# Patient Record
Sex: MALE | Race: WHITE | NOT HISPANIC OR LATINO | Employment: FULL TIME | URBAN - METROPOLITAN AREA
[De-identification: names, ages, dates, MRNs, and addresses within clinical notes are randomized per-mention and may not be internally consistent; named-entity substitution may affect disease eponyms.]

---

## 2018-06-14 ENCOUNTER — OFFICE VISIT (OUTPATIENT)
Dept: URGENT CARE | Facility: PHYSICIAN GROUP | Age: 53
End: 2018-06-14
Payer: OTHER GOVERNMENT

## 2018-06-14 ENCOUNTER — HOSPITAL ENCOUNTER (OUTPATIENT)
Dept: RADIOLOGY | Facility: MEDICAL CENTER | Age: 53
End: 2018-06-14
Attending: FAMILY MEDICINE
Payer: OTHER GOVERNMENT

## 2018-06-14 ENCOUNTER — HOSPITAL ENCOUNTER (OUTPATIENT)
Dept: LAB | Facility: MEDICAL CENTER | Age: 53
End: 2018-06-14
Attending: FAMILY MEDICINE
Payer: OTHER GOVERNMENT

## 2018-06-14 VITALS
OXYGEN SATURATION: 92 % | HEIGHT: 76 IN | RESPIRATION RATE: 12 BRPM | BODY MASS INDEX: 22.65 KG/M2 | SYSTOLIC BLOOD PRESSURE: 132 MMHG | TEMPERATURE: 97.7 F | WEIGHT: 186 LBS | HEART RATE: 71 BPM | DIASTOLIC BLOOD PRESSURE: 74 MMHG

## 2018-06-14 DIAGNOSIS — R05.9 COUGH: ICD-10-CM

## 2018-06-14 DIAGNOSIS — R60.0 LOWER EXTREMITY EDEMA: ICD-10-CM

## 2018-06-14 DIAGNOSIS — M79.89 CALF SWELLING: ICD-10-CM

## 2018-06-14 LAB
ANION GAP SERPL CALC-SCNC: 10 MMOL/L (ref 0–11.9)
BUN SERPL-MCNC: 13 MG/DL (ref 8–22)
CALCIUM SERPL-MCNC: 9.4 MG/DL (ref 8.5–10.5)
CHLORIDE SERPL-SCNC: 102 MMOL/L (ref 96–112)
CO2 SERPL-SCNC: 27 MMOL/L (ref 20–33)
CREAT SERPL-MCNC: 1.07 MG/DL (ref 0.5–1.4)
GLUCOSE SERPL-MCNC: 79 MG/DL (ref 65–99)
POTASSIUM SERPL-SCNC: 3.8 MMOL/L (ref 3.6–5.5)
SODIUM SERPL-SCNC: 139 MMOL/L (ref 135–145)

## 2018-06-14 PROCEDURE — 71046 X-RAY EXAM CHEST 2 VIEWS: CPT

## 2018-06-14 PROCEDURE — 36415 COLL VENOUS BLD VENIPUNCTURE: CPT

## 2018-06-14 PROCEDURE — 80048 BASIC METABOLIC PNL TOTAL CA: CPT

## 2018-06-14 PROCEDURE — 93971 EXTREMITY STUDY: CPT | Mod: LT

## 2018-06-14 PROCEDURE — 99214 OFFICE O/P EST MOD 30 MIN: CPT | Performed by: FAMILY MEDICINE

## 2018-06-14 RX ORDER — FUROSEMIDE 40 MG/1
40 TABLET ORAL DAILY
Qty: 3 TAB | Refills: 1 | Status: SHIPPED | OUTPATIENT
Start: 2018-06-14 | End: 2018-06-17

## 2018-06-14 ASSESSMENT — ENCOUNTER SYMPTOMS
EYE REDNESS: 0
WEIGHT LOSS: 0
SENSORY CHANGE: 0
FEVER: 0
EYE DISCHARGE: 0
FOCAL WEAKNESS: 0

## 2018-06-14 ASSESSMENT — PAIN SCALES - GENERAL: PAINLEVEL: NO PAIN

## 2018-06-14 NOTE — PROGRESS NOTES
"Subjective:      Tommy Mckeon is a 52 y.o. male who presents with Foot Swelling (Swelling in feet, ankles, and legs, dry cracked feet w/bleeding [id number])            1 year bilateral leg swelling. Waxes and wanes. Improves with compression. Swelling to mid leg. Left is worse than right. No orthopnea but coughs at night with apparent apnea episodes. No chest pain. No known CKD. No other aggravating or alleviating factors.            Review of Systems   Constitutional: Negative for fever and weight loss.   Eyes: Negative for discharge and redness.   Respiratory: Negative for hemoptysis.    Musculoskeletal: Negative for joint pain and myalgias.   Skin: Positive for itching (bilateral leg) and rash.   Neurological: Negative for sensory change and focal weakness.     .  Medications, Allergies, and current problem list reviewed today in Epic       Objective:     /74   Pulse 71   Temp 36.5 °C (97.7 °F)   Resp 12   Ht 1.93 m (6' 3.98\")   Wt 84.4 kg (186 lb)   SpO2 92%   BMI 22.65 kg/m²      Physical Exam   Constitutional: He appears well-developed and well-nourished. No distress.   HENT:   Head: Normocephalic and atraumatic.   Eyes: Conjunctivae are normal.   Neck: Neck supple.   Cardiovascular: Normal rate, regular rhythm and normal heart sounds.    Pulmonary/Chest: Effort normal and breath sounds normal.   Musculoskeletal: He exhibits edema (bilateral L>R. calf diameter 3cm L>R with mild tenderness, no cords. ).   Lymphadenopathy:     He has no cervical adenopathy.   Skin: Skin is warm and dry. No rash noted.               Assessment/Plan:   US no DVT  cxr per radiology:  1.  Left lower lobe airspace process which could be atelectasis or pneumonia    2.  No evidence for pulmonary edema    3.  Hiatal hernia    1. Lower extremity edema  US-EXTREMITY VENOUS UNILATERAL-LOWER LEFT    BASIC METABOLIC PANEL    furosemide (LASIX) 40 MG Tab   2. Calf swelling  US-EXTREMITY VENOUS UNILATERAL-LOWER LEFT   3. Cough  " DX-CHEST-2 VIEWS     Renal function ok for short duration diuresis and f/u with pcp  Suspect component of sleep apnea  Abnormal cxr results discussed with patient, no clinical evidence of pneumonia.

## 2018-06-19 ENCOUNTER — OFFICE VISIT (OUTPATIENT)
Dept: MEDICAL GROUP | Facility: PHYSICIAN GROUP | Age: 53
End: 2018-06-19
Payer: OTHER GOVERNMENT

## 2018-06-19 VITALS
OXYGEN SATURATION: 90 % | BODY MASS INDEX: 34.34 KG/M2 | HEART RATE: 97 BPM | TEMPERATURE: 98.1 F | DIASTOLIC BLOOD PRESSURE: 84 MMHG | WEIGHT: 282 LBS | RESPIRATION RATE: 18 BRPM | HEIGHT: 76 IN | SYSTOLIC BLOOD PRESSURE: 154 MMHG

## 2018-06-19 DIAGNOSIS — E66.9 OBESITY (BMI 30-39.9): ICD-10-CM

## 2018-06-19 DIAGNOSIS — Z23 NEED FOR TDAP VACCINATION: ICD-10-CM

## 2018-06-19 DIAGNOSIS — I10 ESSENTIAL HYPERTENSION: ICD-10-CM

## 2018-06-19 DIAGNOSIS — Z12.11 COLON CANCER SCREENING: ICD-10-CM

## 2018-06-19 DIAGNOSIS — M79.89 LEG SWELLING: ICD-10-CM

## 2018-06-19 DIAGNOSIS — R03.0 ELEVATED BLOOD PRESSURE READING: ICD-10-CM

## 2018-06-19 PROCEDURE — 90715 TDAP VACCINE 7 YRS/> IM: CPT | Performed by: INTERNAL MEDICINE

## 2018-06-19 PROCEDURE — 90471 IMMUNIZATION ADMIN: CPT | Performed by: INTERNAL MEDICINE

## 2018-06-19 PROCEDURE — 99214 OFFICE O/P EST MOD 30 MIN: CPT | Mod: 25 | Performed by: INTERNAL MEDICINE

## 2018-06-19 RX ORDER — FUROSEMIDE 40 MG/1
40 TABLET ORAL DAILY
COMMUNITY
End: 2018-06-19

## 2018-06-19 RX ORDER — LISINOPRIL AND HYDROCHLOROTHIAZIDE 12.5; 1 MG/1; MG/1
1 TABLET ORAL DAILY
Qty: 30 TAB | Refills: 3 | Status: SHIPPED | OUTPATIENT
Start: 2018-06-19 | End: 2018-09-18 | Stop reason: SDUPTHER

## 2018-06-19 ASSESSMENT — PATIENT HEALTH QUESTIONNAIRE - PHQ9: CLINICAL INTERPRETATION OF PHQ2 SCORE: 0

## 2018-06-19 NOTE — ASSESSMENT & PLAN NOTE
He has had bilateral leg swelling for several years but particularly worse in the past year. He was recently seen in urgent care with ultrasound recently that was negative for LLE DVT. He was also prescribed lasix and compression shoes and sleeping with his legs elevated. Denies chest pain, shortness of breath, orthopnea, palpitations. Does have shortness of breath though when he's exercising.

## 2018-06-19 NOTE — ASSESSMENT & PLAN NOTE
He says on his home scale he is fluctuating between 265-270 lbs. He does think that his pants are fitting better. He is trying to make dietary modifications and work out.

## 2018-06-19 NOTE — PROGRESS NOTES
PRIMARY CARE CLINIC NEW PATIENT H&P  Chief Complaint   Patient presents with   • Edema     bilateral legs and feet     History of Present Illness     Leg swelling  He has had bilateral leg swelling for several years but particularly worse in the past year. He was recently seen in urgent care with ultrasound recently that was negative for LLE DVT. He was also prescribed lasix and compression shoes and sleeping with his legs elevated. Denies chest pain, shortness of breath, orthopnea, palpitations. Does have shortness of breath though when he's exercising.     Obesity (BMI 30-39.9)  He says on his home scale he is fluctuating between 265-270 lbs. He does think that his pants are fitting better. He is trying to make dietary modifications and work out.     Elevated blood pressure reading  Blood pressure reading of 154/84 today. He doesn't have a blood pressure machine at home.     Current Outpatient Prescriptions   Medication Sig Dispense Refill   • lisinopril-hydrochlorothiazide (PRINZIDE, ZESTORETIC) 10-12.5 MG per tablet Take 1 Tab by mouth every day. 30 Tab 3     No current facility-administered medications for this visit.      Past Medical History:   Diagnosis Date   • Hypertension    • Obesity (BMI 30-39.9) 2016     Past Surgical History:   Procedure Laterality Date   • EYE SURGERY       Social History   Substance Use Topics   • Smoking status: Never Smoker   • Smokeless tobacco: Never Used   • Alcohol use 0.6 oz/week     1 Cans of beer per week      Comment: occassionally      Social History     Social History Narrative    Works in security      Family History   Problem Relation Age of Onset   • Diabetes Mother    • Stroke Mother      Family Status   Relation Status   • Mother    • Father     HX unknown   • Brother Alive     Allergies: Patient has no known allergies.    ROS  Constitutional: Negative for fatigue/generalized weakness.   HEENT: Negative for  vision changes, hearing changes   "  Respiratory: Negative for shortness of breath  Cardiovascular: Negative for chest pain, palpitations  Gastrointestinal: Negative for blood in stool, constipation, diarrhea  Genitourinary: Negative for dysuria, polyuria  Musculoskeletal: Negative for myalgias, back pain, and joint pain.   Skin: Negative for rash  Neurological: Negative for numbness, tingling  Psychiatric/Behavioral: Negative for depression, anxiety       Objective   Blood pressure 154/84, pulse 97, temperature 36.7 °C (98.1 °F), resp. rate 18, height 1.93 m (6' 3.98\"), weight (!) 127.9 kg (282 lb), SpO2 90 %. Body mass index is 34.34 kg/m².    General: Alert, oriented. In no acute distress   HEET: EOMI, PERRL, conjunctiva non-injected, sclera non-icteric.  Nares patent with no significant congestion or drainage.  Cecily pinnae, external auditory canals, TM pearly gray with normal light reflex bilaterally.Oral mucous membranes pink and moist with no lesions.  Neck: supple with no cervical, subclavicular lymphadenopathy, JVD, palpable thyroid nodules   Lungs: clear to auscultation bilaterally with good excursion.  CV: regular rate and rhythm.  Abdomen soft, non-distended, non-tender with normal bowel sounds. No hepatosplenomegaly, no masses palpated  Extremities: non-pitting bilateral lower extremity edema up to mid calves. Bilateral venous stasis changes   Psychiatric: appropriate mood and affect       Assessment and Plan   The following treatment plan was discussed     1. Leg swelling  Suspect that his lower extremity edema is from weight and uncontrolled hypertension. Will also check BNP (recent BMP with normal kidney function). Advised to continue compression stockings, work on weight loss, and will manage blood pressure as below. Asked him to stop the lasix.   - CBC WITH DIFFERENTIAL; Future  - LIPID PROFILE; Future  - B TYPE NATRIURETIC    2. BMI 34.0-34.9,adult  - Patient identified as having weight management issue.  Appropriate orders and " counseling given.    3. Colon cancer screening  Due for screening colonoscopy.   - REFERRAL TO GI FOR COLONOSCOPY    4. Essential hypertension  His blood pressure today is elevated and suspect he may have uncontrolled hypertension. Will start low dose Prinzide and advised obtaining a blood pressure cuff and keeping home blood pressure log (demonstrated proper technique) and will follow up within 4 weeks.   - lisinopril-hydrochlorothiazide (PRINZIDE, ZESTORETIC) 10-12.5 MG per tablet; Take 1 Tab by mouth every day.  Dispense: 30 Tab; Refill: 3    5. Need for Tdap vaccination  Given today.   - TDAP VACCINE =>6YO IM      Return in about 4 weeks (around 7/17/2018) for blood pressure management .    Health Maintenance      Health Maintenance Due   Topic Date Due   • IMM DTaP/Tdap/Td Vaccine (1 - Tdap) 10/05/1982   • COLONOSCOPY  10/05/2013       Trent Dsouza MD  Internal Medicine  [facility]

## 2018-06-21 ENCOUNTER — HOSPITAL ENCOUNTER (OUTPATIENT)
Dept: LAB | Facility: MEDICAL CENTER | Age: 53
End: 2018-06-21
Attending: INTERNAL MEDICINE
Payer: OTHER GOVERNMENT

## 2018-06-21 DIAGNOSIS — M79.89 LEG SWELLING: ICD-10-CM

## 2018-06-21 LAB
BASOPHILS # BLD AUTO: 1.4 % (ref 0–1.8)
BASOPHILS # BLD: 0.11 K/UL (ref 0–0.12)
CHOLEST SERPL-MCNC: 185 MG/DL (ref 100–199)
EOSINOPHIL # BLD AUTO: 0.22 K/UL (ref 0–0.51)
EOSINOPHIL NFR BLD: 2.8 % (ref 0–6.9)
ERYTHROCYTE [DISTWIDTH] IN BLOOD BY AUTOMATED COUNT: 46 FL (ref 35.9–50)
HCT VFR BLD AUTO: 52.1 % (ref 42–52)
HDLC SERPL-MCNC: 51 MG/DL
HGB BLD-MCNC: 17.4 G/DL (ref 14–18)
IMM GRANULOCYTES # BLD AUTO: 0.02 K/UL (ref 0–0.11)
IMM GRANULOCYTES NFR BLD AUTO: 0.3 % (ref 0–0.9)
LDLC SERPL CALC-MCNC: 114 MG/DL
LYMPHOCYTES # BLD AUTO: 3.06 K/UL (ref 1–4.8)
LYMPHOCYTES NFR BLD: 38.9 % (ref 22–41)
MCH RBC QN AUTO: 32 PG (ref 27–33)
MCHC RBC AUTO-ENTMCNC: 33.4 G/DL (ref 33.7–35.3)
MCV RBC AUTO: 95.9 FL (ref 81.4–97.8)
MONOCYTES # BLD AUTO: 0.8 K/UL (ref 0–0.85)
MONOCYTES NFR BLD AUTO: 10.2 % (ref 0–13.4)
NEUTROPHILS # BLD AUTO: 3.66 K/UL (ref 1.82–7.42)
NEUTROPHILS NFR BLD: 46.4 % (ref 44–72)
NRBC # BLD AUTO: 0 K/UL
NRBC BLD-RTO: 0 /100 WBC
PLATELET # BLD AUTO: 263 K/UL (ref 164–446)
PMV BLD AUTO: 11.1 FL (ref 9–12.9)
RBC # BLD AUTO: 5.43 M/UL (ref 4.7–6.1)
TRIGL SERPL-MCNC: 102 MG/DL (ref 0–149)
WBC # BLD AUTO: 7.9 K/UL (ref 4.8–10.8)

## 2018-06-21 PROCEDURE — 85025 COMPLETE CBC W/AUTO DIFF WBC: CPT

## 2018-06-21 PROCEDURE — 80061 LIPID PANEL: CPT

## 2018-06-21 PROCEDURE — 36415 COLL VENOUS BLD VENIPUNCTURE: CPT

## 2018-06-22 ASSESSMENT — ENCOUNTER SYMPTOMS
MYALGIAS: 0
HEMOPTYSIS: 0

## 2018-07-24 ENCOUNTER — OFFICE VISIT (OUTPATIENT)
Dept: MEDICAL GROUP | Facility: PHYSICIAN GROUP | Age: 53
End: 2018-07-24
Payer: OTHER GOVERNMENT

## 2018-07-24 VITALS
SYSTOLIC BLOOD PRESSURE: 138 MMHG | WEIGHT: 280 LBS | HEART RATE: 70 BPM | RESPIRATION RATE: 16 BRPM | TEMPERATURE: 97.5 F | DIASTOLIC BLOOD PRESSURE: 88 MMHG | BODY MASS INDEX: 34.1 KG/M2 | HEIGHT: 76 IN | OXYGEN SATURATION: 94 %

## 2018-07-24 DIAGNOSIS — D18.01 CHERRY ANGIOMA: ICD-10-CM

## 2018-07-24 DIAGNOSIS — M79.89 LEG SWELLING: ICD-10-CM

## 2018-07-24 PROBLEM — I10 HYPERTENSION: Status: ACTIVE | Noted: 2018-07-24

## 2018-07-24 PROCEDURE — 99213 OFFICE O/P EST LOW 20 MIN: CPT | Performed by: INTERNAL MEDICINE

## 2018-07-24 NOTE — PROGRESS NOTES
"PRIMARY CARE CLINIC FOLLOW UP VISIT  Chief Complaint   Patient presents with   • Results     labs   • Hypertension   • Edema     Lt Foot      History of Present Illness     Hypertension  He brings in his blood pressure log since he started Prinzide 10-12.5 on 2018. He is also going to the gym about 3 times a week and is doing well on the Prinzide.     Leg swelling  His right lower extremity is a lot better and left lower extremity swelling has also improved. He has been using compression socks when he goes to work and compression boots at home.     Cherry angioma  Wife points out several cherry angiomas on Tommy' back.     Current Outpatient Prescriptions   Medication Sig Dispense Refill   • lisinopril-hydrochlorothiazide (PRINZIDE, ZESTORETIC) 10-12.5 MG per tablet Take 1 Tab by mouth every day. 30 Tab 3     No current facility-administered medications for this visit.      Past Medical History:   Diagnosis Date   • Hypertension    • Obesity (BMI 30-39.9) 2016     Past Surgical History:   Procedure Laterality Date   • EYE SURGERY       Social History   Substance Use Topics   • Smoking status: Never Smoker   • Smokeless tobacco: Never Used   • Alcohol use 0.6 oz/week     1 Cans of beer per week      Comment: occassionally      Social History     Social History Narrative    Works in security      Family History   Problem Relation Age of Onset   • Diabetes Mother    • Stroke Mother      Family Status   Relation Status   • Mother    • Father     HX unknown   • Brother Alive     Allergies: Patient has no known allergies.    ROS  As per HPI above. All other systems reviewed and negative.        Objective   Blood pressure 138/88, pulse 70, temperature 36.4 °C (97.5 °F), resp. rate 16, height 1.93 m (6' 3.98\"), weight (!) 127 kg (280 lb), SpO2 94 %. Body mass index is 34.1 kg/m².    General: alert and oriented, pleasant, cooperative  HEENT: Normocephalic, atraumatic  Cardiovascular: regular " rate and rhythm, normal S1/S2  Pulmonary: lungs clear to auscultation bilaterally  Lymphatics: no cervical or supraclavicular lymphadenopathy   Skin: scattered cherry angiomas of back  Extremities: non-pitting bilateral lower extremity edema   Psychiatric: appropriate mood and affect. Good insight and appropriate judgment     Assessment and Plan   The following treatment plan was discussed     1. Leg swelling  Improving with blood pressure control and compression stockings. LLE DVT US in urgent care 6/2018 was negative. Advised him to continue current dose of Prinzide (blood pressure log scanned into media tab and SBPs ranging from 120-130 and look excellent). Advised continue weight reduction with diet and exercise. Follow up in 6 months.     2. Cherry angioma  Reassured these are benign.       Healthcare maintenance     Health Maintenance Due   Topic Date Due   • COLONOSCOPY  10/05/2013       Return in about 6 months (around 1/24/2019).    Trent Dsouza MD  Internal Medicine  [facility]

## 2018-07-24 NOTE — ASSESSMENT & PLAN NOTE
He brings in his blood pressure log since he started Prinzide 10-12.5 on 6/19/2018. He is also going to the gym about 3 times a week and is doing well on the Prinzide.

## 2018-07-24 NOTE — ASSESSMENT & PLAN NOTE
His right lower extremity is a lot better and left lower extremity swelling has also improved. He has been using compression socks when he goes to work and compression boots at home.

## 2018-08-29 ENCOUNTER — OFFICE VISIT (OUTPATIENT)
Dept: URGENT CARE | Facility: PHYSICIAN GROUP | Age: 53
End: 2018-08-29
Payer: OTHER GOVERNMENT

## 2018-08-29 VITALS
SYSTOLIC BLOOD PRESSURE: 104 MMHG | BODY MASS INDEX: 33.61 KG/M2 | HEIGHT: 76 IN | DIASTOLIC BLOOD PRESSURE: 78 MMHG | OXYGEN SATURATION: 98 % | HEART RATE: 79 BPM | TEMPERATURE: 98.4 F | WEIGHT: 276 LBS | RESPIRATION RATE: 18 BRPM

## 2018-08-29 DIAGNOSIS — B35.9 DERMATOPHYTOSIS: ICD-10-CM

## 2018-08-29 DIAGNOSIS — I87.2 VENOUS STASIS DERMATITIS OF BOTH LOWER EXTREMITIES: ICD-10-CM

## 2018-08-29 PROCEDURE — 99202 OFFICE O/P NEW SF 15 MIN: CPT | Performed by: EMERGENCY MEDICINE

## 2018-08-29 RX ORDER — CLOTRIMAZOLE AND BETAMETHASONE DIPROPIONATE 10; .64 MG/G; MG/G
1 CREAM TOPICAL 2 TIMES DAILY
Qty: 1 TUBE | Refills: 0 | Status: SHIPPED
Start: 2018-08-29 | End: 2020-01-28

## 2018-08-29 ASSESSMENT — ENCOUNTER SYMPTOMS
FEVER: 0
SENSORY CHANGE: 0

## 2018-08-30 NOTE — PROGRESS NOTES
"Subjective:      Tommy Mckeon is a 52 y.o. male who presents with Rash (on the back of the left ankle x 1 week and has increased in size)            Rash   This is a new problem. The current episode started in the past 7 days. The problem has been gradually worsening since onset. The affected locations include the left ankle. The rash is characterized by itchiness, peeling and scaling. He was exposed to nothing. Pertinent negatives include no fever. Past treatments include topical steroids. The treatment provided mild relief. There is no history of eczema.   Patient notes chronic lower extremity edema, uses compression stockings, OTC hydrocortisone. No trauma, notes weeping from lesion last 2 days.    Review of Systems   Constitutional: Negative for fever.   Skin: Positive for itching and rash.   Neurological: Negative for sensory change.     PMH:  has a past medical history of Hypertension and Obesity (BMI 30-39.9) (4/19/2016).  MEDS:   Current Outpatient Prescriptions:   •  clotrimazole-betamethasone (LOTRISONE) 1-0.05 % Cream, Apply 1 Application to affected area(s) 2 times a day., Disp: 1 Tube, Rfl: 0  •  lisinopril-hydrochlorothiazide (PRINZIDE, ZESTORETIC) 10-12.5 MG per tablet, Take 1 Tab by mouth every day., Disp: 30 Tab, Rfl: 3  ALLERGIES: No Known Allergies  SURGHX:   Past Surgical History:   Procedure Laterality Date   • EYE SURGERY  2015     SOCHX:  reports that he has never smoked. He has never used smokeless tobacco. He reports that he drinks about 0.6 oz of alcohol per week . He reports that he does not use drugs.  FH: family history includes Diabetes in his mother; Stroke in his mother.       Objective:     /78   Pulse 79   Temp 36.9 °C (98.4 °F)   Resp 18   Ht 1.93 m (6' 4\")   Wt (!) 125.2 kg (276 lb)   SpO2 98%   BMI 33.60 kg/m²      Physical Exam   Constitutional: Vital signs are normal. He appears well-developed and well-nourished. He is cooperative. He does not appear ill. No " distress.   Cardiovascular: Normal rate, regular rhythm and normal heart sounds.    No calf tenderness, Homans sign negative.   Pulmonary/Chest: Effort normal and breath sounds normal.   Lymphadenopathy:   No lymphangitis   Neurological: He is alert.   Distal sensation to light touch and pressure intact.   Skin: Skin is dry. Rash noted.        Bilateral lower extremity edema with mild hyperpigmentation, scaliness.   Psychiatric: He has a normal mood and affect.               Assessment/Plan:     1. Dermatophytosis  PCP follow up if not resolving  - clotrimazole-betamethasone (LOTRISONE) 1-0.05 % Cream; Apply 1 Application to affected area(s) 2 times a day.  Dispense: 1 Tube; Refill: 0    2. Venous stasis dermatitis of both lower extremities  Compression stockings, CeraVe topical

## 2018-09-18 ENCOUNTER — OFFICE VISIT (OUTPATIENT)
Dept: MEDICAL GROUP | Facility: PHYSICIAN GROUP | Age: 53
End: 2018-09-18
Payer: OTHER GOVERNMENT

## 2018-09-18 VITALS
SYSTOLIC BLOOD PRESSURE: 124 MMHG | BODY MASS INDEX: 33.36 KG/M2 | WEIGHT: 274 LBS | DIASTOLIC BLOOD PRESSURE: 78 MMHG | HEART RATE: 82 BPM | OXYGEN SATURATION: 96 % | TEMPERATURE: 97.3 F | RESPIRATION RATE: 16 BRPM | HEIGHT: 76 IN

## 2018-09-18 DIAGNOSIS — R05.9 COUGH: ICD-10-CM

## 2018-09-18 DIAGNOSIS — R21 RASH: ICD-10-CM

## 2018-09-18 DIAGNOSIS — I10 ESSENTIAL HYPERTENSION: ICD-10-CM

## 2018-09-18 PROCEDURE — 99213 OFFICE O/P EST LOW 20 MIN: CPT | Performed by: INTERNAL MEDICINE

## 2018-09-18 RX ORDER — OMEPRAZOLE 20 MG/1
20 CAPSULE, DELAYED RELEASE ORAL DAILY
Qty: 60 CAP | Refills: 0 | Status: SHIPPED | OUTPATIENT
Start: 2018-09-18 | End: 2018-12-03 | Stop reason: SDUPTHER

## 2018-09-18 RX ORDER — LISINOPRIL AND HYDROCHLOROTHIAZIDE 12.5; 1 MG/1; MG/1
1 TABLET ORAL DAILY
Qty: 90 TAB | Refills: 1 | Status: SHIPPED | OUTPATIENT
Start: 2018-09-18 | End: 2019-04-01 | Stop reason: SDUPTHER

## 2018-09-18 NOTE — ASSESSMENT & PLAN NOTE
Having a raspy cough at night so he has to sit up to sleep. Sometimes has acid reflux. Used to be able to eat things like chili but cannot tolerate these foods any longer. Nocturnal coughing can be so violent he has to move to the cough downstairs to prop himself up to sleep. Eats dinner around 5 pm and goes to bed around 10 pm. His wife has bought him a foam angle to help prop himself up but he doesn't always use it.

## 2018-09-18 NOTE — ASSESSMENT & PLAN NOTE
Was seen 8/29/2018 in urgent care for a rash behind her left ankle and prescribed Lotrisone. His weeping rash has healed well with the treatment that was prescribed.

## 2018-09-18 NOTE — PROGRESS NOTES
PRIMARY CARE CLINIC FOLLOW UP VISIT  Chief Complaint   Patient presents with   • Cough     nightly    • Rash     Lt foot-[facility] Follow up    • Hypertension     lisinopril-hydrochlorothiazide      History of Present Illness     Rash  Was seen 2018 in urgent care for a rash behind her left ankle and prescribed Lotrisone. His weeping rash has healed well with the treatment that was prescribed.     Cough  Having a raspy cough at night so he has to sit up to sleep. Sometimes has acid reflux. Used to be able to eat things like chili but cannot tolerate these foods any longer. Nocturnal coughing can be so violent he has to move to the cough downstairs to prop himself up to sleep. Eats dinner around 5 pm and goes to bed around 10 pm. His wife has bought him a foam angle to help prop himself up but he doesn't always use it.     Current Outpatient Prescriptions   Medication Sig Dispense Refill   • lisinopril-hydrochlorothiazide (PRINZIDE, ZESTORETIC) 10-12.5 MG per tablet Take 1 Tab by mouth every day. 90 Tab 1   • omeprazole (PRILOSEC) 20 MG delayed-release capsule Take 1 Cap by mouth every day. Take 30 minutes before breakfast 60 Cap 0   • clotrimazole-betamethasone (LOTRISONE) 1-0.05 % Cream Apply 1 Application to affected area(s) 2 times a day. 1 Tube 0     No current facility-administered medications for this visit.      Past Medical History:   Diagnosis Date   • Hypertension    • Obesity (BMI 30-39.9) 2016     Past Surgical History:   Procedure Laterality Date   • EYE SURGERY       Social History   Substance Use Topics   • Smoking status: Never Smoker   • Smokeless tobacco: Never Used   • Alcohol use 0.6 oz/week     1 Cans of beer per week      Comment: occassionally      Social History     Social History Narrative    Works in security      Family History   Problem Relation Age of Onset   • Diabetes Mother    • Stroke Mother      Family Status   Relation Status   • Mo    • Fa         HX unknown  "  • Bro Alive     Allergies: Patient has no known allergies.    ROS  As per HPI above. All other systems reviewed and negative.        Objective   Blood pressure 124/78, pulse 82, temperature 36.3 °C (97.3 °F), resp. rate 16, height 1.93 m (6' 3.98\"), weight 124.3 kg (274 lb), SpO2 96 %. Body mass index is 33.37 kg/m².    General: alert and oriented, pleasant, cooperative  HEENT: Normocephalic, atraumatic.   Skin: dry, crackled skin of left ankle/heel   Psychiatric: appropriate mood and affect. Good insight and appropriate judgment     Assessment and Plan   The following treatment plan was discussed     1. Rash  His rash is healing well with the lotrisone prescribed by urgent care. Advised to continue care with moisturizing.     2. Essential hypertension  Well controlled on Prinzide, continue present dose.   - lisinopril-hydrochlorothiazide (PRINZIDE, ZESTORETIC) 10-12.5 MG per tablet; Take 1 Tab by mouth every day.  Dispense: 90 Tab; Refill: 1    3. Cough  His nocturnal cough symptoms could very well be GERD related. Advised to identify and avoid particular food triggers and trial 4-6 weeks of PPI.   - omeprazole (PRILOSEC) 20 MG delayed-release capsule; Take 1 Cap by mouth every day. Take 30 minutes before breakfast  Dispense: 60 Cap; Refill: 0    Healthcare maintenance     There are no preventive care reminders to display for this patient.    Return if symptoms worsen or fail to improve.    Trent Dsouza MD  Internal Medicine  [facility]                   "

## 2018-12-03 DIAGNOSIS — R05.9 COUGH: ICD-10-CM

## 2018-12-04 RX ORDER — OMEPRAZOLE 20 MG/1
CAPSULE, DELAYED RELEASE ORAL
Qty: 90 CAP | Refills: 1 | Status: SHIPPED | OUTPATIENT
Start: 2018-12-04 | End: 2019-07-12 | Stop reason: SDUPTHER

## 2018-12-04 NOTE — TELEPHONE ENCOUNTER
Was the patient seen in the last year in this department? Yes    Does patient have an active prescription for medications requested? No     Received Request Via: Pharmacy      Pt met protocol?: Yes. Last 9/18/18  Last labs 6/18

## 2019-04-01 DIAGNOSIS — I10 ESSENTIAL HYPERTENSION: ICD-10-CM

## 2019-04-02 NOTE — TELEPHONE ENCOUNTER
Was the patient seen in the last year in this department? Yes    Does patient have an active prescription for medications requested? No     Received Request Via: Pharmacy      Pt met protocol?: No, OV 9/18   BP Readings from Last 1 Encounters:   09/18/18 124/78

## 2019-04-03 RX ORDER — LISINOPRIL AND HYDROCHLOROTHIAZIDE 12.5; 1 MG/1; MG/1
TABLET ORAL
Qty: 90 TAB | Refills: 1 | Status: SHIPPED | OUTPATIENT
Start: 2019-04-03 | End: 2020-01-13

## 2019-04-03 NOTE — TELEPHONE ENCOUNTER
Refill X 6 months, sent to pharmacy.Pt. Seen in the last 6 months per protocol.   Lab Results   Component Value Date/Time    SODIUM 139 06/14/2018 12:30 PM    POTASSIUM 3.8 06/14/2018 12:30 PM    CHLORIDE 102 06/14/2018 12:30 PM    CO2 27 06/14/2018 12:30 PM    GLUCOSE 79 06/14/2018 12:30 PM    BUN 13 06/14/2018 12:30 PM    CREATININE 1.07 06/14/2018 12:30 PM

## 2019-07-12 DIAGNOSIS — R05.9 COUGH: ICD-10-CM

## 2019-07-15 RX ORDER — OMEPRAZOLE 20 MG/1
CAPSULE, DELAYED RELEASE ORAL
Qty: 90 CAP | Refills: 0 | Status: SHIPPED | OUTPATIENT
Start: 2019-07-15 | End: 2019-10-20 | Stop reason: SDUPTHER

## 2019-10-20 DIAGNOSIS — R05.9 COUGH: ICD-10-CM

## 2019-10-22 RX ORDER — OMEPRAZOLE 20 MG/1
CAPSULE, DELAYED RELEASE ORAL
Qty: 90 CAP | Refills: 0 | Status: SHIPPED | OUTPATIENT
Start: 2019-10-22 | End: 2020-01-28 | Stop reason: SDUPTHER

## 2020-01-28 ENCOUNTER — HOSPITAL ENCOUNTER (OUTPATIENT)
Dept: LAB | Facility: MEDICAL CENTER | Age: 55
End: 2020-01-28
Attending: NURSE PRACTITIONER
Payer: OTHER GOVERNMENT

## 2020-01-28 ENCOUNTER — OFFICE VISIT (OUTPATIENT)
Dept: MEDICAL GROUP | Facility: PHYSICIAN GROUP | Age: 55
End: 2020-01-28
Payer: OTHER GOVERNMENT

## 2020-01-28 VITALS
TEMPERATURE: 97.3 F | HEART RATE: 80 BPM | OXYGEN SATURATION: 94 % | RESPIRATION RATE: 16 BRPM | HEIGHT: 76 IN | WEIGHT: 272 LBS | DIASTOLIC BLOOD PRESSURE: 78 MMHG | SYSTOLIC BLOOD PRESSURE: 120 MMHG | BODY MASS INDEX: 33.12 KG/M2

## 2020-01-28 DIAGNOSIS — E78.5 DYSLIPIDEMIA: ICD-10-CM

## 2020-01-28 DIAGNOSIS — I10 ESSENTIAL HYPERTENSION: ICD-10-CM

## 2020-01-28 DIAGNOSIS — Z11.59 NEED FOR HEPATITIS C SCREENING TEST: ICD-10-CM

## 2020-01-28 DIAGNOSIS — E55.9 AVITAMINOSIS D: ICD-10-CM

## 2020-01-28 DIAGNOSIS — Z12.5 SCREENING PSA (PROSTATE SPECIFIC ANTIGEN): ICD-10-CM

## 2020-01-28 DIAGNOSIS — R05.9 COUGH: ICD-10-CM

## 2020-01-28 LAB
25(OH)D3 SERPL-MCNC: 40 NG/ML (ref 30–100)
ALBUMIN SERPL BCP-MCNC: 4.2 G/DL (ref 3.2–4.9)
ALBUMIN/GLOB SERPL: 1.3 G/DL
ALP SERPL-CCNC: 29 U/L (ref 30–99)
ALT SERPL-CCNC: 31 U/L (ref 2–50)
ANION GAP SERPL CALC-SCNC: 10 MMOL/L (ref 0–11.9)
AST SERPL-CCNC: 31 U/L (ref 12–45)
BASOPHILS # BLD AUTO: 1.2 % (ref 0–1.8)
BASOPHILS # BLD: 0.08 K/UL (ref 0–0.12)
BILIRUB SERPL-MCNC: 0.6 MG/DL (ref 0.1–1.5)
BUN SERPL-MCNC: 18 MG/DL (ref 8–22)
CALCIUM SERPL-MCNC: 9.8 MG/DL (ref 8.5–10.5)
CHLORIDE SERPL-SCNC: 103 MMOL/L (ref 96–112)
CHOLEST SERPL-MCNC: 187 MG/DL (ref 100–199)
CO2 SERPL-SCNC: 27 MMOL/L (ref 20–33)
COMMENT 1642: NORMAL
CREAT SERPL-MCNC: 1.07 MG/DL (ref 0.5–1.4)
EOSINOPHIL # BLD AUTO: 0.41 K/UL (ref 0–0.51)
EOSINOPHIL NFR BLD: 6.3 % (ref 0–6.9)
ERYTHROCYTE [DISTWIDTH] IN BLOOD BY AUTOMATED COUNT: 46.5 FL (ref 35.9–50)
FASTING STATUS PATIENT QL REPORTED: NORMAL
GLOBULIN SER CALC-MCNC: 3.3 G/DL (ref 1.9–3.5)
GLUCOSE SERPL-MCNC: 83 MG/DL (ref 65–99)
HCT VFR BLD AUTO: 50.2 % (ref 42–52)
HCV AB SER QL: NEGATIVE
HDLC SERPL-MCNC: 51 MG/DL
HGB BLD-MCNC: 16.9 G/DL (ref 14–18)
IMM GRANULOCYTES # BLD AUTO: 0.02 K/UL (ref 0–0.11)
IMM GRANULOCYTES NFR BLD AUTO: 0.3 % (ref 0–0.9)
LDLC SERPL CALC-MCNC: 112 MG/DL
LYMPHOCYTES # BLD AUTO: 2.07 K/UL (ref 1–4.8)
LYMPHOCYTES NFR BLD: 31.9 % (ref 22–41)
MCH RBC QN AUTO: 32.9 PG (ref 27–33)
MCHC RBC AUTO-ENTMCNC: 33.7 G/DL (ref 33.7–35.3)
MCV RBC AUTO: 97.7 FL (ref 81.4–97.8)
MONOCYTES # BLD AUTO: 0.56 K/UL (ref 0–0.85)
MONOCYTES NFR BLD AUTO: 8.6 % (ref 0–13.4)
MORPHOLOGY BLD-IMP: NORMAL
NEUTROPHILS # BLD AUTO: 3.34 K/UL (ref 1.82–7.42)
NEUTROPHILS NFR BLD: 51.7 % (ref 44–72)
NRBC # BLD AUTO: 0 K/UL
NRBC BLD-RTO: 0 /100 WBC
PLATELET # BLD AUTO: 205 K/UL (ref 164–446)
PMV BLD AUTO: 12.1 FL (ref 9–12.9)
POTASSIUM SERPL-SCNC: 4 MMOL/L (ref 3.6–5.5)
PROT SERPL-MCNC: 7.5 G/DL (ref 6–8.2)
PSA SERPL-MCNC: 1.01 NG/ML (ref 0–4)
RBC # BLD AUTO: 5.14 M/UL (ref 4.7–6.1)
SODIUM SERPL-SCNC: 140 MMOL/L (ref 135–145)
TRIGL SERPL-MCNC: 119 MG/DL (ref 0–149)
WBC # BLD AUTO: 6.5 K/UL (ref 4.8–10.8)

## 2020-01-28 PROCEDURE — 80053 COMPREHEN METABOLIC PANEL: CPT

## 2020-01-28 PROCEDURE — 80061 LIPID PANEL: CPT

## 2020-01-28 PROCEDURE — 85025 COMPLETE CBC W/AUTO DIFF WBC: CPT

## 2020-01-28 PROCEDURE — 82306 VITAMIN D 25 HYDROXY: CPT

## 2020-01-28 PROCEDURE — 86803 HEPATITIS C AB TEST: CPT

## 2020-01-28 PROCEDURE — 99214 OFFICE O/P EST MOD 30 MIN: CPT | Performed by: NURSE PRACTITIONER

## 2020-01-28 PROCEDURE — 84153 ASSAY OF PSA TOTAL: CPT

## 2020-01-28 PROCEDURE — 36415 COLL VENOUS BLD VENIPUNCTURE: CPT

## 2020-01-28 RX ORDER — OMEPRAZOLE 20 MG/1
CAPSULE, DELAYED RELEASE ORAL
Qty: 90 CAP | Refills: 3 | Status: SHIPPED | OUTPATIENT
Start: 2020-01-28 | End: 2020-10-26 | Stop reason: SDUPTHER

## 2020-01-28 RX ORDER — HYDROCODONE BITARTRATE AND ACETAMINOPHEN 5; 325 MG/1; MG/1
TABLET ORAL
COMMUNITY
Start: 2019-10-31 | End: 2020-01-28

## 2020-01-28 RX ORDER — LISINOPRIL AND HYDROCHLOROTHIAZIDE 12.5; 1 MG/1; MG/1
1 TABLET ORAL DAILY
Qty: 90 TAB | Refills: 3 | Status: SHIPPED | OUTPATIENT
Start: 2020-01-28 | End: 2020-10-26 | Stop reason: SDUPTHER

## 2020-01-28 ASSESSMENT — PATIENT HEALTH QUESTIONNAIRE - PHQ9: CLINICAL INTERPRETATION OF PHQ2 SCORE: 0

## 2020-01-28 NOTE — PROGRESS NOTES
Chief Complaint   Patient presents with   • Medication Management     Rx Check        HISTORY OF PRESENT ILLNESS: Patient is a 54 y.o. male established patient who presents today to discuss the following issues:    Hypertension  Chronic in nature.  Stable on meds.  Due for labs and refills.    Cough  Would like refills of prilosec.    BMI 33.0-33.9,adult  Patient is aware of BMI elevation.  Brief discussion of diet, exercise, and lifestyle modification.      Dyslipidemia  Due for labs.      Patient Active Problem List    Diagnosis Date Noted   • Dyslipidemia 2020   • Rash 2018   • Cough 2018   • Hypertension 2018   • BMI 33.0-33.9,adult 2016       Allergies:Patient has no known allergies.    Current Outpatient Medications   Medication Sig Dispense Refill   • lisinopril-hydrochlorothiazide (PRINZIDE) 10-12.5 MG per tablet Take 1 Tab by mouth every day. 90 Tab 3   • omeprazole (PRILOSEC) 20 MG delayed-release capsule TAKE 1 CAPSULE BY MOUTH ONCE DAILY 90 Cap 3     No current facility-administered medications for this visit.        Social History     Tobacco Use   • Smoking status: Never Smoker   • Smokeless tobacco: Never Used   Substance Use Topics   • Alcohol use: Yes     Alcohol/week: 0.6 oz     Types: 1 Cans of beer per week     Comment: occassionally    • Drug use: No       Family Status   Relation Name Status   • Mo     • Fa          HX unknown   • Bro  Alive     Family History   Problem Relation Age of Onset   • Diabetes Mother    • Stroke Mother        Review of Systems:   Constitutional: Negative for fever, chills, weight loss and malaise/fatigue.   HENT: Negative for ear pain, nosebleeds, congestion, sore throat and neck pain.    Eyes: Negative for blurred vision.   Respiratory: Negative for cough, sputum production, shortness of breath and wheezing.    Cardiovascular: Negative for chest pain, palpitations, orthopnea and leg swelling.   Gastrointestinal: Negative  "for nausea, vomiting and abdominal pain. Positive for stable heartburn  Genitourinary: Negative for dysuria, urgency and frequency.   Musculoskeletal: Negative for myalgias, joint pain, and back pain.  Skin: Negative for rash and itching.   Neurological: Negative for dizziness, tingling, tremors, sensory change, focal weakness and headaches.   Endo/Heme/Allergies: Does not bruise/bleed easily.   Psychiatric/Behavioral: Negative for depression, suicidal ideas and memory loss.  The patient is not nervous/anxious and does not have insomnia.    All other systems reviewed and are negative except as in HPI.    Exam:  Blood Pressure 120/78   Pulse 80   Temperature 36.3 °C (97.3 °F)   Respiration 16   Height 1.93 m (6' 4\")   Weight 123.4 kg (272 lb)   Oxygen Saturation 94%   General:  Well nourished, well developed male in NAD  Head: Grossly normal.  Neck: Supple without JVD or bruit. Thyroid is not enlarged.  Pulmonary: Clear to ausculation. Normal effort. No rales, ronchi, or wheezing.  Cardiovascular: Regular rate and rhythm without murmur.   Abdomen:  Soft, nontender, nondistended.  Extremities: No clubbing, cyanosis, or edema.  Skin: Intact with no obvious rashes or lesions.  Neuro: Grossly intact.  Psych: Alert and oriented x 3.  Mood and affect appropriate.    Medical decision-making and discussion: Tommy is here today to discuss a few things.  Lab work was ordered, his medications were refilled, and he will follow-up here as needed.          Assessment/Plan:  1. Essential hypertension  lisinopril-hydrochlorothiazide (PRINZIDE) 10-12.5 MG per tablet    CBC WITH DIFFERENTIAL    Comp Metabolic Panel   2. Cough  omeprazole (PRILOSEC) 20 MG delayed-release capsule   3. BMI 33.0-33.9,adult  Patient identified as having weight management issue.  Appropriate orders and counseling given.   4. Screening PSA (prostate specific antigen)  PROSTATE SPECIFIC AG SCREENING   5. Avitaminosis D  VITAMIN D,25 HYDROXY   6. " Dyslipidemia  Lipid Profile   7. Need for hepatitis C screening test  HEP C VIRUS ANTIBODY       Return if symptoms worsen or fail to improve.    Please note that this dictation was created using voice recognition software. I have made every reasonable attempt to correct obvious errors, but I expect that there are errors of grammar and possibly content that I did not discover before finalizing the note.

## 2020-02-02 PROBLEM — E78.5 DYSLIPIDEMIA: Status: ACTIVE | Noted: 2020-02-02

## 2020-03-10 ENCOUNTER — TELEPHONE (OUTPATIENT)
Dept: MEDICAL GROUP | Facility: PHYSICIAN GROUP | Age: 55
End: 2020-03-10

## 2020-03-10 NOTE — TELEPHONE ENCOUNTER
1. Caller Name: Tommy                          Call Back Number: [phone] (home)           2.  Does patient have any active symptoms of respiratory illness (fever OR cough OR shortness of breath)?     Unable to contact, sahram for pt to call the office back at [phone]

## 2020-03-12 ENCOUNTER — OFFICE VISIT (OUTPATIENT)
Dept: MEDICAL GROUP | Facility: PHYSICIAN GROUP | Age: 55
End: 2020-03-12
Payer: OTHER GOVERNMENT

## 2020-03-12 DIAGNOSIS — J06.9 UPPER RESPIRATORY TRACT INFECTION, UNSPECIFIED TYPE: ICD-10-CM

## 2020-03-12 NOTE — PROGRESS NOTES
Patient has an appointment with me today.   However the patient reported having respiratory symptoms and therefore the patient was sent to Respiratory Urgent care at  [address] [place] [address] for further evaluation.  Pt was not seen by this provider today.

## 2020-03-12 NOTE — PROGRESS NOTES
Patient has an appointment with me today.   However the patient reported having respiratory symptoms and therefore the patient was sent to [facility] at  [address] [place] [address] for further evaluation.  Pt was not seen by this provider today.

## 2020-03-24 ENCOUNTER — TELEPHONE (OUTPATIENT)
Dept: MEDICAL GROUP | Facility: PHYSICIAN GROUP | Age: 55
End: 2020-03-24

## 2020-03-24 NOTE — TELEPHONE ENCOUNTER
1. Caller Name: Tommy                          Call Back Number: [phone] (home)           2.  Does patient have any active symptoms of respiratory illness (fever OR cough OR shortness of breath)?     Left detailed message to call if pt is having respiratory symptoms and if not to proceed with appt.

## 2020-03-26 ENCOUNTER — OFFICE VISIT (OUTPATIENT)
Dept: MEDICAL GROUP | Facility: PHYSICIAN GROUP | Age: 55
End: 2020-03-26
Payer: OTHER GOVERNMENT

## 2020-03-26 VITALS
SYSTOLIC BLOOD PRESSURE: 106 MMHG | WEIGHT: 266.8 LBS | DIASTOLIC BLOOD PRESSURE: 76 MMHG | HEIGHT: 76 IN | OXYGEN SATURATION: 94 % | HEART RATE: 96 BPM | TEMPERATURE: 98.1 F | BODY MASS INDEX: 32.49 KG/M2 | RESPIRATION RATE: 16 BRPM

## 2020-03-26 DIAGNOSIS — I10 HYPERTENSION, ESSENTIAL: ICD-10-CM

## 2020-03-26 DIAGNOSIS — I15.9 SECONDARY HYPERTENSION: ICD-10-CM

## 2020-03-26 DIAGNOSIS — K63.5 POLYP OF COLON, UNSPECIFIED PART OF COLON, UNSPECIFIED TYPE: ICD-10-CM

## 2020-03-26 DIAGNOSIS — E78.5 DYSLIPIDEMIA: ICD-10-CM

## 2020-03-26 DIAGNOSIS — K21.9 GASTROESOPHAGEAL REFLUX DISEASE, ESOPHAGITIS PRESENCE NOT SPECIFIED: ICD-10-CM

## 2020-03-26 PROBLEM — R21 RASH: Status: RESOLVED | Noted: 2018-09-18 | Resolved: 2020-03-26

## 2020-03-26 PROBLEM — R05.9 COUGH: Status: RESOLVED | Noted: 2018-09-18 | Resolved: 2020-03-26

## 2020-03-26 PROCEDURE — 99214 OFFICE O/P EST MOD 30 MIN: CPT | Performed by: INTERNAL MEDICINE

## 2020-03-26 ASSESSMENT — FIBROSIS 4 INDEX: FIB4 SCORE: 1.52

## 2020-03-26 NOTE — ASSESSMENT & PLAN NOTE
Patient had colonoscopy done August 2018 which showed 2 polyps.  GI recommend to repeat the study August 2023.  He denies any  GI symptoms.

## 2020-03-26 NOTE — PROGRESS NOTES
CC: BP check  Meds reviewed         HPI: 54 y.o. Patient presents to discuss the following:     BMI 33.0-33.9,adult  This is a chronic and stable condition.  His weight has remained essentially unchanged.  Recommend the patient to start/continue a healthy diet.  Also encouraged regular exercise activities.    Hypertension  This is a chronic and stable condition.  The patient is currently taking lisinopril-hydrochlorothiazide.  Blood pressure is well controlled at home per patient report.  No side effect reported.  Patient had blood test done recently including CBC and chemistry panel which was unremarkable.    Dyslipidemia  This is a chronic and stable condition.  The LDL cholesterol is slightly elevated as noted recent recent blood test which is stable compared to previous result    GERD (gastroesophageal reflux disease)  This is a chronic and stable condition.  The patient takes omeprazole as needed.  He denies nausea vomiting abdominal pain unexplained weight loss dysphagia or odynophagia    Colonic polyp  Patient had colonoscopy done August 2018 which showed 2 polyps.  GI recommend to repeat the study August 2023.  He denies any  GI symptoms.              REVIEW OF SYSTEMS:  Constitutional:  no fever / chills   Neurologic: no headaches  Eyes: no changes in vision  ENT: no sore throat, no hearing loss  CV:  no chest pain, no palpitations  Pulmonary: no SOB, no cough      Allergies: Patient has no known allergies.    Current Outpatient Medications Ordered in Epic   Medication Sig Dispense Refill   • lisinopril-hydrochlorothiazide (PRINZIDE) 10-12.5 MG per tablet Take 1 Tab by mouth every day. 90 Tab 3   • omeprazole (PRILOSEC) 20 MG delayed-release capsule TAKE 1 CAPSULE BY MOUTH ONCE DAILY 90 Cap 3     No current Epic-ordered facility-administered medications on file.        Past Medical History:   Diagnosis Date   • Hypertension    • Obesity (BMI 30-39.9) 4/19/2016        Past Surgical History:   Procedure  Laterality Date   • EYE SURGERY  2015        Family History   Problem Relation Age of Onset   • Diabetes Mother    • Stroke Mother         Social History     Tobacco Use   Smoking Status Never Smoker   Smokeless Tobacco Never Used          Social History     Substance and Sexual Activity   Alcohol Use Yes   • Alcohol/week: 0.6 oz   • Types: 1 Cans of beer per week    Comment: occassionally         ---------------------------------------------------------------------     PHYSICAL EXAM:  Vitals:    03/26/20 0723   BP: 106/76   Pulse: 96   Resp: 16   Temp: 36.7 °C (98.1 °F)   SpO2: 94%      Body mass index is 32.48 kg/m².        Psych: A&O x 3, mood and affect appropiate  Constitution: no acute distress  Eyes: EOMI, PERRL  Neck: supple, no LN or thyromegaly  Respiratory: normal effort, no wheezing  rales or rhonchi  CV: heart RRR  GI: abdomen is soft, nontender, no obvious mass  Skin no rash no  Neuro cranial nerves II to XII grossly intact    ---------------------------------------------------------------------     ASSESSMENT and PLAN:     1. Gastroesophageal reflux disease, esophagitis presence not specified  Chronic stable condition.  Continue with omeprazole.    2. Polyp of colon, unspecified part of colon, unspecified type  Recommend repeat colonoscopy August 2023.  Continue to monitor      3. Hypertension, essential  Chronic stable condition.  Continue with current med  Advised the patient to continue to monitor blood pressure at home.  Recommend sodium restriction.  - Basic Metabolic Panel; Future  - Lipid Profile; Future  - ALANINE AMINO-TRANS; Future  - PROSTATE SPECIFIC AG SCREENING; Future  - MICROALBUMIN CREAT RATIO URINE; Future    5. BMI 33.0-33.9,adult  Advised the patient healthy diet and regular exercise.  The patient will try to lose some weight    6. Dyslipidemia  LDL cholesterol slightly elevated.  Continue to monitor              No follow-ups on file.       PATIENT EDUCATION:  -If any problems  should arise, patient was advised to contact our office or go to ER to be evaluated.  -The pertinent physical findings, assessments, and plans of care were discussed with the patient. Patient verbalized understanding.  -Advised pt to follow a healthy diet and regular aerobic exercise regimen. Advised pt to avoid alcohol and tobacco use.    Please note that this dictation was created using voice recognition software. I have made every reasonable attempt to correct obvious errors, but it is possible there are errors of grammar and possibly content that I did not discover before finalizing the note.

## 2020-03-26 NOTE — ASSESSMENT & PLAN NOTE
This is a chronic and stable condition.  His weight has remained essentially unchanged.  Recommend the patient to start/continue a healthy diet.  Also encouraged regular exercise activities.

## 2020-03-26 NOTE — ASSESSMENT & PLAN NOTE
This is a chronic and stable condition.  The patient is currently taking lisinopril-hydrochlorothiazide.  Blood pressure is well controlled at home per patient report.  No side effect reported.  Patient had blood test done recently including CBC and chemistry panel which was unremarkable.

## 2020-03-26 NOTE — ASSESSMENT & PLAN NOTE
This is a chronic and stable condition.  The LDL cholesterol is slightly elevated as noted recent recent blood test which is stable compared to previous result

## 2020-03-26 NOTE — ASSESSMENT & PLAN NOTE
This is a chronic and stable condition.  The patient takes omeprazole as needed.  He denies nausea vomiting abdominal pain unexplained weight loss dysphagia or odynophagia

## 2020-10-12 ENCOUNTER — HOSPITAL ENCOUNTER (OUTPATIENT)
Dept: LAB | Facility: MEDICAL CENTER | Age: 55
End: 2020-10-12
Attending: INTERNAL MEDICINE
Payer: OTHER GOVERNMENT

## 2020-10-12 DIAGNOSIS — I10 HYPERTENSION, ESSENTIAL: ICD-10-CM

## 2020-10-12 LAB
ALT SERPL-CCNC: 17 U/L (ref 2–50)
ANION GAP SERPL CALC-SCNC: 10 MMOL/L (ref 7–16)
BUN SERPL-MCNC: 15 MG/DL (ref 8–22)
CALCIUM SERPL-MCNC: 9.7 MG/DL (ref 8.5–10.5)
CHLORIDE SERPL-SCNC: 102 MMOL/L (ref 96–112)
CHOLEST SERPL-MCNC: 204 MG/DL (ref 100–199)
CO2 SERPL-SCNC: 26 MMOL/L (ref 20–33)
CREAT SERPL-MCNC: 1.16 MG/DL (ref 0.5–1.4)
CREAT UR-MCNC: 233.22 MG/DL
FASTING STATUS PATIENT QL REPORTED: NORMAL
GLUCOSE SERPL-MCNC: 82 MG/DL (ref 65–99)
HDLC SERPL-MCNC: 58 MG/DL
LDLC SERPL CALC-MCNC: 129 MG/DL
MICROALBUMIN UR-MCNC: <1.2 MG/DL
MICROALBUMIN/CREAT UR: NORMAL MG/G (ref 0–30)
POTASSIUM SERPL-SCNC: 3.8 MMOL/L (ref 3.6–5.5)
PSA SERPL-MCNC: 0.83 NG/ML (ref 0–4)
SODIUM SERPL-SCNC: 138 MMOL/L (ref 135–145)
TRIGL SERPL-MCNC: 86 MG/DL (ref 0–149)

## 2020-10-12 PROCEDURE — 84153 ASSAY OF PSA TOTAL: CPT

## 2020-10-12 PROCEDURE — 82043 UR ALBUMIN QUANTITATIVE: CPT

## 2020-10-12 PROCEDURE — 80048 BASIC METABOLIC PNL TOTAL CA: CPT

## 2020-10-12 PROCEDURE — 36415 COLL VENOUS BLD VENIPUNCTURE: CPT

## 2020-10-12 PROCEDURE — 80061 LIPID PANEL: CPT

## 2020-10-12 PROCEDURE — 82570 ASSAY OF URINE CREATININE: CPT

## 2020-10-12 PROCEDURE — 84460 ALANINE AMINO (ALT) (SGPT): CPT

## 2020-10-13 ENCOUNTER — TELEPHONE (OUTPATIENT)
Dept: MEDICAL GROUP | Facility: PHYSICIAN GROUP | Age: 55
End: 2020-10-13

## 2020-10-13 NOTE — TELEPHONE ENCOUNTER
----- Message from Reynaldo Robins M.D. sent at 10/13/2020  3:19 PM PDT -----    Please call patient labs reviewed    - Liver enzyme,  chemistry panel,  kidney function test,  Urine microalbumin, psa [prostate test] :  are within acceptable levels.    -Cholesterol :   204                   [Desirable range = below 200]   LDL [bad cholesterol]:  129      [Desirable range = below 100]  Please start/continue with low fat low cholesterol diet, continue to exercise regularly and maintain an ideal weight.

## 2020-10-26 ENCOUNTER — OFFICE VISIT (OUTPATIENT)
Dept: MEDICAL GROUP | Facility: PHYSICIAN GROUP | Age: 55
End: 2020-10-26
Payer: OTHER GOVERNMENT

## 2020-10-26 VITALS
OXYGEN SATURATION: 97 % | HEIGHT: 76 IN | HEART RATE: 74 BPM | BODY MASS INDEX: 32.63 KG/M2 | RESPIRATION RATE: 14 BRPM | TEMPERATURE: 98.5 F | WEIGHT: 268 LBS | DIASTOLIC BLOOD PRESSURE: 70 MMHG | SYSTOLIC BLOOD PRESSURE: 110 MMHG

## 2020-10-26 DIAGNOSIS — I10 ESSENTIAL HYPERTENSION: ICD-10-CM

## 2020-10-26 DIAGNOSIS — R05.9 COUGH: ICD-10-CM

## 2020-10-26 DIAGNOSIS — J30.2 SEASONAL ALLERGIES: ICD-10-CM

## 2020-10-26 DIAGNOSIS — E78.5 DYSLIPIDEMIA: Chronic | ICD-10-CM

## 2020-10-26 PROBLEM — K21.9 GERD (GASTROESOPHAGEAL REFLUX DISEASE): Chronic | Status: ACTIVE | Noted: 2020-03-26

## 2020-10-26 PROCEDURE — 99214 OFFICE O/P EST MOD 30 MIN: CPT | Performed by: INTERNAL MEDICINE

## 2020-10-26 RX ORDER — OMEPRAZOLE 20 MG/1
CAPSULE, DELAYED RELEASE ORAL
Qty: 90 CAP | Refills: 3 | Status: SHIPPED | OUTPATIENT
Start: 2020-10-26 | End: 2022-01-26 | Stop reason: SDUPTHER

## 2020-10-26 RX ORDER — HYDROCODONE BITARTRATE AND ACETAMINOPHEN 5; 325 MG/1; MG/1
TABLET ORAL
COMMUNITY
Start: 2020-10-19 | End: 2020-10-20

## 2020-10-26 RX ORDER — LISINOPRIL AND HYDROCHLOROTHIAZIDE 12.5; 1 MG/1; MG/1
1 TABLET ORAL DAILY
Qty: 90 TAB | Refills: 3 | Status: SHIPPED | OUTPATIENT
Start: 2020-10-26 | End: 2021-10-18

## 2020-10-26 ASSESSMENT — FIBROSIS 4 INDEX: FIB4 SCORE: 2.09

## 2020-10-26 NOTE — PROGRESS NOTES
CC: Follow-up hypertension and to discuss recent lab test results      HPI: This is a 55 y.o. pt.  Pt's medical history is notable for:     Hypertension  This is a chronic condition.  The patient is presently taking lisinopril/hydrochlorothiazide.  His blood pressure has been well controlled at home per patient report.  Patient is requesting a refill    Dyslipidemia  This was noted with recent lab test.  Patient was advised regarding healthy diet and exercise.    BMI 33.0-33.9,adult  This is a chronic and stable condition but the patient takes omeprazole daily.  Patient denies nausea vomiting abdominal pain or unexplained weight loss.    Seasonal allergies  This is a chronic condition.  The patient has been complaining of sneezing runny nose congestion.  He has not taken any antihistamine over-the-counter.  The patient is interested in getting a Kenalog injection.    Discussed w pt re different treatment options for allergies which may include: oral antihistamine such as zyrtec/claritin +/- steroid nasal spray such as fluticasone +/- referral to allergy specialist.    Also dw pt the potential risks of kenalog injection which may be contributing to osteoporosis , glaucoma, diabetes and hypertension. Other risks may  include  but not limited to : irregular heart rhythm, sob, swelling, rapid wt gain, severe high bp, headaches, tinnitus, anxiety,confusion, cp, problems w vision, eye swelling, redness discomfort , or drainage, severe depression, changes in mood or behavior, seizure , muscle pain/tenderness/weakness, Nausea, bloating, appetite changes, stomach pain, sleep problems, ance, scaling, other skin changes, slow to heal wound, thinning hair, bruising, swelling, redness /pain or skin changes at the injection site, stomach upset, dizziness, wt gain, uncontrolled infection which could be life threatening...    Pt voiced understandingt of the above and declined to get the Kenalog injection.  Patient stated that he  will try over-the-counter Zyrtec and Claritin first.          REVIEW OF SYSTEMS:     Constitutional:  no fever / chills   Neurologic: no headaches, no numbness/tingling  Eyes: no changes in vision  ENT: no sore throat, no hearing loss  CV:  no chest pain, no palpitations  Pulmonary: no SOB, no cough    GI: no nausea / vomiting, no diarrhea, no constipation  :  no dysuria, no hematuria       Allergies: Patient has no known allergies.    Current Outpatient Medications Ordered in Epic   Medication Sig Dispense Refill   • omeprazole (PRILOSEC) 20 MG delayed-release capsule TAKE 1 CAPSULE BY MOUTH ONCE DAILY 90 Cap 3   • lisinopril-hydrochlorothiazide (PRINZIDE) 10-12.5 MG per tablet Take 1 Tab by mouth every day. 90 Tab 3     No current Epic-ordered facility-administered medications on file.        Past Medical History:   Diagnosis Date   • Hypertension    • Obesity (BMI 30-39.9) 4/19/2016        Past Surgical History:   Procedure Laterality Date   • EYE SURGERY  2015        Family History   Problem Relation Age of Onset   • Diabetes Mother    • Stroke Mother         Social History     Tobacco Use   Smoking Status Never Smoker   Smokeless Tobacco Never Used          Social History     Substance and Sexual Activity   Alcohol Use Yes   • Alcohol/week: 0.6 oz   • Types: 1 Cans of beer per week    Comment: occassionally          ------------------------------------------------------------------------------     PHYSICAL EXAM:   Vitals:    10/26/20 0702   BP: 110/70   Pulse: 74   Resp: 14   Temp: 36.9 °C (98.5 °F)   SpO2: 97%      Body mass index is 32.62 kg/m².         Constitutional: no acute distress  Neck: supple, no JVD  CV: heart RRR  Resp: normal effort, no wheezing or rales.  GI: abdomen soft, no obvious mass, no tenderness  Neuro: CN 2-12 grossly intact        -----------------------------------------------------------------------------    ASSESSMENT:   1. Seasonal allergies     2. Cough  omeprazole (PRILOSEC) 20  MG delayed-release capsule   3. Essential hypertension  lisinopril-hydrochlorothiazide (PRINZIDE) 10-12.5 MG per tablet    HEMOGLOBIN A1C    ALANINE AMINO-TRANS    Basic Metabolic Panel    Lipid Profile    TSH    MICROALBUMIN CREAT RATIO URINE    CBC WITH DIFFERENTIAL   4. Dyslipidemia           MEDICAL DECISION MAKING: TODAY'S ASSESSMENT / STATUS / PLAN:    Recent lab test result discussed with the patient in detail.  Advised the patient regarding low-fat low-cholesterol low-carb diet.  Patient will try to lose some weight.  Regular exercise activities advised.  Continue with current medication.  Commend follow-up in approximately 6 months with lab test to be done prior.     Return in about 6 months (around 4/26/2021).       PATIENT EDUCATION:  -If any problems should arise, patient was advised to contact our office or go to ER to be evaluated.  -Advised pt to follow a healthy diet and regular aerobic exercise regimen. Advised pt to avoid alcohol and tobacco use.    Please note that this dictation was created using voice recognition software. I have made every reasonable attempt to correct obvious errors, but it is possible there are errors of grammar and possibly content that I did not discover before finalizing the note.

## 2020-10-26 NOTE — ASSESSMENT & PLAN NOTE
This is a chronic condition.  The patient has been complaining of sneezing runny nose congestion.  He has not taken any antihistamine over-the-counter.  The patient is interested in getting a Kenalog injection.    Discussed w pt re different treatment options for allergies which may include: oral antihistamine such as zyrtec/claritin +/- steroid nasal spray such as fluticasone +/- referral to allergy specialist.    Also dw pt the potential risks of kenalog injection which may be contributing to osteoporosis , glaucoma, diabetes and hypertension. Other risks may  include  but not limited to : irregular heart rhythm, sob, swelling, rapid wt gain, severe high bp, headaches, tinnitus, anxiety,confusion, cp, problems w vision, eye swelling, redness discomfort , or drainage, severe depression, changes in mood or behavior, seizure , muscle pain/tenderness/weakness, Nausea, bloating, appetite changes, stomach pain, sleep problems, ance, scaling, other skin changes, slow to heal wound, thinning hair, bruising, swelling, redness /pain or skin changes at the injection site, stomach upset, dizziness, wt gain, uncontrolled infection which could be life threatening...    Pt voiced understandingt of the above and declined to get the Kenalog injection.  Patient stated that he will try over-the-counter Zyrtec and Claritin first.

## 2020-10-26 NOTE — ASSESSMENT & PLAN NOTE
This is a chronic and stable condition but the patient takes omeprazole daily.  Patient denies nausea vomiting abdominal pain or unexplained weight loss.

## 2020-10-26 NOTE — ASSESSMENT & PLAN NOTE
This is a chronic condition.  The patient is presently taking lisinopril/hydrochlorothiazide.  His blood pressure has been well controlled at home per patient report.  Patient is requesting a refill

## 2021-04-19 ENCOUNTER — HOSPITAL ENCOUNTER (OUTPATIENT)
Dept: LAB | Facility: MEDICAL CENTER | Age: 56
End: 2021-04-19
Attending: INTERNAL MEDICINE
Payer: OTHER GOVERNMENT

## 2021-04-19 DIAGNOSIS — I10 ESSENTIAL HYPERTENSION: ICD-10-CM

## 2021-04-19 LAB
ALT SERPL-CCNC: 22 U/L (ref 2–50)
ANION GAP SERPL CALC-SCNC: 8 MMOL/L (ref 7–16)
BASOPHILS # BLD AUTO: 1.1 % (ref 0–1.8)
BASOPHILS # BLD: 0.07 K/UL (ref 0–0.12)
BUN SERPL-MCNC: 18 MG/DL (ref 8–22)
CALCIUM SERPL-MCNC: 9.4 MG/DL (ref 8.5–10.5)
CHLORIDE SERPL-SCNC: 104 MMOL/L (ref 96–112)
CHOLEST SERPL-MCNC: 183 MG/DL (ref 100–199)
CO2 SERPL-SCNC: 28 MMOL/L (ref 20–33)
CREAT SERPL-MCNC: 1.06 MG/DL (ref 0.5–1.4)
CREAT UR-MCNC: 206.03 MG/DL
EOSINOPHIL # BLD AUTO: 0.19 K/UL (ref 0–0.51)
EOSINOPHIL NFR BLD: 2.9 % (ref 0–6.9)
ERYTHROCYTE [DISTWIDTH] IN BLOOD BY AUTOMATED COUNT: 47.4 FL (ref 35.9–50)
EST. AVERAGE GLUCOSE BLD GHB EST-MCNC: 120 MG/DL
FASTING STATUS PATIENT QL REPORTED: NORMAL
GLUCOSE SERPL-MCNC: 83 MG/DL (ref 65–99)
HBA1C MFR BLD: 5.8 % (ref 4–5.6)
HCT VFR BLD AUTO: 46.1 % (ref 42–52)
HDLC SERPL-MCNC: 49 MG/DL
HGB BLD-MCNC: 15.4 G/DL (ref 14–18)
IMM GRANULOCYTES # BLD AUTO: 0.04 K/UL (ref 0–0.11)
IMM GRANULOCYTES NFR BLD AUTO: 0.6 % (ref 0–0.9)
LDLC SERPL CALC-MCNC: 113 MG/DL
LYMPHOCYTES # BLD AUTO: 1.79 K/UL (ref 1–4.8)
LYMPHOCYTES NFR BLD: 27.5 % (ref 22–41)
MCH RBC QN AUTO: 32.2 PG (ref 27–33)
MCHC RBC AUTO-ENTMCNC: 33.4 G/DL (ref 33.7–35.3)
MCV RBC AUTO: 96.2 FL (ref 81.4–97.8)
MICROALBUMIN UR-MCNC: <1.2 MG/DL
MICROALBUMIN/CREAT UR: NORMAL MG/G (ref 0–30)
MONOCYTES # BLD AUTO: 0.72 K/UL (ref 0–0.85)
MONOCYTES NFR BLD AUTO: 11.1 % (ref 0–13.4)
NEUTROPHILS # BLD AUTO: 3.7 K/UL (ref 1.82–7.42)
NEUTROPHILS NFR BLD: 56.8 % (ref 44–72)
NRBC # BLD AUTO: 0 K/UL
NRBC BLD-RTO: 0 /100 WBC
PLATELET # BLD AUTO: 254 K/UL (ref 164–446)
PMV BLD AUTO: 11 FL (ref 9–12.9)
POTASSIUM SERPL-SCNC: 3.8 MMOL/L (ref 3.6–5.5)
RBC # BLD AUTO: 4.79 M/UL (ref 4.7–6.1)
SODIUM SERPL-SCNC: 140 MMOL/L (ref 135–145)
TRIGL SERPL-MCNC: 104 MG/DL (ref 0–149)
TSH SERPL DL<=0.005 MIU/L-ACNC: 3.04 UIU/ML (ref 0.38–5.33)
WBC # BLD AUTO: 6.5 K/UL (ref 4.8–10.8)

## 2021-04-19 PROCEDURE — 84460 ALANINE AMINO (ALT) (SGPT): CPT

## 2021-04-19 PROCEDURE — 83036 HEMOGLOBIN GLYCOSYLATED A1C: CPT

## 2021-04-19 PROCEDURE — 80048 BASIC METABOLIC PNL TOTAL CA: CPT

## 2021-04-19 PROCEDURE — 82043 UR ALBUMIN QUANTITATIVE: CPT

## 2021-04-19 PROCEDURE — 84443 ASSAY THYROID STIM HORMONE: CPT

## 2021-04-19 PROCEDURE — 80061 LIPID PANEL: CPT

## 2021-04-19 PROCEDURE — 36415 COLL VENOUS BLD VENIPUNCTURE: CPT

## 2021-04-19 PROCEDURE — 82570 ASSAY OF URINE CREATININE: CPT

## 2021-04-19 PROCEDURE — 85025 COMPLETE CBC W/AUTO DIFF WBC: CPT

## 2021-04-20 ENCOUNTER — TELEPHONE (OUTPATIENT)
Dept: MEDICAL GROUP | Facility: PHYSICIAN GROUP | Age: 56
End: 2021-04-20

## 2021-04-20 NOTE — TELEPHONE ENCOUNTER
Pt called back and left vm stating that he would follow up with Dr. Robins at his next appt.  I returned his call to verify he knew we were calling in regards to his labs, that they were available in [facility] and if he had any further questions before his appt to call us back.

## 2021-04-20 NOTE — TELEPHONE ENCOUNTER
----- Message from Reynaldo Robins M.D. sent at 4/19/2021  4:59 PM PDT -----  Please call patient : labs back    A1c high = prediabetes  Please start/continue with low sweet  low carb diet ,  continue with regular exercises / walking and maintain an ideal weight.    Cholesterol :     183 Improved when compared to previous result.                 [Desirable range = below 200]  LDL [bad cholesterol]:  113. Improved.     [Desirable range = below 100]  Please continue with low fat low cholesterol diet

## 2021-04-26 ENCOUNTER — OFFICE VISIT (OUTPATIENT)
Dept: MEDICAL GROUP | Facility: PHYSICIAN GROUP | Age: 56
End: 2021-04-26
Payer: OTHER GOVERNMENT

## 2021-04-26 VITALS
BODY MASS INDEX: 32.62 KG/M2 | HEART RATE: 89 BPM | TEMPERATURE: 98.6 F | RESPIRATION RATE: 16 BRPM | SYSTOLIC BLOOD PRESSURE: 122 MMHG | OXYGEN SATURATION: 99 % | DIASTOLIC BLOOD PRESSURE: 64 MMHG | HEIGHT: 76 IN

## 2021-04-26 DIAGNOSIS — K21.9 GASTROESOPHAGEAL REFLUX DISEASE, UNSPECIFIED WHETHER ESOPHAGITIS PRESENT: Chronic | ICD-10-CM

## 2021-04-26 DIAGNOSIS — E78.5 DYSLIPIDEMIA: ICD-10-CM

## 2021-04-26 DIAGNOSIS — Z00.00 WELL ADULT EXAM: ICD-10-CM

## 2021-04-26 DIAGNOSIS — I10 ESSENTIAL HYPERTENSION: Chronic | ICD-10-CM

## 2021-04-26 DIAGNOSIS — R73.03 PREDIABETES: ICD-10-CM

## 2021-04-26 PROCEDURE — 99214 OFFICE O/P EST MOD 30 MIN: CPT | Performed by: INTERNAL MEDICINE

## 2021-04-26 ASSESSMENT — PATIENT HEALTH QUESTIONNAIRE - PHQ9: CLINICAL INTERPRETATION OF PHQ2 SCORE: 0

## 2021-04-26 NOTE — ASSESSMENT & PLAN NOTE
Recent blood test show A1c 5.8%.  Patient was advised the result.   Discussed with the patient regarding the option to take medication to delay the progression of diabetes.  Patient not interested in taking medication at this time.  Diet and exercise advised.

## 2021-04-26 NOTE — ASSESSMENT & PLAN NOTE
Patient currently taking lisinopril/hydrochlorothiazide.  Blood pressure has been well controlled.

## 2021-04-26 NOTE — PROGRESS NOTES
CC: Follow-up hypertension  Discuss lab test result      HPI: This is a 55 y.o. pt.  Pt's medical history is notable for:     Prediabetes  Recent blood test show A1c 5.8%.  Patient was advised the result.   Discussed with the patient regarding the option to take medication to delay the progression of diabetes.  Patient not interested in taking medication at this time.  Diet and exercise advised.    Dyslipidemia  Lipid panel has improved from when compared to previous result.  Continue with diet and exercise.    GERD (gastroesophageal reflux disease)  Chronic condition.  Patient is taking omeprazole.  Patient denies nausea vomiting or dysphagia.    Hypertension  Patient currently taking lisinopril/hydrochlorothiazide.  Blood pressure has been well controlled.          REVIEW OF SYSTEMS:     Constitutional:  no fever / chills   Neurologic: no headaches  Eyes: no changes in vision  ENT: no sore throat, no hearing loss  CV:  no chest pain, no palpitations  Pulmonary: no SOB, no cough          Allergies: Patient has no known allergies.    Current Outpatient Medications Ordered in Epic   Medication Sig Dispense Refill   • omeprazole (PRILOSEC) 20 MG delayed-release capsule TAKE 1 CAPSULE BY MOUTH ONCE DAILY 90 Cap 3   • lisinopril-hydrochlorothiazide (PRINZIDE) 10-12.5 MG per tablet Take 1 Tab by mouth every day. 90 Tab 3     No current Epic-ordered facility-administered medications on file.       Past Medical, Social, and Family history reviewed and updated in EPIC     ------------------------------------------------------------------------------     PHYSICAL EXAM:   Vitals:    04/26/21 0701   BP: 122/64   Pulse: 89   Resp: 16   Temp: 37 °C (98.6 °F)   SpO2: 99%      Body mass index is 32.62 kg/m².         Constitutional: no acute distress  Neck: supple, no JVD  CV: heart RRR  Resp: normal effort, no wheezing or rales.  GI: abdomen soft, no obvious mass, no tenderness  Neuro: CN 2-12 grossly  intact        -----------------------------------------------------------------------------    ASSESSMENT:   1. Dyslipidemia  ALANINE AMINO-TRANS    Lipid Profile   2. Prediabetes  HEMOGLOBIN A1C    Basic Metabolic Panel   3. Well adult exam  PROSTATE SPECIFIC AG SCREENING    TSH    VITAMIN D,25 HYDROXY    MICROALBUMIN CREAT RATIO URINE   4. Gastroesophageal reflux disease, unspecified whether esophagitis present     5. Essential hypertension             MEDICAL DECISION MAKING: DISCUSSION / STATUS / PLAN:    Recent lab test result discussed with the patient.  Diet and exercise advised.  We will continue with current medications  Follow-up again in 6 months with lab to be done prior       Return in about 6 months (around 10/26/2021).       PATIENT EDUCATION:  -If any problems should arise, patient was advised to contact our office or go to ER to be evaluated.      Please note that this dictation was created using voice recognition software. I have made every reasonable attempt to correct obvious errors, but it is possible there are errors of grammar and possibly content that I did not discover before finalizing the note.         denies

## 2021-10-15 DIAGNOSIS — I10 ESSENTIAL HYPERTENSION: ICD-10-CM

## 2021-10-18 RX ORDER — LISINOPRIL AND HYDROCHLOROTHIAZIDE 12.5; 1 MG/1; MG/1
TABLET ORAL
Qty: 90 TABLET | Refills: 0 | Status: SHIPPED | OUTPATIENT
Start: 2021-10-18 | End: 2022-01-26 | Stop reason: SDUPTHER

## 2021-10-19 ENCOUNTER — APPOINTMENT (OUTPATIENT)
Dept: LAB | Facility: MEDICAL CENTER | Age: 56
End: 2021-10-19
Payer: OTHER GOVERNMENT

## 2022-01-26 DIAGNOSIS — R05.9 COUGH: ICD-10-CM

## 2022-01-26 DIAGNOSIS — I10 ESSENTIAL HYPERTENSION: ICD-10-CM

## 2022-01-27 RX ORDER — LISINOPRIL AND HYDROCHLOROTHIAZIDE 12.5; 1 MG/1; MG/1
1 TABLET ORAL DAILY
Qty: 90 TABLET | Refills: 0 | Status: SHIPPED | OUTPATIENT
Start: 2022-01-27 | End: 2023-01-01

## 2022-01-27 RX ORDER — OMEPRAZOLE 20 MG/1
CAPSULE, DELAYED RELEASE ORAL
Qty: 90 CAPSULE | Refills: 3 | Status: SHIPPED | OUTPATIENT
Start: 2022-01-27 | End: 2023-01-01

## 2023-01-01 ENCOUNTER — HOSPITAL ENCOUNTER (OUTPATIENT)
Dept: RADIATION ONCOLOGY | Facility: MEDICAL CENTER | Age: 58
End: 2023-10-31
Attending: RADIOLOGY
Payer: OTHER GOVERNMENT

## 2023-01-01 ENCOUNTER — PATIENT OUTREACH (OUTPATIENT)
Dept: ONCOLOGY | Facility: MEDICAL CENTER | Age: 58
End: 2023-01-01
Payer: OTHER GOVERNMENT

## 2023-01-01 ENCOUNTER — HOSPITAL ENCOUNTER (OUTPATIENT)
Dept: RADIATION ONCOLOGY | Facility: MEDICAL CENTER | Age: 58
End: 2023-10-06
Payer: OTHER GOVERNMENT

## 2023-01-01 ENCOUNTER — HOSPITAL ENCOUNTER (OUTPATIENT)
Dept: RADIATION ONCOLOGY | Facility: MEDICAL CENTER | Age: 58
End: 2023-09-21
Payer: OTHER GOVERNMENT

## 2023-01-01 ENCOUNTER — OUTPATIENT INFUSION SERVICES (OUTPATIENT)
Dept: ONCOLOGY | Facility: MEDICAL CENTER | Age: 58
End: 2023-01-01
Attending: INTERNAL MEDICINE
Payer: OTHER GOVERNMENT

## 2023-01-01 ENCOUNTER — HOSPITAL ENCOUNTER (OUTPATIENT)
Dept: RADIATION ONCOLOGY | Facility: MEDICAL CENTER | Age: 58
End: 2023-09-20
Payer: OTHER GOVERNMENT

## 2023-01-01 ENCOUNTER — HOSPITAL ENCOUNTER (OUTPATIENT)
Dept: RADIOLOGY | Facility: MEDICAL CENTER | Age: 58
End: 2023-08-30
Attending: SPECIALIST
Payer: OTHER GOVERNMENT

## 2023-01-01 ENCOUNTER — HOSPITAL ENCOUNTER (OUTPATIENT)
Dept: RADIATION ONCOLOGY | Facility: MEDICAL CENTER | Age: 58
End: 2023-09-27
Payer: OTHER GOVERNMENT

## 2023-01-01 ENCOUNTER — HOSPITAL ENCOUNTER (OUTPATIENT)
Dept: RADIATION ONCOLOGY | Facility: MEDICAL CENTER | Age: 58
End: 2023-09-30
Attending: RADIOLOGY
Payer: OTHER GOVERNMENT

## 2023-01-01 ENCOUNTER — HOSPITAL ENCOUNTER (OUTPATIENT)
Dept: RADIATION ONCOLOGY | Facility: MEDICAL CENTER | Age: 58
End: 2023-10-11
Payer: OTHER GOVERNMENT

## 2023-01-01 ENCOUNTER — HOSPITAL ENCOUNTER (OUTPATIENT)
Dept: LAB | Facility: MEDICAL CENTER | Age: 58
End: 2023-09-22
Attending: INTERNAL MEDICINE
Payer: OTHER GOVERNMENT

## 2023-01-01 ENCOUNTER — HOSPITAL ENCOUNTER (OUTPATIENT)
Dept: LAB | Facility: MEDICAL CENTER | Age: 58
End: 2023-08-16
Attending: PHYSICIAN ASSISTANT
Payer: OTHER GOVERNMENT

## 2023-01-01 ENCOUNTER — HOSPITAL ENCOUNTER (OUTPATIENT)
Dept: RADIATION ONCOLOGY | Facility: MEDICAL CENTER | Age: 58
End: 2023-09-18

## 2023-01-01 ENCOUNTER — HOSPITAL ENCOUNTER (OUTPATIENT)
Facility: MEDICAL CENTER | Age: 58
End: 2023-09-01
Attending: SURGERY | Admitting: SURGERY
Payer: OTHER GOVERNMENT

## 2023-01-01 ENCOUNTER — TELEPHONE (OUTPATIENT)
Dept: RADIATION ONCOLOGY | Facility: MEDICAL CENTER | Age: 58
End: 2023-01-01
Payer: OTHER GOVERNMENT

## 2023-01-01 ENCOUNTER — HOSPITAL ENCOUNTER (OUTPATIENT)
Dept: RADIATION ONCOLOGY | Facility: MEDICAL CENTER | Age: 58
End: 2023-10-20
Payer: OTHER GOVERNMENT

## 2023-01-01 ENCOUNTER — ANESTHESIA (OUTPATIENT)
Dept: SURGERY | Facility: MEDICAL CENTER | Age: 58
End: 2023-01-01
Payer: OTHER GOVERNMENT

## 2023-01-01 ENCOUNTER — HOSPITAL ENCOUNTER (OUTPATIENT)
Dept: LAB | Facility: MEDICAL CENTER | Age: 58
End: 2023-09-29
Attending: INTERNAL MEDICINE
Payer: OTHER GOVERNMENT

## 2023-01-01 ENCOUNTER — HOSPITAL ENCOUNTER (OUTPATIENT)
Dept: RADIATION ONCOLOGY | Facility: MEDICAL CENTER | Age: 58
End: 2023-12-31
Attending: RADIOLOGY
Payer: OTHER GOVERNMENT

## 2023-01-01 ENCOUNTER — HOSPITAL ENCOUNTER (OUTPATIENT)
Dept: RADIATION ONCOLOGY | Facility: MEDICAL CENTER | Age: 58
End: 2023-09-19
Payer: OTHER GOVERNMENT

## 2023-01-01 ENCOUNTER — ANESTHESIA EVENT (OUTPATIENT)
Dept: SURGERY | Facility: MEDICAL CENTER | Age: 58
End: 2023-01-01
Payer: OTHER GOVERNMENT

## 2023-01-01 ENCOUNTER — APPOINTMENT (OUTPATIENT)
Dept: RADIOLOGY | Facility: MEDICAL CENTER | Age: 58
DRG: 375 | End: 2023-01-01
Attending: STUDENT IN AN ORGANIZED HEALTH CARE EDUCATION/TRAINING PROGRAM
Payer: OTHER GOVERNMENT

## 2023-01-01 ENCOUNTER — HOSPITAL ENCOUNTER (OUTPATIENT)
Dept: RADIOLOGY | Facility: MEDICAL CENTER | Age: 58
End: 2023-08-16
Attending: PHYSICIAN ASSISTANT
Payer: OTHER GOVERNMENT

## 2023-01-01 ENCOUNTER — OFFICE VISIT (OUTPATIENT)
Dept: URGENT CARE | Facility: PHYSICIAN GROUP | Age: 58
End: 2023-01-01
Payer: OTHER GOVERNMENT

## 2023-01-01 ENCOUNTER — HOSPITAL ENCOUNTER (OUTPATIENT)
Dept: RADIATION ONCOLOGY | Facility: MEDICAL CENTER | Age: 58
End: 2023-08-31
Attending: RADIOLOGY
Payer: OTHER GOVERNMENT

## 2023-01-01 ENCOUNTER — HOSPITAL ENCOUNTER (OUTPATIENT)
Dept: RADIATION ONCOLOGY | Facility: MEDICAL CENTER | Age: 58
End: 2023-10-23

## 2023-01-01 ENCOUNTER — HOSPITAL ENCOUNTER (OUTPATIENT)
Dept: RADIATION ONCOLOGY | Facility: MEDICAL CENTER | Age: 58
End: 2023-10-13
Payer: OTHER GOVERNMENT

## 2023-01-01 ENCOUNTER — TELEPHONE (OUTPATIENT)
Dept: SURGICAL ONCOLOGY | Facility: MEDICAL CENTER | Age: 58
End: 2023-01-01
Payer: OTHER GOVERNMENT

## 2023-01-01 ENCOUNTER — HOSPITAL ENCOUNTER (OUTPATIENT)
Dept: RADIATION ONCOLOGY | Facility: MEDICAL CENTER | Age: 58
End: 2023-09-28
Payer: OTHER GOVERNMENT

## 2023-01-01 ENCOUNTER — HOSPITAL ENCOUNTER (INPATIENT)
Facility: MEDICAL CENTER | Age: 58
LOS: 3 days | DRG: 375 | End: 2023-08-20
Attending: STUDENT IN AN ORGANIZED HEALTH CARE EDUCATION/TRAINING PROGRAM | Admitting: STUDENT IN AN ORGANIZED HEALTH CARE EDUCATION/TRAINING PROGRAM
Payer: OTHER GOVERNMENT

## 2023-01-01 ENCOUNTER — HOSPITAL ENCOUNTER (OUTPATIENT)
Dept: RADIATION ONCOLOGY | Facility: MEDICAL CENTER | Age: 58
End: 2023-09-29
Payer: OTHER GOVERNMENT

## 2023-01-01 ENCOUNTER — HOSPITAL ENCOUNTER (OUTPATIENT)
Facility: MEDICAL CENTER | Age: 58
End: 2023-09-05
Attending: INTERNAL MEDICINE
Payer: OTHER GOVERNMENT

## 2023-01-01 ENCOUNTER — PATIENT OUTREACH (OUTPATIENT)
Dept: ONCOLOGY | Facility: MEDICAL CENTER | Age: 58
End: 2023-01-01

## 2023-01-01 ENCOUNTER — HOSPITAL ENCOUNTER (OUTPATIENT)
Facility: MEDICAL CENTER | Age: 58
End: 2023-09-15
Payer: OTHER GOVERNMENT

## 2023-01-01 ENCOUNTER — HOSPITAL ENCOUNTER (OUTPATIENT)
Dept: RADIATION ONCOLOGY | Facility: MEDICAL CENTER | Age: 58
End: 2023-10-19
Payer: OTHER GOVERNMENT

## 2023-01-01 ENCOUNTER — HOSPITAL ENCOUNTER (OUTPATIENT)
Dept: RADIATION ONCOLOGY | Facility: MEDICAL CENTER | Age: 58
End: 2023-10-05
Payer: OTHER GOVERNMENT

## 2023-01-01 ENCOUNTER — PRE-ADMISSION TESTING (OUTPATIENT)
Dept: ADMISSIONS | Facility: MEDICAL CENTER | Age: 58
End: 2023-01-01
Attending: SURGERY
Payer: OTHER GOVERNMENT

## 2023-01-01 ENCOUNTER — HOSPITAL ENCOUNTER (OUTPATIENT)
Dept: RADIATION ONCOLOGY | Facility: MEDICAL CENTER | Age: 58
End: 2023-10-04
Payer: OTHER GOVERNMENT

## 2023-01-01 ENCOUNTER — HOSPITAL ENCOUNTER (OUTPATIENT)
Dept: RADIATION ONCOLOGY | Facility: MEDICAL CENTER | Age: 58
End: 2023-10-10
Payer: OTHER GOVERNMENT

## 2023-01-01 ENCOUNTER — TELEPHONE (OUTPATIENT)
Dept: ONCOLOGY | Facility: MEDICAL CENTER | Age: 58
End: 2023-01-01
Payer: OTHER GOVERNMENT

## 2023-01-01 ENCOUNTER — HOSPITAL ENCOUNTER (OUTPATIENT)
Dept: RADIATION ONCOLOGY | Facility: MEDICAL CENTER | Age: 58
End: 2023-10-02

## 2023-01-01 ENCOUNTER — PATIENT MESSAGE (OUTPATIENT)
Dept: ONCOLOGY | Facility: MEDICAL CENTER | Age: 58
End: 2023-01-01
Payer: OTHER GOVERNMENT

## 2023-01-01 ENCOUNTER — HOSPITAL ENCOUNTER (OUTPATIENT)
Dept: LAB | Facility: MEDICAL CENTER | Age: 58
End: 2023-10-12
Attending: INTERNAL MEDICINE
Payer: OTHER GOVERNMENT

## 2023-01-01 ENCOUNTER — HOSPITAL ENCOUNTER (OUTPATIENT)
Dept: RADIATION ONCOLOGY | Facility: MEDICAL CENTER | Age: 58
End: 2023-09-06

## 2023-01-01 ENCOUNTER — HOSPITAL ENCOUNTER (OUTPATIENT)
Dept: RADIOLOGY | Facility: MEDICAL CENTER | Age: 58
End: 2023-11-29
Attending: RADIOLOGY
Payer: OTHER GOVERNMENT

## 2023-01-01 ENCOUNTER — HOSPITAL ENCOUNTER (OUTPATIENT)
Dept: RADIATION ONCOLOGY | Facility: MEDICAL CENTER | Age: 58
End: 2023-10-12
Payer: OTHER GOVERNMENT

## 2023-01-01 ENCOUNTER — HOSPITAL ENCOUNTER (OUTPATIENT)
Dept: RADIATION ONCOLOGY | Facility: MEDICAL CENTER | Age: 58
End: 2023-09-25

## 2023-01-01 ENCOUNTER — HOSPITAL ENCOUNTER (OUTPATIENT)
Dept: LAB | Facility: MEDICAL CENTER | Age: 58
End: 2023-10-19
Attending: INTERNAL MEDICINE
Payer: OTHER GOVERNMENT

## 2023-01-01 ENCOUNTER — ANESTHESIA (OUTPATIENT)
Dept: SURGERY | Facility: MEDICAL CENTER | Age: 58
DRG: 375 | End: 2023-01-01
Payer: OTHER GOVERNMENT

## 2023-01-01 ENCOUNTER — HOSPITAL ENCOUNTER (OUTPATIENT)
Dept: RADIATION ONCOLOGY | Facility: MEDICAL CENTER | Age: 58
End: 2023-10-17
Payer: OTHER GOVERNMENT

## 2023-01-01 ENCOUNTER — HOSPITAL ENCOUNTER (OUTPATIENT)
Dept: RADIATION ONCOLOGY | Facility: MEDICAL CENTER | Age: 58
End: 2023-10-24
Payer: OTHER GOVERNMENT

## 2023-01-01 ENCOUNTER — APPOINTMENT (OUTPATIENT)
Dept: RADIOLOGY | Facility: MEDICAL CENTER | Age: 58
DRG: 375 | End: 2023-01-01
Payer: OTHER GOVERNMENT

## 2023-01-01 ENCOUNTER — HOSPITAL ENCOUNTER (OUTPATIENT)
Dept: RADIATION ONCOLOGY | Facility: MEDICAL CENTER | Age: 58
End: 2023-09-22
Payer: OTHER GOVERNMENT

## 2023-01-01 ENCOUNTER — HOSPITAL ENCOUNTER (OUTPATIENT)
Dept: RADIATION ONCOLOGY | Facility: MEDICAL CENTER | Age: 58
End: 2023-10-18
Payer: OTHER GOVERNMENT

## 2023-01-01 ENCOUNTER — HOSPITAL ENCOUNTER (OUTPATIENT)
Dept: RADIATION ONCOLOGY | Facility: MEDICAL CENTER | Age: 58
End: 2023-10-09

## 2023-01-01 ENCOUNTER — APPOINTMENT (OUTPATIENT)
Dept: RADIOLOGY | Facility: MEDICAL CENTER | Age: 58
End: 2023-01-01
Attending: SURGERY
Payer: OTHER GOVERNMENT

## 2023-01-01 ENCOUNTER — HOSPITAL ENCOUNTER (OUTPATIENT)
Dept: RADIATION ONCOLOGY | Facility: MEDICAL CENTER | Age: 58
End: 2023-09-26
Payer: OTHER GOVERNMENT

## 2023-01-01 ENCOUNTER — HOSPITAL ENCOUNTER (OUTPATIENT)
Dept: RADIATION ONCOLOGY | Facility: MEDICAL CENTER | Age: 58
End: 2023-10-03

## 2023-01-01 ENCOUNTER — HOSPITAL ENCOUNTER (OUTPATIENT)
Dept: RADIATION ONCOLOGY | Facility: MEDICAL CENTER | Age: 58
End: 2023-10-25
Payer: OTHER GOVERNMENT

## 2023-01-01 ENCOUNTER — APPOINTMENT (OUTPATIENT)
Dept: RADIOLOGY | Facility: MEDICAL CENTER | Age: 58
DRG: 375 | End: 2023-01-01
Attending: INTERNAL MEDICINE
Payer: OTHER GOVERNMENT

## 2023-01-01 ENCOUNTER — HOSPITAL ENCOUNTER (OUTPATIENT)
Dept: RADIATION ONCOLOGY | Facility: MEDICAL CENTER | Age: 58
End: 2023-10-16

## 2023-01-01 VITALS
RESPIRATION RATE: 18 BRPM | BODY MASS INDEX: 26.52 KG/M2 | HEART RATE: 88 BPM | OXYGEN SATURATION: 96 % | SYSTOLIC BLOOD PRESSURE: 118 MMHG | HEIGHT: 76 IN | TEMPERATURE: 98.1 F | DIASTOLIC BLOOD PRESSURE: 71 MMHG | WEIGHT: 217.81 LBS

## 2023-01-01 VITALS
HEIGHT: 76 IN | WEIGHT: 214.95 LBS | TEMPERATURE: 97.8 F | RESPIRATION RATE: 18 BRPM | BODY MASS INDEX: 26.18 KG/M2 | HEART RATE: 95 BPM | DIASTOLIC BLOOD PRESSURE: 71 MMHG | SYSTOLIC BLOOD PRESSURE: 100 MMHG | OXYGEN SATURATION: 98 %

## 2023-01-01 VITALS
DIASTOLIC BLOOD PRESSURE: 66 MMHG | RESPIRATION RATE: 18 BRPM | TEMPERATURE: 97.1 F | BODY MASS INDEX: 26.18 KG/M2 | WEIGHT: 214.95 LBS | SYSTOLIC BLOOD PRESSURE: 111 MMHG | HEIGHT: 76 IN | HEART RATE: 82 BPM | OXYGEN SATURATION: 95 %

## 2023-01-01 VITALS
DIASTOLIC BLOOD PRESSURE: 82 MMHG | BODY MASS INDEX: 27.21 KG/M2 | DIASTOLIC BLOOD PRESSURE: 79 MMHG | TEMPERATURE: 97.5 F | OXYGEN SATURATION: 97 % | HEART RATE: 63 BPM | WEIGHT: 216.71 LBS | HEART RATE: 66 BPM | OXYGEN SATURATION: 95 % | TEMPERATURE: 97.7 F | SYSTOLIC BLOOD PRESSURE: 117 MMHG | WEIGHT: 220.68 LBS | BODY MASS INDEX: 26.73 KG/M2 | SYSTOLIC BLOOD PRESSURE: 123 MMHG

## 2023-01-01 VITALS
HEIGHT: 76 IN | HEART RATE: 62 BPM | OXYGEN SATURATION: 100 % | TEMPERATURE: 97 F | DIASTOLIC BLOOD PRESSURE: 80 MMHG | BODY MASS INDEX: 26.66 KG/M2 | WEIGHT: 218.92 LBS | SYSTOLIC BLOOD PRESSURE: 127 MMHG

## 2023-01-01 VITALS
BODY MASS INDEX: 25.96 KG/M2 | SYSTOLIC BLOOD PRESSURE: 106 MMHG | RESPIRATION RATE: 16 BRPM | OXYGEN SATURATION: 96 % | HEART RATE: 84 BPM | HEIGHT: 76 IN | DIASTOLIC BLOOD PRESSURE: 69 MMHG | TEMPERATURE: 97.7 F | WEIGHT: 213.19 LBS

## 2023-01-01 VITALS
DIASTOLIC BLOOD PRESSURE: 59 MMHG | SYSTOLIC BLOOD PRESSURE: 104 MMHG | BODY MASS INDEX: 26.66 KG/M2 | TEMPERATURE: 96.7 F | WEIGHT: 218.92 LBS | HEART RATE: 68 BPM | RESPIRATION RATE: 17 BRPM | HEIGHT: 76 IN | OXYGEN SATURATION: 95 %

## 2023-01-01 VITALS
DIASTOLIC BLOOD PRESSURE: 60 MMHG | WEIGHT: 219 LBS | BODY MASS INDEX: 26.67 KG/M2 | HEART RATE: 67 BPM | SYSTOLIC BLOOD PRESSURE: 124 MMHG | OXYGEN SATURATION: 97 % | TEMPERATURE: 98.6 F | HEIGHT: 76 IN

## 2023-01-01 VITALS
WEIGHT: 224.32 LBS | OXYGEN SATURATION: 96 % | BODY MASS INDEX: 27.32 KG/M2 | RESPIRATION RATE: 16 BRPM | DIASTOLIC BLOOD PRESSURE: 60 MMHG | SYSTOLIC BLOOD PRESSURE: 110 MMHG | HEART RATE: 78 BPM | HEIGHT: 76 IN | TEMPERATURE: 97.9 F

## 2023-01-01 VITALS
OXYGEN SATURATION: 95 % | HEART RATE: 80 BPM | SYSTOLIC BLOOD PRESSURE: 100 MMHG | DIASTOLIC BLOOD PRESSURE: 70 MMHG | BODY MASS INDEX: 26.38 KG/M2 | RESPIRATION RATE: 18 BRPM | TEMPERATURE: 97.7 F | WEIGHT: 216.71 LBS

## 2023-01-01 VITALS
SYSTOLIC BLOOD PRESSURE: 103 MMHG | DIASTOLIC BLOOD PRESSURE: 60 MMHG | RESPIRATION RATE: 18 BRPM | OXYGEN SATURATION: 94 % | HEART RATE: 101 BPM | TEMPERATURE: 98.1 F

## 2023-01-01 VITALS
HEART RATE: 85 BPM | DIASTOLIC BLOOD PRESSURE: 74 MMHG | TEMPERATURE: 98.2 F | WEIGHT: 216.71 LBS | HEIGHT: 76 IN | BODY MASS INDEX: 26.39 KG/M2 | RESPIRATION RATE: 18 BRPM | OXYGEN SATURATION: 99 % | SYSTOLIC BLOOD PRESSURE: 113 MMHG

## 2023-01-01 VITALS
WEIGHT: 217 LBS | TEMPERATURE: 97.6 F | HEART RATE: 74 BPM | BODY MASS INDEX: 26.76 KG/M2 | DIASTOLIC BLOOD PRESSURE: 80 MMHG | SYSTOLIC BLOOD PRESSURE: 128 MMHG | OXYGEN SATURATION: 97 %

## 2023-01-01 VITALS
HEART RATE: 71 BPM | DIASTOLIC BLOOD PRESSURE: 67 MMHG | SYSTOLIC BLOOD PRESSURE: 115 MMHG | TEMPERATURE: 97.6 F | BODY MASS INDEX: 26.72 KG/M2 | OXYGEN SATURATION: 95 % | WEIGHT: 216.71 LBS

## 2023-01-01 VITALS
TEMPERATURE: 97.8 F | OXYGEN SATURATION: 96 % | DIASTOLIC BLOOD PRESSURE: 65 MMHG | SYSTOLIC BLOOD PRESSURE: 115 MMHG | BODY MASS INDEX: 27.26 KG/M2 | HEART RATE: 68 BPM | WEIGHT: 221.12 LBS

## 2023-01-01 VITALS
RESPIRATION RATE: 18 BRPM | SYSTOLIC BLOOD PRESSURE: 115 MMHG | TEMPERATURE: 97.2 F | DIASTOLIC BLOOD PRESSURE: 71 MMHG | HEART RATE: 95 BPM | OXYGEN SATURATION: 98 %

## 2023-01-01 VITALS
OXYGEN SATURATION: 97 % | WEIGHT: 217.81 LBS | DIASTOLIC BLOOD PRESSURE: 66 MMHG | TEMPERATURE: 97.4 F | BODY MASS INDEX: 26.51 KG/M2 | SYSTOLIC BLOOD PRESSURE: 177 MMHG | HEART RATE: 68 BPM

## 2023-01-01 VITALS
DIASTOLIC BLOOD PRESSURE: 73 MMHG | SYSTOLIC BLOOD PRESSURE: 104 MMHG | TEMPERATURE: 97.5 F | OXYGEN SATURATION: 93 % | BODY MASS INDEX: 26.59 KG/M2 | HEART RATE: 60 BPM | WEIGHT: 218.48 LBS

## 2023-01-01 VITALS
HEART RATE: 90 BPM | TEMPERATURE: 98 F | SYSTOLIC BLOOD PRESSURE: 102 MMHG | OXYGEN SATURATION: 98 % | RESPIRATION RATE: 18 BRPM | DIASTOLIC BLOOD PRESSURE: 73 MMHG

## 2023-01-01 VITALS
HEART RATE: 81 BPM | OXYGEN SATURATION: 98 % | SYSTOLIC BLOOD PRESSURE: 120 MMHG | RESPIRATION RATE: 18 BRPM | DIASTOLIC BLOOD PRESSURE: 75 MMHG

## 2023-01-01 DIAGNOSIS — R63.4 WEIGHT LOSS, ABNORMAL: ICD-10-CM

## 2023-01-01 DIAGNOSIS — R19.5 CLAY-COLORED STOOLS: ICD-10-CM

## 2023-01-01 DIAGNOSIS — D64.9 ANEMIA, UNSPECIFIED TYPE: ICD-10-CM

## 2023-01-01 DIAGNOSIS — C15.5 MALIGNANT NEOPLASM OF LOWER THIRD OF ESOPHAGUS (HCC): ICD-10-CM

## 2023-01-01 DIAGNOSIS — R13.19 ESOPHAGEAL DYSPHAGIA: ICD-10-CM

## 2023-01-01 DIAGNOSIS — C16.0 ADENOCARCINOMA OF CARDIO-ESOPHAGEAL JUNCTION (HCC): ICD-10-CM

## 2023-01-01 DIAGNOSIS — C15.9 MALIGNANT NEOPLASM OF ESOPHAGUS, UNSPECIFIED LOCATION (HCC): ICD-10-CM

## 2023-01-01 DIAGNOSIS — K22.89 ESOPHAGEAL MASS: ICD-10-CM

## 2023-01-01 DIAGNOSIS — K59.00 CONSTIPATION, UNSPECIFIED CONSTIPATION TYPE: ICD-10-CM

## 2023-01-01 DIAGNOSIS — K92.1 MELENA: ICD-10-CM

## 2023-01-01 DIAGNOSIS — R10.10 UPPER ABDOMINAL PAIN: ICD-10-CM

## 2023-01-01 DIAGNOSIS — M79.18 MYOFASCIAL PAIN SYNDROME OF THORACIC SPINE: ICD-10-CM

## 2023-01-01 DIAGNOSIS — C15.5 MALIGNANT NEOPLASM OF LOWER THIRD OF ESOPHAGUS (HCC): Chronic | ICD-10-CM

## 2023-01-01 LAB
ABO + RH BLD: NORMAL
ABO GROUP BLD: NORMAL
ALBUMIN SERPL BCP-MCNC: 3.6 G/DL (ref 3.2–4.9)
ALBUMIN SERPL BCP-MCNC: 3.7 G/DL (ref 3.2–4.9)
ALBUMIN SERPL BCP-MCNC: 3.7 G/DL (ref 3.2–4.9)
ALBUMIN SERPL BCP-MCNC: 3.8 G/DL (ref 3.2–4.9)
ALBUMIN SERPL BCP-MCNC: 3.9 G/DL (ref 3.2–4.9)
ALBUMIN SERPL BCP-MCNC: 4 G/DL (ref 3.2–4.9)
ALBUMIN/GLOB SERPL: 1.2 G/DL
ALBUMIN/GLOB SERPL: 1.3 G/DL
ALBUMIN/GLOB SERPL: 1.4 G/DL
ALBUMIN/GLOB SERPL: 1.7 G/DL
ALBUMIN/GLOB SERPL: 1.7 G/DL
ALP SERPL-CCNC: 29 U/L (ref 30–99)
ALP SERPL-CCNC: 31 U/L (ref 30–99)
ALP SERPL-CCNC: 32 U/L (ref 30–99)
ALP SERPL-CCNC: 33 U/L (ref 30–99)
ALP SERPL-CCNC: 35 U/L (ref 30–99)
ALP SERPL-CCNC: 39 U/L (ref 30–99)
ALP SERPL-CCNC: 40 U/L (ref 30–99)
ALP SERPL-CCNC: 45 U/L (ref 30–99)
ALT SERPL-CCNC: 14 U/L (ref 2–50)
ALT SERPL-CCNC: 19 U/L (ref 2–50)
ALT SERPL-CCNC: 5 U/L (ref 2–50)
ALT SERPL-CCNC: 6 U/L (ref 2–50)
ALT SERPL-CCNC: 7 U/L (ref 2–50)
ALT SERPL-CCNC: 8 U/L (ref 2–50)
ALT SERPL-CCNC: 8 U/L (ref 2–50)
ALT SERPL-CCNC: 9 U/L (ref 2–50)
ANION GAP SERPL CALC-SCNC: 11 MMOL/L (ref 7–16)
ANION GAP SERPL CALC-SCNC: 13 MMOL/L (ref 7–16)
ANION GAP SERPL CALC-SCNC: 7 MMOL/L (ref 7–16)
ANION GAP SERPL CALC-SCNC: 8 MMOL/L (ref 7–16)
ANION GAP SERPL CALC-SCNC: 8 MMOL/L (ref 7–16)
ANION GAP SERPL CALC-SCNC: 9 MMOL/L (ref 7–16)
ANISOCYTOSIS BLD QL SMEAR: ABNORMAL
ANISOCYTOSIS BLD QL SMEAR: NORMAL
APTT PPP: 25.8 SEC (ref 24.7–36)
AST SERPL-CCNC: 10 U/L (ref 12–45)
AST SERPL-CCNC: 11 U/L (ref 12–45)
AST SERPL-CCNC: 11 U/L (ref 12–45)
AST SERPL-CCNC: 12 U/L (ref 12–45)
AST SERPL-CCNC: 12 U/L (ref 12–45)
AST SERPL-CCNC: 13 U/L (ref 12–45)
AST SERPL-CCNC: 14 U/L (ref 12–45)
AST SERPL-CCNC: 17 U/L (ref 12–45)
AST SERPL-CCNC: 22 U/L (ref 12–45)
AST SERPL-CCNC: 23 U/L (ref 12–45)
BARCODED ABORH UBTYP: 1700
BARCODED ABORH UBTYP: 7300
BARCODED PRD CODE UBPRD: NORMAL
BARCODED UNIT NUM UBUNT: NORMAL
BASOPHILS # BLD AUTO: 0.6 % (ref 0–1.8)
BASOPHILS # BLD AUTO: 0.6 % (ref 0–1.8)
BASOPHILS # BLD AUTO: 0.8 % (ref 0–1.8)
BASOPHILS # BLD AUTO: 0.8 % (ref 0–1.8)
BASOPHILS # BLD AUTO: 0.9 % (ref 0–1.8)
BASOPHILS # BLD AUTO: 0.9 % (ref 0–1.8)
BASOPHILS # BLD AUTO: 1 % (ref 0–1.8)
BASOPHILS # BLD AUTO: 1 % (ref 0–1.8)
BASOPHILS # BLD AUTO: 1.1 % (ref 0–1.8)
BASOPHILS # BLD AUTO: 1.2 % (ref 0–1.8)
BASOPHILS # BLD AUTO: 1.2 % (ref 0–1.8)
BASOPHILS # BLD AUTO: 1.6 % (ref 0–1.8)
BASOPHILS # BLD AUTO: 2.1 % (ref 0–1.8)
BASOPHILS # BLD: 0.02 K/UL (ref 0–0.12)
BASOPHILS # BLD: 0.03 K/UL (ref 0–0.12)
BASOPHILS # BLD: 0.03 K/UL (ref 0–0.12)
BASOPHILS # BLD: 0.04 K/UL (ref 0–0.12)
BASOPHILS # BLD: 0.04 K/UL (ref 0–0.12)
BASOPHILS # BLD: 0.07 K/UL (ref 0–0.12)
BASOPHILS # BLD: 0.07 K/UL (ref 0–0.12)
BASOPHILS # BLD: 0.09 K/UL (ref 0–0.12)
BASOPHILS # BLD: 0.15 K/UL (ref 0–0.12)
BASOPHILS # BLD: 0.16 K/UL (ref 0–0.12)
BILIRUB SERPL-MCNC: 0.2 MG/DL (ref 0.1–1.5)
BILIRUB SERPL-MCNC: 0.3 MG/DL (ref 0.1–1.5)
BILIRUB SERPL-MCNC: 0.4 MG/DL (ref 0.1–1.5)
BILIRUB SERPL-MCNC: 0.6 MG/DL (ref 0.1–1.5)
BLD GP AB SCN SERPL QL: NORMAL
BUN SERPL-MCNC: 12 MG/DL (ref 8–22)
BUN SERPL-MCNC: 13 MG/DL (ref 8–22)
BUN SERPL-MCNC: 16 MG/DL (ref 8–22)
BUN SERPL-MCNC: 16 MG/DL (ref 8–22)
BUN SERPL-MCNC: 17 MG/DL (ref 8–22)
BUN SERPL-MCNC: 17 MG/DL (ref 8–22)
BUN SERPL-MCNC: 18 MG/DL (ref 8–22)
BUN SERPL-MCNC: 20 MG/DL (ref 8–22)
BUN SERPL-MCNC: 20 MG/DL (ref 8–22)
BUN SERPL-MCNC: 21 MG/DL (ref 8–22)
BUN SERPL-MCNC: 5 MG/DL (ref 8–22)
CALCIUM ALBUM COR SERPL-MCNC: 9.1 MG/DL (ref 8.5–10.5)
CALCIUM ALBUM COR SERPL-MCNC: 9.1 MG/DL (ref 8.5–10.5)
CALCIUM ALBUM COR SERPL-MCNC: 9.3 MG/DL (ref 8.5–10.5)
CALCIUM ALBUM COR SERPL-MCNC: 9.4 MG/DL (ref 8.5–10.5)
CALCIUM ALBUM COR SERPL-MCNC: 9.5 MG/DL (ref 8.5–10.5)
CALCIUM ALBUM COR SERPL-MCNC: 9.5 MG/DL (ref 8.5–10.5)
CALCIUM ALBUM COR SERPL-MCNC: 9.8 MG/DL (ref 8.5–10.5)
CALCIUM ALBUM COR SERPL-MCNC: 9.9 MG/DL (ref 8.5–10.5)
CALCIUM SERPL-MCNC: 8.5 MG/DL (ref 8.5–10.5)
CALCIUM SERPL-MCNC: 8.6 MG/DL (ref 8.5–10.5)
CALCIUM SERPL-MCNC: 8.8 MG/DL (ref 8.5–10.5)
CALCIUM SERPL-MCNC: 8.9 MG/DL (ref 8.5–10.5)
CALCIUM SERPL-MCNC: 9 MG/DL (ref 8.5–10.5)
CALCIUM SERPL-MCNC: 9.1 MG/DL (ref 8.5–10.5)
CALCIUM SERPL-MCNC: 9.1 MG/DL (ref 8.5–10.5)
CALCIUM SERPL-MCNC: 9.2 MG/DL (ref 8.5–10.5)
CALCIUM SERPL-MCNC: 9.3 MG/DL (ref 8.5–10.5)
CALCIUM SERPL-MCNC: 9.4 MG/DL (ref 8.5–10.5)
CALCIUM SERPL-MCNC: 9.5 MG/DL (ref 8.5–10.5)
CALCIUM SERPL-MCNC: 9.9 MG/DL (ref 8.5–10.5)
CHEMOTHERAPY INFUSION START DATE: NORMAL
CHEMOTHERAPY INFUSION STOP DATE: NORMAL
CHEMOTHERAPY RECORDS: 1.8
CHEMOTHERAPY RECORDS: 4500
CHEMOTHERAPY RECORDS: 540
CHEMOTHERAPY RECORDS: NORMAL
CHEMOTHERAPY RX CANCER: NORMAL
CHLORIDE SERPL-SCNC: 105 MMOL/L (ref 96–112)
CHLORIDE SERPL-SCNC: 105 MMOL/L (ref 96–112)
CHLORIDE SERPL-SCNC: 106 MMOL/L (ref 96–112)
CHLORIDE SERPL-SCNC: 107 MMOL/L (ref 96–112)
CHLORIDE SERPL-SCNC: 108 MMOL/L (ref 96–112)
CHLORIDE SERPL-SCNC: 109 MMOL/L (ref 96–112)
CHLORIDE SERPL-SCNC: 109 MMOL/L (ref 96–112)
CHLORIDE SERPL-SCNC: 111 MMOL/L (ref 96–112)
CO2 SERPL-SCNC: 21 MMOL/L (ref 20–33)
CO2 SERPL-SCNC: 22 MMOL/L (ref 20–33)
CO2 SERPL-SCNC: 22 MMOL/L (ref 20–33)
CO2 SERPL-SCNC: 24 MMOL/L (ref 20–33)
CO2 SERPL-SCNC: 25 MMOL/L (ref 20–33)
CO2 SERPL-SCNC: 26 MMOL/L (ref 20–33)
CO2 SERPL-SCNC: 27 MMOL/L (ref 20–33)
CO2 SERPL-SCNC: 28 MMOL/L (ref 20–33)
COMMENT 1642: NORMAL
COMPONENT R 8504R: NORMAL
CREAT SERPL-MCNC: 0.53 MG/DL (ref 0.5–1.4)
CREAT SERPL-MCNC: 0.58 MG/DL (ref 0.5–1.4)
CREAT SERPL-MCNC: 0.63 MG/DL (ref 0.5–1.4)
CREAT SERPL-MCNC: 0.64 MG/DL (ref 0.5–1.4)
CREAT SERPL-MCNC: 0.65 MG/DL (ref 0.5–1.4)
CREAT SERPL-MCNC: 0.65 MG/DL (ref 0.5–1.4)
CREAT SERPL-MCNC: 0.66 MG/DL (ref 0.5–1.4)
CREAT SERPL-MCNC: 0.71 MG/DL (ref 0.5–1.4)
CREAT SERPL-MCNC: 0.73 MG/DL (ref 0.5–1.4)
CREAT SERPL-MCNC: 0.74 MG/DL (ref 0.5–1.4)
CREAT SERPL-MCNC: 0.81 MG/DL (ref 0.5–1.4)
CREAT SERPL-MCNC: 0.89 MG/DL (ref 0.5–1.4)
CREAT SERPL-MCNC: 0.9 MG/DL (ref 0.5–1.4)
CREAT SERPL-MCNC: 0.91 MG/DL (ref 0.5–1.4)
DATE 1ST CHEMO CANCER: NORMAL
EOSINOPHIL # BLD AUTO: 0.02 K/UL (ref 0–0.51)
EOSINOPHIL # BLD AUTO: 0.03 K/UL (ref 0–0.51)
EOSINOPHIL # BLD AUTO: 0.03 K/UL (ref 0–0.51)
EOSINOPHIL # BLD AUTO: 0.05 K/UL (ref 0–0.51)
EOSINOPHIL # BLD AUTO: 0.08 K/UL (ref 0–0.51)
EOSINOPHIL # BLD AUTO: 0.08 K/UL (ref 0–0.51)
EOSINOPHIL # BLD AUTO: 0.1 K/UL (ref 0–0.51)
EOSINOPHIL # BLD AUTO: 0.13 K/UL (ref 0–0.51)
EOSINOPHIL # BLD AUTO: 0.15 K/UL (ref 0–0.51)
EOSINOPHIL # BLD AUTO: 0.15 K/UL (ref 0–0.51)
EOSINOPHIL # BLD AUTO: 0.25 K/UL (ref 0–0.51)
EOSINOPHIL NFR BLD: 0.9 % (ref 0–6.9)
EOSINOPHIL NFR BLD: 1.1 % (ref 0–6.9)
EOSINOPHIL NFR BLD: 1.3 % (ref 0–6.9)
EOSINOPHIL NFR BLD: 1.5 % (ref 0–6.9)
EOSINOPHIL NFR BLD: 1.5 % (ref 0–6.9)
EOSINOPHIL NFR BLD: 1.7 % (ref 0–6.9)
EOSINOPHIL NFR BLD: 2 % (ref 0–6.9)
EOSINOPHIL NFR BLD: 2.7 % (ref 0–6.9)
EOSINOPHIL NFR BLD: 2.9 % (ref 0–6.9)
EOSINOPHIL NFR BLD: 3.5 % (ref 0–6.9)
EOSINOPHIL NFR BLD: 4.1 % (ref 0–6.9)
ERYTHROCYTE [DISTWIDTH] IN BLOOD BY AUTOMATED COUNT: 50.1 FL (ref 35.9–50)
ERYTHROCYTE [DISTWIDTH] IN BLOOD BY AUTOMATED COUNT: 51.5 FL (ref 35.9–50)
ERYTHROCYTE [DISTWIDTH] IN BLOOD BY AUTOMATED COUNT: 57 FL (ref 35.9–50)
ERYTHROCYTE [DISTWIDTH] IN BLOOD BY AUTOMATED COUNT: 58.4 FL (ref 35.9–50)
ERYTHROCYTE [DISTWIDTH] IN BLOOD BY AUTOMATED COUNT: 60.4 FL (ref 35.9–50)
ERYTHROCYTE [DISTWIDTH] IN BLOOD BY AUTOMATED COUNT: 62.5 FL (ref 35.9–50)
ERYTHROCYTE [DISTWIDTH] IN BLOOD BY AUTOMATED COUNT: 70.9 FL (ref 35.9–50)
ERYTHROCYTE [DISTWIDTH] IN BLOOD BY AUTOMATED COUNT: 71.9 FL (ref 35.9–50)
ERYTHROCYTE [DISTWIDTH] IN BLOOD BY AUTOMATED COUNT: 74.2 FL (ref 35.9–50)
ERYTHROCYTE [DISTWIDTH] IN BLOOD BY AUTOMATED COUNT: 74.3 FL (ref 35.9–50)
ERYTHROCYTE [DISTWIDTH] IN BLOOD BY AUTOMATED COUNT: 76.9 FL (ref 35.9–50)
ERYTHROCYTE [DISTWIDTH] IN BLOOD BY AUTOMATED COUNT: 78 FL (ref 35.9–50)
ERYTHROCYTE [DISTWIDTH] IN BLOOD BY AUTOMATED COUNT: 79.5 FL (ref 35.9–50)
ERYTHROCYTE [DISTWIDTH] IN BLOOD BY AUTOMATED COUNT: 83.5 FL (ref 35.9–50)
FASTING STATUS PATIENT QL REPORTED: NORMAL
FERRITIN SERPL-MCNC: 16 NG/ML (ref 22–322)
FERRITIN SERPL-MCNC: 3.3 NG/ML (ref 22–322)
GFR SERPLBLD CREATININE-BSD FMLA CKD-EPI: 101 ML/MIN/1.73 M 2
GFR SERPLBLD CREATININE-BSD FMLA CKD-EPI: 104 ML/MIN/1.73 M 2
GFR SERPLBLD CREATININE-BSD FMLA CKD-EPI: 104 ML/MIN/1.73 M 2
GFR SERPLBLD CREATININE-BSD FMLA CKD-EPI: 105 ML/MIN/1.73 M 2
GFR SERPLBLD CREATININE-BSD FMLA CKD-EPI: 107 ML/MIN/1.73 M 2
GFR SERPLBLD CREATININE-BSD FMLA CKD-EPI: 108 ML/MIN/1.73 M 2
GFR SERPLBLD CREATININE-BSD FMLA CKD-EPI: 109 ML/MIN/1.73 M 2
GFR SERPLBLD CREATININE-BSD FMLA CKD-EPI: 112 ML/MIN/1.73 M 2
GFR SERPLBLD CREATININE-BSD FMLA CKD-EPI: 115 ML/MIN/1.73 M 2
GFR SERPLBLD CREATININE-BSD FMLA CKD-EPI: 97 ML/MIN/1.73 M 2
GFR SERPLBLD CREATININE-BSD FMLA CKD-EPI: 98 ML/MIN/1.73 M 2
GFR SERPLBLD CREATININE-BSD FMLA CKD-EPI: 98 ML/MIN/1.73 M 2
GLOBULIN SER CALC-MCNC: 2.2 G/DL (ref 1.9–3.5)
GLOBULIN SER CALC-MCNC: 2.3 G/DL (ref 1.9–3.5)
GLOBULIN SER CALC-MCNC: 2.6 G/DL (ref 1.9–3.5)
GLOBULIN SER CALC-MCNC: 2.6 G/DL (ref 1.9–3.5)
GLOBULIN SER CALC-MCNC: 2.7 G/DL (ref 1.9–3.5)
GLOBULIN SER CALC-MCNC: 2.7 G/DL (ref 1.9–3.5)
GLOBULIN SER CALC-MCNC: 2.9 G/DL (ref 1.9–3.5)
GLOBULIN SER CALC-MCNC: 3 G/DL (ref 1.9–3.5)
GLUCOSE SERPL-MCNC: 102 MG/DL (ref 65–99)
GLUCOSE SERPL-MCNC: 104 MG/DL (ref 65–99)
GLUCOSE SERPL-MCNC: 107 MG/DL (ref 65–99)
GLUCOSE SERPL-MCNC: 107 MG/DL (ref 65–99)
GLUCOSE SERPL-MCNC: 111 MG/DL (ref 65–99)
GLUCOSE SERPL-MCNC: 116 MG/DL (ref 65–99)
GLUCOSE SERPL-MCNC: 137 MG/DL (ref 65–99)
GLUCOSE SERPL-MCNC: 77 MG/DL (ref 65–99)
GLUCOSE SERPL-MCNC: 82 MG/DL (ref 65–99)
GLUCOSE SERPL-MCNC: 88 MG/DL (ref 65–99)
GLUCOSE SERPL-MCNC: 90 MG/DL (ref 65–99)
GLUCOSE SERPL-MCNC: 91 MG/DL (ref 65–99)
GLUCOSE SERPL-MCNC: 96 MG/DL (ref 65–99)
GLUCOSE SERPL-MCNC: 99 MG/DL (ref 65–99)
HAV IGM SERPL QL IA: NORMAL
HBV CORE IGM SER QL: NORMAL
HBV SURFACE AG SER QL: NORMAL
HCT VFR BLD AUTO: 18.3 % (ref 42–52)
HCT VFR BLD AUTO: 19.1 % (ref 42–52)
HCT VFR BLD AUTO: 19.2 % (ref 42–52)
HCT VFR BLD AUTO: 19.9 % (ref 42–52)
HCT VFR BLD AUTO: 23.6 % (ref 42–52)
HCT VFR BLD AUTO: 25.3 % (ref 42–52)
HCT VFR BLD AUTO: 25.4 % (ref 42–52)
HCT VFR BLD AUTO: 26 % (ref 42–52)
HCT VFR BLD AUTO: 28.7 % (ref 42–52)
HCT VFR BLD AUTO: 30 % (ref 42–52)
HCT VFR BLD AUTO: 30.3 % (ref 42–52)
HCT VFR BLD AUTO: 32.2 % (ref 42–52)
HCT VFR BLD AUTO: 33.2 % (ref 42–52)
HCT VFR BLD AUTO: 34.5 % (ref 42–52)
HCT VFR BLD AUTO: 35.1 % (ref 42–52)
HCT VFR BLD AUTO: 35.6 % (ref 42–52)
HCT VFR BLD AUTO: 35.9 % (ref 42–52)
HCT VFR BLD AUTO: 37.1 % (ref 42–52)
HCT VFR BLD AUTO: 37.1 % (ref 42–52)
HCT VFR BLD AUTO: 37.2 % (ref 42–52)
HCV AB SER QL: NORMAL
HGB BLD-MCNC: 10.1 G/DL (ref 14–18)
HGB BLD-MCNC: 10.2 G/DL (ref 14–18)
HGB BLD-MCNC: 10.4 G/DL (ref 14–18)
HGB BLD-MCNC: 10.7 G/DL (ref 14–18)
HGB BLD-MCNC: 10.9 G/DL (ref 14–18)
HGB BLD-MCNC: 11.3 G/DL (ref 14–18)
HGB BLD-MCNC: 11.5 G/DL (ref 14–18)
HGB BLD-MCNC: 11.6 G/DL (ref 14–18)
HGB BLD-MCNC: 11.7 G/DL (ref 14–18)
HGB BLD-MCNC: 4.9 G/DL (ref 14–18)
HGB BLD-MCNC: 5.1 G/DL (ref 14–18)
HGB BLD-MCNC: 5.2 G/DL (ref 14–18)
HGB BLD-MCNC: 5.3 G/DL (ref 14–18)
HGB BLD-MCNC: 6.9 G/DL (ref 14–18)
HGB BLD-MCNC: 7.3 G/DL (ref 14–18)
HGB BLD-MCNC: 7.6 G/DL (ref 14–18)
HGB BLD-MCNC: 7.6 G/DL (ref 14–18)
HGB BLD-MCNC: 8.5 G/DL (ref 14–18)
HGB BLD-MCNC: 8.6 G/DL (ref 14–18)
HGB BLD-MCNC: 9 G/DL (ref 14–18)
HGB RETIC QN AUTO: 16 PG/CELL (ref 29–35)
HYPOCHROMIA BLD QL SMEAR: ABNORMAL
HYPOCHROMIA BLD QL SMEAR: NORMAL
IMM GRANULOCYTES # BLD AUTO: 0 K/UL (ref 0–0.11)
IMM GRANULOCYTES # BLD AUTO: 0 K/UL (ref 0–0.11)
IMM GRANULOCYTES # BLD AUTO: 0.01 K/UL (ref 0–0.11)
IMM GRANULOCYTES # BLD AUTO: 0.01 K/UL (ref 0–0.11)
IMM GRANULOCYTES # BLD AUTO: 0.02 K/UL (ref 0–0.11)
IMM GRANULOCYTES # BLD AUTO: 0.24 K/UL (ref 0–0.11)
IMM GRANULOCYTES # BLD AUTO: 0.26 K/UL (ref 0–0.11)
IMM GRANULOCYTES NFR BLD AUTO: 0 % (ref 0–0.9)
IMM GRANULOCYTES NFR BLD AUTO: 0 % (ref 0–0.9)
IMM GRANULOCYTES NFR BLD AUTO: 0.2 % (ref 0–0.9)
IMM GRANULOCYTES NFR BLD AUTO: 0.3 % (ref 0–0.9)
IMM GRANULOCYTES NFR BLD AUTO: 0.3 % (ref 0–0.9)
IMM GRANULOCYTES NFR BLD AUTO: 0.4 % (ref 0–0.9)
IMM GRANULOCYTES NFR BLD AUTO: 0.4 % (ref 0–0.9)
IMM GRANULOCYTES NFR BLD AUTO: 0.6 % (ref 0–0.9)
IMM GRANULOCYTES NFR BLD AUTO: 0.7 % (ref 0–0.9)
IMM GRANULOCYTES NFR BLD AUTO: 0.8 % (ref 0–0.9)
IMM GRANULOCYTES NFR BLD AUTO: 1 % (ref 0–0.9)
IMM GRANULOCYTES NFR BLD AUTO: 1.7 % (ref 0–0.9)
IMM GRANULOCYTES NFR BLD AUTO: 1.8 % (ref 0–0.9)
IMM RETICS NFR: 32.9 % (ref 2.6–16.1)
INR PPP: 1.13 (ref 0.87–1.13)
IRON SATN MFR SERPL: 12 % (ref 15–55)
IRON SATN MFR SERPL: 7 % (ref 15–55)
IRON SERPL-MCNC: 19 UG/DL (ref 50–180)
IRON SERPL-MCNC: 38 UG/DL (ref 50–180)
LIPASE SERPL-CCNC: 37 U/L (ref 11–82)
LIPASE SERPL-CCNC: 49 U/L (ref 11–82)
LYMPHOCYTES # BLD AUTO: 0.1 K/UL (ref 1–4.8)
LYMPHOCYTES # BLD AUTO: 0.13 K/UL (ref 1–4.8)
LYMPHOCYTES # BLD AUTO: 0.16 K/UL (ref 1–4.8)
LYMPHOCYTES # BLD AUTO: 0.16 K/UL (ref 1–4.8)
LYMPHOCYTES # BLD AUTO: 0.21 K/UL (ref 1–4.8)
LYMPHOCYTES # BLD AUTO: 0.26 K/UL (ref 1–4.8)
LYMPHOCYTES # BLD AUTO: 0.26 K/UL (ref 1–4.8)
LYMPHOCYTES # BLD AUTO: 0.61 K/UL (ref 1–4.8)
LYMPHOCYTES # BLD AUTO: 0.88 K/UL (ref 1–4.8)
LYMPHOCYTES # BLD AUTO: 1.73 K/UL (ref 1–4.8)
LYMPHOCYTES # BLD AUTO: 1.74 K/UL (ref 1–4.8)
LYMPHOCYTES # BLD AUTO: 1.75 K/UL (ref 1–4.8)
LYMPHOCYTES # BLD AUTO: 1.91 K/UL (ref 1–4.8)
LYMPHOCYTES NFR BLD: 11.8 % (ref 22–41)
LYMPHOCYTES NFR BLD: 12.5 % (ref 22–41)
LYMPHOCYTES NFR BLD: 12.9 % (ref 22–41)
LYMPHOCYTES NFR BLD: 20.3 % (ref 22–41)
LYMPHOCYTES NFR BLD: 23.1 % (ref 22–41)
LYMPHOCYTES NFR BLD: 23.3 % (ref 22–41)
LYMPHOCYTES NFR BLD: 4.3 % (ref 22–41)
LYMPHOCYTES NFR BLD: 5.2 % (ref 22–41)
LYMPHOCYTES NFR BLD: 6.1 % (ref 22–41)
LYMPHOCYTES NFR BLD: 6.7 % (ref 22–41)
LYMPHOCYTES NFR BLD: 8.2 % (ref 22–41)
LYMPHOCYTES NFR BLD: 8.3 % (ref 22–41)
LYMPHOCYTES NFR BLD: 8.8 % (ref 22–41)
MACROCYTES BLD QL SMEAR: ABNORMAL
MAGNESIUM SERPL-MCNC: 2 MG/DL (ref 1.5–2.5)
MAGNESIUM SERPL-MCNC: 2 MG/DL (ref 1.5–2.5)
MCH RBC QN AUTO: 18.5 PG (ref 27–33)
MCH RBC QN AUTO: 18.5 PG (ref 27–33)
MCH RBC QN AUTO: 21.7 PG (ref 27–33)
MCH RBC QN AUTO: 22.2 PG (ref 27–33)
MCH RBC QN AUTO: 22.4 PG (ref 27–33)
MCH RBC QN AUTO: 24.4 PG (ref 27–33)
MCH RBC QN AUTO: 25.6 PG (ref 27–33)
MCH RBC QN AUTO: 25.6 PG (ref 27–33)
MCH RBC QN AUTO: 26.3 PG (ref 27–33)
MCH RBC QN AUTO: 26.3 PG (ref 27–33)
MCH RBC QN AUTO: 26.7 PG (ref 27–33)
MCH RBC QN AUTO: 26.8 PG (ref 27–33)
MCH RBC QN AUTO: 28 PG (ref 27–33)
MCH RBC QN AUTO: 28.7 PG (ref 27–33)
MCHC RBC AUTO-ENTMCNC: 26.1 G/DL (ref 32.3–36.5)
MCHC RBC AUTO-ENTMCNC: 26.7 G/DL (ref 32.3–36.5)
MCHC RBC AUTO-ENTMCNC: 28.9 G/DL (ref 32.3–36.5)
MCHC RBC AUTO-ENTMCNC: 29.3 G/DL (ref 32.3–36.5)
MCHC RBC AUTO-ENTMCNC: 29.6 G/DL (ref 32.3–36.5)
MCHC RBC AUTO-ENTMCNC: 29.7 G/DL (ref 32.3–36.5)
MCHC RBC AUTO-ENTMCNC: 30.1 G/DL (ref 32.3–36.5)
MCHC RBC AUTO-ENTMCNC: 30.5 G/DL (ref 32.3–36.5)
MCHC RBC AUTO-ENTMCNC: 30.5 G/DL (ref 32.3–36.5)
MCHC RBC AUTO-ENTMCNC: 30.7 G/DL (ref 32.3–36.5)
MCHC RBC AUTO-ENTMCNC: 31.3 G/DL (ref 32.3–36.5)
MCHC RBC AUTO-ENTMCNC: 31.4 G/DL (ref 32.3–36.5)
MCHC RBC AUTO-ENTMCNC: 32.3 G/DL (ref 32.3–36.5)
MCHC RBC AUTO-ENTMCNC: 32.6 G/DL (ref 32.3–36.5)
MCV RBC AUTO: 69.2 FL (ref 81.4–97.8)
MCV RBC AUTO: 70.8 FL (ref 81.4–97.8)
MCV RBC AUTO: 74.9 FL (ref 81.4–97.8)
MCV RBC AUTO: 75.1 FL (ref 81.4–97.8)
MCV RBC AUTO: 75.4 FL (ref 81.4–97.8)
MCV RBC AUTO: 83.2 FL (ref 81.4–97.8)
MCV RBC AUTO: 83.4 FL (ref 81.4–97.8)
MCV RBC AUTO: 83.9 FL (ref 81.4–97.8)
MCV RBC AUTO: 85 FL (ref 81.4–97.8)
MCV RBC AUTO: 85.7 FL (ref 81.4–97.8)
MCV RBC AUTO: 86.3 FL (ref 81.4–97.8)
MCV RBC AUTO: 86.6 FL (ref 81.4–97.8)
MCV RBC AUTO: 87.5 FL (ref 81.4–97.8)
MCV RBC AUTO: 88.2 FL (ref 81.4–97.8)
MICROCYTES BLD QL SMEAR: ABNORMAL
MICROCYTES BLD QL SMEAR: NORMAL
MONOCYTES # BLD AUTO: 0.2 K/UL (ref 0–0.85)
MONOCYTES # BLD AUTO: 0.24 K/UL (ref 0–0.85)
MONOCYTES # BLD AUTO: 0.26 K/UL (ref 0–0.85)
MONOCYTES # BLD AUTO: 0.27 K/UL (ref 0–0.85)
MONOCYTES # BLD AUTO: 0.35 K/UL (ref 0–0.85)
MONOCYTES # BLD AUTO: 0.4 K/UL (ref 0–0.85)
MONOCYTES # BLD AUTO: 0.72 K/UL (ref 0–0.85)
MONOCYTES # BLD AUTO: 0.81 K/UL (ref 0–0.85)
MONOCYTES # BLD AUTO: 0.86 K/UL (ref 0–0.85)
MONOCYTES # BLD AUTO: 1.23 K/UL (ref 0–0.85)
MONOCYTES # BLD AUTO: 1.59 K/UL (ref 0–0.85)
MONOCYTES NFR BLD AUTO: 10.2 % (ref 0–13.4)
MONOCYTES NFR BLD AUTO: 10.4 % (ref 0–13.4)
MONOCYTES NFR BLD AUTO: 10.7 % (ref 0–13.4)
MONOCYTES NFR BLD AUTO: 10.8 % (ref 0–13.4)
MONOCYTES NFR BLD AUTO: 11.3 % (ref 0–13.4)
MONOCYTES NFR BLD AUTO: 11.6 % (ref 0–13.4)
MONOCYTES NFR BLD AUTO: 13.6 % (ref 0–13.4)
MONOCYTES NFR BLD AUTO: 13.9 % (ref 0–13.4)
MONOCYTES NFR BLD AUTO: 16.6 % (ref 0–13.4)
MONOCYTES NFR BLD AUTO: 4.6 % (ref 0–13.4)
MONOCYTES NFR BLD AUTO: 8.3 % (ref 0–13.4)
MONOCYTES NFR BLD AUTO: 8.3 % (ref 0–13.4)
MONOCYTES NFR BLD AUTO: 8.5 % (ref 0–13.4)
MORPHOLOGY BLD-IMP: NORMAL
NEUTROPHILS # BLD AUTO: 1.42 K/UL (ref 1.82–7.42)
NEUTROPHILS # BLD AUTO: 1.91 K/UL (ref 1.82–7.42)
NEUTROPHILS # BLD AUTO: 10.14 K/UL (ref 1.82–7.42)
NEUTROPHILS # BLD AUTO: 11.05 K/UL (ref 1.82–7.42)
NEUTROPHILS # BLD AUTO: 2 K/UL (ref 1.82–7.42)
NEUTROPHILS # BLD AUTO: 2.03 K/UL (ref 1.82–7.42)
NEUTROPHILS # BLD AUTO: 2.16 K/UL (ref 1.82–7.42)
NEUTROPHILS # BLD AUTO: 2.48 K/UL (ref 1.82–7.42)
NEUTROPHILS # BLD AUTO: 2.51 K/UL (ref 1.82–7.42)
NEUTROPHILS # BLD AUTO: 2.7 K/UL (ref 1.82–7.42)
NEUTROPHILS # BLD AUTO: 4.21 K/UL (ref 1.82–7.42)
NEUTROPHILS # BLD AUTO: 4.63 K/UL (ref 1.82–7.42)
NEUTROPHILS # BLD AUTO: 4.78 K/UL (ref 1.82–7.42)
NEUTROPHILS NFR BLD: 57.8 % (ref 44–72)
NEUTROPHILS NFR BLD: 62.6 % (ref 44–72)
NEUTROPHILS NFR BLD: 63.4 % (ref 44–72)
NEUTROPHILS NFR BLD: 72.3 % (ref 44–72)
NEUTROPHILS NFR BLD: 73.2 % (ref 44–72)
NEUTROPHILS NFR BLD: 73.3 % (ref 44–72)
NEUTROPHILS NFR BLD: 74.3 % (ref 44–72)
NEUTROPHILS NFR BLD: 78.7 % (ref 44–72)
NEUTROPHILS NFR BLD: 79 % (ref 44–72)
NEUTROPHILS NFR BLD: 79.6 % (ref 44–72)
NEUTROPHILS NFR BLD: 81.1 % (ref 44–72)
NEUTROPHILS NFR BLD: 81.2 % (ref 44–72)
NEUTROPHILS NFR BLD: 85.4 % (ref 44–72)
NRBC # BLD AUTO: 0 K/UL
NRBC # BLD AUTO: 0.02 K/UL
NRBC # BLD AUTO: 0.03 K/UL
NRBC # BLD AUTO: 0.27 K/UL
NRBC # BLD AUTO: 0.31 K/UL
NRBC BLD-RTO: 0 /100 WBC (ref 0–0.2)
NRBC BLD-RTO: 0.3 /100 WBC (ref 0–0.2)
NRBC BLD-RTO: 0.4 /100 WBC (ref 0–0.2)
NRBC BLD-RTO: 1.8 /100 WBC (ref 0–0.2)
NRBC BLD-RTO: 2.2 /100 WBC (ref 0–0.2)
OUTPT INFUS CBC COMMENT OICOM: ABNORMAL
OVALOCYTES BLD QL SMEAR: NORMAL
PATHOLOGY CONSULT NOTE: NORMAL
PHOSPHATE SERPL-MCNC: 2.4 MG/DL (ref 2.5–4.5)
PLATELET # BLD AUTO: 133 K/UL (ref 164–446)
PLATELET # BLD AUTO: 139 K/UL (ref 164–446)
PLATELET # BLD AUTO: 154 K/UL (ref 164–446)
PLATELET # BLD AUTO: 176 K/UL (ref 164–446)
PLATELET # BLD AUTO: 189 K/UL (ref 164–446)
PLATELET # BLD AUTO: 242 K/UL (ref 164–446)
PLATELET # BLD AUTO: 250 K/UL (ref 164–446)
PLATELET # BLD AUTO: 330 K/UL (ref 164–446)
PLATELET # BLD AUTO: 332 K/UL (ref 164–446)
PLATELET # BLD AUTO: 452 K/UL (ref 164–446)
PLATELET # BLD AUTO: 490 K/UL (ref 164–446)
PLATELET # BLD AUTO: 504 K/UL (ref 164–446)
PLATELET # BLD AUTO: 511 K/UL (ref 164–446)
PLATELET # BLD AUTO: 530 K/UL (ref 164–446)
PLATELET BLD QL SMEAR: NORMAL
PMV BLD AUTO: 10.1 FL (ref 9–12.9)
PMV BLD AUTO: 10.3 FL (ref 9–12.9)
PMV BLD AUTO: 10.3 FL (ref 9–12.9)
PMV BLD AUTO: 10.4 FL (ref 9–12.9)
PMV BLD AUTO: 10.6 FL (ref 9–12.9)
PMV BLD AUTO: 10.6 FL (ref 9–12.9)
PMV BLD AUTO: 10.8 FL (ref 9–12.9)
PMV BLD AUTO: 10.8 FL (ref 9–12.9)
PMV BLD AUTO: 10.9 FL (ref 9–12.9)
PMV BLD AUTO: 11.5 FL (ref 9–12.9)
PMV BLD AUTO: 9.3 FL (ref 9–12.9)
PMV BLD AUTO: 9.6 FL (ref 9–12.9)
PMV BLD AUTO: 9.7 FL (ref 9–12.9)
PMV BLD AUTO: 9.7 FL (ref 9–12.9)
POIKILOCYTOSIS BLD QL SMEAR: NORMAL
POTASSIUM SERPL-SCNC: 3 MMOL/L (ref 3.6–5.5)
POTASSIUM SERPL-SCNC: 3.3 MMOL/L (ref 3.6–5.5)
POTASSIUM SERPL-SCNC: 3.4 MMOL/L (ref 3.6–5.5)
POTASSIUM SERPL-SCNC: 3.9 MMOL/L (ref 3.6–5.5)
POTASSIUM SERPL-SCNC: 3.9 MMOL/L (ref 3.6–5.5)
POTASSIUM SERPL-SCNC: 4.1 MMOL/L (ref 3.6–5.5)
POTASSIUM SERPL-SCNC: 4.2 MMOL/L (ref 3.6–5.5)
POTASSIUM SERPL-SCNC: 4.2 MMOL/L (ref 3.6–5.5)
POTASSIUM SERPL-SCNC: 4.3 MMOL/L (ref 3.6–5.5)
POTASSIUM SERPL-SCNC: 4.3 MMOL/L (ref 3.6–5.5)
POTASSIUM SERPL-SCNC: 4.4 MMOL/L (ref 3.6–5.5)
POTASSIUM SERPL-SCNC: 4.5 MMOL/L (ref 3.6–5.5)
PRODUCT TYPE UPROD: NORMAL
PROT SERPL-MCNC: 6 G/DL (ref 6–8.2)
PROT SERPL-MCNC: 6.1 G/DL (ref 6–8.2)
PROT SERPL-MCNC: 6.2 G/DL (ref 6–8.2)
PROT SERPL-MCNC: 6.3 G/DL (ref 6–8.2)
PROT SERPL-MCNC: 6.3 G/DL (ref 6–8.2)
PROT SERPL-MCNC: 6.4 G/DL (ref 6–8.2)
PROT SERPL-MCNC: 6.6 G/DL (ref 6–8.2)
PROT SERPL-MCNC: 6.7 G/DL (ref 6–8.2)
PROT SERPL-MCNC: 6.8 G/DL (ref 6–8.2)
PROT SERPL-MCNC: 6.9 G/DL (ref 6–8.2)
PROTHROMBIN TIME: 14.3 SEC (ref 12–14.6)
RAD ONC ARIA COURSE LAST TREATMENT DATE: NORMAL
RAD ONC ARIA COURSE TREATMENT ELAPSED DAYS: NORMAL
RAD ONC ARIA REFERENCE POINT DOSAGE GIVEN TO DATE: 1.8
RAD ONC ARIA REFERENCE POINT DOSAGE GIVEN TO DATE: 1.8
RAD ONC ARIA REFERENCE POINT DOSAGE GIVEN TO DATE: 10.8
RAD ONC ARIA REFERENCE POINT DOSAGE GIVEN TO DATE: 12.6
RAD ONC ARIA REFERENCE POINT DOSAGE GIVEN TO DATE: 14.4
RAD ONC ARIA REFERENCE POINT DOSAGE GIVEN TO DATE: 16.2
RAD ONC ARIA REFERENCE POINT DOSAGE GIVEN TO DATE: 18
RAD ONC ARIA REFERENCE POINT DOSAGE GIVEN TO DATE: 19.8
RAD ONC ARIA REFERENCE POINT DOSAGE GIVEN TO DATE: 21.6
RAD ONC ARIA REFERENCE POINT DOSAGE GIVEN TO DATE: 23.4
RAD ONC ARIA REFERENCE POINT DOSAGE GIVEN TO DATE: 25.2
RAD ONC ARIA REFERENCE POINT DOSAGE GIVEN TO DATE: 27
RAD ONC ARIA REFERENCE POINT DOSAGE GIVEN TO DATE: 28.8
RAD ONC ARIA REFERENCE POINT DOSAGE GIVEN TO DATE: 3.6
RAD ONC ARIA REFERENCE POINT DOSAGE GIVEN TO DATE: 3.6
RAD ONC ARIA REFERENCE POINT DOSAGE GIVEN TO DATE: 30.6
RAD ONC ARIA REFERENCE POINT DOSAGE GIVEN TO DATE: 32.4
RAD ONC ARIA REFERENCE POINT DOSAGE GIVEN TO DATE: 34.2
RAD ONC ARIA REFERENCE POINT DOSAGE GIVEN TO DATE: 36
RAD ONC ARIA REFERENCE POINT DOSAGE GIVEN TO DATE: 37.8
RAD ONC ARIA REFERENCE POINT DOSAGE GIVEN TO DATE: 39.6
RAD ONC ARIA REFERENCE POINT DOSAGE GIVEN TO DATE: 41.4
RAD ONC ARIA REFERENCE POINT DOSAGE GIVEN TO DATE: 43.2
RAD ONC ARIA REFERENCE POINT DOSAGE GIVEN TO DATE: 45
RAD ONC ARIA REFERENCE POINT DOSAGE GIVEN TO DATE: 45
RAD ONC ARIA REFERENCE POINT DOSAGE GIVEN TO DATE: 5.4
RAD ONC ARIA REFERENCE POINT DOSAGE GIVEN TO DATE: 7.2
RAD ONC ARIA REFERENCE POINT DOSAGE GIVEN TO DATE: 9
RAD ONC ARIA REFERENCE POINT ID: NORMAL
RAD ONC ARIA REFERENCE POINT SESSION DOSAGE GIVEN: 1.8
RBC # BLD AUTO: 2.76 M/UL (ref 4.7–6.1)
RBC # BLD AUTO: 2.81 M/UL (ref 4.7–6.1)
RBC # BLD AUTO: 3.37 M/UL (ref 4.7–6.1)
RBC # BLD AUTO: 3.83 M/UL (ref 4.7–6.1)
RBC # BLD AUTO: 3.84 M/UL (ref 4.7–6.1)
RBC # BLD AUTO: 3.98 M/UL (ref 4.7–6.1)
RBC # BLD AUTO: 4.01 M/UL (ref 4.7–6.1)
RBC # BLD AUTO: 4.02 M/UL (ref 4.7–6.1)
RBC # BLD AUTO: 4.06 M/UL (ref 4.7–6.1)
RBC # BLD AUTO: 4.07 M/UL (ref 4.7–6.1)
RBC # BLD AUTO: 4.11 M/UL (ref 4.7–6.1)
RBC # BLD AUTO: 4.3 M/UL (ref 4.7–6.1)
RBC # BLD AUTO: 4.33 M/UL (ref 4.7–6.1)
RBC # BLD AUTO: 4.47 M/UL (ref 4.7–6.1)
RBC BLD AUTO: PRESENT
RETICS # AUTO: 0.05 M/UL (ref 0.04–0.12)
RETICS/RBC NFR: 1.6 % (ref 0.8–2.6)
RH BLD: NORMAL
SODIUM SERPL-SCNC: 137 MMOL/L (ref 135–145)
SODIUM SERPL-SCNC: 139 MMOL/L (ref 135–145)
SODIUM SERPL-SCNC: 140 MMOL/L (ref 135–145)
SODIUM SERPL-SCNC: 140 MMOL/L (ref 135–145)
SODIUM SERPL-SCNC: 141 MMOL/L (ref 135–145)
SODIUM SERPL-SCNC: 141 MMOL/L (ref 135–145)
SODIUM SERPL-SCNC: 142 MMOL/L (ref 135–145)
SODIUM SERPL-SCNC: 142 MMOL/L (ref 135–145)
SODIUM SERPL-SCNC: 143 MMOL/L (ref 135–145)
SODIUM SERPL-SCNC: 145 MMOL/L (ref 135–145)
TIBC SERPL-MCNC: 261 UG/DL (ref 250–450)
TIBC SERPL-MCNC: 313 UG/DL (ref 250–450)
TOXIC GRANULES BLD QL SMEAR: NORMAL
TSH SERPL DL<=0.005 MIU/L-ACNC: 1.17 UIU/ML (ref 0.38–5.33)
UIBC SERPL-MCNC: 242 UG/DL (ref 110–370)
UIBC SERPL-MCNC: 275 UG/DL (ref 110–370)
UNIT STATUS USTAT: NORMAL
WBC # BLD AUTO: 1.9 K/UL (ref 4.8–10.8)
WBC # BLD AUTO: 14 K/UL (ref 4.8–10.8)
WBC # BLD AUTO: 14.9 K/UL (ref 4.8–10.8)
WBC # BLD AUTO: 2.3 K/UL (ref 4.8–10.8)
WBC # BLD AUTO: 2.4 K/UL (ref 4.8–10.8)
WBC # BLD AUTO: 2.5 K/UL (ref 4.8–10.8)
WBC # BLD AUTO: 3 K/UL (ref 4.8–10.8)
WBC # BLD AUTO: 3.2 K/UL (ref 4.8–10.8)
WBC # BLD AUTO: 3.4 K/UL (ref 4.8–10.8)
WBC # BLD AUTO: 4.3 K/UL (ref 4.8–10.8)
WBC # BLD AUTO: 5.2 K/UL (ref 4.8–10.8)
WBC # BLD AUTO: 6.8 K/UL (ref 4.8–10.8)
WBC # BLD AUTO: 7.4 K/UL (ref 4.8–10.8)
WBC # BLD AUTO: 7.5 K/UL (ref 4.8–10.8)

## 2023-01-01 PROCEDURE — 77386 HCHG IMRT DELIVERY COMPLEX: CPT | Performed by: RADIOLOGY

## 2023-01-01 PROCEDURE — 99205 OFFICE O/P NEW HI 60 MIN: CPT | Performed by: RADIOLOGY

## 2023-01-01 PROCEDURE — 80053 COMPREHEN METABOLIC PANEL: CPT

## 2023-01-01 PROCEDURE — 99232 SBSQ HOSP IP/OBS MODERATE 35: CPT | Mod: GC | Performed by: INTERNAL MEDICINE

## 2023-01-01 PROCEDURE — 85025 COMPLETE CBC W/AUTO DIFF WBC: CPT | Mod: 91

## 2023-01-01 PROCEDURE — 77014 PR CT GUIDANCE PLACEMENT RAD THERAPY FIELDS: CPT | Mod: 26 | Performed by: RADIOLOGY

## 2023-01-01 PROCEDURE — 160048 HCHG OR STATISTICAL LEVEL 1-5: Performed by: SURGERY

## 2023-01-01 PROCEDURE — 96417 CHEMO IV INFUS EACH ADDL SEQ: CPT

## 2023-01-01 PROCEDURE — 77470 SPECIAL RADIATION TREATMENT: CPT | Performed by: RADIOLOGY

## 2023-01-01 PROCEDURE — 36415 COLL VENOUS BLD VENIPUNCTURE: CPT

## 2023-01-01 PROCEDURE — 700102 HCHG RX REV CODE 250 W/ 637 OVERRIDE(OP): Mod: JZ

## 2023-01-01 PROCEDURE — 85025 COMPLETE CBC W/AUTO DIFF WBC: CPT

## 2023-01-01 PROCEDURE — 80074 ACUTE HEPATITIS PANEL: CPT

## 2023-01-01 PROCEDURE — 85018 HEMOGLOBIN: CPT | Mod: 91

## 2023-01-01 PROCEDURE — A9270 NON-COVERED ITEM OR SERVICE: HCPCS | Mod: JZ

## 2023-01-01 PROCEDURE — 700111 HCHG RX REV CODE 636 W/ 250 OVERRIDE (IP): Mod: JZ

## 2023-01-01 PROCEDURE — C9113 INJ PANTOPRAZOLE SODIUM, VIA: HCPCS | Performed by: STUDENT IN AN ORGANIZED HEALTH CARE EDUCATION/TRAINING PROGRAM

## 2023-01-01 PROCEDURE — A4212 NON CORING NEEDLE OR STYLET: HCPCS

## 2023-01-01 PROCEDURE — 96375 TX/PRO/DX INJ NEW DRUG ADDON: CPT

## 2023-01-01 PROCEDURE — 77336 RADIATION PHYSICS CONSULT: CPT | Performed by: RADIOLOGY

## 2023-01-01 PROCEDURE — 160002 HCHG RECOVERY MINUTES (STAT): Performed by: INTERNAL MEDICINE

## 2023-01-01 PROCEDURE — 160002 HCHG RECOVERY MINUTES (STAT): Performed by: SURGERY

## 2023-01-01 PROCEDURE — 700117 HCHG RX CONTRAST REV CODE 255

## 2023-01-01 PROCEDURE — 83550 IRON BINDING TEST: CPT

## 2023-01-01 PROCEDURE — 36591 DRAW BLOOD OFF VENOUS DEVICE: CPT

## 2023-01-01 PROCEDURE — 77338 DESIGN MLC DEVICE FOR IMRT: CPT | Mod: 26 | Performed by: RADIOLOGY

## 2023-01-01 PROCEDURE — 99232 SBSQ HOSP IP/OBS MODERATE 35: CPT | Performed by: NURSE PRACTITIONER

## 2023-01-01 PROCEDURE — 86923 COMPATIBILITY TEST ELECTRIC: CPT

## 2023-01-01 PROCEDURE — 700111 HCHG RX REV CODE 636 W/ 250 OVERRIDE (IP): Performed by: INTERNAL MEDICINE

## 2023-01-01 PROCEDURE — 700105 HCHG RX REV CODE 258: Performed by: INTERNAL MEDICINE

## 2023-01-01 PROCEDURE — 304540 HCHG NITRO SET VENT 2ND TUB

## 2023-01-01 PROCEDURE — 77427 RADIATION TX MANAGEMENT X5: CPT | Performed by: RADIOLOGY

## 2023-01-01 PROCEDURE — 96374 THER/PROPH/DIAG INJ IV PUSH: CPT

## 2023-01-01 PROCEDURE — C1788 PORT, INDWELLING, IMP: HCPCS | Performed by: SURGERY

## 2023-01-01 PROCEDURE — 700101 HCHG RX REV CODE 250: Performed by: SURGERY

## 2023-01-01 PROCEDURE — 88305 TISSUE EXAM BY PATHOLOGIST: CPT | Mod: 59

## 2023-01-01 PROCEDURE — 700111 HCHG RX REV CODE 636 W/ 250 OVERRIDE (IP): Performed by: ANESTHESIOLOGY

## 2023-01-01 PROCEDURE — 700111 HCHG RX REV CODE 636 W/ 250 OVERRIDE (IP): Performed by: SURGERY

## 2023-01-01 PROCEDURE — 700111 HCHG RX REV CODE 636 W/ 250 OVERRIDE (IP): Performed by: STUDENT IN AN ORGANIZED HEALTH CARE EDUCATION/TRAINING PROGRAM

## 2023-01-01 PROCEDURE — A9552 F18 FDG: HCPCS

## 2023-01-01 PROCEDURE — 99223 1ST HOSP IP/OBS HIGH 75: CPT | Performed by: STUDENT IN AN ORGANIZED HEALTH CARE EDUCATION/TRAINING PROGRAM

## 2023-01-01 PROCEDURE — 160036 HCHG PACU - EA ADDL 30 MINS PHASE I: Performed by: SURGERY

## 2023-01-01 PROCEDURE — 86900 BLOOD TYPING SEROLOGIC ABO: CPT

## 2023-01-01 PROCEDURE — 3074F SYST BP LT 130 MM HG: CPT | Performed by: SURGERY

## 2023-01-01 PROCEDURE — 160203 HCHG ENDO MINUTES - 1ST 30 MINS LEVEL 4: Performed by: INTERNAL MEDICINE

## 2023-01-01 PROCEDURE — 700105 HCHG RX REV CODE 258

## 2023-01-01 PROCEDURE — 96367 TX/PROPH/DG ADDL SEQ IV INF: CPT

## 2023-01-01 PROCEDURE — 86850 RBC ANTIBODY SCREEN: CPT

## 2023-01-01 PROCEDURE — 84443 ASSAY THYROID STIM HORMONE: CPT

## 2023-01-01 PROCEDURE — 30233N1 TRANSFUSION OF NONAUTOLOGOUS RED BLOOD CELLS INTO PERIPHERAL VEIN, PERCUTANEOUS APPROACH: ICD-10-PCS | Performed by: STUDENT IN AN ORGANIZED HEALTH CARE EDUCATION/TRAINING PROGRAM

## 2023-01-01 PROCEDURE — A9270 NON-COVERED ITEM OR SERVICE: HCPCS | Performed by: STUDENT IN AN ORGANIZED HEALTH CARE EDUCATION/TRAINING PROGRAM

## 2023-01-01 PROCEDURE — 74018 RADEX ABDOMEN 1 VIEW: CPT

## 2023-01-01 PROCEDURE — 77263 THER RADIOLOGY TX PLNG CPLX: CPT | Performed by: RADIOLOGY

## 2023-01-01 PROCEDURE — 770001 HCHG ROOM/CARE - MED/SURG/GYN PRIV*

## 2023-01-01 PROCEDURE — A9270 NON-COVERED ITEM OR SERVICE: HCPCS

## 2023-01-01 PROCEDURE — 99239 HOSP IP/OBS DSCHRG MGMT >30: CPT | Mod: GC | Performed by: INTERNAL MEDICINE

## 2023-01-01 PROCEDURE — 88365 INSITU HYBRIDIZATION (FISH): CPT | Mod: 91

## 2023-01-01 PROCEDURE — 85730 THROMBOPLASTIN TIME PARTIAL: CPT

## 2023-01-01 PROCEDURE — 36561 INSERT TUNNELED CV CATH: CPT | Mod: RT | Performed by: SURGERY

## 2023-01-01 PROCEDURE — 700102 HCHG RX REV CODE 250 W/ 637 OVERRIDE(OP): Performed by: STUDENT IN AN ORGANIZED HEALTH CARE EDUCATION/TRAINING PROGRAM

## 2023-01-01 PROCEDURE — 80048 BASIC METABOLIC PNL TOTAL CA: CPT

## 2023-01-01 PROCEDURE — 74170 CT ABD WO CNTRST FLWD CNTRST: CPT

## 2023-01-01 PROCEDURE — 3074F SYST BP LT 130 MM HG: CPT | Performed by: PHYSICIAN ASSISTANT

## 2023-01-01 PROCEDURE — 0DB68ZX EXCISION OF STOMACH, VIA NATURAL OR ARTIFICIAL OPENING ENDOSCOPIC, DIAGNOSTIC: ICD-10-PCS | Performed by: INTERNAL MEDICINE

## 2023-01-01 PROCEDURE — 77300 RADIATION THERAPY DOSE PLAN: CPT | Performed by: RADIOLOGY

## 2023-01-01 PROCEDURE — 77470 SPECIAL RADIATION TREATMENT: CPT | Mod: 26 | Performed by: RADIOLOGY

## 2023-01-01 PROCEDURE — 3078F DIAST BP <80 MM HG: CPT | Performed by: PHYSICIAN ASSISTANT

## 2023-01-01 PROCEDURE — P9016 RBC LEUKOCYTES REDUCED: HCPCS | Mod: 91

## 2023-01-01 PROCEDURE — 77300 RADIATION THERAPY DOSE PLAN: CPT | Mod: 26 | Performed by: RADIOLOGY

## 2023-01-01 PROCEDURE — 700105 HCHG RX REV CODE 258: Mod: JZ | Performed by: ANESTHESIOLOGY

## 2023-01-01 PROCEDURE — 96415 CHEMO IV INFUSION ADDL HR: CPT

## 2023-01-01 PROCEDURE — 43239 EGD BIOPSY SINGLE/MULTIPLE: CPT | Performed by: INTERNAL MEDICINE

## 2023-01-01 PROCEDURE — 85610 PROTHROMBIN TIME: CPT

## 2023-01-01 PROCEDURE — 96413 CHEMO IV INFUSION 1 HR: CPT

## 2023-01-01 PROCEDURE — 700105 HCHG RX REV CODE 258: Performed by: STUDENT IN AN ORGANIZED HEALTH CARE EDUCATION/TRAINING PROGRAM

## 2023-01-01 PROCEDURE — 700111 HCHG RX REV CODE 636 W/ 250 OVERRIDE (IP): Mod: JZ | Performed by: ANESTHESIOLOGY

## 2023-01-01 PROCEDURE — 700101 HCHG RX REV CODE 250: Performed by: ANESTHESIOLOGY

## 2023-01-01 PROCEDURE — 85027 COMPLETE CBC AUTOMATED: CPT

## 2023-01-01 PROCEDURE — 36430 TRANSFUSION BLD/BLD COMPNT: CPT

## 2023-01-01 PROCEDURE — A9270 NON-COVERED ITEM OR SERVICE: HCPCS | Mod: JZ | Performed by: STUDENT IN AN ORGANIZED HEALTH CARE EDUCATION/TRAINING PROGRAM

## 2023-01-01 PROCEDURE — 770020 HCHG ROOM/CARE - TELE (206)

## 2023-01-01 PROCEDURE — 160048 HCHG OR STATISTICAL LEVEL 1-5: Performed by: INTERNAL MEDICINE

## 2023-01-01 PROCEDURE — 77301 RADIOTHERAPY DOSE PLAN IMRT: CPT | Performed by: RADIOLOGY

## 2023-01-01 PROCEDURE — 83735 ASSAY OF MAGNESIUM: CPT

## 2023-01-01 PROCEDURE — 71270 CT THORAX DX C-/C+: CPT

## 2023-01-01 PROCEDURE — 700102 HCHG RX REV CODE 250 W/ 637 OVERRIDE(OP)

## 2023-01-01 PROCEDURE — 99285 EMERGENCY DEPT VISIT HI MDM: CPT

## 2023-01-01 PROCEDURE — 160025 RECOVERY II MINUTES (STATS): Performed by: SURGERY

## 2023-01-01 PROCEDURE — 83690 ASSAY OF LIPASE: CPT

## 2023-01-01 PROCEDURE — P9016 RBC LEUKOCYTES REDUCED: HCPCS

## 2023-01-01 PROCEDURE — 160035 HCHG PACU - 1ST 60 MINS PHASE I: Performed by: SURGERY

## 2023-01-01 PROCEDURE — 160046 HCHG PACU - 1ST 60 MINS PHASE II: Performed by: SURGERY

## 2023-01-01 PROCEDURE — 700102 HCHG RX REV CODE 250 W/ 637 OVERRIDE(OP): Mod: JZ | Performed by: STUDENT IN AN ORGANIZED HEALTH CARE EDUCATION/TRAINING PROGRAM

## 2023-01-01 PROCEDURE — 700117 HCHG RX CONTRAST REV CODE 255: Performed by: STUDENT IN AN ORGANIZED HEALTH CARE EDUCATION/TRAINING PROGRAM

## 2023-01-01 PROCEDURE — 99212 OFFICE O/P EST SF 10 MIN: CPT | Performed by: RADIOLOGY

## 2023-01-01 PROCEDURE — 82728 ASSAY OF FERRITIN: CPT

## 2023-01-01 PROCEDURE — 77293 RESPIRATOR MOTION MGMT SIMUL: CPT | Performed by: RADIOLOGY

## 2023-01-01 PROCEDURE — 77301 RADIOTHERAPY DOSE PLAN IMRT: CPT | Mod: 26 | Performed by: RADIOLOGY

## 2023-01-01 PROCEDURE — 85014 HEMATOCRIT: CPT | Mod: 91

## 2023-01-01 PROCEDURE — 700111 HCHG RX REV CODE 636 W/ 250 OVERRIDE (IP): Mod: JZ | Performed by: INTERNAL MEDICINE

## 2023-01-01 PROCEDURE — 85046 RETICYTE/HGB CONCENTRATE: CPT

## 2023-01-01 PROCEDURE — 99223 1ST HOSP IP/OBS HIGH 75: CPT | Performed by: SURGERY

## 2023-01-01 PROCEDURE — 80053 COMPREHEN METABOLIC PANEL: CPT | Mod: 91

## 2023-01-01 PROCEDURE — 86923 COMPATIBILITY TEST ELECTRIC: CPT | Mod: 91

## 2023-01-01 PROCEDURE — 84100 ASSAY OF PHOSPHORUS: CPT

## 2023-01-01 PROCEDURE — 86901 BLOOD TYPING SEROLOGIC RH(D): CPT

## 2023-01-01 PROCEDURE — 0DB58ZX EXCISION OF ESOPHAGUS, VIA NATURAL OR ARTIFICIAL OPENING ENDOSCOPIC, DIAGNOSTIC: ICD-10-PCS | Performed by: INTERNAL MEDICINE

## 2023-01-01 PROCEDURE — 3078F DIAST BP <80 MM HG: CPT | Performed by: SURGERY

## 2023-01-01 PROCEDURE — 71260 CT THORAX DX C+: CPT

## 2023-01-01 PROCEDURE — 77280 THER RAD SIMULAJ FIELD SMPL: CPT | Performed by: RADIOLOGY

## 2023-01-01 PROCEDURE — 71045 X-RAY EXAM CHEST 1 VIEW: CPT

## 2023-01-01 PROCEDURE — 160039 HCHG SURGERY MINUTES - EA ADDL 1 MIN LEVEL 3: Performed by: SURGERY

## 2023-01-01 PROCEDURE — 88342 IMHCHEM/IMCYTCHM 1ST ANTB: CPT

## 2023-01-01 PROCEDURE — 74177 CT ABD & PELVIS W/CONTRAST: CPT

## 2023-01-01 PROCEDURE — 83690 ASSAY OF LIPASE: CPT | Mod: 91

## 2023-01-01 PROCEDURE — 88341 IMHCHEM/IMCYTCHM EA ADD ANTB: CPT | Mod: 91

## 2023-01-01 PROCEDURE — 99214 OFFICE O/P EST MOD 30 MIN: CPT | Performed by: RADIOLOGY

## 2023-01-01 PROCEDURE — 77290 THER RAD SIMULAJ FIELD CPLX: CPT | Performed by: RADIOLOGY

## 2023-01-01 PROCEDURE — 99215 OFFICE O/P EST HI 40 MIN: CPT | Performed by: PHYSICIAN ASSISTANT

## 2023-01-01 PROCEDURE — 160035 HCHG PACU - 1ST 60 MINS PHASE I: Performed by: INTERNAL MEDICINE

## 2023-01-01 PROCEDURE — 77334 RADIATION TREATMENT AID(S): CPT | Mod: 26 | Performed by: RADIOLOGY

## 2023-01-01 PROCEDURE — 700105 HCHG RX REV CODE 258: Performed by: SURGERY

## 2023-01-01 PROCEDURE — 160009 HCHG ANES TIME/MIN: Performed by: SURGERY

## 2023-01-01 PROCEDURE — 700117 HCHG RX CONTRAST REV CODE 255: Performed by: INTERNAL MEDICINE

## 2023-01-01 PROCEDURE — 77338 DESIGN MLC DEVICE FOR IMRT: CPT | Performed by: RADIOLOGY

## 2023-01-01 PROCEDURE — 99222 1ST HOSP IP/OBS MODERATE 55: CPT | Performed by: INTERNAL MEDICINE

## 2023-01-01 PROCEDURE — 99213 OFFICE O/P EST LOW 20 MIN: CPT | Performed by: SURGERY

## 2023-01-01 PROCEDURE — 160028 HCHG SURGERY MINUTES - 1ST 30 MINS LEVEL 3: Performed by: SURGERY

## 2023-01-01 PROCEDURE — 77334 RADIATION TREATMENT AID(S): CPT | Performed by: RADIOLOGY

## 2023-01-01 PROCEDURE — 83540 ASSAY OF IRON: CPT

## 2023-01-01 PROCEDURE — 77293 RESPIRATOR MOTION MGMT SIMUL: CPT | Mod: 26 | Performed by: RADIOLOGY

## 2023-01-01 PROCEDURE — 160009 HCHG ANES TIME/MIN: Performed by: INTERNAL MEDICINE

## 2023-01-01 PROCEDURE — 77338 DESIGN MLC DEVICE FOR IMRT: CPT | Mod: XU | Performed by: RADIOLOGY

## 2023-01-01 DEVICE — POWER PORTMRI COMPATABLE: Type: IMPLANTABLE DEVICE | Site: CHEST | Status: FUNCTIONAL

## 2023-01-01 RX ORDER — EPINEPHRINE 1 MG/ML(1)
0.5 AMPUL (ML) INJECTION PRN
Status: CANCELLED | OUTPATIENT
Start: 2023-01-01

## 2023-01-01 RX ORDER — 0.9 % SODIUM CHLORIDE 0.9 %
VIAL (ML) INJECTION PRN
Status: CANCELLED | OUTPATIENT
Start: 2023-01-01

## 2023-01-01 RX ORDER — MIDAZOLAM HYDROCHLORIDE 1 MG/ML
INJECTION INTRAMUSCULAR; INTRAVENOUS PRN
Status: DISCONTINUED | OUTPATIENT
Start: 2023-01-01 | End: 2023-01-01 | Stop reason: SURG

## 2023-01-01 RX ORDER — DIPHENHYDRAMINE HCL 25 MG
25 TABLET ORAL EVERY EVENING
Status: ON HOLD | COMMUNITY
End: 2023-01-01

## 2023-01-01 RX ORDER — 0.9 % SODIUM CHLORIDE 0.9 %
10 VIAL (ML) INJECTION PRN
Status: CANCELLED | OUTPATIENT
Start: 2023-01-01

## 2023-01-01 RX ORDER — SODIUM CHLORIDE 9 MG/ML
INJECTION, SOLUTION INTRAVENOUS CONTINUOUS
Status: CANCELLED | OUTPATIENT
Start: 2023-01-01

## 2023-01-01 RX ORDER — METHYLPREDNISOLONE SODIUM SUCCINATE 125 MG/2ML
125 INJECTION, POWDER, LYOPHILIZED, FOR SOLUTION INTRAMUSCULAR; INTRAVENOUS PRN
Status: CANCELLED | OUTPATIENT
Start: 2023-01-01

## 2023-01-01 RX ORDER — OMEPRAZOLE 20 MG/1
20 CAPSULE, DELAYED RELEASE ORAL 2 TIMES DAILY
Status: DISCONTINUED | OUTPATIENT
Start: 2023-01-01 | End: 2023-01-01 | Stop reason: HOSPADM

## 2023-01-01 RX ORDER — PROMETHAZINE HYDROCHLORIDE 25 MG/1
12.5 TABLET ORAL EVERY 6 HOURS PRN
Qty: 30 TABLET | Refills: 0 | Status: SHIPPED
Start: 2023-01-01 | End: 2024-01-01

## 2023-01-01 RX ORDER — HYDROMORPHONE HYDROCHLORIDE 1 MG/ML
0.1 INJECTION, SOLUTION INTRAMUSCULAR; INTRAVENOUS; SUBCUTANEOUS
Status: DISCONTINUED | OUTPATIENT
Start: 2023-01-01 | End: 2023-01-01 | Stop reason: HOSPADM

## 2023-01-01 RX ORDER — HYDRALAZINE HYDROCHLORIDE 20 MG/ML
5 INJECTION INTRAMUSCULAR; INTRAVENOUS
Status: DISCONTINUED | OUTPATIENT
Start: 2023-01-01 | End: 2023-01-01 | Stop reason: HOSPADM

## 2023-01-01 RX ORDER — ONDANSETRON 2 MG/ML
4 INJECTION INTRAMUSCULAR; INTRAVENOUS PRN
Status: CANCELLED | OUTPATIENT
Start: 2023-01-01

## 2023-01-01 RX ORDER — HYDROMORPHONE HYDROCHLORIDE 1 MG/ML
0.2 INJECTION, SOLUTION INTRAMUSCULAR; INTRAVENOUS; SUBCUTANEOUS
Status: DISCONTINUED | OUTPATIENT
Start: 2023-01-01 | End: 2023-01-01 | Stop reason: HOSPADM

## 2023-01-01 RX ORDER — ONDANSETRON 8 MG/1
8 TABLET, ORALLY DISINTEGRATING ORAL PRN
Status: CANCELLED | OUTPATIENT
Start: 2023-01-01

## 2023-01-01 RX ORDER — SUCRALFATE ORAL 1 G/10ML
1 SUSPENSION ORAL EVERY 6 HOURS
Qty: 560 ML | Refills: 0 | Status: SHIPPED | OUTPATIENT
Start: 2023-01-01 | End: 2023-01-01 | Stop reason: SDUPTHER

## 2023-01-01 RX ORDER — DIPHENHYDRAMINE HYDROCHLORIDE 50 MG/ML
50 INJECTION INTRAMUSCULAR; INTRAVENOUS PRN
Status: CANCELLED | OUTPATIENT
Start: 2023-01-01

## 2023-01-01 RX ORDER — MIDAZOLAM HYDROCHLORIDE 1 MG/ML
1 INJECTION INTRAMUSCULAR; INTRAVENOUS
Status: DISCONTINUED | OUTPATIENT
Start: 2023-01-01 | End: 2023-01-01 | Stop reason: HOSPADM

## 2023-01-01 RX ORDER — OXYCODONE HCL 5 MG/5 ML
5 SOLUTION, ORAL ORAL
Status: DISCONTINUED | OUTPATIENT
Start: 2023-01-01 | End: 2023-01-01 | Stop reason: HOSPADM

## 2023-01-01 RX ORDER — PROCHLORPERAZINE MALEATE 10 MG
10 TABLET ORAL EVERY 6 HOURS PRN
Status: CANCELLED | OUTPATIENT
Start: 2023-01-01

## 2023-01-01 RX ORDER — LIDOCAINE HYDROCHLORIDE 20 MG/ML
INJECTION, SOLUTION EPIDURAL; INFILTRATION; INTRACAUDAL; PERINEURAL PRN
Status: DISCONTINUED | OUTPATIENT
Start: 2023-01-01 | End: 2023-01-01 | Stop reason: SURG

## 2023-01-01 RX ORDER — BISACODYL 10 MG
10 SUPPOSITORY, RECTAL RECTAL
Status: DISCONTINUED | OUTPATIENT
Start: 2023-01-01 | End: 2023-01-01 | Stop reason: HOSPADM

## 2023-01-01 RX ORDER — PROCHLORPERAZINE MALEATE 10 MG
TABLET ORAL
COMMUNITY
Start: 2023-01-01 | End: 2024-01-01

## 2023-01-01 RX ORDER — HYDROMORPHONE HYDROCHLORIDE 1 MG/ML
0.4 INJECTION, SOLUTION INTRAMUSCULAR; INTRAVENOUS; SUBCUTANEOUS
Status: DISCONTINUED | OUTPATIENT
Start: 2023-01-01 | End: 2023-01-01 | Stop reason: HOSPADM

## 2023-01-01 RX ORDER — PANTOPRAZOLE SODIUM 40 MG/10ML
40 INJECTION, POWDER, LYOPHILIZED, FOR SOLUTION INTRAVENOUS 2 TIMES DAILY
Status: DISCONTINUED | OUTPATIENT
Start: 2023-01-01 | End: 2023-01-01

## 2023-01-01 RX ORDER — CALCIUM CARBONATE 500 MG/1
500-1000 TABLET, CHEWABLE ORAL 3 TIMES DAILY PRN
Status: ON HOLD | COMMUNITY
End: 2023-01-01

## 2023-01-01 RX ORDER — OMEPRAZOLE 20 MG/1
20 CAPSULE, DELAYED RELEASE ORAL DAILY
Status: ON HOLD | COMMUNITY
End: 2023-01-01

## 2023-01-01 RX ORDER — 0.9 % SODIUM CHLORIDE 0.9 %
3 VIAL (ML) INJECTION PRN
Status: CANCELLED | OUTPATIENT
Start: 2023-01-01

## 2023-01-01 RX ORDER — HALOPERIDOL 5 MG/ML
1 INJECTION INTRAMUSCULAR
Status: DISCONTINUED | OUTPATIENT
Start: 2023-01-01 | End: 2023-01-01 | Stop reason: HOSPADM

## 2023-01-01 RX ORDER — DIPHENHYDRAMINE HYDROCHLORIDE 50 MG/ML
12.5 INJECTION INTRAMUSCULAR; INTRAVENOUS
Status: DISCONTINUED | OUTPATIENT
Start: 2023-01-01 | End: 2023-01-01 | Stop reason: HOSPADM

## 2023-01-01 RX ORDER — SODIUM CHLORIDE 9 MG/ML
INJECTION, SOLUTION INTRAVENOUS CONTINUOUS
Status: ACTIVE | OUTPATIENT
Start: 2023-01-01 | End: 2023-01-01

## 2023-01-01 RX ORDER — CELECOXIB 200 MG/1
200 CAPSULE ORAL 2 TIMES DAILY
Qty: 60 CAPSULE | Refills: 0 | Status: SHIPPED
Start: 2023-01-01 | End: 2024-01-01

## 2023-01-01 RX ORDER — SODIUM CHLORIDE, SODIUM LACTATE, POTASSIUM CHLORIDE, CALCIUM CHLORIDE 600; 310; 30; 20 MG/100ML; MG/100ML; MG/100ML; MG/100ML
INJECTION, SOLUTION INTRAVENOUS CONTINUOUS
Status: DISCONTINUED | OUTPATIENT
Start: 2023-01-01 | End: 2023-01-01

## 2023-01-01 RX ORDER — SODIUM CHLORIDE, SODIUM LACTATE, POTASSIUM CHLORIDE, CALCIUM CHLORIDE 600; 310; 30; 20 MG/100ML; MG/100ML; MG/100ML; MG/100ML
INJECTION, SOLUTION INTRAVENOUS CONTINUOUS
Status: DISCONTINUED | OUTPATIENT
Start: 2023-01-01 | End: 2023-01-01 | Stop reason: HOSPADM

## 2023-01-01 RX ORDER — ONDANSETRON 2 MG/ML
4 INJECTION INTRAMUSCULAR; INTRAVENOUS
Status: DISCONTINUED | OUTPATIENT
Start: 2023-01-01 | End: 2023-01-01 | Stop reason: HOSPADM

## 2023-01-01 RX ORDER — POTASSIUM CHLORIDE 20 MEQ/1
40 TABLET, EXTENDED RELEASE ORAL 2 TIMES DAILY
Status: DISCONTINUED | OUTPATIENT
Start: 2023-01-01 | End: 2023-01-01

## 2023-01-01 RX ORDER — MEPERIDINE HYDROCHLORIDE 25 MG/ML
12.5 INJECTION INTRAMUSCULAR; INTRAVENOUS; SUBCUTANEOUS
Status: DISCONTINUED | OUTPATIENT
Start: 2023-01-01 | End: 2023-01-01 | Stop reason: HOSPADM

## 2023-01-01 RX ORDER — OXYCODONE HCL 5 MG/5 ML
10 SOLUTION, ORAL ORAL
Status: DISCONTINUED | OUTPATIENT
Start: 2023-01-01 | End: 2023-01-01 | Stop reason: HOSPADM

## 2023-01-01 RX ORDER — ONDANSETRON 8 MG/1
TABLET, ORALLY DISINTEGRATING ORAL
COMMUNITY
Start: 2023-01-01 | End: 2024-01-01

## 2023-01-01 RX ORDER — OMEPRAZOLE 20 MG/1
20 CAPSULE, DELAYED RELEASE ORAL 2 TIMES DAILY
Qty: 30 CAPSULE | Refills: 0 | Status: SHIPPED
Start: 2023-01-01 | End: 2024-01-01

## 2023-01-01 RX ORDER — HEPARIN SODIUM,PORCINE 1000/ML
VIAL (ML) INJECTION
Status: DISCONTINUED | OUTPATIENT
Start: 2023-01-01 | End: 2023-01-01 | Stop reason: HOSPADM

## 2023-01-01 RX ORDER — SODIUM CHLORIDE, SODIUM LACTATE, POTASSIUM CHLORIDE, CALCIUM CHLORIDE 600; 310; 30; 20 MG/100ML; MG/100ML; MG/100ML; MG/100ML
INJECTION, SOLUTION INTRAVENOUS
Status: DISCONTINUED | OUTPATIENT
Start: 2023-01-01 | End: 2023-01-01 | Stop reason: SURG

## 2023-01-01 RX ORDER — SUCRALFATE ORAL 1 G/10ML
1 SUSPENSION ORAL EVERY 6 HOURS
Qty: 560 ML | Refills: 0 | Status: ON HOLD
Start: 2023-01-01 | End: 2023-01-01

## 2023-01-01 RX ORDER — PANTOPRAZOLE SODIUM 40 MG/10ML
40 INJECTION, POWDER, LYOPHILIZED, FOR SOLUTION INTRAVENOUS DAILY
Status: DISCONTINUED | OUTPATIENT
Start: 2023-01-01 | End: 2023-01-01

## 2023-01-01 RX ORDER — BUPIVACAINE HYDROCHLORIDE AND EPINEPHRINE 5; 5 MG/ML; UG/ML
INJECTION, SOLUTION EPIDURAL; INTRACAUDAL; PERINEURAL
Status: DISCONTINUED | OUTPATIENT
Start: 2023-01-01 | End: 2023-01-01 | Stop reason: HOSPADM

## 2023-01-01 RX ORDER — AMOXICILLIN 250 MG
2 CAPSULE ORAL 2 TIMES DAILY
Status: DISCONTINUED | OUTPATIENT
Start: 2023-01-01 | End: 2023-01-01 | Stop reason: HOSPADM

## 2023-01-01 RX ORDER — CEFAZOLIN SODIUM 1 G/3ML
INJECTION, POWDER, FOR SOLUTION INTRAMUSCULAR; INTRAVENOUS PRN
Status: DISCONTINUED | OUTPATIENT
Start: 2023-01-01 | End: 2023-01-01 | Stop reason: SURG

## 2023-01-01 RX ORDER — EPHEDRINE SULFATE 50 MG/ML
INJECTION, SOLUTION INTRAVENOUS PRN
Status: DISCONTINUED | OUTPATIENT
Start: 2023-01-01 | End: 2023-01-01 | Stop reason: SURG

## 2023-01-01 RX ORDER — POLYETHYLENE GLYCOL 3350 17 G/17G
1 POWDER, FOR SOLUTION ORAL
Status: DISCONTINUED | OUTPATIENT
Start: 2023-01-01 | End: 2023-01-01 | Stop reason: HOSPADM

## 2023-01-01 RX ORDER — SUCRALFATE ORAL 1 G/10ML
1 SUSPENSION ORAL EVERY 6 HOURS
Status: DISCONTINUED | OUTPATIENT
Start: 2023-01-01 | End: 2023-01-01 | Stop reason: HOSPADM

## 2023-01-01 RX ORDER — EPHEDRINE SULFATE 50 MG/ML
5 INJECTION, SOLUTION INTRAVENOUS
Status: DISCONTINUED | OUTPATIENT
Start: 2023-01-01 | End: 2023-01-01 | Stop reason: HOSPADM

## 2023-01-01 RX ORDER — POTASSIUM CHLORIDE 20 MEQ/1
40 TABLET, EXTENDED RELEASE ORAL ONCE
Status: COMPLETED | OUTPATIENT
Start: 2023-01-01 | End: 2023-01-01

## 2023-01-01 RX ORDER — SULFAMETHOXAZOLE AND TRIMETHOPRIM 800; 160 MG/1; MG/1
1 TABLET ORAL 2 TIMES DAILY
Qty: 14 TABLET | Refills: 0 | Status: SHIPPED | OUTPATIENT
Start: 2023-01-01 | End: 2023-01-01

## 2023-01-01 RX ORDER — PHENYLEPHRINE HYDROCHLORIDE 10 MG/ML
INJECTION, SOLUTION INTRAMUSCULAR; INTRAVENOUS; SUBCUTANEOUS PRN
Status: DISCONTINUED | OUTPATIENT
Start: 2023-01-01 | End: 2023-01-01 | Stop reason: SURG

## 2023-01-01 RX ORDER — ONDANSETRON 2 MG/ML
INJECTION INTRAMUSCULAR; INTRAVENOUS PRN
Status: DISCONTINUED | OUTPATIENT
Start: 2023-01-01 | End: 2023-01-01 | Stop reason: SURG

## 2023-01-01 RX ORDER — OMEPRAZOLE 20 MG/1
20 CAPSULE, DELAYED RELEASE ORAL 2 TIMES DAILY
Qty: 30 CAPSULE | Refills: 0 | Status: SHIPPED | OUTPATIENT
Start: 2023-01-01 | End: 2023-01-01 | Stop reason: SDUPTHER

## 2023-01-01 RX ADMIN — SUCRALFATE 1 G: 1 SUSPENSION ORAL at 00:02

## 2023-01-01 RX ADMIN — POTASSIUM CHLORIDE 40 MEQ: 1500 TABLET, EXTENDED RELEASE ORAL at 18:58

## 2023-01-01 RX ADMIN — SODIUM CHLORIDE 250 MG: 9 INJECTION, SOLUTION INTRAVENOUS at 05:50

## 2023-01-01 RX ADMIN — FAMOTIDINE 20 MG: 10 INJECTION, SOLUTION INTRAVENOUS at 12:01

## 2023-01-01 RX ADMIN — CARBOPLATIN 300 MG: 10 INJECTION, SOLUTION INTRAVENOUS at 13:16

## 2023-01-01 RX ADMIN — DEXAMETHASONE SODIUM PHOSPHATE 12 MG: 4 INJECTION, SOLUTION INTRA-ARTICULAR; INTRALESIONAL; INTRAMUSCULAR; INTRAVENOUS; SOFT TISSUE at 09:51

## 2023-01-01 RX ADMIN — EPHEDRINE SULFATE 25 MG: 50 INJECTION, SOLUTION INTRAVENOUS at 07:35

## 2023-01-01 RX ADMIN — DIPHENHYDRAMINE HYDROCHLORIDE 25 MG: 50 INJECTION, SOLUTION INTRAMUSCULAR; INTRAVENOUS at 11:47

## 2023-01-01 RX ADMIN — DEXAMETHASONE SODIUM PHOSPHATE 12 MG: 4 INJECTION, SOLUTION INTRA-ARTICULAR; INTRALESIONAL; INTRAMUSCULAR; INTRAVENOUS; SOFT TISSUE at 13:00

## 2023-01-01 RX ADMIN — FENTANYL CITRATE 25 MCG: 50 INJECTION, SOLUTION INTRAMUSCULAR; INTRAVENOUS at 11:07

## 2023-01-01 RX ADMIN — POTASSIUM CHLORIDE 40 MEQ: 1500 TABLET, EXTENDED RELEASE ORAL at 17:32

## 2023-01-01 RX ADMIN — ONDANSETRON 16 MG: 2 INJECTION INTRAMUSCULAR; INTRAVENOUS at 11:32

## 2023-01-01 RX ADMIN — HEPARIN 500 UNITS: 100 SYRINGE at 13:22

## 2023-01-01 RX ADMIN — OMEPRAZOLE 20 MG: 20 CAPSULE, DELAYED RELEASE ORAL at 09:20

## 2023-01-01 RX ADMIN — SODIUM CHLORIDE, POTASSIUM CHLORIDE, SODIUM LACTATE AND CALCIUM CHLORIDE: 600; 310; 30; 20 INJECTION, SOLUTION INTRAVENOUS at 10:52

## 2023-01-01 RX ADMIN — HEPARIN 500 UNITS: 100 SYRINGE at 16:12

## 2023-01-01 RX ADMIN — ONDANSETRON 16 MG: 2 INJECTION INTRAMUSCULAR; INTRAVENOUS at 12:44

## 2023-01-01 RX ADMIN — CEFAZOLIN 2 G: 1 INJECTION, POWDER, FOR SOLUTION INTRAMUSCULAR; INTRAVENOUS at 07:35

## 2023-01-01 RX ADMIN — CARBOPLATIN 300 MG: 450 INJECTION, SOLUTION INTRAVENOUS at 12:40

## 2023-01-01 RX ADMIN — HEPARIN 500 UNITS: 100 SYRINGE at 16:28

## 2023-01-01 RX ADMIN — IOHEXOL 100 ML: 350 INJECTION, SOLUTION INTRAVENOUS at 18:00

## 2023-01-01 RX ADMIN — FENTANYL CITRATE 25 MCG: 50 INJECTION, SOLUTION INTRAMUSCULAR; INTRAVENOUS at 11:02

## 2023-01-01 RX ADMIN — POTASSIUM CHLORIDE 40 MEQ: 1500 TABLET, EXTENDED RELEASE ORAL at 04:52

## 2023-01-01 RX ADMIN — CARBOPLATIN 300 MG: 450 INJECTION, SOLUTION INTRAVENOUS at 13:44

## 2023-01-01 RX ADMIN — SUCRALFATE 1 G: 1 SUSPENSION ORAL at 05:24

## 2023-01-01 RX ADMIN — DEXAMETHASONE SODIUM PHOSPHATE 12 MG: 4 INJECTION, SOLUTION INTRA-ARTICULAR; INTRALESIONAL; INTRAMUSCULAR; INTRAVENOUS; SOFT TISSUE at 11:53

## 2023-01-01 RX ADMIN — PACLITAXEL 114 MG: 6 INJECTION, SOLUTION, CONCENTRATE INTRAVENOUS at 12:25

## 2023-01-01 RX ADMIN — PROPOFOL 75 MG: 10 INJECTION, EMULSION INTRAVENOUS at 10:59

## 2023-01-01 RX ADMIN — FENTANYL CITRATE 100 MCG: 50 INJECTION, SOLUTION INTRAMUSCULAR; INTRAVENOUS at 07:35

## 2023-01-01 RX ADMIN — FAMOTIDINE 20 MG: 10 INJECTION, SOLUTION INTRAVENOUS at 12:27

## 2023-01-01 RX ADMIN — HEPARIN 500 UNITS: 100 SYRINGE at 14:30

## 2023-01-01 RX ADMIN — SUCRALFATE 1 G: 1 SUSPENSION ORAL at 04:37

## 2023-01-01 RX ADMIN — PANTOPRAZOLE SODIUM 40 MG: 40 INJECTION, POWDER, FOR SOLUTION INTRAVENOUS at 05:24

## 2023-01-01 RX ADMIN — IOHEXOL 75 ML: 350 INJECTION, SOLUTION INTRAVENOUS at 15:22

## 2023-01-01 RX ADMIN — PANTOPRAZOLE SODIUM 40 MG: 40 INJECTION, POWDER, FOR SOLUTION INTRAVENOUS at 17:33

## 2023-01-01 RX ADMIN — SUCRALFATE 1 G: 1 SUSPENSION ORAL at 17:33

## 2023-01-01 RX ADMIN — SUCRALFATE 1 G: 1 SUSPENSION ORAL at 04:53

## 2023-01-01 RX ADMIN — SUCRALFATE 1 G: 1 SUSPENSION ORAL at 12:41

## 2023-01-01 RX ADMIN — SUCRALFATE 1 G: 1 SUSPENSION ORAL at 17:50

## 2023-01-01 RX ADMIN — PACLITAXEL 114 MG: 6 INJECTION, SOLUTION, CONCENTRATE INTRAVENOUS at 11:43

## 2023-01-01 RX ADMIN — PANTOPRAZOLE SODIUM 40 MG: 40 INJECTION, POWDER, FOR SOLUTION INTRAVENOUS at 02:35

## 2023-01-01 RX ADMIN — FAMOTIDINE 20 MG: 10 INJECTION, SOLUTION INTRAVENOUS at 11:04

## 2023-01-01 RX ADMIN — PROPOFOL 25 MG: 10 INJECTION, EMULSION INTRAVENOUS at 11:02

## 2023-01-01 RX ADMIN — MIDAZOLAM 2 MG: 1 INJECTION, SOLUTION INTRAMUSCULAR; INTRAVENOUS at 07:35

## 2023-01-01 RX ADMIN — CARBOPLATIN 300 MG: 10 INJECTION, SOLUTION INTRAVENOUS at 15:23

## 2023-01-01 RX ADMIN — POTASSIUM CHLORIDE 40 MEQ: 1500 TABLET, EXTENDED RELEASE ORAL at 17:42

## 2023-01-01 RX ADMIN — SUCRALFATE 1 G: 1 SUSPENSION ORAL at 02:37

## 2023-01-01 RX ADMIN — PANTOPRAZOLE SODIUM 40 MG: 40 INJECTION, POWDER, FOR SOLUTION INTRAVENOUS at 04:37

## 2023-01-01 RX ADMIN — LIDOCAINE HYDROCHLORIDE 50 MG: 20 INJECTION, SOLUTION EPIDURAL; INFILTRATION; INTRACAUDAL at 10:59

## 2023-01-01 RX ADMIN — SODIUM CHLORIDE 250 MG: 9 INJECTION, SOLUTION INTRAVENOUS at 07:52

## 2023-01-01 RX ADMIN — PROPOFOL 35 MG: 10 INJECTION, EMULSION INTRAVENOUS at 11:09

## 2023-01-01 RX ADMIN — PACLITAXEL 114 MG: 6 INJECTION, SOLUTION, CONCENTRATE INTRAVENOUS at 11:12

## 2023-01-01 RX ADMIN — SODIUM CHLORIDE: 9 INJECTION, SOLUTION INTRAVENOUS at 19:54

## 2023-01-01 RX ADMIN — SODIUM CHLORIDE 250 MG: 9 INJECTION, SOLUTION INTRAVENOUS at 17:48

## 2023-01-01 RX ADMIN — SUCRALFATE 1 G: 1 SUSPENSION ORAL at 00:15

## 2023-01-01 RX ADMIN — SUCRALFATE 1 G: 1 SUSPENSION ORAL at 23:16

## 2023-01-01 RX ADMIN — SODIUM CHLORIDE, POTASSIUM CHLORIDE, SODIUM LACTATE AND CALCIUM CHLORIDE: 600; 310; 30; 20 INJECTION, SOLUTION INTRAVENOUS at 06:46

## 2023-01-01 RX ADMIN — DIPHENHYDRAMINE HYDROCHLORIDE 25 MG: 50 INJECTION, SOLUTION INTRAMUSCULAR; INTRAVENOUS at 12:08

## 2023-01-01 RX ADMIN — PANTOPRAZOLE SODIUM 40 MG: 40 INJECTION, POWDER, FOR SOLUTION INTRAVENOUS at 04:53

## 2023-01-01 RX ADMIN — DIPHENHYDRAMINE HYDROCHLORIDE 25 MG: 50 INJECTION, SOLUTION INTRAMUSCULAR; INTRAVENOUS at 11:07

## 2023-01-01 RX ADMIN — LIDOCAINE HYDROCHLORIDE 40 MG: 20 INJECTION, SOLUTION EPIDURAL; INFILTRATION; INTRACAUDAL at 07:35

## 2023-01-01 RX ADMIN — HEPARIN 500 UNITS: 100 SYRINGE at 13:57

## 2023-01-01 RX ADMIN — SENNOSIDES AND DOCUSATE SODIUM 2 TABLET: 50; 8.6 TABLET ORAL at 04:53

## 2023-01-01 RX ADMIN — PANTOPRAZOLE SODIUM 40 MG: 40 INJECTION, POWDER, FOR SOLUTION INTRAVENOUS at 17:50

## 2023-01-01 RX ADMIN — FAMOTIDINE 20 MG: 10 INJECTION, SOLUTION INTRAVENOUS at 11:28

## 2023-01-01 RX ADMIN — ONDANSETRON 4 MG: 2 INJECTION INTRAMUSCULAR; INTRAVENOUS at 07:35

## 2023-01-01 RX ADMIN — POTASSIUM CHLORIDE 40 MEQ: 1500 TABLET, EXTENDED RELEASE ORAL at 04:37

## 2023-01-01 RX ADMIN — PROPOFOL 40 MG: 10 INJECTION, EMULSION INTRAVENOUS at 11:05

## 2023-01-01 RX ADMIN — HEPARIN 500 UNITS: 100 SYRINGE at 13:58

## 2023-01-01 RX ADMIN — FENTANYL CITRATE 25 MCG: 50 INJECTION, SOLUTION INTRAMUSCULAR; INTRAVENOUS at 10:57

## 2023-01-01 RX ADMIN — ONDANSETRON 16 MG: 2 INJECTION INTRAMUSCULAR; INTRAVENOUS at 10:05

## 2023-01-01 RX ADMIN — PACLITAXEL 114 MG: 6 INJECTION, SOLUTION, CONCENTRATE INTRAVENOUS at 12:03

## 2023-01-01 RX ADMIN — SENNOSIDES AND DOCUSATE SODIUM 2 TABLET: 50; 8.6 TABLET ORAL at 17:42

## 2023-01-01 RX ADMIN — ONDANSETRON 16 MG: 2 INJECTION INTRAMUSCULAR; INTRAVENOUS at 10:40

## 2023-01-01 RX ADMIN — PANTOPRAZOLE SODIUM 40 MG: 40 INJECTION, POWDER, FOR SOLUTION INTRAVENOUS at 17:42

## 2023-01-01 RX ADMIN — SUCRALFATE 1 G: 1 SUSPENSION ORAL at 12:07

## 2023-01-01 RX ADMIN — SUCRALFATE 1 G: 1 SUSPENSION ORAL at 17:42

## 2023-01-01 RX ADMIN — IOHEXOL 100 ML: 350 INJECTION, SOLUTION INTRAVENOUS at 19:23

## 2023-01-01 RX ADMIN — ONDANSETRON 16 MG: 2 INJECTION INTRAMUSCULAR; INTRAVENOUS at 11:28

## 2023-01-01 RX ADMIN — PROPOFOL 200 MG: 10 INJECTION, EMULSION INTRAVENOUS at 07:35

## 2023-01-01 RX ADMIN — CARBOPLATIN 300 MG: 10 INJECTION, SOLUTION INTRAVENOUS at 13:10

## 2023-01-01 RX ADMIN — PHENYLEPHRINE HYDROCHLORIDE 200 MCG: 10 INJECTION INTRAVENOUS at 08:06

## 2023-01-01 RX ADMIN — DEXAMETHASONE SODIUM PHOSPHATE 12 MG: 4 INJECTION, SOLUTION INTRA-ARTICULAR; INTRALESIONAL; INTRAMUSCULAR; INTRAVENOUS; SOFT TISSUE at 11:21

## 2023-01-01 RX ADMIN — DIPHENHYDRAMINE HYDROCHLORIDE 25 MG: 50 INJECTION, SOLUTION INTRAMUSCULAR; INTRAVENOUS at 12:29

## 2023-01-01 RX ADMIN — SENNOSIDES AND DOCUSATE SODIUM 2 TABLET: 50; 8.6 TABLET ORAL at 05:24

## 2023-01-01 RX ADMIN — DEXAMETHASONE SODIUM PHOSPHATE 12 MG: 4 INJECTION, SOLUTION INTRA-ARTICULAR; INTRALESIONAL; INTRAMUSCULAR; INTRAVENOUS; SOFT TISSUE at 10:28

## 2023-01-01 RX ADMIN — FAMOTIDINE 20 MG: 10 INJECTION, SOLUTION INTRAVENOUS at 10:43

## 2023-01-01 RX ADMIN — MIDAZOLAM 2 MG: 1 INJECTION, SOLUTION INTRAMUSCULAR; INTRAVENOUS at 10:56

## 2023-01-01 RX ADMIN — DIPHENHYDRAMINE HYDROCHLORIDE 25 MG: 50 INJECTION, SOLUTION INTRAMUSCULAR; INTRAVENOUS at 10:25

## 2023-01-01 RX ADMIN — SODIUM CHLORIDE 250 MG: 9 INJECTION, SOLUTION INTRAVENOUS at 17:38

## 2023-01-01 RX ADMIN — PACLITAXEL 114 MG: 6 INJECTION, SOLUTION, CONCENTRATE INTRAVENOUS at 13:21

## 2023-01-01 ASSESSMENT — FIBROSIS 4 INDEX
FIB4 SCORE: 1.346870059402947665
FIB4 SCORE: 2.004459314343182885
FIB4 SCORE: 0.51
FIB4 SCORE: 0.49
FIB4 SCORE: 0.48
FIB4 SCORE: 1.02
FIB4 SCORE: 0.49
FIB4 SCORE: 2.004459314343182885
FIB4 SCORE: 0.73
FIB4 SCORE: 0.73
FIB4 SCORE: 0.55
FIB4 SCORE: 0.58
FIB4 SCORE: 2.38
FIB4 SCORE: 1.346870059402947665
FIB4 SCORE: 1
FIB4 SCORE: 2.004459314343182885
FIB4 SCORE: 0.47
FIB4 SCORE: 2.38
FIB4 SCORE: 0.49

## 2023-01-01 ASSESSMENT — ENCOUNTER SYMPTOMS
BRUISES/BLEEDS EASILY: 0
DEPRESSION: 0
CONSTIPATION: 0
ABDOMINAL PAIN: 0
BACK PAIN: 1
PHOTOPHOBIA: 0
NAUSEA: 0
BLOOD IN STOOL: 0
HEADACHES: 0
FLANK PAIN: 0
CLAUDICATION: 0
COUGH: 0
DIZZINESS: 0
CHILLS: 0
BLURRED VISION: 0
SHORTNESS OF BREATH: 0
PALPITATIONS: 0
BLURRED VISION: 0
DOUBLE VISION: 0
NECK PAIN: 0
CONSTIPATION: 1
WEIGHT LOSS: 1
TINGLING: 0
HEMOPTYSIS: 0
DEPRESSION: 0
DIARRHEA: 0
PALPITATIONS: 0
NERVOUS/ANXIOUS: 1
NAUSEA: 1
EYE REDNESS: 0
CONSTIPATION: 0
COUGH: 0
HEADACHES: 0
DOUBLE VISION: 0
ABDOMINAL PAIN: 0
SHORTNESS OF BREATH: 0
SHORTNESS OF BREATH: 0
PALPITATIONS: 0
NECK PAIN: 0
BACK PAIN: 1
VOMITING: 0
WHEEZING: 0
BACK PAIN: 1
SINUS PAIN: 0
NERVOUS/ANXIOUS: 0
DIARRHEA: 0
MYALGIAS: 0
MYALGIAS: 0
BRUISES/BLEEDS EASILY: 0
DEPRESSION: 0
VOMITING: 0
EYE PAIN: 0
ABDOMINAL PAIN: 1
EYE DISCHARGE: 0
WEIGHT LOSS: 1
DIARRHEA: 0
FEVER: 0
NAUSEA: 0
COUGH: 0
VOMITING: 0
NAUSEA: 0
DIARRHEA: 0
BLOOD IN STOOL: 0
LOSS OF CONSCIOUSNESS: 0
DIAPHORESIS: 0
FLANK PAIN: 0
HEARTBURN: 0
SPUTUM PRODUCTION: 0
CONSTIPATION: 1
ABDOMINAL PAIN: 1
LOSS OF CONSCIOUSNESS: 0
FEVER: 0
WEIGHT LOSS: 1
DIZZINESS: 0
WEAKNESS: 1
CONSTIPATION: 1
HEARTBURN: 1
FEVER: 0
ABDOMINAL PAIN: 0
FEVER: 0
DIZZINESS: 0
CHILLS: 0
ABDOMINAL PAIN: 0
CHILLS: 0
DIZZINESS: 0
WEAKNESS: 0
DIAPHORESIS: 0
WEAKNESS: 0
HEADACHES: 0
FEVER: 0
DEPRESSION: 0
CHILLS: 0
HEMOPTYSIS: 0
HEARTBURN: 0
NAUSEA: 1
DIZZINESS: 0
BLOOD IN STOOL: 0
ORTHOPNEA: 0
WEAKNESS: 0
HEARTBURN: 0
VOMITING: 0
CHILLS: 0
VOMITING: 0
ABDOMINAL PAIN: 1
SORE THROAT: 0
EYE PAIN: 0
MYALGIAS: 0
COUGH: 0
ABDOMINAL PAIN: 1
HEARTBURN: 1
HEADACHES: 0
NAUSEA: 0
NERVOUS/ANXIOUS: 0
DIAPHORESIS: 0
BACK PAIN: 0
DIARRHEA: 0
SHORTNESS OF BREATH: 0

## 2023-01-01 ASSESSMENT — PATIENT HEALTH QUESTIONNAIRE - PHQ9
2. FEELING DOWN, DEPRESSED, IRRITABLE, OR HOPELESS: NOT AT ALL
1. LITTLE INTEREST OR PLEASURE IN DOING THINGS: NOT AT ALL
SUM OF ALL RESPONSES TO PHQ9 QUESTIONS 1 AND 2: 0
2. FEELING DOWN, DEPRESSED, IRRITABLE, OR HOPELESS: NOT AT ALL
SUM OF ALL RESPONSES TO PHQ9 QUESTIONS 1 AND 2: 0
1. LITTLE INTEREST OR PLEASURE IN DOING THINGS: NOT AT ALL

## 2023-01-01 ASSESSMENT — LIFESTYLE VARIABLES
TOBACCO_USE: NO
HAVE YOU EVER FELT YOU SHOULD CUT DOWN ON YOUR DRINKING: NO
SMOKING_STATUS: NO
HOW MANY TIMES IN THE PAST YEAR HAVE YOU HAD 5 OR MORE DRINKS IN A DAY: 0
DOES PATIENT WANT TO STOP DRINKING: NO
AVERAGE NUMBER OF DAYS PER WEEK YOU HAVE A DRINK CONTAINING ALCOHOL: 0
TOTAL SCORE: 0
TOTAL SCORE: 0
EVER FELT BAD OR GUILTY ABOUT YOUR DRINKING: NO
HAVE PEOPLE ANNOYED YOU BY CRITICIZING YOUR DRINKING: NO
SMOKING_STATUS: NO
ALCOHOL_USE: NO
TOTAL SCORE: 0
EVER HAD A DRINK FIRST THING IN THE MORNING TO STEADY YOUR NERVES TO GET RID OF A HANGOVER: NO
ON A TYPICAL DAY WHEN YOU DRINK ALCOHOL HOW MANY DRINKS DO YOU HAVE: 0
TOBACCO_USE: NO
CONSUMPTION TOTAL: NEGATIVE

## 2023-01-01 ASSESSMENT — COGNITIVE AND FUNCTIONAL STATUS - GENERAL
MOBILITY SCORE: 24
SUGGESTED CMS G CODE MODIFIER DAILY ACTIVITY: CH
SUGGESTED CMS G CODE MODIFIER MOBILITY: CH
DAILY ACTIVITIY SCORE: 24

## 2023-01-01 ASSESSMENT — PAIN SCALES - GENERAL
PAIN_LEVEL: 2
PAINLEVEL: NO PAIN

## 2023-01-01 ASSESSMENT — PAIN DESCRIPTION - PAIN TYPE
TYPE: SURGICAL PAIN
TYPE: ACUTE PAIN
TYPE: SURGICAL PAIN
TYPE: SURGICAL PAIN
TYPE: ACUTE PAIN
TYPE: ACUTE PAIN
TYPE: SURGICAL PAIN
TYPE: SURGICAL PAIN
TYPE: ACUTE PAIN
TYPE: SURGICAL PAIN

## 2023-08-16 NOTE — PROGRESS NOTES
"  Subjective:     Tommy Mckeon  is a 57 y.o. male who presents for Constipation (X 3 weeks. )       He presents today with constipation has been ongoing the last 3 weeks.  States that his last bowel movement was 2-3 days ago and was \"handy in nature\".  Denies any recent dietary or lifestyle changes.  He has associated upper abdominal discomfort as well as nausea and vomiting.  He does note reduced food and fluid intake over the last 3 weeks due to his constipation and upper abdominal discomfort.  States that he does become dizzy and lightheaded with positional movements, he personally associates this with reduced fluid intake recently.  He did try Dulcolax for a very short time and it did not provide any symptom relief.  He denies any chest pain, no shortness of breath.  Denies fevers.  He denies any recent drug or alcohol use.  Denies any changes to skin tone.    He is also having complaints of pain present between the shoulder blades.  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  Has not use any medications for this pain.       Review of Systems   Constitutional:  Negative for chills, diaphoresis, fever and malaise/fatigue.   HENT:  Negative for congestion, ear discharge, sinus pain and sore throat.    Eyes:  Negative for pain, discharge and redness.   Respiratory:  Negative for cough, shortness of breath and wheezing.    Cardiovascular:  Negative for chest pain.   Gastrointestinal:  Positive for abdominal pain and constipation. Negative for diarrhea, nausea and vomiting.   Genitourinary:  Negative for dysuria, frequency and urgency.   Musculoskeletal:  Positive for back pain.   Neurological:  Negative for dizziness and headaches.      No Known Allergies  Past Medical History:   Diagnosis Date    Hypertension     Obesity (BMI 30-39.9) 4/19/2016        Objective:   /60 (BP Location: Left arm, Patient Position: Sitting, BP Cuff Size: Adult long)   Pulse " "78   Temp 36.6 °C (97.9 °F) (Temporal)   Resp 16   Ht 1.93 m (6' 4\")   Wt 102 kg (224 lb 5.1 oz)   SpO2 96%   BMI 27.30 kg/m²   Physical Exam  Vitals and nursing note reviewed.   Constitutional:       General: He is not in acute distress.     Appearance: He is not ill-appearing or toxic-appearing.   HENT:      Head: Normocephalic.      Nose: No rhinorrhea.   Eyes:      General: No scleral icterus.     Conjunctiva/sclera: Conjunctivae normal.   Cardiovascular:      Rate and Rhythm: Normal rate and regular rhythm.   Pulmonary:      Effort: Pulmonary effort is normal. No respiratory distress.      Breath sounds: Normal breath sounds. No stridor.   Abdominal:      General: Abdomen is flat. Bowel sounds are normal. There is no distension.      Palpations: Abdomen is soft. There is no fluid wave, hepatomegaly, splenomegaly or mass.      Tenderness: There is no abdominal tenderness. There is no guarding or rebound.      Comments: No ascites, no signs of caput medusa   Musculoskeletal:      Cervical back: Neck supple.      Comments: Tenderness to palpation along the thoracic spine paraspinal musculature   Skin:     Coloration: Skin is not jaundiced.   Neurological:      Mental Status: He is alert and oriented to person, place, and time.   Psychiatric:         Mood and Affect: Mood normal.         Behavior: Behavior normal.         Thought Content: Thought content normal.         Judgment: Judgment normal.             Diagnostic testing:    KUB  Radiologist IMPRESSION:     Nonobstructive bowel gas pattern. Small amount of stool throughout the colon.    CBC, CMP, lipase-pending    Assessment/Plan:     Encounter Diagnoses   Name Primary?    Constipation, unspecified constipation type     Rolando-colored stools     Upper abdominal pain     Myofascial pain syndrome of thoracic spine         Plan for care for today's complaint includes Contacting the patient via phone call to discuss results of the CBC, CMP and lipase.  The " CBC did show low hemoglobin and hematocrit, hemoglobin of 5.2, hematocrit of 19.9.  During the phone call we discussed that the patient needs to follow-up immediately in the emergency department for immediate evaluation for his signs of anemia seen on lab testing.  Patient did verbally state that he will have his wife transfer him to the emergency department immediately for evaluation.  Transfer service was contacted..    See AVS Instructions below for written guidance provided to patient on after-visit management and care in addition to our verbal discussion during the visit.    Please note that this dictation was created using voice recognition software. I have attempted to correct all errors, but there may be sound-alike, spelling, grammar and possibly content errors that I did not discover before finalizing the note.    Light Oakkristofer Ordoñez PA-C

## 2023-08-17 PROBLEM — D62 ACUTE BLOOD LOSS ANEMIA: Status: ACTIVE | Noted: 2023-01-01

## 2023-08-17 PROBLEM — K92.2 ACUTE GI BLEEDING: Status: ACTIVE | Noted: 2023-01-01

## 2023-08-17 NOTE — ED NOTES
Pt sleeping, equal chest rise and fall with unlabored respirations. Call light available and within reach. No distress noted.

## 2023-08-17 NOTE — ED NOTES
Assumed care of pt, received bedside report from NADEEN Dhaliwal   Cardiac and spO2 monitor on, call light within reach.

## 2023-08-17 NOTE — ASSESSMENT & PLAN NOTE
Secondary to GIB, NSAID use suspect upper GIB  IV Protonix 40 mg BID   Serial H/H  S/p 1 unit PRBC with no improvement. Repeat hemoglobin.   Will transfuse additional PRBC pending above repeat   Carafate   GI planning to take patient for EGD today  Serial abdominal exams

## 2023-08-17 NOTE — CONSULTS
Date of Consultation:  8/17/2023    Patient: : Tommy Mckeon  MRN: [id number]    Referring Physician: Gwendolyn Ortiz     GI:Kale Patel M.D.     Reason for Consultation: Anemia due to GI blood loss, fatigue    History of Present Illness:   57-year-old male presents with epigastric discomfort, worsening fatigue, change in stool habits, poor appetite.  Patient's epigastric pain began about 2 weeks ago.  He has been having some darker type stools.  He has never had upper endoscopy.  He had a colonoscopy 5 years ago and is due for recall because of polyps.  Patient's hemoglobin on presentation was 5.1.  He has been transfused 3 units of PRBCs.  He is currently getting his third unit.  No hematemesis.  No unintentional weight loss.      Past Medical History:   Diagnosis Date    Obesity (BMI 30-39.9) 4/19/2016    Hypertension        Past Surgical History:   Procedure Laterality Date    EYE SURGERY  2015       Family History   Problem Relation Age of Onset    Diabetes Mother     Stroke Mother        Social History     Socioeconomic History    Marital status:    Tobacco Use    Smoking status: Never    Smokeless tobacco: Never   Vaping Use    Vaping Use: Never used   Substance and Sexual Activity    Alcohol use: Not Currently    Drug use: No    Sexual activity: Yes     Partners: Female   Social History Narrative    Works in security        Current Meds (name, sig, last dose):     Current Facility-Administered Medications:     pantoprazole    senna-docusate **AND** polyethylene glycol/lytes **AND** magnesium hydroxide **AND** bisacodyl    sucralfate    Current Outpatient Medications:     Multiple Vitamins-Minerals (ONE-A-DAY MENS 50+ ADVANTAGE PO), 1 Tablet, Oral, DAILY, 8/15/2023 at AM    diphenhydrAMINE, 25 mg, Oral, Q EVENING, 8/15/2023 at PM    calcium carbonate, 500-1,000 mg, Oral, TID PRN, 8/15/2023 at PRN    omeprazole, 20 mg, Oral, DAILY, FEW DAYS AGO at PRN      ROS  10 systems reviewed and are negative  unless otherwise noted in history of present illness.    Physical Exam:  Vitals:    08/17/23 0633 08/17/23 0647 08/17/23 0648 08/17/23 0702   BP: 105/56 120/65  130/70   Pulse: 67 93 79 69   Resp:       Temp:       TempSrc:       SpO2: 93%  95% 93%   Weight:       Height:           Physical Exam  General: Non-toxic appearing, without acute distress.  HEENT: Anicteric sclera, OP with moist mucous membranes.  Neck: trachea midline, supple.  Lungs: Clear bilaterally. No labored breathing.  Heart: S1S2 Regular rate. Pulses normal.  Abd: Soft, non-tender, positive bowel sounds.   Ext: without cubbing or cyanosis or edema.  Neurologic: without focal deficit. Moves all extremities.  Psychiatric: normal speech, mood, and affect.      Labs:  Recent Labs     08/16/23  1034 08/16/23  1729   SODIUM 143 142   POTASSIUM 3.3* 3.0*   CHLORIDE 106 106   CO2 24 25   BUN 16 17   CREATININE 0.89 0.91   MAGNESIUM  --  2.0   CALCIUM 9.5 9.2     Recent Labs     08/16/23  1034 08/16/23  1729   ALTSGPT 5 8   ASTSGOT 11* 12   ALKPHOSPHAT 45 45   TBILIRUBIN 0.3 0.2   LIPASE 49 37   GLUCOSE 99 96     Recent Labs     08/16/23  1034 08/16/23  1729   WBC 7.4 7.5   NEUTSPOLYS 62.60 63.40   LYMPHOCYTES 23.30 23.10   MONOCYTES 11.60 10.70   EOSINOPHILS 1.30 1.30   BASOPHILS 0.90 1.20   ASTSGOT 11* 12   ALTSGPT 5 8   ALKPHOSPHAT 45 45   TBILIRUBIN 0.3 0.2     Recent Labs     08/16/23  1034 08/16/23  1729 08/17/23  0020 08/17/23  0147   RBC 2.81* 2.76*  --   --    HEMOGLOBIN 5.2* 5.1* 4.9* 5.3*   HEMATOCRIT 19.9* 19.1* 18.3* 19.2*   PLATELETCT 504* 530*  --   --    PROTHROMBTM  --  14.3  --   --    APTT  --  25.8  --   --    INR  --  1.13  --   --      Recent Results (from the past 24 hour(s))   CBC WITH DIFFERENTIAL    Collection Time: 08/16/23 10:34 AM   Result Value Ref Range    WBC 7.4 4.8 - 10.8 K/uL    RBC 2.81 (L) 4.70 - 6.10 M/uL    Hemoglobin 5.2 (LL) 14.0 - 18.0 g/dL    Hematocrit 19.9 (L) 42.0 - 52.0 %    MCV 70.8 (L) 81.4 - 97.8 fL    MCH  18.5 (L) 27.0 - 33.0 pg    MCHC 26.1 (L) 32.3 - 36.5 g/dL    RDW 51.5 (H) 35.9 - 50.0 fL    Platelet Count 504 (H) 164 - 446 K/uL    MPV 10.9 9.0 - 12.9 fL    Neutrophils-Polys 62.60 44.00 - 72.00 %    Lymphocytes 23.30 22.00 - 41.00 %    Monocytes 11.60 0.00 - 13.40 %    Eosinophils 1.30 0.00 - 6.90 %    Basophils 0.90 0.00 - 1.80 %    Immature Granulocytes 0.30 0.00 - 0.90 %    Nucleated RBC 0.30 (H) 0.00 - 0.20 /100 WBC    Neutrophils (Absolute) 4.63 1.82 - 7.42 K/uL    Lymphs (Absolute) 1.73 1.00 - 4.80 K/uL    Monos (Absolute) 0.86 (H) 0.00 - 0.85 K/uL    Eos (Absolute) 0.10 0.00 - 0.51 K/uL    Baso (Absolute) 0.07 0.00 - 0.12 K/uL    Immature Granulocytes (abs) 0.02 0.00 - 0.11 K/uL    NRBC (Absolute) 0.02 K/uL    Hypochromia 1+     Anisocytosis 1+     Microcytosis 1+    Comp Metabolic Panel    Collection Time: 08/16/23 10:34 AM   Result Value Ref Range    Sodium 143 135 - 145 mmol/L    Potassium 3.3 (L) 3.6 - 5.5 mmol/L    Chloride 106 96 - 112 mmol/L    Co2 24 20 - 33 mmol/L    Anion Gap 13.0 7.0 - 16.0    Glucose 99 65 - 99 mg/dL    Bun 16 8 - 22 mg/dL    Creatinine 0.89 0.50 - 1.40 mg/dL    Calcium 9.5 8.5 - 10.5 mg/dL    Correct Calcium 9.8 8.5 - 10.5 mg/dL    AST(SGOT) 11 (L) 12 - 45 U/L    ALT(SGPT) 5 2 - 50 U/L    Alkaline Phosphatase 45 30 - 99 U/L    Total Bilirubin 0.3 0.1 - 1.5 mg/dL    Albumin 3.6 3.2 - 4.9 g/dL    Total Protein 6.6 6.0 - 8.2 g/dL    Globulin 3.0 1.9 - 3.5 g/dL    A-G Ratio 1.2 g/dL   LIPASE    Collection Time: 08/16/23 10:34 AM   Result Value Ref Range    Lipase 49 11 - 82 U/L   FASTING STATUS    Collection Time: 08/16/23 10:34 AM   Result Value Ref Range    Fasting Status Non-Fasting    ESTIMATED GFR    Collection Time: 08/16/23 10:34 AM   Result Value Ref Range    GFR (CKD-EPI) 98 >60 mL/min/1.73 m 2   PLATELET ESTIMATE    Collection Time: 08/16/23 10:34 AM   Result Value Ref Range    Plt Estimation Increased    MORPHOLOGY    Collection Time: 08/16/23 10:34 AM   Result Value  Ref Range    RBC Morphology Present    PERIPHERAL SMEAR REVIEW    Collection Time: 08/16/23 10:34 AM   Result Value Ref Range    Peripheral Smear Review see below    DIFFERENTIAL COMMENT    Collection Time: 08/16/23 10:34 AM   Result Value Ref Range    Comments-Diff see below    COD (ADULT)    Collection Time: 08/16/23  5:29 PM   Result Value Ref Range    ABO Grouping Only B     Rh Grouping Only POS     Antibody Screen-Cod NEG     Component R       R99                 Red Cells, LR       [id number]   transfused   08/16/23   19:32      Product Type R99     Dispense Status transfused     Unit Number (Barcoded) [id number]     Product Code (Barcoded) [id number]     Blood Type (Barcoded) 7300     Component R       R99                 Red Cells, LR       [id number]   issued       08/17/23   03:06      Product Type R99     Dispense Status issued     Unit Number (Barcoded) [id number]     Product Code (Barcoded) [id number]     Blood Type (Barcoded) 7300     Component R       R99                 Red Cells, LR       [id number]   issued       08/17/23   06:23      Product Type R99     Dispense Status issued     Unit Number (Barcoded) [id number]     Product Code (Barcoded) [id number]     Blood Type (Barcoded) 7300    CBC WITH DIFFERENTIAL    Collection Time: 08/16/23  5:29 PM   Result Value Ref Range    WBC 7.5 4.8 - 10.8 K/uL    RBC 2.76 (L) 4.70 - 6.10 M/uL    Hemoglobin 5.1 (LL) 14.0 - 18.0 g/dL    Hematocrit 19.1 (L) 42.0 - 52.0 %    MCV 69.2 (L) 81.4 - 97.8 fL    MCH 18.5 (L) 27.0 - 33.0 pg    MCHC 26.7 (L) 32.3 - 36.5 g/dL    RDW 50.1 (H) 35.9 - 50.0 fL    Platelet Count 530 (H) 164 - 446 K/uL    MPV 10.6 9.0 - 12.9 fL    Neutrophils-Polys 63.40 44.00 - 72.00 %    Lymphocytes 23.10 22.00 - 41.00 %    Monocytes 10.70 0.00 - 13.40 %    Eosinophils 1.30 0.00 - 6.90 %    Basophils 1.20 0.00 - 1.80 %    Immature Granulocytes 0.30 0.00 - 0.90 %    Nucleated RBC 0.40 (H) 0.00 - 0.20 /100 WBC    Neutrophils (Absolute)  4.78 1.82 - 7.42 K/uL    Lymphs (Absolute) 1.74 1.00 - 4.80 K/uL    Monos (Absolute) 0.81 0.00 - 0.85 K/uL    Eos (Absolute) 0.10 0.00 - 0.51 K/uL    Baso (Absolute) 0.09 0.00 - 0.12 K/uL    Immature Granulocytes (abs) 0.02 0.00 - 0.11 K/uL    NRBC (Absolute) 0.03 K/uL   COMP METABOLIC PANEL    Collection Time: 08/16/23  5:29 PM   Result Value Ref Range    Sodium 142 135 - 145 mmol/L    Potassium 3.0 (L) 3.6 - 5.5 mmol/L    Chloride 106 96 - 112 mmol/L    Co2 25 20 - 33 mmol/L    Anion Gap 11.0 7.0 - 16.0    Glucose 96 65 - 99 mg/dL    Bun 17 8 - 22 mg/dL    Creatinine 0.91 0.50 - 1.40 mg/dL    Calcium 9.2 8.5 - 10.5 mg/dL    Correct Calcium 9.4 8.5 - 10.5 mg/dL    AST(SGOT) 12 12 - 45 U/L    ALT(SGPT) 8 2 - 50 U/L    Alkaline Phosphatase 45 30 - 99 U/L    Total Bilirubin 0.2 0.1 - 1.5 mg/dL    Albumin 3.7 3.2 - 4.9 g/dL    Total Protein 6.4 6.0 - 8.2 g/dL    Globulin 2.7 1.9 - 3.5 g/dL    A-G Ratio 1.4 g/dL   LIPASE    Collection Time: 08/16/23  5:29 PM   Result Value Ref Range    Lipase 37 11 - 82 U/L   PROTHROMBIN TIME    Collection Time: 08/16/23  5:29 PM   Result Value Ref Range    PT 14.3 12.0 - 14.6 sec    INR 1.13 0.87 - 1.13   APTT    Collection Time: 08/16/23  5:29 PM   Result Value Ref Range    APTT 25.8 24.7 - 36.0 sec   ESTIMATED GFR    Collection Time: 08/16/23  5:29 PM   Result Value Ref Range    GFR (CKD-EPI) 97 >60 mL/min/1.73 m 2   MAGNESIUM    Collection Time: 08/16/23  5:29 PM   Result Value Ref Range    Magnesium 2.0 1.5 - 2.5 mg/dL   ABO Rh Confirm    Collection Time: 08/16/23  6:11 PM   Result Value Ref Range    ABO Rh Confirm B POS    HEMOGLOBIN AND HEMATOCRIT    Collection Time: 08/17/23 12:20 AM   Result Value Ref Range    Hemoglobin 4.9 (LL) 14.0 - 18.0 g/dL    Hematocrit 18.3 (L) 42.0 - 52.0 %   HEMOGLOBIN AND HEMATOCRIT    Collection Time: 08/17/23  1:47 AM   Result Value Ref Range    Hemoglobin 5.3 (LL) 14.0 - 18.0 g/dL    Hematocrit 19.2 (L) 42.0 - 52.0 %          Imaging:  CT-ABDOMEN-PELVIS WITH  Narrative: 8/16/2023 7:00 PM    HISTORY/REASON FOR EXAM:  IV contrast only.  Constipation.    TECHNIQUE/EXAM DESCRIPTION:   CT scan of the abdomen and pelvis with contrast.    Contrast-enhanced helical scanning was obtained from the diaphragmatic domes through the pubic symphysis following the bolus administration of nonionic contrast without complication.    100 mL of Omnipaque 350 nonionic contrast was administered without complication.    Low dose optimization technique was utilized for this CT exam including automated exposure control and adjustment of the mA and/or kV according to patient size.    COMPARISON: No prior studies available.    FINDINGS:  Lower Chest: Small left pleural effusion with adjacent atelectasis..    Liver: Normal.    Spleen: Unremarkable.    Pancreas: Unremarkable.    Gallbladder: No calcified stones.    Biliary: Nondilated.    Adrenal glands: Normal.    Kidneys: No hydronephrosis. Small, simple left renal cyst does not require follow-up.    Bowel: Moderate hiatal hernia. No obstruction or acute inflammation. Normal appendix.    Lymph nodes: No adenopathy.    Vasculature: Unremarkable.    Peritoneum: Unremarkable without ascites.    Musculoskeletal: No acute or destructive process.    Pelvis: Small, nonspecific free fluid.  Impression: 1.  No acute inflammation in the abdomen or pelvis. No bowel obstruction.  2.  Small, nonspecific free fluid in the pelvis.  3.  Small left pleural effusion with adjacent atelectasis.  4.  Moderate hiatal hernia.  DX-CHEST-PORTABLE (1 VIEW)  Narrative: 8/16/2023 7:14 PM    HISTORY/REASON FOR EXAM:  Blood in vomit or stool or dark tarry stool.    TECHNIQUE/EXAM DESCRIPTION AND NUMBER OF VIEWS:  Single portable view of the chest.    COMPARISON: 6/14/2018    FINDINGS:    Cardiomediastinal silhouette is stable.    Mild left basilar opacity possibly atelectasis/scarring given the similarity to prior peritonitis with trace  left pleural effusion.    No acute osseous abnormality.  Impression: Mild blunted left costophrenic angle could be atelectasis/scarring and/or trace left pleural effusion.  [id number] VIEW  Narrative: 8/16/2023 8:50 AM    HISTORY/REASON FOR EXAM:  Gastrointestinal Complaint; Constipation.    TECHNIQUE/EXAM DESCRIPTION AND NUMBER OF VIEWS:  2 view(s) of the abdomen.    COMPARISON: None    FINDINGS:  Nonobstructive bowel gas pattern. Small amount of stool throughout the colon.  No signs of free air.  No abnormal calcifications projecting over the field of view.  No acute fracture.  Impression: Nonobstructive bowel gas pattern. Small amount of stool throughout the colon.        MDM Data Review:  -Records reviewed and summarized in current documentation  -I personally reviewed and interpreted the laboratory results  -I personally reviewed the radiology images  -I have personally reviewed medications    Hospital Problem List:  Active Hospital Problems    Diagnosis     Acute blood loss anemia [D62]     Acute GI bleeding [K92.2]     GERD (gastroesophageal reflux disease) [K21.9]     Hypertension [I10]        Impressions:  57-year-old male with profound anemia in the setting of change in stool habits/dark stools.  Patient is having epigastric pain.  He has never had upper endoscopy.  It has been 5 years since his last colonoscopy.  He has a personal history of colon polyps.  He is on his third unit of PRBCs.  I have recommended EGD today.  Patient consents after having reviewed risk benefits and alternatives.      Recommendations:  EGD today.  Continue n.p.o. status.

## 2023-08-17 NOTE — ED NOTES
Lab called with critical result of heomglobin 5.3 at 0259. Critical lab result read back to lab.   Dr. Appiah notified of critical lab result at 0300.  Critical lab result read back by Dr. Appiah.

## 2023-08-17 NOTE — PROGRESS NOTES
Patient's potassium is 3.0 on last evening's draw.  No repletion that I can determine on chart review.  At this time nonemergent sedation is not recommended.    Recommend repleting potassium and checking lab in a.m.  Anticipate EGD tomorrow based on this finding.  Continue to trend hemoglobin and transfuse for a level of 7.0.

## 2023-08-17 NOTE — ED NOTES
Pt resting, denies any needs at this time. Call light available and within reach. No distress noted.

## 2023-08-17 NOTE — H&P
Hospital Medicine History & Physical Note    Date of Service  8/17/2023    Primary Care Physician  Reynaldo Robins M.D.    Consultants  none    Specialist Names: none     Code Status  Full Code    Chief Complaint  Chief Complaint   Patient presents with    Constipation     X 3 days. Last bowel movement was 3 days ago. Denies bloody/black/dark stool. But states that he noticed dark-brownish with green tinge stool color x 3 weeks. Denies vomiting blood.    Other     Got a call from med tech an hour ago and was told that he has 5.8 and recommended to go to ER for further evaluation       History of Presenting Illness  Tommy Mckeon is a 57 y.o. male past medical history of hypertension who presented 8/16/2023 with increased fatigue and weakness.  Patient reports he works [employer] as a .  Former  personnel.  He reports he has been feeling weak and fatigued and attributed these symptoms to not sleeping well.  Does report having back pain for which she has been taking ibuprofen over the last few months.  Additionally reports poor appetite and constipation over the last 1 week.  Notes that his stool is dark but denies any bright red blood and hematemesis.  Denies any fevers or chills.  Patient visited urgent care earlier today and got a call stating his hemoglobin was low and advised him to go to the ER.  Does report having a colonoscopy done 5 years ago and reports they found polyps but does not know further information.  Never had endoscopy.  Reports he was recently notified to schedule next colonoscopy.  Denies alcohol or recreational drug use.    In the ER, at [facility] his hemoglobin was 5.1 for which 1 unit PRBC was transfused. No episodes of melena or hematemesis in the ER. Repeat Hemoglobin post 1 unit PRBC returned 4.9. We will repeat hemoglobin prior to administering further PRBC's. He has been started on IV Protonix and will be admitted for GI consultation in am.  Currently he is hemodynamically stable. Admitted to medicine in guarded condition.     I discussed the plan of care with patient, bedside RN, and Dr. Parth Oconnor .    Review of Systems  Review of Systems   Constitutional:  Positive for malaise/fatigue. Negative for chills and fever.   HENT:  Negative for hearing loss and tinnitus.    Eyes:  Negative for blurred vision and double vision.   Respiratory:  Negative for cough and hemoptysis.    Cardiovascular:  Negative for chest pain and palpitations.   Gastrointestinal:  Positive for abdominal pain and melena. Negative for heartburn and nausea.   Genitourinary:  Negative for dysuria and urgency.   Musculoskeletal:  Negative for myalgias and neck pain.   Neurological:  Positive for weakness. Negative for dizziness and headaches.   Endo/Heme/Allergies:  Does not bruise/bleed easily.   Psychiatric/Behavioral:  Negative for depression and suicidal ideas.        Past Medical History   has a past medical history of Hypertension and Obesity (BMI 30-39.9) (4/19/2016).    Surgical History   has a past surgical history that includes eye surgery (2015).     Family History  family history includes Diabetes in his mother; Stroke in his mother.   Family history reviewed with patient. There is no family history that is pertinent to the chief complaint.     Social History   reports that he has never smoked. He has never used smokeless tobacco. He reports that he does not currently use alcohol. He reports that he does not use drugs.    Allergies  No Known Allergies    Medications  Prior to Admission Medications   Prescriptions Last Dose Informant Patient Reported? Taking?   Multiple Vitamins-Minerals (ONE-A-DAY MENS 50+ ADVANTAGE PO) 8/15/2023 at AM Patient Yes Yes   Sig: Take 1 Tablet by mouth every day.   calcium carbonate (TUMS) 500 MG Chew Tab 8/15/2023 at PRN Patient Yes Yes   Sig: Chew 500-1,000 mg 3 times a day as needed.   diphenhydrAMINE (BENADRYL) 25 MG Tab 8/15/2023 at PM  Patient Yes Yes   Sig: Take 25 mg by mouth every evening.   omeprazole (PRILOSEC) 20 MG delayed-release capsule FEW DAYS AGO at PRN Patient Yes Yes   Sig: Take 20 mg by mouth every day. OTC      Facility-Administered Medications: None       Physical Exam  Temp:  [36.1 °C (97 °F)-37.1 °C (98.8 °F)] 37.1 °C (98.8 °F)  Pulse:  [70-88] 77  Resp:  [12-39] 14  BP: (101-145)/(55-83) 127/75  SpO2:  [93 %-99 %] 95 %  Blood Pressure: 127/75   Temperature: 37.1 °C (98.8 °F)   Pulse: 77   Respiration: 14   Pulse Oximetry: 95 %       Physical Exam  Vitals and nursing note reviewed.   Constitutional:       Appearance: Normal appearance.   HENT:      Head: Normocephalic and atraumatic.      Right Ear: Tympanic membrane normal.      Left Ear: Tympanic membrane normal.      Nose: Nose normal.      Mouth/Throat:      Mouth: Mucous membranes are moist.      Pharynx: Oropharynx is clear.   Eyes:      Extraocular Movements: Extraocular movements intact.      Pupils: Pupils are equal, round, and reactive to light.   Cardiovascular:      Rate and Rhythm: Normal rate and regular rhythm.      Pulses: Normal pulses.      Heart sounds: Normal heart sounds.   Pulmonary:      Effort: Pulmonary effort is normal.      Breath sounds: Normal breath sounds.   Abdominal:      General: Bowel sounds are normal. There is no distension.      Palpations: Abdomen is soft. There is no mass.   Musculoskeletal:         General: Normal range of motion.      Cervical back: Neck supple.   Skin:     General: Skin is warm.      Capillary Refill: Capillary refill takes 2 to 3 seconds.      Coloration: Skin is pale.   Neurological:      General: No focal deficit present.      Mental Status: He is alert and oriented to person, place, and time. Mental status is at baseline.   Psychiatric:         Mood and Affect: Mood normal.         Behavior: Behavior normal.         Laboratory:  Recent Labs     08/16/23  1034 08/16/23  1729 08/17/23  0020   WBC 7.4 7.5  --    RBC  "2.81* 2.76*  --    HEMOGLOBIN 5.2* 5.1* 4.9*   HEMATOCRIT 19.9* 19.1* 18.3*   MCV 70.8* 69.2*  --    MCH 18.5* 18.5*  --    MCHC 26.1* 26.7*  --    RDW 51.5* 50.1*  --    PLATELETCT 504* 530*  --    MPV 10.9 10.6  --      Recent Labs     08/16/23  1034 08/16/23  1729   SODIUM 143 142   POTASSIUM 3.3* 3.0*   CHLORIDE 106 106   CO2 24 25   GLUCOSE 99 96   BUN 16 17   CREATININE 0.89 0.91   CALCIUM 9.5 9.2     Recent Labs     08/16/23  1034 08/16/23  1729   ALTSGPT 5 8   ASTSGOT 11* 12   ALKPHOSPHAT 45 45   TBILIRUBIN 0.3 0.2   LIPASE 49 37   GLUCOSE 99 96     Recent Labs     08/16/23  1729   APTT 25.8   INR 1.13     No results for input(s): \"NTPROBNP\" in the last 72 hours.      No results for input(s): \"TROPONINT\" in the last 72 hours.    Imaging:  CT-ABDOMEN-PELVIS WITH   Final Result      1.  No acute inflammation in the abdomen or pelvis. No bowel obstruction.   2.  Small, nonspecific free fluid in the pelvis.   3.  Small left pleural effusion with adjacent atelectasis.   4.  Moderate hiatal hernia.      DX-CHEST-PORTABLE (1 VIEW)   Final Result      Mild blunted left costophrenic angle could be atelectasis/scarring and/or trace left pleural effusion.          X-Ray:  I have personally reviewed the images and compared with prior images.    Assessment/Plan:  Justification for Admission Status  I anticipate this patient will require at least two midnights for appropriate medical management, necessitating inpatient admission because acute blood loss anemia requiring PRBC transfusions and GI consultation in am.    Patient will need a Telemetry bed on MEDICAL service .  The need is secondary to see above.    * Acute GI bleeding  Assessment & Plan  Secondary to GIB, NSAID use suspect upper GIB  IV Protonix 40 mg BID   Serial H/H  S/p 1 unit PRBC with no improvement. Repeat hemoglobin.   Will transfuse additional PRBC pending above repeat   Carafate   Clear liquids    Serial abdominal exams     Hold all Anticoagulants, " NSAID's and anti platelets     Acute blood loss anemia- (present on admission)  Assessment & Plan  Secondary to GIB, NSAID use suspect upper GIB  IV Protonix 40 mg BID   Serial H/H  S/p 1 unit PRBC with no improvement. Repeat hemoglobin.   Will transfuse additional PRBC pending above repeat   Carafate   Clear liquids    Serial abdominal exams       GERD (gastroesophageal reflux disease)- (present on admission)  Assessment & Plan  IV Protonix 40 mg BID     Hypertension- (present on admission)  Assessment & Plan  Reports he is supposed to be taking lisinopril however has not filled due to cost.   Counseled on importance of medication adherence.   Hold BP medications given current clinical scenario.           VTE prophylaxis: SCDs/TEDs and pharmacologic prophylaxis contraindicated due to GI Bleed

## 2023-08-17 NOTE — ED PROVIDER NOTES
ED Provider Note    CHIEF COMPLAINT  Chief Complaint   Patient presents with    Constipation     X 3 days. Last bowel movement was 3 days ago. Denies bloody/black/dark stool. But states that he noticed dark-brownish with green tinge stool color x 3 weeks. Denies vomiting blood.    Other     Got a call from med tech an hour ago and was told that he has 5.8 and recommended to go to ER for further evaluation       EXTERNAL RECORDS REVIEWED  Outpatient Notes      HPI/ROS  LIMITATION TO HISTORY     OUTSIDE HISTORIAN(S):      Tommy Mckeon is a 57 y.o. male who presents with anemia.  Patient says he has been having irregular bowel movements for the past few weeks and recently went to his primary care doctor who ordered blood work and is notable for severe anemia.  Patient says he has had ongoing reflux and been taking Tums regularly denies heavy NSAID use.  Patient denies blood thinner use, previous history of GI bleeding.  Patient denies recent fever, chills, chest pain, shortness of breath, abdominal pain.    PAST MEDICAL HISTORY   has a past medical history of Hypertension and Obesity (BMI 30-39.9) (4/19/2016).    SURGICAL HISTORY   has a past surgical history that includes eye surgery (2015).    FAMILY HISTORY  Family History   Problem Relation Age of Onset    Diabetes Mother     Stroke Mother        SOCIAL HISTORY  Social History     Tobacco Use    Smoking status: Never    Smokeless tobacco: Never   Vaping Use    Vaping Use: Never used   Substance and Sexual Activity    Alcohol use: Not Currently    Drug use: No    Sexual activity: Yes     Partners: Female       CURRENT MEDICATIONS  Home Medications       Reviewed by Adarsh Del Valle (Pharmacy Tech) on 08/16/23 at 2301  Med List Status: Complete     Medication Last Dose Status   calcium carbonate (TUMS) 500 MG Chew Tab 8/15/2023 Active   diphenhydrAMINE (BENADRYL) 25 MG Tab 8/15/2023 Active   Multiple Vitamins-Minerals (ONE-A-DAY MENS 50+ ADVANTAGE PO) 8/15/2023  "Active   omeprazole (PRILOSEC) 20 MG delayed-release capsule FEW DAYS AGO Active                    ALLERGIES  No Known Allergies    PHYSICAL EXAM  VITAL SIGNS: BP (!) 145/76   Pulse 74   Temp 37.1 °C (98.8 °F) (Oral)   Resp 14   Ht 1.93 m (6' 4\")   Wt 102 kg (224 lb 6.9 oz)   SpO2 97%   BMI 27.32 kg/m²    No acute distress, clear bilateral breath sounds, normal heart sounds, pallor, abdomen with mild distention, nontender, rectal exam with melena, gross motor function sensation intact    DIAGNOSTIC STUDIES / PROCEDURES  EKG    LABS  Labs Reviewed   CBC WITH DIFFERENTIAL - Abnormal; Notable for the following components:       Result Value    RBC 2.76 (*)     Hemoglobin 5.1 (*)     Hematocrit 19.1 (*)     MCV 69.2 (*)     MCH 18.5 (*)     MCHC 26.7 (*)     RDW 50.1 (*)     Platelet Count 530 (*)     Nucleated RBC 0.40 (*)     All other components within normal limits    Narrative:     Indicate which anticoagulants the patient is on:->UNKNOWN   COMP METABOLIC PANEL - Abnormal; Notable for the following components:    Potassium 3.0 (*)     All other components within normal limits    Narrative:     Indicate which anticoagulants the patient is on:->UNKNOWN   COD (ADULT)   LIPASE    Narrative:     Indicate which anticoagulants the patient is on:->UNKNOWN   PROTHROMBIN TIME    Narrative:     Indicate which anticoagulants the patient is on:->UNKNOWN   APTT    Narrative:     Indicate which anticoagulants the patient is on:->UNKNOWN   ABO RH CONFIRM   ESTIMATED GFR    Narrative:     Indicate which anticoagulants the patient is on:->UNKNOWN   MAGNESIUM    Narrative:     Indicate which anticoagulants the patient is on:->UNKNOWN   HEMOGLOBIN AND HEMATOCRIT   RELEASE RED BLOOD CELLS   TRANSFUSE RED BLOOD CELLS-NURSING COMMUNICATION (UNITS)         RADIOLOGY  I have independently interpreted the diagnostic imaging associated with this visit and am waiting the final reading from the radiologist.   My preliminary " interpretation is as follows: No free air  Radiologist interpretation:   Labs Reviewed   CBC WITH DIFFERENTIAL - Abnormal; Notable for the following components:       Result Value    RBC 2.76 (*)     Hemoglobin 5.1 (*)     Hematocrit 19.1 (*)     MCV 69.2 (*)     MCH 18.5 (*)     MCHC 26.7 (*)     RDW 50.1 (*)     Platelet Count 530 (*)     Nucleated RBC 0.40 (*)     All other components within normal limits    Narrative:     Indicate which anticoagulants the patient is on:->UNKNOWN   COMP METABOLIC PANEL - Abnormal; Notable for the following components:    Potassium 3.0 (*)     All other components within normal limits    Narrative:     Indicate which anticoagulants the patient is on:->UNKNOWN   COD (ADULT)   LIPASE    Narrative:     Indicate which anticoagulants the patient is on:->UNKNOWN   PROTHROMBIN TIME    Narrative:     Indicate which anticoagulants the patient is on:->UNKNOWN   APTT    Narrative:     Indicate which anticoagulants the patient is on:->UNKNOWN   ABO RH CONFIRM   ESTIMATED GFR    Narrative:     Indicate which anticoagulants the patient is on:->UNKNOWN   MAGNESIUM    Narrative:     Indicate which anticoagulants the patient is on:->UNKNOWN   HEMOGLOBIN AND HEMATOCRIT   RELEASE RED BLOOD CELLS   TRANSFUSE RED BLOOD CELLS-NURSING COMMUNICATION (UNITS)        COURSE & MEDICAL DECISION MAKING    ED Observation Status? Yes; I am placing the patient in to an observation status due to a diagnostic uncertainty as well as therapeutic intensity. Patient placed in observation status at 2130, 8/16/2023.     Observation plan is as follows: Monitor patient during blood transfusion repeat hemoglobin.    After observation was initiated rectal exam was completed which is notable for melena.  Given this finding and patient's significant anemia I spoke with hospitalist regarding admission    Upon Reevaluation, the patient's condition has: not improved; and will be escalated to hospitalization.        INITIAL  ASSESSMENT, COURSE AND PLAN  Care Narrative: Differential includes iron deficiency anemia, GI bleed, malignancy.  The patient's irregular bowel movements and abdominal distention will obtain CT ab/pelvis to assess for mass.  Initial plan to observe while receiving blood transfusion and monitoring hemoglobin.  Rectal exam was performed after initial evaluation and was notable for melena.  Given this finding spoke with hospitalist regarding admission patient given initial dose of PPI.  Patient with stable hemodynamics and benign abdominal exam.        ADDITIONAL PROBLEM LIST    DISPOSITION AND DISCUSSIONS  I have discussed management of the patient with the following physicians and PAIGE's: Hospitalist    Discussion of management with other QHP or appropriate source(s):      Escalation of care considered, and ultimately not performed:    Barriers to care at this time, including but not limited to: .     Decision tools and prescription drugs considered including, but not limited to: .    FINAL DIAGNOSIS  1. Anemia, unspecified type    2. Melena           Electronically signed by: River Oconnor D.O., 8/17/2023 12:37 AM

## 2023-08-17 NOTE — HOSPITAL COURSE
Tommy Mckeon is a 57 y.o. male past medical history of hypertension who presented on 8/16/2023 with increased fatigue and weakness.  Patient reports he works [employer] as a .  Former  personnel.  He reports he has been feeling weak and fatigued and attributed these symptoms to not sleeping well.  Does report having back pain for which she has been taking ibuprofen over the last few months.  Additionally reports poor appetite and constipation over the last 1 week.  Notes that his stool is dark but denies any bright red blood and hematemesis.  Denies any fevers or chills.  Patient visited urgent care earlier today and got a call stating his hemoglobin was low and advised him to go to the ER.  Does report having a colonoscopy done 5 years ago and reports they found polyps but does not know further information.  Never had endoscopy.  Reports he was recently notified to schedule next colonoscopy.  Denies alcohol or recreational drug use.     In the ER, at [facility] his hemoglobin was 5.1 for which 1 unit PRBC was transfused. No episodes of melena or hematemesis in the ER. Repeat Hemoglobin post 1 unit PRBC returned 4.9. We will repeat hemoglobin prior to administering further PRBC's. He has been started on IV Protonix and admitted for GI consultation. Currently he is hemodynamically stable. Admitted to medicine in guarded condition. Patient received 4 PRBCs, hb 7.3 hto 25.3. Patient got EGD on 8/18 that showed  mass lesion in the distal esophagus beginning at 34 cm from the incisors and lesion persists into the cardia of the stomach at 42 cm from the incisors.  Likely malignant.  CT of chest showed large hiatal hernia with wall thickening in the distal thoracic esophagus and portion of the herniated stomach. Biopsies obtained from mass showed stomach and distal esophagus with Invasive adenocarcinoma.  Surgical Oncology consulted, recommend CT chest IV contrast and CT pancreas  and abdomen with PO and IV contrast. Will evaluate the patient on 8/20. Medical Oncology consulted, will evaluate the patient on 8/20

## 2023-08-17 NOTE — ED NOTES
Lab called with critical result of hemoglobin 4.9 at 0104. Critical lab result read back to lab.   Dr. Parth Oconnor notified of critical lab result at 0104.  Critical lab result read back by Dr. Parth Oconnor at 0108.

## 2023-08-17 NOTE — ED NOTES
Bedside report from NADEEN Quezada. Assumed care of patient and he is resting. Bed locked and in lowest position. Call light available and within reach. No oxygen requirements at this time. Appropriate fall precautions in place for this patient. Patient on the cardiac monitor, SR 77. Denies any needs at this time. No distress noted.

## 2023-08-17 NOTE — ED NOTES
Bedside report received from NADEEN Chapa. Pt resting comfortably in bed, connected to monitor. No O2. Blood transfusing. VSS.

## 2023-08-17 NOTE — ASSESSMENT & PLAN NOTE
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  IV Protonix 40 mg BID   On Iron replacement  Hold all Anticoagulants, NSAID's and anti platelets   H/H q12hrs  S/p 3 unit PRBC, hemoglobin now 6.9, ordered a 4th unit  Will transfuse additional PRBC pending above repeat   Carafate   GI did EGD on 8/18 and showed mass lesion in the distal esophagus beginning at 34 cm from the incisors and lesion persists into the cardia of the stomach at 42 cm from the incisors.  Likely malignant.    - CT of chest on 8/18 showed large hiatal hernia with wall thickening in the distal thoracic esophagus and portion of the herniated stomach   - Biopsies obtained from mass showed stomach and distal esophagus with Invasive adenocarcinoma  - Surgical Oncology consulted, recommend CT chest IV contrast and CT pancreas and abdomen with PO and IV contrast. Will evaluate the patient on 8/20  - Medical Oncology consulted, will evaluate the patient on 8/20

## 2023-08-17 NOTE — PROGRESS NOTES
[facility] Medicine Daily Progress Note    Date of Service  8/17/2023    Chief Complaint  Tommy Mckeon is a 57 y.o. male admitted 8/16/2023 with increased fatigue and weakness.    Hospital Course  Tommy Mckeon is a 57 y.o. male past medical history of hypertension who presented 8/16/2023 with increased fatigue and weakness.  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  He reports he has been feeling weak and fatigued and attributed these symptoms to not sleeping well.  Does report having back pain for which she has been taking ibuprofen over the last few months.  Additionally reports poor appetite and constipation over the last 1 week.  Notes that his stool is dark but denies any bright red blood and hematemesis.  Denies any fevers or chills.  Patient visited urgent care earlier today and got a call stating his hemoglobin was low and advised him to go to the ER.  Does report having a colonoscopy done 5 years ago and reports they found polyps but does not know further information.  Never had endoscopy.  Reports he was recently notified to schedule next colonoscopy.  Denies alcohol or recreational drug use.     In the ER, at [facility] [place] [place] his hemoglobin was 5.1 for which 1 unit PRBC was transfused. No episodes of melena or hematemesis in the ER. Repeat Hemoglobin post 1 unit PRBC returned 4.9. We will repeat hemoglobin prior to administering further PRBC's. He has been started on IV Protonix and will be admitted for GI consultation in am. Currently he is hemodynamically stable. Admitted to medicine in guarded condition.     Interval Problem Update  8/17 afebrile, vitals are stable, on room air.  He has received 3 units of packed RBCs.  3.  Hemoglobin 6.9, will transfuse 1 more unit.  And recheck hemoglobin 130s complete.  We will continue to transfuse if hgb is less than 7.  GI has been consulted and plans for EGD today    I have discussed this patient's plan of  care and discharge plan at IDT rounds today with Case Management, Nursing, Nursing leadership, and other members of the IDT team.    Consultants/Specialty  GI    Code Status  Full Code    Disposition  The patient is not medically cleared for discharge to home or a post-acute facility.  Anticipate discharge to: home with close outpatient follow-up    I have placed the appropriate orders for post-discharge needs.    Review of Systems  Review of Systems   Constitutional:  Positive for malaise/fatigue. Negative for chills and fever.   Respiratory:  Negative for cough and shortness of breath.    Cardiovascular:  Negative for chest pain, palpitations and leg swelling.   Gastrointestinal:  Negative for abdominal pain, diarrhea, heartburn, nausea and vomiting.   Genitourinary:  Negative for dysuria, frequency and urgency.   Neurological:  Negative for dizziness and headaches.   All other systems reviewed and are negative.       Physical Exam  Temp:  [36.1 °C (97 °F)-37.1 °C (98.8 °F)] 36.9 °C (98.4 °F)  Pulse:  [59-95] 64  Resp:  [12-39] 14  BP: (101-145)/(54-83) 136/63  SpO2:  [91 %-99 %] 94 %    Physical Exam  Vitals and nursing note reviewed.   Constitutional:       Appearance: Normal appearance. He is not ill-appearing.   HENT:      Head: Normocephalic and atraumatic.      Jaw: There is normal jaw occlusion.      Right Ear: Hearing normal.      Left Ear: Hearing normal.      Nose: Nose normal.      Mouth/Throat:      Lips: Pink.      Mouth: Mucous membranes are moist.   Eyes:      Extraocular Movements: Extraocular movements intact.      Conjunctiva/sclera: Conjunctivae normal.      Pupils: Pupils are equal, round, and reactive to light.   Neck:      Vascular: No carotid bruit.   Cardiovascular:      Rate and Rhythm: Normal rate and regular rhythm.      Pulses: Normal pulses.      Heart sounds: Normal heart sounds, S1 normal and S2 normal.   Pulmonary:      Effort: Pulmonary effort is normal.      Breath sounds: Normal  breath sounds and air entry. No stridor.   Abdominal:      General: Bowel sounds are normal.      Palpations: Abdomen is soft.      Tenderness: There is no abdominal tenderness.   Musculoskeletal:      Cervical back: Normal range of motion and neck supple.      Right lower leg: No edema.      Left lower leg: No edema.   Skin:     General: Skin is warm and dry.      Capillary Refill: Capillary refill takes less than 2 seconds.   Neurological:      General: No focal deficit present.      Mental Status: He is alert and oriented to person, place, and time. Mental status is at baseline.      Sensory: Sensation is intact.      Motor: Motor function is intact.   Psychiatric:         Attention and Perception: Attention and perception normal.         Mood and Affect: Mood and affect normal.         Speech: Speech normal.         Behavior: Behavior normal. Behavior is cooperative.         Fluids    Intake/Output Summary (Last 24 hours) at 8/17/2023 1138  Last data filed at 8/17/2023 0916  Gross per 24 hour   Intake 840 ml   Output --   Net 840 ml       Laboratory  Recent Labs     08/16/23  1034 08/16/23  1729 08/17/23  0020 08/17/23  0147 08/17/23  1034   WBC 7.4 7.5  --   --   --    RBC 2.81* 2.76*  --   --   --    HEMOGLOBIN 5.2* 5.1* 4.9* 5.3* 6.9*   HEMATOCRIT 19.9* 19.1* 18.3* 19.2* 23.6*   MCV 70.8* 69.2*  --   --   --    MCH 18.5* 18.5*  --   --   --    MCHC 26.1* 26.7*  --   --   --    RDW 51.5* 50.1*  --   --   --    PLATELETCT 504* 530*  --   --   --    MPV 10.9 10.6  --   --   --      Recent Labs     08/16/23  1034 08/16/23  1729   SODIUM 143 142   POTASSIUM 3.3* 3.0*   CHLORIDE 106 106   CO2 24 25   GLUCOSE 99 96   BUN 16 17   CREATININE 0.89 0.91   CALCIUM 9.5 9.2     Recent Labs     08/16/23  1729   APTT 25.8   INR 1.13               Imaging  CT-ABDOMEN-PELVIS WITH   Final Result      1.  No acute inflammation in the abdomen or pelvis. No bowel obstruction.   2.  Small, nonspecific free fluid in the pelvis.   3.   Small left pleural effusion with adjacent atelectasis.   4.  Moderate hiatal hernia.      DX-CHEST-PORTABLE (1 VIEW)   Final Result      Mild blunted left costophrenic angle could be atelectasis/scarring and/or trace left pleural effusion.           Assessment/Plan  * Acute GI bleeding  Assessment & Plan  Secondary to GIB, NSAID use suspect upper GIB  IV Protonix 40 mg BID   Serial H/H  S/p 3 unit PRBC, hemoglobin now 6.9, ordered a 4th unit  Will transfuse additional PRBC pending above repeat   Carafate   NPO-GI planning to take patient for EGD today  Serial abdominal exams     Hold all Anticoagulants, NSAID's and anti platelets     Acute blood loss anemia- (present on admission)  Assessment & Plan  Secondary to GIB, NSAID use suspect upper GIB  IV Protonix 40 mg BID   Serial H/H  S/p 1 unit PRBC with no improvement. Repeat hemoglobin.   Will transfuse additional PRBC pending above repeat   Carafate   GI planning to take patient for EGD today  Serial abdominal exams       GERD (gastroesophageal reflux disease)- (present on admission)  Assessment & Plan  IV Protonix 40 mg BID     Hypertension- (present on admission)  Assessment & Plan  Reports he is supposed to be taking lisinopril however has not filled due to cost.   Counseled on importance of medication adherence.   Hold BP medications given current clinical scenario.            VTE prophylaxis:    pharmacologic prophylaxis contraindicated due to GI Bleed      I have performed a physical exam and reviewed and updated ROS and Plan today (8/17/2023). In review of yesterday's note (8/16/2023), there are no changes except as documented above.

## 2023-08-17 NOTE — ASSESSMENT & PLAN NOTE
Reports he is supposed to be taking lisinopril however has not filled due to cost.   Counseled on importance of medication adherence.   Hold BP medications given current clinical scenario.

## 2023-08-17 NOTE — ED NOTES
Med Rec complete per patient  No oral antibiotics in the last 30 days   Allergies reviewed  Preferred Pharmacy: [facility]  Patient denies any prescription medications in the last 30 days

## 2023-08-17 NOTE — PROGRESS NOTES
4 Eyes Skin Assessment Completed by Jacques MEDINA RN and Lindsay BEAUCHAMP RN.    Head WDL  Ears WDL  Nose WDL  Mouth WDL  Neck WDL  Breast/Chest WDL  Shoulder Blades WDL  Spine WDL  (R) Arm/Elbow/Hand WDL  (L) Arm/Elbow/Hand WDL  Abdomen WDL  Groin WDL  Scrotum/Coccyx/Buttocks WDL  (R) Leg WDL  (L) Leg WDL  (R) Heel/Foot/Toe WDL  (L) Heel/Foot/Toe WDL          Devices In Places Tele Box      Interventions In Place Pillows    Possible Skin Injury No    Pictures Uploaded Into Epic N/A  Wound Consult Placed N/A  RN Wound Prevention Protocol Ordered No

## 2023-08-18 PROBLEM — D62 ACUTE BLOOD LOSS ANEMIA: Status: RESOLVED | Noted: 2023-01-01 | Resolved: 2023-01-01

## 2023-08-18 NOTE — PROGRESS NOTES
[facility] Internal Medicine Daily Progress Note    Date of Service  8/18/2023    [employer] Team: R IM Purple Team   Attending: Kaylin Ferrara M.d.  Senior Resident: Dr. LUCY Wilson  Intern:  Dr. TERRANCE Sanchez  Contact Number: [phone]    Chief Complaint  Tommy Mckeon is a 57 y.o. male admitted 8/16/2023 with constipation and anemia.    Hospital Course  Tommy Mckeon is a 57 y.o. male past medical history of hypertension who presented 8/16/2023 with increased fatigue and weakness.  Patient reports he works [employer] as a .  Former  personnel.  He reports he has been feeling weak and fatigued and attributed these symptoms to not sleeping well.  Does report having back pain for which she has been taking ibuprofen over the last few months.  Additionally reports poor appetite and constipation over the last 1 week.  Notes that his stool is dark but denies any bright red blood and hematemesis.  Denies any fevers or chills.  Patient visited urgent care earlier today and got a call stating his hemoglobin was low and advised him to go to the ER.  Does report having a colonoscopy done 5 years ago and reports they found polyps but does not know further information.  Never had endoscopy.  Reports he was recently notified to schedule next colonoscopy.  Denies alcohol or recreational drug use.     In the ER, at [facility] his hemoglobin was 5.1 for which 1 unit PRBC was transfused. No episodes of melena or hematemesis in the ER. Repeat Hemoglobin post 1 unit PRBC returned 4.9. We will repeat hemoglobin prior to administering further PRBC's. He has been started on IV Protonix and will be admitted for GI consultation in am. Currently he is hemodynamically stable. Admitted to medicine in guarded condition. Patient received 4 PRBCs, hb 7.3 hto 25.3.    Interval Problem Update  Patient received 1 PRBCs overnight (total of 4 PRBCs since 8/16). Hb went up form 5.2 (8/16) to 7.3 (8/18). Patient was seen and  evaluated at bedside this AM, reports no nausea, vomiting or abdominal pain. No fever , chills or systemic symptoms. Will have EGD with GI today.    I have discussed this patient's plan of care and discharge plan at IDT rounds today with Case Management, Nursing, Nursing leadership, and other members of the IDT team.    Consultants/Specialty  GI    Code Status  Full Code    Disposition  The patient is not medically cleared for discharge to home or a post-acute facility.      I have placed the appropriate orders for post-discharge needs.    Review of Systems  ROS     Physical Exam  Temp:  [35.9 °C (96.7 °F)-37.1 °C (98.8 °F)] 36.4 °C (97.5 °F)  Pulse:  [55-94] 71  Resp:  [14-18] 16  BP: (106-133)/(55-85) 133/85  SpO2:  [92 %-100 %] 95 %    Physical Exam    Fluids    Intake/Output Summary (Last 24 hours) at 8/18/2023 1730  Last data filed at 8/18/2023 1116  Gross per 24 hour   Intake 914 ml   Output 10 ml   Net 904 ml       Laboratory  Recent Labs     08/16/23  1034 08/16/23  1729 08/17/23  0020 08/17/23  1952 08/18/23  0041 08/18/23  0729   WBC 7.4 7.5  --   --  6.8  --    RBC 2.81* 2.76*  --   --  3.37*  --    HEMOGLOBIN 5.2* 5.1*   < > 7.6* 7.3* 7.6*   HEMATOCRIT 19.9* 19.1*   < > 25.4* 25.3* 26.0*   MCV 70.8* 69.2*  --   --  75.1*  --    MCH 18.5* 18.5*  --   --  21.7*  --    MCHC 26.1* 26.7*  --   --  28.9*  --    RDW 51.5* 50.1*  --   --  57.0*  --    PLATELETCT 504* 530*  --   --  452*  --    MPV 10.9 10.6  --   --  10.8  --     < > = values in this interval not displayed.     Recent Labs     08/16/23  1034 08/16/23  1729 08/18/23  0041   SODIUM 143 142 139   POTASSIUM 3.3* 3.0* 3.9   CHLORIDE 106 106 108   CO2 24 25 22   GLUCOSE 99 96 82   BUN 16 17 12   CREATININE 0.89 0.91 0.73   CALCIUM 9.5 9.2 8.5     Recent Labs     08/16/23  1729   APTT 25.8   INR 1.13               Imaging  CT-CHEST (THORAX) WITH & W/O   Final Result      1.  Large hiatal hernia with wall thickening in the distal thoracic esophagus and  portion of the herniated stomach which would be in keeping with the provided clinical history small LEFT pleural effusion   2.  Trace RIGHT pleural effusion   3.  No pulmonary nodules or enlarged mediastinal lymph nodes         CT-ABDOMEN-PELVIS WITH   Final Result      1.  No acute inflammation in the abdomen or pelvis. No bowel obstruction.   2.  Small, nonspecific free fluid in the pelvis.   3.  Small left pleural effusion with adjacent atelectasis.   4.  Moderate hiatal hernia.      DX-CHEST-PORTABLE (1 VIEW)   Final Result      Mild blunted left costophrenic angle could be atelectasis/scarring and/or trace left pleural effusion.           Assessment/Plan  Problem Representation:    * Iron deficiency Anemia secondary to acute GI bleeding  Assessment & Plan  Secondary to GIB, NSAID use suspect upper GIB.   IV Protonix 40 mg BID   On Iron replacement  Hold all Anticoagulants, NSAID's and anti platelets   H/H q12hrs  S/p 3 unit PRBC, hemoglobin now 6.9, ordered a 4th unit  Will transfuse additional PRBC pending above repeat   Carafate   GI did EGD today and showed mass lesion in the distal esophagus beginning at 34 cm from the incisors and lesion persists into the cardia of the stomach at 42 cm from the incisors.  Likely malignant.    - Multiple biopsies obtained from mass.  - CT of chest ordered        GERD (gastroesophageal reflux disease)- (present on admission)  Assessment & Plan  IV Protonix 40 mg BID     Hypertension- (present on admission)  Assessment & Plan  Reports he is supposed to be taking lisinopril however has not filled due to cost.   Counseled on importance of medication adherence.   Hold BP medications given current clinical scenario.            VTE prophylaxis: SCDs/TEDs    I have performed a physical exam and reviewed and updated ROS and Plan today (8/18/2023). In review of yesterday's note (8/17/2023), there are no changes except as documented above.        Elli Sanchez MD  Internal Medicine  PGY-1

## 2023-08-18 NOTE — CARE PLAN
The patient is Watcher - Medium risk of patient condition declining or worsening    Shift Goals  Clinical Goals: Monitor hgb, EGD in the morning  Patient Goals: rest, comfort, ambulation as tolerated, med compliance  Family Goals: ANUPAM    Progress made toward(s) clinical / shift goals:    Problem: Knowledge Deficit - Standard  Goal: Patient and family/care givers will demonstrate understanding of plan of care, disease process/condition, diagnostic tests and medications  Outcome: Progressing  Note: Patient understands and is able to verbalize the reason for his admission, his upcoming EGD/continuation of plan of care, and barriers to his discharge.     Problem: Hemodynamics  Goal: Patient's hemodynamics, fluid balance and neurologic status will be stable or improve  Outcome: Progressing  Note: Patient's most recent HGB was above 7 post blood transfusion and patient's hemodynamic status has remained stable and consistent throughout the shift.

## 2023-08-18 NOTE — CARE PLAN
The patient is Watcher - Medium risk of patient condition declining or worsening        Progress made toward(s) clinical / shift goals:        Problem: Knowledge Deficit - Standard  Goal: Patient and family/care givers will demonstrate understanding of plan of care, disease process/condition, diagnostic tests and medications  Outcome: Progressing  Note: Educated pt on hgb result of 6.9, 1u PRBC transfused and completed with no complications at 1844. H&H redraw at 1945 per MD order. NPO 0000 for EGD 8/18.       Patient is not progressing towards the following goals:

## 2023-08-18 NOTE — PROGRESS NOTES
[facility] Internal Medicine Daily Progress Note    Date of Service  8/18/2023    [employer] Team: R IM Green Team and R IM Purple Team   Attending: Kaylin Ferrara M.d.  Senior Resident: Dr. Wilson  Intern:  Dr. Sanchez  Contact Number: [phone]    Chief Complaint  Tommy Mckeon is a 57 y.o. male admitted 8/16/2023 with 3 days of constipation and severe anemia (Hb 5.1).    Hospital Course    8/16/23  Pt with PMH of HRN, back pain treated with 800mg-1600mg Ibuprofen 4x week, and colonic polyps (found 5 yrs ago) presents with constipation and fatigue. Patient stated that stool is dark, green and non-bloody. Patient came into ER with Hb of 5.1 and was given 1 unit PRBC. Repeat Hb was 4.9. No hematemesis or melena in ER. Patient was started on Pantaprazole IV 40mg BID and admitted for GI consult.    Today is 8/18/23 and patient has received 4 units to date with last unit given at 11pm 8/17/23. GI performed EGD  this morning (8/18).     Subjective:   Patient did not feel faint or notice any changes when HB was 5.1. 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 He is very tall (6'4) and states that it hurts his back. He takes 800-1600mg of ibuprofen troughout his shift for back pain 4 days a week. He states he has not had much of an appetite and has been nauseous and vomits simple liquids like soup.Patient states that his diet has been bad for the past 4 months and he has had an unintentional 25lb weight loss over the last 6 months.     Today, patient is feeling well and does not have weakness, fatigue, fevers, chills, CP, SOB, lightheadedness, syncope, vision changes,hematochezia, hematemesis, hematuria or bright red blood in stool. Patient reports feeling well and that his last BM was this morning. It was a dark, green color. He has been awaiting GI consult and is ready for first EGD.     Interval Problem Update  No acute overnight events. Patient was seen by GI. EGD  p/o note (8/18/23) reports distal circumferential malignant-suspected mass. It is deeply ulcerated and extends into the cardia of the stomach.Not suspected to be related to GERD.    I have discussed this patient's plan of care and discharge plan at IDT rounds today with Case Management, Nursing, Nursing leadership, and other members of the IDT team.    Consultants/Specialty  GI    Code Status  Full Code    Disposition  The patient is not medically cleared for discharge to home or a post-acute facility.  Anticipate discharge to: home with close outpatient follow-up    I have placed the appropriate orders for post-discharge needs.    Review of Systems  Review of Systems   Constitutional:  Positive for weight loss. Negative for chills, diaphoresis, fever and malaise/fatigue.        Patient states 25lb unintentional weight loss over 6 months.   Eyes:  Negative for blurred vision.   Cardiovascular:  Negative for chest pain, palpitations and leg swelling.   Gastrointestinal:  Positive for constipation and nausea. Negative for abdominal pain, blood in stool, diarrhea, heartburn and vomiting.   Genitourinary:  Positive for frequency. Negative for dysuria, flank pain, hematuria and urgency.   Musculoskeletal:  Positive for back pain. Negative for myalgias.   Neurological:  Negative for dizziness, loss of consciousness, weakness and headaches.   Psychiatric/Behavioral:  Negative for depression. The patient is not nervous/anxious.         Physical Exam  Temp:  [36 °C (96.8 °F)-37.1 °C (98.8 °F)] 36.7 °C (98.1 °F)  Pulse:  [58-94] 58  Resp:  [14-18] 14  BP: (109-135)/(56-83) 109/56  SpO2:  [93 %-100 %] 100 %    Physical Exam  Constitutional:       Appearance: Normal appearance.   HENT:      Head: Normocephalic.      Mouth/Throat:      Mouth: Mucous membranes are moist.   Cardiovascular:      Rate and Rhythm: Normal rate and regular rhythm.      Heart sounds: No murmur heard.  Pulmonary:      Breath sounds: Normal breath sounds.  No wheezing, rhonchi or rales.   Abdominal:      General: Bowel sounds are normal.      Palpations: Abdomen is soft. There is no mass.      Tenderness: There is no abdominal tenderness. There is no right CVA tenderness, left CVA tenderness or rebound.   Musculoskeletal:      Cervical back: No rigidity or tenderness.   Skin:     General: Skin is warm.      Capillary Refill: Capillary refill takes less than 2 seconds.      Coloration: Skin is not jaundiced or pale.      Comments: Pale conjunctivae bilaterally   Neurological:      General: No focal deficit present.      Mental Status: He is alert and oriented to person, place, and time.   Psychiatric:         Mood and Affect: Mood normal.         Fluids    Intake/Output Summary (Last 24 hours) at 8/18/2023 1138  Last data filed at 8/18/2023 1116  Gross per 24 hour   Intake 914 ml   Output 10 ml   Net 904 ml       Laboratory  Recent Labs     08/16/23  1034 08/16/23  1729 08/17/23  0020 08/17/23 1952 08/18/23  0041 08/18/23  0729   WBC 7.4 7.5  --   --  6.8  --    RBC 2.81* 2.76*  --   --  3.37*  --    HEMOGLOBIN 5.2* 5.1*   < > 7.6* 7.3* 7.6*   HEMATOCRIT 19.9* 19.1*   < > 25.4* 25.3* 26.0*   MCV 70.8* 69.2*  --   --  75.1*  --    MCH 18.5* 18.5*  --   --  21.7*  --    MCHC 26.1* 26.7*  --   --  28.9*  --    RDW 51.5* 50.1*  --   --  57.0*  --    PLATELETCT 504* 530*  --   --  452*  --    MPV 10.9 10.6  --   --  10.8  --     < > = values in this interval not displayed.     Recent Labs     08/16/23  1034 08/16/23  1729 08/18/23  0041   SODIUM 143 142 139   POTASSIUM 3.3* 3.0* 3.9   CHLORIDE 106 106 108   CO2 24 25 22   GLUCOSE 99 96 82   BUN 16 17 12   CREATININE 0.89 0.91 0.73   CALCIUM 9.5 9.2 8.5     Recent Labs     08/16/23  1729   APTT 25.8   INR 1.13               Imaging  CT-ABDOMEN-PELVIS WITH   Final Result      1.  No acute inflammation in the abdomen or pelvis. No bowel obstruction.   2.  Small, nonspecific free fluid in the pelvis.   3.  Small left pleural  effusion with adjacent atelectasis.   4.  Moderate hiatal hernia.      DX-CHEST-PORTABLE (1 VIEW)   Final Result      Mild blunted left costophrenic angle could be atelectasis/scarring and/or trace left pleural effusion.      CT-CHEST (THORAX) WITH & W/O    (Results Pending)        Assessment/Plan  Problem Representation:    #GI bleed, iron-deficiency anemia  Patent has iron of 19 and microcytic anemia (MCV 75.1) likely due to bleeding from deeply ulcerated circumferential distal esophageal mass, gastric ulcer from NSAID use, malnutrition, and poor GI absorption. Patient has had poor appetite and 25lb unintentional weight loss over the last 6 months. Patient is taking up to 1800mg of Ibuprofen 4 days a week for back pain.     -EGD performed  -HH q12hrs  -stop NSAID and anticoagulation use  -Pantaprazole 40mg IV BID  - tranfuse with B Pos when Hb below 7  -CMP (keep an eye on bicarb - low iron causes depletion of a-glycerophosphate oxidase -> impaired glycolysis -. Increase In LA)    #Distal Esophageal mass  Mass is deeply ulcerated. Suspected malignancy  -Pantaprazole 40mg IV BID  - biopsy mass  -consult hem/onc    #GERD  Patient was taking excessive NSAIDs without food. Poor diet.  -Pantaprazole 40mg IV BID    #HTN  Patient not currently hypertensive. Has not taken lisniporil for a couple of weeks.  -halt lisinopril for now  -monitor BP      VTE prophylaxis: anticoags contraindicated in suspected GI bleed    I have performed a physical exam and reviewed and updated ROS and Plan today (8/18/2023). In review of yesterday's note (8/17/2023), there are no changes except as documented above.

## 2023-08-18 NOTE — OR NURSING
1115    Arrived via gurney from OR. Report received from anesthesiologist and RN. Monitors attached. Identity verified by two staff members.     1145 awakens. Bite block removed    1150 report to NADEEN Luu    1155 Tolerates small sips of water

## 2023-08-18 NOTE — ANESTHESIA POSTPROCEDURE EVALUATION
Patient: Tommy Mckeon    Procedure Summary     Date: 08/18/23 Room / Location: [facility] ROOM 26 / SURGERY SAME DAY [facility]    Anesthesia Start: 1052 Anesthesia Stop: 1117    Procedures:       GASTROSCOPY (Esophagus)      GASTROSCOPY, WITH BIOPSY (Esophagus) Diagnosis: (gastroesophageal mass)    Surgeons: Kale Patel M.D. Responsible Provider: Nelson Lucero M.D.    Anesthesia Type: MAC ASA Status: 3          Final Anesthesia Type: MAC  Last vitals  BP   Blood Pressure: 109/56    Temp   36.7 °C (98.1 °F)    Pulse   (!) 55   Resp   14    SpO2   100 %      Anesthesia Post Evaluation    Patient location during evaluation: PACU  Patient participation: complete - patient participated  Level of consciousness: awake and alert    Airway patency: patent  Anesthetic complications: no  Cardiovascular status: hemodynamically stable  Respiratory status: acceptable  Hydration status: euvolemic    PONV: none          No notable events documented.     Nurse Pain Score: 0 (NPRS)

## 2023-08-18 NOTE — OP REPORT
OPERATIVE REPORT    PATIENT:   Tommy Mckeon   1965       PREOPERATIVE DIAGNOSES/INDICATIONS: Anemia due to GI blood loss    PROCEDURE: EGD with biopsy    PHYSICIAN:  Kale Patel MD     ANESTHESIA:  Per anesthesiologist.  TIVA    LOCATION: [facility]    CONSENT: The risks, benefits and alternatives of the procedure were discussed in detail. The risks include and are not limited to bleeding, infection, perforation, missed lesions, and sedations risks (cardiopulmonary compromise and allergic reaction to medications).    DESCRIPTION:   The patient presented to the operating room.  A time out was performed prior to beginning the procedure.   The patient was placed in the left lateral recumbent position. Patient was sedated by anesthesia: Propofol.    OPERATIVE FINDINGS:    Esophagus: Proximal esophagus normal.  In the distal esophagus beginning at 34 cm from the incisors a circumferential mass lesion was encountered with stigmata of recent bleeding.  Deeply ulcerated.  Malignant in appearance.  Lesion extended into the cardia of the stomach to 42 cm from the incisors.  On retroflexed view in the stomach no gastric or esophageal varices.  This is not appear consistent with severe reflux esophagitis.  Stomach: Except for lesion noted in the cardia of the stomach the stomach was otherwise normal.  No portal hypertensive change.  Duodenum: Normal to the second portion    Blood loss: Minimal    The patient tolerated the procedure well.      There were no immediate complications.    Pathology samples obtained: Distal esophageal biopsy, cardia stomach biopsy    IMPRESSION:  Mass lesion in the distal esophagus beginning at 34 cm from the incisors and lesion persists into the cardia of the stomach at 42 cm from the incisors.  Likely malignant.  Multiple biopsies obtained.      RECOMMENDATIONS:  Obtain CT chest today  Advance diet thereafter  Await biopsies  Return patient to [facility] for continued care

## 2023-08-18 NOTE — PROGRESS NOTES
Bedside report received and patient care assumed. Pt is resting in bed, A&O4, with no complaints of pain, and is on room air. Tele box on. All low fall precautions are in place, belongings at bedside table.  Pt was updated on POC, no questions or concerns. Pt educated on use of call light for assistance.

## 2023-08-18 NOTE — ANESTHESIA PREPROCEDURE EVALUATION
Case: [id number] Date/Time: 08/18/23 1050    Procedure: GASTROSCOPY (Esophagus)    Anesthesia type: MAC    Pre-op diagnosis: Anemia due to GI blood loss    Location: [facility] ROOM 26 / SURGERY SAME DAY [facility]    Surgeons: Kale Patel M.D.      Came in with Hgb 5, received rbc's , now over 7    Relevant Problems   CARDIAC   (positive) Hypertension      GI   (positive) GERD (gastroesophageal reflux disease)       Physical Exam    Airway   Mallampati: II  TM distance: >3 FB  Neck ROM: full       Cardiovascular - normal exam  Rhythm: regular  Rate: normal  (-) murmur     Dental - normal exam           Pulmonary - normal exam  Breath sounds clear to auscultation     Abdominal    Neurological - normal exam                 Anesthesia Plan    ASA 3   ASA physical status 3 criteria: other (comment)    Plan - MAC         (Severe anemia, gi bleed)      Induction: intravenous    Postoperative Plan: Postoperative administration of opioids is intended.    Pertinent diagnostic labs and testing reviewed    Informed Consent:    Anesthetic plan and risks discussed with patient.    Use of blood products discussed with: patient whom consented to blood products.

## 2023-08-18 NOTE — HOSPITAL COURSE
Tommy Mckeon is a 57 y.o. male past medical history of hypertension who presented 8/16/2023 with increased fatigue and weakness.  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  He reports he has been feeling weak and fatigued and attributed these symptoms to not sleeping well.  Does report having back pain for which she has been taking ibuprofen over the last few months.  Additionally reports poor appetite and constipation over the last 1 week.  Notes that his stool is dark but denies any bright red blood and hematemesis.  Denies any fevers or chills.  Patient visited urgent care earlier today and got a call stating his hemoglobin was low and advised him to go to the ER.  Does report having a colonoscopy done 5 years ago and reports they found polyps but does not know further information.  Never had endoscopy.  Reports he was recently notified to schedule next colonoscopy.  Denies alcohol or recreational drug use.     In the ER, at [facility] his hemoglobin was 5.1 for which 1 unit PRBC was transfused. No episodes of melena or hematemesis in the ER. Repeat Hemoglobin post 1 unit PRBC returned 4.9. We will repeat hemoglobin prior to administering further PRBC's. He has been started on IV Protonix and will be admitted for GI consultation in am. Currently he is hemodynamically stable. Admitted to medicine in guarded condition.

## 2023-08-18 NOTE — ANESTHESIA TIME REPORT
Anesthesia Start and Stop Event Times     Date Time Event    8/18/2023 1041 Ready for Procedure     1052 Anesthesia Start     1117 Anesthesia Stop        Responsible Staff  08/18/23    Name Role Begin End    Nelson Lucero M.D. Anesth 1052 1117        Overtime Reason:  no overtime (within assigned shift)    Comments:

## 2023-08-19 NOTE — PROGRESS NOTES
..Gastroenterology Progress Note               Author:  CIRO Petersen Date & Time Created: 8/19/2023 7:07 AM       Patient ID:  Name:             Tommy Mckeon  YOB: 1965  Age:                 57 y.o.  male  MRN:               [id number]        Medical Decision Making, by Problem:  Active Hospital Problems    Diagnosis     Iron deficiency Anemia secondary to acute GI bleeding [K92.2]     GERD (gastroesophageal reflux disease) [K21.9]     Hypertension [I10]            Presenting Chief Complaint:  Symptomatic anemia      Interval History:  8/19/2023: Patient seen.  Reports feeling better, tolerating diet and having bowel movements.  Hemoglobin improving. CT chest reveals large hiatal hernia, trace right and left pleural effusion, no pulmonary nodules or mediastinal lymph nodes.  Unfortunately, pathology returned with invasive adenocarcinoma.  Discussed results with patient and his wife.    8/18/2023: EGD with Dr. Patel:  IMPRESSION:  Mass lesion in the distal esophagus beginning at 34 cm from the incisors and lesion persists into the cardia of the stomach at 42 cm from the incisors.  Likely malignant. Multiple biopsies obtained.    Hospital Medications:  Current Facility-Administered Medications   Medication Dose Frequency Provider Last Rate Last Admin    pantoprazole (Protonix) injection 40 mg  40 mg BID Cesar Appiah M.D.   40 mg at 08/19/23 0437    senna-docusate (Pericolace Or Senokot S) 8.6-50 MG per tablet 2 Tablet  2 Tablet BID Cesar Appiah M.D.   2 Tablet at 08/18/23 0453    And    polyethylene glycol/lytes (Miralax) PACKET 1 Packet  1 Packet QDAY LINDA Appiah M.D.        And    magnesium hydroxide (Milk Of Magnesia) suspension 30 mL  30 mL QDAY LINDA Appiah M.D.        And    bisacodyl (Dulcolax) suppository 10 mg  10 mg QDAY LINDA Appiah M.D.        sucralfate (Carafate) 1 GM/10ML suspension 1 g  1 g Q6HRS Cesar Appiah M.D.   1 g at 08/19/23  "0437    ferric gluconate complex (Ferrlecit) 250 mg in  mL IVPB  250 mg BID Gwendolyn Ortiz A.P.R.N.   Stopped at 08/18/23 1838    potassium chloride SA (Kdur) tablet 40 mEq  40 mEq BID ESTELLA HolmanP.R.N.   40 mEq at 08/19/23 0437   Last reviewed on 8/17/2023 12:16 PM by Jacques Lucero R.N.       Review of Systems:  Review of Systems   Constitutional:  Positive for malaise/fatigue. Negative for chills and fever.   HENT:  Negative for hearing loss.    Eyes:  Negative for blurred vision.   Respiratory:  Negative for cough and shortness of breath.    Cardiovascular:  Negative for chest pain and leg swelling.   Gastrointestinal:  Positive for heartburn. Negative for abdominal pain, blood in stool, constipation, diarrhea, melena, nausea and vomiting.   Genitourinary:  Negative for dysuria.   Musculoskeletal:  Negative for back pain.   Skin:  Negative for rash.   Neurological:  Negative for dizziness and weakness.   Psychiatric/Behavioral:  Negative for depression.    All other systems reviewed and are negative.        Vital signs:  Weight/BMI: Body mass index is 26.51 kg/m².  /83   Pulse 73   Temp 36.6 °C (97.8 °F) (Temporal)   Resp 16   Ht 1.93 m (6' 4\")   Wt 98.8 kg (217 lb 13 oz)   SpO2 98%   Vitals:    08/18/23 2005 08/18/23 2100 08/19/23 0008 08/19/23 0452   BP: 130/82  139/89 127/83   Pulse: 65  98 73   Resp: 16  16 16   Temp: 37 °C (98.6 °F)  36.6 °C (97.8 °F) 36.6 °C (97.8 °F)   TempSrc: Temporal  Temporal Temporal   SpO2: 96%  95% 98%   Weight:  98.8 kg (217 lb 13 oz)     Height:         Oxygen Therapy:  Pulse Oximetry: 98 %, O2 (LPM): 0, O2 Delivery Device: None - Room Air    Intake/Output Summary (Last 24 hours) at 8/19/2023 0707  Last data filed at 8/19/2023 0400  Gross per 24 hour   Intake 490 ml   Output 260 ml   Net 230 ml         Physical Exam:  Physical Exam  Vitals and nursing note reviewed.   Constitutional:       General: He is not in acute distress.     Appearance: " Normal appearance. He is not ill-appearing.   HENT:      Head: Normocephalic and atraumatic.      Right Ear: External ear normal.      Left Ear: External ear normal.      Nose: Nose normal.      Mouth/Throat:      Mouth: Mucous membranes are moist.      Pharynx: Oropharynx is clear.   Eyes:      General: No scleral icterus.  Cardiovascular:      Rate and Rhythm: Normal rate and regular rhythm.      Pulses: Normal pulses.      Heart sounds: Normal heart sounds.   Pulmonary:      Effort: Pulmonary effort is normal.      Breath sounds: Normal breath sounds.   Abdominal:      General: Abdomen is flat. Bowel sounds are normal. There is no distension.      Palpations: Abdomen is soft.   Musculoskeletal:         General: Normal range of motion.      Cervical back: Normal range of motion and neck supple.   Skin:     General: Skin is warm.      Capillary Refill: Capillary refill takes less than 2 seconds.   Neurological:      Mental Status: He is alert and oriented to person, place, and time.   Psychiatric:         Mood and Affect: Mood normal.         Behavior: Behavior normal.             Labs:  Recent Labs     08/16/23  1034 08/16/23  1729 08/17/23  1034 08/18/23  0041   SODIUM 143 142  --  139   POTASSIUM 3.3* 3.0*  --  3.9   CHLORIDE 106 106  --  108   CO2 24 25  --  22   BUN 16 17  --  12   CREATININE 0.89 0.91  --  0.73   MAGNESIUM  --  2.0 2.0  --    PHOSPHORUS  --   --  2.4*  --    CALCIUM 9.5 9.2  --  8.5     Recent Labs     08/16/23  1034 08/16/23  1729 08/18/23  0041   ALTSGPT 5 8  --    ASTSGOT 11* 12  --    ALKPHOSPHAT 45 45  --    TBILIRUBIN 0.3 0.2  --    LIPASE 49 37  --    GLUCOSE 99 96 82     Recent Labs     08/16/23  1034 08/16/23  1729 08/18/23  0041   WBC 7.4 7.5 6.8   NEUTSPOLYS 62.60 63.40  --    LYMPHOCYTES 23.30 23.10  --    MONOCYTES 11.60 10.70  --    EOSINOPHILS 1.30 1.30  --    BASOPHILS 0.90 1.20  --    ASTSGOT 11* 12  --    ALTSGPT 5 8  --    ALKPHOSPHAT 45 45  --    TBILIRUBIN 0.3 0.2  --       Recent Labs     08/16/23  1034 08/16/23  1729 08/17/23  0020 08/17/23  1034 08/17/23  1952 08/18/23  0041 08/18/23  0729 08/18/23  2118   RBC 2.81* 2.76*  --   --   --  3.37*  --   --    HEMOGLOBIN 5.2* 5.1*   < > 6.9*   < > 7.3* 7.6* 8.6*   HEMATOCRIT 19.9* 19.1*   < > 23.6*   < > 25.3* 26.0* 30.0*   PLATELETCT 504* 530*  --   --   --  452*  --   --    PROTHROMBTM  --  14.3  --   --   --   --   --   --    APTT  --  25.8  --   --   --   --   --   --    INR  --  1.13  --   --   --   --   --   --    IRON  --   --   --  19*  --   --   --   --    FERRITIN  --   --   --  3.3*  --   --   --   --    TOTIRONBC  --   --   --  261  --   --   --   --     < > = values in this interval not displayed.     Recent Results (from the past 24 hour(s))   HEMOGLOBIN AND HEMATOCRIT    Collection Time: 08/18/23  7:29 AM   Result Value Ref Range    Hemoglobin 7.6 (L) 14.0 - 18.0 g/dL    Hematocrit 26.0 (L) 42.0 - 52.0 %   Histology Request    Collection Time: 08/18/23 11:06 AM   Result Value Ref Range    Pathology Request Sent to [facility]    HEMOGLOBIN AND HEMATOCRIT    Collection Time: 08/18/23  9:18 PM   Result Value Ref Range    Hemoglobin 8.6 (L) 14.0 - 18.0 g/dL    Hematocrit 30.0 (L) 42.0 - 52.0 %       Radiology Review:  CT-CHEST (THORAX) WITH & W/O   Final Result      1.  Large hiatal hernia with wall thickening in the distal thoracic esophagus and portion of the herniated stomach which would be in keeping with the provided clinical history small LEFT pleural effusion   2.  Trace RIGHT pleural effusion   3.  No pulmonary nodules or enlarged mediastinal lymph nodes         CT-ABDOMEN-PELVIS WITH   Final Result      1.  No acute inflammation in the abdomen or pelvis. No bowel obstruction.   2.  Small, nonspecific free fluid in the pelvis.   3.  Small left pleural effusion with adjacent atelectasis.   4.  Moderate hiatal hernia.      DX-CHEST-PORTABLE (1 VIEW)   Final Result      Mild blunted left costophrenic angle could be  atelectasis/scarring and/or trace left pleural effusion.            MDM (Data Review):   -Records reviewed and summarized in current documentation  -I personally reviewed and interpreted the laboratory results  -I personally reviewed the radiology images    Assessment/Recommendations:  Impressions:  57-year-old male with profound anemia in the setting of change in stool habits/dark stools.  Patient is having epigastric pain.  He has never had upper endoscopy.  It has been 5 years since his last colonoscopy.  He has a personal history of colon polyps.  He received 3 units of PRBC and underwent endoscopy on 8/18 and determined to have a large esophageal mass.    Recommendations:  Continue iron infusions given iron deficiency on labs  Pathology consistent with invasive adenocarcinoma, no metastasis noted on CT chest thorax  Trend H&H, transfuse for hemoglobin less than 7  Surgical oncology consult    Plan of care discussed with patient and his wife, primary team, and Dr. Patel    Core Quality Measures   Reviewed items::  Labs, Medications and Radiology reports reviewed

## 2023-08-19 NOTE — CARE PLAN
The patient is Watcher - Medium risk of patient condition declining or worsening    Shift Goals  Clinical Goals: mass biopsy, monitor blood labs  Patient Goals: rest, update on POC  Family Goals: ANUPAM    Progress made toward(s) clinical / shift goals:    Problem: Hemodynamics  Goal: Patient's hemodynamics, fluid balance and neurologic status will be stable or improve  Outcome: Progressing  Note: Patient's hemoglobin has remained stable throughout the shift and patient has shown no signs of active bleeding in his stool.      Problem: Respiratory  Goal: Patient will achieve/maintain optimum respiratory ventilation and gas exchange  Outcome: Progressing  Flowsheets (Taken 8/19/2023 8948)  O2 Delivery Device: None - Room Air  Note: Patient has remained on room air throughout the shift and has not reported any shortness of breath. Oxygen saturation has consistently been above 90%.

## 2023-08-19 NOTE — CARE PLAN
The patient is Stable - Low risk of patient condition declining or worsening    Shift Goals  Clinical Goals: EGD, monitor hgb  Patient Goals: rest, comfort, EGD  Family Goals: not applicable    Progress made toward(s) clinical / shift goals:    Problem: Hemodynamics  Goal: Patient's hemodynamics, fluid balance and neurologic status will be stable or improve  Outcome: Progressing  H/H stable this shift; 7.6/26, +BM today, brown, no blood noted.      Problem: Respiratory  Goal: Patient will achieve/maintain optimum respiratory ventilation and gas exchange  Outcome: Progressing   Patient remains on RA, denies SOB.

## 2023-08-19 NOTE — PROGRESS NOTES
Bedside report received and patient care assumed. Pt is resting in bed, A&O4, with no complaints of pain, and is on room air. Tele box on. All fall precautions are in place, belongings at bedside table.  Pt was updated on POC, no questions or concerns. Pt educated on use of call light for assistance.

## 2023-08-19 NOTE — PROGRESS NOTES
[facility] Internal Medicine Daily Progress Note    Date of Service  8/19/2023    [facility] Team: R IM Purple Team   Attending: Kaylin Ferrara M.d.  Senior Resident: Dr. LUCY Wilson  Intern:  Dr. TERRANCE Sanchez  Contact Number: [phone]    Chief Complaint  Tommy Mckeon is a 57 y.o. male admitted 8/16/2023 with constipation and anemia.    Hospital Course  Tommy Mckeon is a 57 y.o. male past medical history of hypertension who presented on 8/16/2023 with increased fatigue and weakness.  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  He reports he has been feeling weak and fatigued and attributed these symptoms to not sleeping well.  Does report having back pain for which she has been taking ibuprofen over the last few months.  Additionally reports poor appetite and constipation over the last 1 week.  Notes that his stool is dark but denies any bright red blood and hematemesis.  Denies any fevers or chills.  Patient visited urgent care earlier today and got a call stating his hemoglobin was low and advised him to go to the ER.  Does report having a colonoscopy done 5 years ago and reports they found polyps but does not know further information.  Never had endoscopy.  Reports he was recently notified to schedule next colonoscopy.  Denies alcohol or recreational drug use.     In the ER, at [facility] his hemoglobin was 5.1 for which 1 unit PRBC was transfused. No episodes of melena or hematemesis in the ER. Repeat Hemoglobin post 1 unit PRBC returned 4.9. We will repeat hemoglobin prior to administering further PRBC's. He has been started on IV Protonix and admitted for GI consultation. Currently he is hemodynamically stable. Admitted to medicine in guarded condition. Patient received 4 PRBCs, hb 7.3 hto 25.3. Patient got EGD on 8/18 that showed  mass lesion in the distal esophagus beginning at 34 cm from the incisors and lesion persists into the cardia of the stomach at 42 cm  "from the incisors.  Likely malignant.  CT of chest showed large hiatal hernia with wall thickening in the distal thoracic esophagus and portion of the herniated stomach. Biopsies obtained from mass showed stomach and distal esophagus with Invasive adenocarcinoma.  Surgical Oncology consulted, recommend CT chest IV contrast and CT pancreas and abdomen with PO and IV contrast. Will evaluate the patient on 8/20. Medical Oncology consulted, will evaluate the patient on 8/20    Interval Problem Update  No acute events overnight. Patient was seen and evaluated at bedside this AM, reports no nausea, vomiting or abdominal pain. Tolerating well diet. No fever , chills or systemic symptoms. Last bowel movement last night. EGD on 8/18 showed in report \"mass lesion in the distal esophagus beginning at 34 cm from the incisors and lesion persistent to the cardia of the stomach at 42 cm from the incisors.  Likely malignant\". Multiple bx were taken, pending results. Patient report someone told him he had a murmur. 2/6 Systolic murmur present, probably benign, no concerns at this point.     I have discussed this patient's plan of care and discharge plan at IDT rounds today with Case Management, Nursing, Nursing leadership, and other members of the IDT team.    Consultants/Specialty  GI    Code Status  Full Code    Disposition  The patient is not medically cleared for discharge to home or a post-acute facility.      I have placed the appropriate orders for post-discharge needs.    Review of Systems  ROS     Physical Exam  Temp:  [36.4 °C (97.5 °F)-37 °C (98.6 °F)] 36.9 °C (98.5 °F)  Pulse:  [63-98] 69  Resp:  [16] 16  BP: (119-139)/(80-89) 119/80  SpO2:  [93 %-98 %] 93 %    Physical Exam    Fluids    Intake/Output Summary (Last 24 hours) at 8/19/2023 1523  Last data filed at 8/19/2023 0400  Gross per 24 hour   Intake 290 ml   Output 250 ml   Net 40 ml       Laboratory  Recent Labs     08/16/23  1729 08/17/23  0020 08/18/23  0041 " 08/18/23  0729 08/18/23  2118 08/19/23  0935   WBC 7.5  --  6.8  --   --  14.9*   RBC 2.76*  --  3.37*  --   --  3.83*   HEMOGLOBIN 5.1*   < > 7.3* 7.6* 8.6* 8.5*   HEMATOCRIT 19.1*   < > 25.3* 26.0* 30.0* 28.7*   MCV 69.2*  --  75.1*  --   --  74.9*   MCH 18.5*  --  21.7*  --   --  22.2*   MCHC 26.7*  --  28.9*  --   --  29.6*   RDW 50.1*  --  57.0*  --   --  58.4*   PLATELETCT 530*  --  452*  --   --  490*   MPV 10.6  --  10.8  --   --  10.1    < > = values in this interval not displayed.     Recent Labs     08/16/23  1729 08/18/23  0041 08/19/23  0935   SODIUM 142 139 137   POTASSIUM 3.0* 3.9 4.3   CHLORIDE 106 108 106   CO2 25 22 22   GLUCOSE 96 82 111*   BUN 17 12 5*   CREATININE 0.91 0.73 0.81   CALCIUM 9.2 8.5 8.6     Recent Labs     08/16/23  1729   APTT 25.8   INR 1.13               Imaging  CT-CHEST (THORAX) WITH & W/O   Final Result      1.  Large hiatal hernia with wall thickening in the distal thoracic esophagus and portion of the herniated stomach which would be in keeping with the provided clinical history small LEFT pleural effusion   2.  Trace RIGHT pleural effusion   3.  No pulmonary nodules or enlarged mediastinal lymph nodes         CT-ABDOMEN-PELVIS WITH   Final Result      1.  No acute inflammation in the abdomen or pelvis. No bowel obstruction.   2.  Small, nonspecific free fluid in the pelvis.   3.  Small left pleural effusion with adjacent atelectasis.   4.  Moderate hiatal hernia.      DX-CHEST-PORTABLE (1 VIEW)   Final Result      Mild blunted left costophrenic angle could be atelectasis/scarring and/or trace left pleural effusion.      CT-PANCREAS AND ABDOMEN WITH & W/O    (Results Pending)   CT-CHEST (THORAX) WITH    (Results Pending)        Assessment/Plan  Problem Representation:    * Iron deficiency Anemia secondary to acute GI bleeding  Assessment & Plan  Secondary to Invasive adenocarcinoma in stomach and distal esophagus.  IV Protonix 40 mg BID   On Iron replacement  Hold all  Anticoagulants, NSAID's and anti platelets   H/H q12hrs  S/p 3 unit PRBC, hemoglobin now 6.9, ordered a 4th unit  Will transfuse additional PRBC pending above repeat   Carafate   GI did EGD on 8/18 and showed mass lesion in the distal esophagus beginning at 34 cm from the incisors and lesion persists into the cardia of the stomach at 42 cm from the incisors.  Likely malignant.    - CT of chest on 8/18 showed large hiatal hernia with wall thickening in the distal thoracic esophagus and portion of the herniated stomach   - Biopsies obtained from mass showed stomach and distal esophagus with Invasive adenocarcinoma  - Surgical Oncology consulted, recommend CT chest IV contrast and CT pancreas and abdomen with PO and IV contrast. Will evaluate the patient on 8/20  - Medical Oncology consulted, will evaluate the patient on 8/20        GERD (gastroesophageal reflux disease)- (present on admission)  Assessment & Plan  IV Protonix 40 mg BID     Hypertension- (present on admission)  Assessment & Plan  Reports he is supposed to be taking lisinopril however has not filled due to cost.   Counseled on importance of medication adherence.   Hold BP medications given current clinical scenario.            VTE prophylaxis: SCDs/TEDs    I have performed a physical exam and reviewed and updated ROS and Plan today (8/19/2023). In review of yesterday's note (8/18/2023), there are no changes except as documented above.        Elli Sanchez MD  Internal Medicine PGY-1

## 2023-08-20 PROBLEM — C15.5 MALIGNANT NEOPLASM OF LOWER THIRD OF ESOPHAGUS (HCC): Status: ACTIVE | Noted: 2023-01-01

## 2023-08-20 NOTE — PROGRESS NOTES
Reviewed discharge instructions with patient, verbalized understanding. All belongings with patient. Wife at bedside to take patient home.

## 2023-08-20 NOTE — DISCHARGE SUMMARY
[facility] Internal Medicine Discharge Summary    Attending: Kaylin Ferrara M.d.  Senior Resident: Dr. Wilson  Intern:  Dr. Sanchez  Contact Number: [phone]    CHIEF COMPLAINT ON ADMISSION  Chief Complaint   Patient presents with    Constipation     X 3 days. Last bowel movement was 3 days ago. Denies bloody/black/dark stool. But states that he noticed dark-brownish with green tinge stool color x 3 weeks. Denies vomiting blood.    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 male past medical history of hypertension who presented 8/16/2023 with increased fatigue and weakness, alongside constipation and unintentional weight loss of 30 lbs.  Patient visited urgent care earlier on 8/16 and got a call stating his hemoglobin was low and advised him to go to the ER.  Does report having a colonoscopy done 5 years ago for polyps at the time, needs to schedule next colonoscopy per outpatient recommendations.  Patient has never never had an endoscopy.  Denies alcohol or recreational drug use.  In the ED noted to have hemoglobin of 5.1, requiring transfusion of a total of 4 units PRBCs throughout course of stay.  Denies any episodes of melena or hematemesis, also not noted over course of admission.  Was also begun on IV Protonix, with GI consulted.  Endoscopy was performed on 8/18 with GI demonstrating a mass lesion in distal esophagus beginning at 34 cm from incisors from persisting to the cardia at 42 cm from the incisors, likely source of recent bleed and resultant anemia.  Biopsy results demonstrated moderate to poorly differentiated invasive adenocarcinoma.  Medical and surgical oncology were both consulted regarding findings.  CT scan of chest  and abdomen were performed redemonstrated distal esophageal/proximal gastric mass, otherwise no signs of metastatic lesions on imaging.  Patient was transitioned to p.o. PPI, alongside addition of sucralfate.  Patient scheduled to have follow-up outpatient with medical and surgical oncology, alongside referrals placed for radiation oncology and GI.  Patient will need an EUS and PET/CT scan outpatient for further work-up.  Discharged in stable condition with appropriate follow-up provided.     Therefore, he is discharged in guarded and stable condition to home with close outpatient follow-up.    The patient met 2-midnight criteria for an inpatient stay at the time of discharge.    Discharge Date  8/20/2023    Physical Exam on Day of Discharge  Constitutional:       General: He is not in acute distress.     Appearance: He is normal weight. He is not ill-appearing or diaphoretic.   HENT:      Head: Normocephalic.      Mouth/Throat:      Mouth: Mucous membranes are moist.   Eyes:      General: No scleral icterus.     Conjunctiva/sclera: Conjunctivae normal.   Cardiovascular:      Rate and Rhythm: Normal rate and regular rhythm.      Pulses: Normal pulses.      Heart sounds: No murmur heard.  Pulmonary:      Breath sounds: No wheezing, rhonchi or rales.   Abdominal:      General: Abdomen is flat. Bowel sounds are normal.      Palpations: Abdomen is soft. There is no hepatomegaly.      Tenderness: There is no abdominal tenderness. There is no guarding.   Musculoskeletal:      Right lower leg: No edema.      Left lower leg: No edema.   Lymphadenopathy:      Head:      Right side of head: No submandibular adenopathy.      Left side of head: No submandibular adenopathy.      Cervical: No cervical adenopathy.      Upper Body:      Right upper body: No supraclavicular or axillary adenopathy.      Left upper body: No supraclavicular or axillary adenopathy.      Lower Body: No right inguinal adenopathy. No left inguinal  adenopathy.   Skin:     General: Skin is warm.      Capillary Refill: Capillary refill takes less than 2 seconds.      Coloration: Skin is not jaundiced or pale.   Neurological:      General: No focal deficit present.      Mental Status: He is alert and oriented to person, place, and time.      Motor: Motor function is intact.     FOLLOW UP ITEMS POST DISCHARGE  -Advised to follow-up with PCP, medical and surgical oncology, radiation oncology, and GI  -Provided appropriate medication reconciliation upon discharge and advised to take medications as prescribed    DISCHARGE DIAGNOSES  Principal Problem:    Iron deficiency Anemia secondary to acute GI bleeding (POA: Yes)  Active Problems:    Hypertension (Chronic) (POA: Yes)    GERD (gastroesophageal reflux disease) (Chronic) (POA: Yes)    Malignant neoplasm of lower third of esophagus (HCC) (POA: Yes)  Resolved Problems:    Acute blood loss anemia (POA: Yes)      FOLLOW UP  Future Appointments   Date Time Provider Department Center   8/22/2023  2:30 PM Corby Cagle M.D. [facility] None   8/30/2023  1:30 PM [facility] [address]     Corby Cagle M.D.  [address]  [address]  [place] [address]  [phone]    Go on 8/22/2023  2:30    Imaging [address]  [address]  [facility] [address]  [phone]  Go on 8/30/2023  1 PM      MEDICATIONS ON DISCHARGE     Medication List        START taking these medications        Instructions   sucralfate 1 GM/10ML Susp  Commonly known as: Carafate   Take 10 mL by mouth every 6 hours.  (Take 10 mL by mouth every 6 hours.)  Dose: 1 g            CHANGE how you take these medications        Instructions   omeprazole 20 MG delayed-release capsule  What changed:   when to take this  additional instructions  Commonly known as: PriLOSEC   Take 1 Capsule by mouth 2 times a day.  Dose: 20 mg            CONTINUE taking these medications        Instructions   ONE-A-DAY MENS 50+ ADVANTAGE PO   Take 1  Tablet by mouth every day.  Dose: 1 Tablet            STOP taking these medications      calcium carbonate 500 MG Chew  Commonly known as: Tums     diphenhydrAMINE 25 MG Tabs  Commonly known as: Benadryl              Allergies  No Known Allergies    DIET  Orders Placed This Encounter   Procedures    Diet Order Diet: Regular     Standing Status:   Standing     Number of Occurrences:   1     Order Specific Question:   Diet:     Answer:   Regular [1]    Discontinue Diet Tray     Standing Status:   Standing     Number of Occurrences:   1       ACTIVITY  As tolerated.  Weight bearing as tolerated    CONSULTATIONS  Gastroenterology  Surgical oncology  Medical oncology    PROCEDURES  EGD with biopsy (8/18/23)    LABORATORY  Lab Results   Component Value Date    SODIUM 137 08/19/2023    POTASSIUM 4.3 08/19/2023    CHLORIDE 106 08/19/2023    CO2 22 08/19/2023    GLUCOSE 111 (H) 08/19/2023    BUN 5 (L) 08/19/2023    CREATININE 0.81 08/19/2023        Lab Results   Component Value Date    WBC 14.0 (H) 08/20/2023    HEMOGLOBIN 9.0 (L) 08/20/2023    HEMATOCRIT 30.3 (L) 08/20/2023    PLATELETCT 511 (H) 08/20/2023        Total time of the discharge process exceeds 45 minutes.

## 2023-08-20 NOTE — CARE PLAN
The patient is Stable - Low risk of patient condition declining or worsening    Shift Goals  Clinical Goals: Monitor VS, Rest  Patient Goals: Rest  Family Goals: ANUPAM    Progress made toward(s) clinical / shift goals:    Problem: Knowledge Deficit - Standard  Goal: Patient and family/care givers will demonstrate understanding of plan of care, disease process/condition, diagnostic tests and medications  Outcome: Progressing  Note: Discussed plan of care and plan for day shift--patient in agreement and has no questions at this time.      Problem: Psychosocial  Goal: Patient's ability to verbalize feelings about condition will improve  Outcome: Progressing  Flowsheets (Taken 8/20/2023 0037)  Condition Will Improve:   Discussed coping with medical condition and its effects   Encouraged patient participation in care   Encouraged acknowledgement of body changes and emotions   Screened for depression     Problem: Hemodynamics  Goal: Patient's hemodynamics, fluid balance and neurologic status will be stable or improve  Outcome: Progressing  Note: Pt remained hemodynamically stable evidenced by stable vital sings, proper urine output and adequate fluid intake.

## 2023-08-20 NOTE — PROGRESS NOTES
Subjective:   8/22/2023 12:52 PM  Primary care physician: Reynaldo Robins M.D.  Referring Provider: Elli Sanchez MD  Medical Oncologist: Alfonzo Triana MD  Radiation Oncology: Referral placed to Michael Woods MD    Chief Complaint:   Chief Complaint   Patient presents with    Other     [place] [facility]  REFERRAL SENT FOR RAD ONC  PET/CT ORDERED  WILL NEED A PORT     Diagnosis:   1. Esophageal mass  sulfamethoxazole-trimethoprim (BACTRIM DS) 800-160 MG tablet      2. Malignant neoplasm of esophagus, unspecified location (HCC)  Referral to Oncology Psychosocial Screening for Distress    REFERRAL TO ONCOLOGY NURSE NAVIGATOR    sulfamethoxazole-trimethoprim (BACTRIM DS) 800-160 MG tablet      3. Malignant neoplasm of lower third of esophagus (HCC)  sulfamethoxazole-trimethoprim (BACTRIM DS) 800-160 MG tablet      4. Esophageal dysphagia  sulfamethoxazole-trimethoprim (BACTRIM DS) 800-160 MG tablet      5. Weight loss, abnormal  sulfamethoxazole-trimethoprim (BACTRIM DS) 800-160 MG tablet          History of presenting illness:  Patient is a 57 year old male with history of obesity and hypertension who presented to the emergency room on August 17 for work irregular bowel movements.  He he had noted he anemia and presented to the ER with a hemoglobin of 5.1 and 1 unit of packed red blood cells was transfused.    Patient has had a little bit of weight loss.  He has no obvious dysphagia.  He states he suffers from GERD for a significant amount of time of his life.  He does take Motrin 3-4 times a week for what appears to be arthritic pain.  He is not a drinker and is not a smoker.  He has no family history of any malignancies.  The patient was significantly anemic and presently is on Carafate in the hospital.  He is on antacids.  He is here to discuss next steps. He has already seen Dr. Triana from medical oncology.  He has not seen radiation oncology.  We were consulted as surgical oncology.    Update  8/22/23   He presents for a pre op discussion on port placement.    The patient is here with his wife.  He was seen in the hospital and instructed on everything he needed and was also set up with his appointments for radiation oncology and medical oncology and his PET scan.  I have reviewed his imaging and there is no sign of metastatic disease but is difficult to assess where the exactly the tumor is.  He did not have an endoscopic ultrasound but he did have an EGD which came back as adenocarcinoma at the GE junction.  The patient shows no sign of metastatic disease on the CT scans.  He has not had a PET scan.  He has not had an endoscopic ultrasound.  He is here with his wife to discuss neck steps.  He does state he has some intermittent dysphagia but he is managing it by eating ground beef and other softer foods.  He denies any nausea or vomiting, fever or chills.  He still continues to have small amount of weight loss.  He is in no obvious pain at this time.  The only other complaint is he has what appears to be some cellulitis on his left bicep secondary to an IV.  Past Medical History:   Diagnosis Date    Hypertension     Obesity (BMI 30-39.9) 4/19/2016     Past Surgical History:   Procedure Laterality Date    CO UPPER GI ENDOSCOPY,DIAGNOSIS N/A 8/18/2023    Procedure: GASTROSCOPY;  Surgeon: Kale Patel M.D.;  Location: SURGERY SAME DAY [facility];  Service: Gastroenterology    CO UPPER GI ENDOSCOPY,BIOPSY N/A 8/18/2023    Procedure: GASTROSCOPY, WITH BIOPSY;  Surgeon: Kale Patel M.D.;  Location: SURGERY SAME DAY [facility];  Service: Gastroenterology    EYE SURGERY  2015     No Known Allergies  Outpatient Encounter Medications as of 8/22/2023   Medication Sig Dispense Refill    sulfamethoxazole-trimethoprim (BACTRIM DS) 800-160 MG tablet Take 1 Tablet by mouth 2 times a day. 14 Tablet 0    omeprazole (PRILOSEC) 20 MG delayed-release capsule Take 1 Capsule by mouth 2 times a day. 30 Capsule 0    Multiple  Vitamins-Minerals (ONE-A-DAY MENS 50+ ADVANTAGE PO) Take 1 Tablet by mouth every day.      sucralfate (CARAFATE) 1 GM/10ML Suspension Take 10 mL by mouth every 6 hours. (Patient not taking: Reported on 8/22/2023) 560 mL 0    [DISCONTINUED] diphenhydrAMINE (BENADRYL) 25 MG Tab Take 25 mg by mouth every evening.      [DISCONTINUED] calcium carbonate (TUMS) 500 MG Chew Tab Chew 500-1,000 mg 3 times a day as needed.      [DISCONTINUED] omeprazole (PRILOSEC) 20 MG delayed-release capsule Take 20 mg by mouth every day. OTC      [DISCONTINUED] omeprazole (PriLOSEC) capsule 20 mg       [DISCONTINUED] senna-docusate (Pericolace Or Senokot S) 8.6-50 MG per tablet 2 Tablet       [DISCONTINUED] polyethylene glycol/lytes (Miralax) PACKET 1 Packet       [DISCONTINUED] magnesium hydroxide (Milk Of Magnesia) suspension 30 mL       [DISCONTINUED] bisacodyl (Dulcolax) suppository 10 mg       [DISCONTINUED] sucralfate (Carafate) 1 GM/10ML suspension 1 g        No facility-administered encounter medications on file as of 8/22/2023.     Social History     Socioeconomic History    Marital status:      Spouse name: Not on file    Number of children: Not on file    Years of education: Not on file    Highest education level: Not on file   Occupational History    Not on file   Tobacco Use    Smoking status: Never    Smokeless tobacco: Never   Vaping Use    Vaping Use: Never used   Substance and Sexual Activity    Alcohol use: Not Currently    Drug use: No    Sexual activity: Yes     Partners: Female   Other Topics Concern    Not on file   Social History Narrative    Works in security      Social Determinants of Health     Financial Resource Strain: Not on file   Food Insecurity: Not on file   Transportation Needs: Not on file   Physical Activity: Not on file   Stress: Not on file   Social Connections: Not on file   Intimate Partner Violence: Not on file   Housing Stability: Not on file      Social History     Tobacco Use   Smoking  "Status Never   Smokeless Tobacco Never     Social History     Substance and Sexual Activity   Alcohol Use Not Currently     Social History     Substance and Sexual Activity   Drug Use No      Family History   Problem Relation Age of Onset    Diabetes Mother     Stroke Mother          Review of Systems   Constitutional:  Positive for malaise/fatigue and weight loss.   Gastrointestinal:  Positive for abdominal pain.   All other systems reviewed and are negative.       Objective:   /60 (BP Location: Right arm, Patient Position: Sitting, BP Cuff Size: Adult)   Pulse 67   Temp 37 °C (98.6 °F) (Temporal)   Ht 1.93 m (6' 4\")   Wt 99.3 kg (219 lb)   SpO2 97%   BMI 26.66 kg/m²     Physical Exam  Vitals and nursing note reviewed.   Constitutional:       Appearance: Normal appearance.   HENT:      Head: Normocephalic.      Nose: Nose normal.      Mouth/Throat:      Mouth: Mucous membranes are moist.      Pharynx: Oropharynx is clear.   Eyes:      Extraocular Movements: Extraocular movements intact.      Conjunctiva/sclera: Conjunctivae normal.      Pupils: Pupils are equal, round, and reactive to light.   Cardiovascular:      Rate and Rhythm: Normal rate and regular rhythm.      Pulses: Normal pulses.      Heart sounds: Normal heart sounds.   Pulmonary:      Effort: Pulmonary effort is normal.      Breath sounds: Normal breath sounds.   Abdominal:      General: Abdomen is flat. Bowel sounds are normal.      Palpations: Abdomen is soft.   Musculoskeletal:         General: Normal range of motion.      Cervical back: Normal range of motion and neck supple.   Skin:     General: Skin is warm.   Neurological:      General: No focal deficit present.      Mental Status: He is alert and oriented to person, place, and time. Mental status is at baseline.   Psychiatric:         Mood and Affect: Mood normal.         Behavior: Behavior normal.         Thought Content: Thought content normal.         Judgment: Judgment normal. "         Labs   Latest Reference Range & Units Most Recent   WBC 4.8 - 10.8 K/uL 14.0 (H)  8/20/23 02:20   RBC 4.70 - 6.10 M/uL 4.02 (L)  8/20/23 02:20   Hemoglobin 14.0 - 18.0 g/dL 9.0 (L)  8/20/23 02:20   Hematocrit 42.0 - 52.0 % 30.3 (L)  8/20/23 02:20   MCV 81.4 - 97.8 fL 75.4 (L)  8/20/23 02:20   MCH 27.0 - 33.0 pg 22.4 (L)  8/20/23 02:20   MCHC 32.3 - 36.5 g/dL 29.7 (L)  8/20/23 02:20   RDW 35.9 - 50.0 fL 60.4 (H)  8/20/23 02:20   Platelet Count 164 - 446 K/uL 511 (H)  8/20/23 02:20   (H): Data is abnormally high  (L): Data is abnormally low        Latest Reference Range & Units Most Recent   Sodium 135 - 145 mmol/L 137  8/19/23 09:35   Potassium 3.6 - 5.5 mmol/L 4.3  8/19/23 09:35   Chloride 96 - 112 mmol/L 106  8/19/23 09:35   Co2 20 - 33 mmol/L 22  8/19/23 09:35   Anion Gap 7.0 - 16.0  9.0  8/19/23 09:35   Glucose 65 - 99 mg/dL 111 (H)  8/19/23 09:35   Bun 8 - 22 mg/dL 5 (L)  8/19/23 09:35   Creatinine 0.50 - 1.40 mg/dL 0.81  8/19/23 09:35   GFR (CKD-EPI) >60 mL/min/1.73 m 2 101  8/19/23 09:35   GFR If African American >60 mL/min/1.73 m 2 >60  4/19/21 07:04   GFR If Non African American >60 mL/min/1.73 m 2 >60  4/19/21 07:04   Calcium 8.5 - 10.5 mg/dL 8.6  8/19/23 09:35   Correct Calcium 8.5 - 10.5 mg/dL 9.4  8/16/23 17:29   AST(SGOT) 12 - 45 U/L 12  8/16/23 17:29   ALT(SGPT) 2 - 50 U/L 8  8/16/23 17:29   Alkaline Phosphatase 30 - 99 U/L 45  8/16/23 17:29   Total Bilirubin 0.1 - 1.5 mg/dL 0.2  8/16/23 17:29   Albumin 3.2 - 4.9 g/dL 3.7  8/16/23 17:29   Total Protein 6.0 - 8.2 g/dL 6.4  8/16/23 17:29   Globulin 1.9 - 3.5 g/dL 2.7  8/16/23 17:29   A-G Ratio g/dL 1.4  8/16/23 17:29   Phosphorus 2.5 - 4.5 mg/dL 2.4 (L)  8/17/23 10:34   Magnesium 1.5 - 2.5 mg/dL 2.0  8/17/23 10:34   Lipase 11 - 82 U/L 37  8/16/23 17:29   (H): Data is abnormally high  (L): Data is abnormally low    Imaging  CT Pancreas/abdomen 8/19/23  IMPRESSION:  1.  Distal esophageal/proximal gastric mass consistent with known malignancy  2.   No local or distant metastases identified  3.  Small left pleural effusion and mild left basilar atelectasis     CT Chest 8/18/23  IMPRESSION:   1.  Large hiatal hernia with wall thickening in the distal thoracic esophagus and portion of the herniated stomach which would be in keeping with the provided clinical history small LEFT pleural effusion  2.  Trace RIGHT pleural effusion  3.  No pulmonary nodules or enlarged mediastinal lymph nodes    CT Abdomen/pelvis 8/16/23   IMPRESSION:   1.  No acute inflammation in the abdomen or pelvis. No bowel obstruction.  2.  Small, nonspecific free fluid in the pelvis.  3.  Small left pleural effusion with adjacent atelectasis.  4.  Moderate hiatal hernia.    Pathology  8/18/23       SURGICAL PATHOLOGY CONSULTATION       FINAL DIAGNOSIS:     A. Stomach, cardia, biopsy:          Invasive adenocarcinoma, poorly differentiated, see comment   B. Distal esophagus, biopsy:          Invasive adenocarcinoma, moderately to poorly differentiated,           see comment       Comment: Within the distal esophagus there is a invasive adenocarcinoma   with areas of moderate and poor differentiation while the   stomach/cardia biopsy the tissue is predominantly nonviable with the   lesional cells being diffuse throughout the tissue and these include   scattered individual signet ring cells.     Procedures  8/18/23 EDG with biopsy by Dr. Patel      Diagnosis:     1. Esophageal mass  sulfamethoxazole-trimethoprim (BACTRIM DS) 800-160 MG tablet      2. Malignant neoplasm of esophagus, unspecified location (HCC)  Referral to Oncology Psychosocial Screening for Distress    REFERRAL TO ONCOLOGY NURSE NAVIGATOR    sulfamethoxazole-trimethoprim (BACTRIM DS) 800-160 MG tablet      3. Malignant neoplasm of lower third of esophagus (HCC)  sulfamethoxazole-trimethoprim (BACTRIM DS) 800-160 MG tablet      4. Esophageal dysphagia  sulfamethoxazole-trimethoprim (BACTRIM DS) 800-160 MG tablet      5. Weight  loss, abnormal  sulfamethoxazole-trimethoprim (BACTRIM DS) 800-160 MG tablet          Medical Decision Making:  Today's Assessment / Status / Plan:     In light of the present findings, the patient he will be in need of a PowerPort.  This is in order to get his upfront neoadjuvant chemoradiotherapy for his esophagus cancer.  We discussed the risks and benefits of the port including bleeding, infection, the possibility of not being able to feed the port and the upper extremity thrombosis.  There is also 1% chance of pneumothorax.  He has agreed to above plan and we will schedule him for September 1, 2023.  In the meantime he has been scheduled for his PET scan, he has an appointment with Dr. Woods from radiation oncology and he is being seen by Dr. Triana for his therapy.    I, Dr. Cagle have entered, reviewed and confirmed the above diagnoses related to this patient on this date of service, 8/22/2023 12:52 PM.    He agreed and verbalized his agreement and understanding with the current plan. I answered all questions and concerns he has at this time and advised him to call at any time in the interim with questions or concerns in regards to his care.    Thank you for allowing me to participate in his care, I will continue to follow closely.       Please note that this dictation was created using voice recognition software. I have made every reasonable attempt to correct obvious errors, but I expect that there are errors of grammar and possibly content that I did not discover before finalizing the note.     Thank you for this consultation and allowing me to participate in your patient's care. If I can be of further service please contact my office.

## 2023-08-20 NOTE — DISCHARGE INSTRUCTIONS
- Follow-up with PCP regarding recent hospitalization.  - Follow-up with Medical and Surgical Oncology for further management of stomach and distal esophagus adenocarcinoma.  - Take Carafeta and Omeprazol as prescribed.

## 2023-08-20 NOTE — PROGRESS NOTES
[facility] Internal Medicine Daily Progress Note    Date of Service  8/20/2023    [employer] Team: R IM Green Team and R IM Purple Team   Attending: Kaylin Ferrara M.d.  Senior Resident: Dr. Mike Wilson  Intern:  Dr. Elli Sanchez  Contact Number: [phone]    Chief Complaint  Tommy Mckeon is a 57 y.o. 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    Hospital Course  8/16/23  Pt with PMH of HRN, back pain treated with 800mg-1600mg Ibuprofen 4x week, and colonic polyps (found 5 yrs ago) presents with constipation and fatigue. Patient stated that stool is dark, green and non-bloody. Patient came into ER with Hb of 5.1 and was given 1 unit PRBC. Repeat Hb was 4.9. No hematemesis or melena in ER. Patient was started on Pantaprazole IV 40mg BID and admitted for GI consult.     8/18/23  Patient has received 4 units to date with last unit given at 11pm 8/17/23. GI performed EGD  this morning (8/18). EGD showed p/o note (8/18/23) reports distal circumferential malignant-suspected mass beginning at 34 cm from the incisors. It is deeply ulcerated and extends into the cardia 42 cm from incisors of the stomach.Not suspected to be related to GERD.    8/19/23 onwards  Pathology: Biopsy taken from EGD showed   - stomach cardia biopsy: poorly differentiated invasive adenocarcinoma, scattered signet cells  - distal esophagus - poorly differentiated invasive adenocarcinoma    Ct Thorax showed large hiatal hernia with wall thickening in the distal thoracic esophagus and portion of herniated stomach. Mediastinal fluid found as well.    CT pancrease and abdomen with and without contrast showed significant thickening of distal esophagus and proximal stomach consistent with known malignancy. Length of mass at least 6.5 cm and transverse dimension is 5.4 cm. No local or distant metastases is noted. Small pleural effusion and mild left  basilar atelectasis.    Med oncology and surgical oncology scheduled to see today.    Interval Problem Update  No acute events overnight. IV infiltrate noted, so removed. Patient switched from IV pantoprazole 40mg BID to omeprazole 20mg PO BID. Patient saw Med Oncology and Surgery Oncology this morning (8/20/23).    Med Oncology consult: Dr. Triana explained to patient his current thoughts on patient's invasive adenocarcinoma. Discussed esophageal ultrasound (EUS) that will have to be completed out-patient. He explained the need for triple therapy (chemo, radiation, and surgery) to patient. He  would like to get EUS and oncology surgical appointments done within the next 7-14 days. Dr. Triana anticipates treatment to start around mid-September.    Surgery Oncology consult: Dr. Cagle met with patient to discuss plan. He would like to get another esophageal scope completed. Spoke to patient about seeing radiation oncology for treatment. Anticipates treatment with Dr. Triana and radiology oncology to last ~6 weeks. Spoke about response to treatment and surgical plan. Patient was concerned about his hiatal hernia needing to be fixed before removal of cancer. Dr. Cagle explained that hital hernia, in this case, is a benefit to the surgery required for removal of adenocarcinoma. Wants patient to be on Omeprazole and Carafate for the foreseeable future.    I have discussed this patient's plan of care and discharge plan at IDT rounds today with Case Management, Nursing, Nursing leadership, and other members of the IDT team.    Subjective:  Patient feels great and feels ready to go home. Patient does not feel lightheaded, dizzy, nausea, CP, SOB, feverish, chills , diaphoresis, changes in vision, or visual changes. Patient is still noticing dark, green stool. He has no hematemesis, hemoptysis, melena, or urinary changes. He is still a little constipated. He is happy his H&H is improving. He is trying to eat more.  His nausea is improving.    Patient remains positive after being told he has cancer yesterday. He is happy he met with med oncology and surgery oncology.     Consultants/Specialty  oncology    Code Status  Full Code    Disposition  The patient is medically cleared for discharge to home or a post-acute facility.  Anticipate discharge to: home with close outpatient follow-up    I have placed the appropriate orders for post-discharge needs.    Review of Systems  Review of Systems   Constitutional:  Negative for chills, diaphoresis, fever and malaise/fatigue.   Eyes:  Negative for blurred vision.   Respiratory:  Negative for cough, hemoptysis and shortness of breath.    Cardiovascular:  Negative for chest pain, palpitations, orthopnea and leg swelling.   Gastrointestinal:  Positive for constipation. Negative for abdominal pain, blood in stool, diarrhea, heartburn, nausea and vomiting.   Genitourinary:  Negative for dysuria, flank pain, hematuria and urgency.   Musculoskeletal:  Positive for back pain. Negative for myalgias.   Neurological:  Negative for dizziness, tingling, loss of consciousness, weakness and headaches.   Psychiatric/Behavioral:  Negative for depression. The patient is not nervous/anxious.         Physical Exam  Temp:  [36.6 °C (97.8 °F)-37.2 °C (98.9 °F)] 36.8 °C (98.3 °F)  Pulse:  [69-97] 97  Resp:  [16] 16  BP: (105-131)/(57-85) 130/85  SpO2:  [91 %-95 %] 94 %    Physical Exam  Constitutional:       General: He is not in acute distress.     Appearance: He is normal weight. He is not ill-appearing or diaphoretic.   HENT:      Head: Normocephalic.      Mouth/Throat:      Mouth: Mucous membranes are moist.   Eyes:      General: No scleral icterus.     Conjunctiva/sclera: Conjunctivae normal.   Cardiovascular:      Rate and Rhythm: Normal rate and regular rhythm.      Pulses: Normal pulses.      Heart sounds: No murmur heard.  Pulmonary:      Breath sounds: No wheezing, rhonchi or rales.   Abdominal:       General: Abdomen is flat. Bowel sounds are normal.      Palpations: Abdomen is soft. There is no hepatomegaly.      Tenderness: There is no abdominal tenderness. There is no guarding.   Musculoskeletal:      Right lower leg: No edema.      Left lower leg: No edema.   Lymphadenopathy:      Head:      Right side of head: No submandibular adenopathy.      Left side of head: No submandibular adenopathy.      Cervical: No cervical adenopathy.      Upper Body:      Right upper body: No supraclavicular or axillary adenopathy.      Left upper body: No supraclavicular or axillary adenopathy.      Lower Body: No right inguinal adenopathy. No left inguinal adenopathy.   Skin:     General: Skin is warm.      Capillary Refill: Capillary refill takes less than 2 seconds.      Coloration: Skin is not jaundiced or pale.   Neurological:      General: No focal deficit present.      Mental Status: He is alert and oriented to person, place, and time.      Motor: Motor function is intact.         Fluids    Intake/Output Summary (Last 24 hours) at 8/20/2023 1012  Last data filed at 8/19/2023 1929  Gross per 24 hour   Intake 300 ml   Output 250 ml   Net 50 ml       Laboratory  Recent Labs     08/18/23  0041 08/18/23  0729 08/18/23  2118 08/19/23  0935 08/20/23  0220   WBC 6.8  --   --  14.9* 14.0*   RBC 3.37*  --   --  3.83* 4.02*   HEMOGLOBIN 7.3*   < > 8.6* 8.5* 9.0*   HEMATOCRIT 25.3*   < > 30.0* 28.7* 30.3*   MCV 75.1*  --   --  74.9* 75.4*   MCH 21.7*  --   --  22.2* 22.4*   MCHC 28.9*  --   --  29.6* 29.7*   RDW 57.0*  --   --  58.4* 60.4*   PLATELETCT 452*  --   --  490* 511*   MPV 10.8  --   --  10.1 10.6    < > = values in this interval not displayed.     Recent Labs     08/18/23  0041 08/19/23  0935   SODIUM 139 137   POTASSIUM 3.9 4.3   CHLORIDE 108 106   CO2 22 22   GLUCOSE 82 111*   BUN 12 5*   CREATININE 0.73 0.81   CALCIUM 8.5 8.6                   Imaging  CT-PANCREAS AND ABDOMEN WITH & W/O   Final Result         1.   Distal esophageal/proximal gastric mass consistent with known malignancy      2.  No local or distant metastases identified      3.  Small left pleural effusion and mild left basilar atelectasis               CT-CHEST (THORAX) WITH   Final Result      1.  Hiatal hernia with wall thickening in the distal thoracic esophagus and portion of the herniated stomach which is in keeping with history of adenocarcinoma   2.  Small left pleural effusion   3.  No pulmonary nodules or enlarged mediastinal lymph nodes      Fleischner Society pulmonary nodule recommendations:   Not Applicable         CT-CHEST (THORAX) WITH & W/O   Final Result      1.  Large hiatal hernia with wall thickening in the distal thoracic esophagus and portion of the herniated stomach which would be in keeping with the provided clinical history small LEFT pleural effusion   2.  Trace RIGHT pleural effusion   3.  No pulmonary nodules or enlarged mediastinal lymph nodes         CT-ABDOMEN-PELVIS WITH   Final Result      1.  No acute inflammation in the abdomen or pelvis. No bowel obstruction.   2.  Small, nonspecific free fluid in the pelvis.   3.  Small left pleural effusion with adjacent atelectasis.   4.  Moderate hiatal hernia.      DX-CHEST-PORTABLE (1 VIEW)   Final Result      Mild blunted left costophrenic angle could be atelectasis/scarring and/or trace left pleural effusion.           Assessment/Plan  Problem Representation:    #Iron deficiency secondary to Invasive Adenocarcinoma GI Bleed  Patent has iron of 19 and microcytic anemia (MCV 75.1) due to bleeding from deeply ulcerated invasive adenocarcinoma. His NSAID use, malnutrition, and poor GI absorption have contributed to his presentation. Patient has had poor appetite and 25lb unintentional weight loss over the last 6 months. Patient is taking up to 1800mg of Ibuprofen 4 days a week for back pain.     Patient's conjunctivae are not pale anymore. Patient's skin is less pale than two days ago  8/18/23.      -Med Oncology and Surgery Oncology consults occurred 8/20/23  -f/u with oncology treatment outpatient for invasive adenocarcinoma  -Hb is improved at 9.0  -eat solids and maintain nutrition  -stop NSAID and anticoagulation use  -switch to tylenol for pain control  -Omeprazole 20mg PO     #GERD  Patient was taking excessive NSAIDs without food. Poor diet.  -Omeprazole 20mg PO BID   -Carafate    #HTN  Patient not currently hypertensive. Has not taken lisniporil for a couple of weeks.  -Resume lisinopril home dose  -monitor BP at home      VTE prophylaxis: pharmacologic prophylaxis contraindicated due to Upper GI Bleed (Adenocarcinoma)    I have performed a physical exam and reviewed and updated ROS and Plan today (8/20/2023). In review of yesterday's note (8/19/2023), there are no changes except as documented above.

## 2023-08-20 NOTE — PROGRESS NOTES
Patient seen this morning, will drop off my card to discuss outpatient management of his esophagus cancer.  He already has an consultation with Dr. Triana from medical oncology and I am recommending he see the radiation oncology team if he is here tomorrow.  Otherwise we can arrange for all this as an outpatient.  No surgical intervention needed while on an inpatient.  We will sign off please call with any issues.

## 2023-08-20 NOTE — CONSULTS
Date of Service  August 20, 2023     Reason for Consult: Mass in distal esophagus    Requested by: Elli Sanchez MD    Location: [facility]    Chief Complaint  Constipation (X 3 days. Last bowel movement was 3 days ago. Denies bloody/black/dark stool. But states that he noticed dark-brownish with green tinge stool color x 3 weeks. Denies vomiting blood.) and Other (Got a call from med tech an hour ago and was told that he has 5.8 and recommended to go to ER for further evaluation)        HPI: Patient is a 57 year old male with history of diabetes and obesity with history of obesity and hypertension who presented to the emergency room on August 17 for work irregular bowel movements.  He he had noted he anemia and presented to the ER with a hemoglobin of 5.1 and 1 unit of packed red blood cells was transfused.  Patient has had a little bit of weight loss.  He has no obvious dysphagia.  He states he suffers from GERD for a significant amount of time of his life.  He does take Motrin 3-4 times a week for what appears to be arthritic pain.  He is not a drinker and is not a smoker.  He has no family history of any malignancies.  The patient was significantly anemic and presently is on Carafate in the hospital.  He is on antacids.  He is here to discuss neck steps.  He has already seen Dr. Triana from medical oncology.  He has not seen radiation oncology.  We were consulted as surgical oncology.  The patient works as a .          Past Medical History  Past Medical History:   Diagnosis Date    Hypertension     Obesity (BMI 30-39.9) 4/19/2016       Past Surgical History  Past Surgical History:   Procedure Laterality Date    SC UPPER GI ENDOSCOPY,DIAGNOSIS N/A 8/18/2023    Procedure: GASTROSCOPY;  Surgeon: Kale Patel M.D.;  Location: SURGERY SAME DAY [facility];  Service: Gastroenterology    SC UPPER GI ENDOSCOPY,BIOPSY N/A 8/18/2023    Procedure: GASTROSCOPY, WITH BIOPSY;  Surgeon: Kale Patel M.D.;   Location: SURGERY SAME DAY [facility];  Service: Gastroenterology    EYE SURGERY  2015       Current Medications: Motrin, look at med list       Allergies:   No Known Allergies    Problem List  Principal Problem:    Iron deficiency Anemia secondary to acute GI bleeding (POA: Unknown)  Active Problems:    Hypertension (Chronic) (POA: Yes)    GERD (gastroesophageal reflux disease) (Chronic) (POA: Yes)  Resolved Problems:    Acute blood loss anemia (POA: Yes)       Subjective  Review of Systems   Gastrointestinal:  Positive for abdominal pain and heartburn.   All other systems reviewed and are negative.        Objective  Temp:  [36.6 °C (97.8 °F)-37.2 °C (98.9 °F)] 36.8 °C (98.3 °F)  Pulse:  [63-97] 97  Resp:  [16] 16  BP: (105-138)/(57-85) 130/85  SpO2:  [91 %-95 %] 94 %      Physical Exam  Vitals and nursing note reviewed.   Constitutional:       Appearance: Normal appearance.   HENT:      Head: Normocephalic.      Mouth/Throat:      Mouth: Mucous membranes are moist.      Pharynx: Oropharynx is clear.   Eyes:      Extraocular Movements: Extraocular movements intact.      Conjunctiva/sclera: Conjunctivae normal.      Pupils: Pupils are equal, round, and reactive to light.   Cardiovascular:      Rate and Rhythm: Normal rate and regular rhythm.      Pulses: Normal pulses.      Heart sounds: Normal heart sounds.   Pulmonary:      Effort: Pulmonary effort is normal.      Breath sounds: Normal breath sounds.   Abdominal:      General: Abdomen is flat. Bowel sounds are normal.      Palpations: Abdomen is soft.   Musculoskeletal:         General: Normal range of motion.      Cervical back: Normal range of motion and neck supple.   Skin:     General: Skin is warm and dry.   Neurological:      General: No focal deficit present.      Mental Status: He is alert and oriented to person, place, and time. Mental status is at baseline.   Psychiatric:         Mood and Affect: Mood normal.         Behavior: Behavior normal.          Thought Content: Thought content normal.         Judgment: Judgment normal.          Fluids    Intake/Output Summary (Last 24 hours) at 8/20/2023 0753  Last data filed at 8/19/2023 1929  Gross per 24 hour   Intake 300 ml   Output 250 ml   Net 50 ml         Labs  Lab Results   Component Value Date/Time    WBC 14.0 (H) 08/20/2023 02:20 AM    RBC 4.02 (L) 08/20/2023 02:20 AM    HEMOGLOBIN 9.0 (L) 08/20/2023 02:20 AM    HEMATOCRIT 30.3 (L) 08/20/2023 02:20 AM    MCV 75.4 (L) 08/20/2023 02:20 AM    MCH 22.4 (L) 08/20/2023 02:20 AM    MCHC 29.7 (L) 08/20/2023 02:20 AM    MPV 10.6 08/20/2023 02:20 AM    NEUTSPOLYS 72.30 (H) 08/20/2023 02:20 AM    LYMPHOCYTES 12.50 (L) 08/20/2023 02:20 AM    MONOCYTES 11.30 08/20/2023 02:20 AM    EOSINOPHILS 1.10 08/20/2023 02:20 AM    BASOPHILS 1.10 08/20/2023 02:20 AM    HYPOCHROMIA 1+ 08/18/2023 12:41 AM    ANISOCYTOSIS 1+ 08/18/2023 12:41 AM        Lab Results   Component Value Date/Time    SODIUM 137 08/19/2023 09:35 AM    POTASSIUM 4.3 08/19/2023 09:35 AM    CHLORIDE 106 08/19/2023 09:35 AM    CO2 22 08/19/2023 09:35 AM    GLUCOSE 111 (H) 08/19/2023 09:35 AM    BUN 5 (L) 08/19/2023 09:35 AM    CREATININE 0.81 08/19/2023 09:35 AM        Lab Results   Component Value Date/Time    PROTHROMBTM 14.3 08/16/2023 05:29 PM    INR 1.13 08/16/2023 05:29 PM        Recent Labs     08/16/23  1034 08/16/23  1729   ASTSGOT 11* 12   ALTSGPT 5 8   TBILIRUBIN 0.3 0.2   GLOBULIN 3.0 2.7   INR  --  1.13       Imaging  CT scan chest and abdomen showed a large mass in the hiatal hernia.  Difficult to assess whether this is a gastric in origin versus esophagus.  May need an endoscopic ultrasound to define that.  CT of the chest is negative for any metastatic disease along with a CT of the abdomen.    Pathology  Adenocarcinoma  Principal Problem:    Iron deficiency Anemia secondary to acute GI bleeding (POA: Unknown)  Active Problems:    Hypertension (Chronic) (POA: Yes)    GERD (gastroesophageal reflux  disease) (Chronic) (POA: Yes)  Resolved Problems:    Acute blood loss anemia (POA: Yes)       Assessment and Plan  Patient is a 57-year-old male with what appears to be a GE junction/Siewert 1/2 adenocarcinoma which will need upfront neoadjuvant chemoradiotherapy.  He is able to eat and has no dysphagia at this time.  He has had some weight loss.  Patient will need a port as an outpatient and we will set that up when we see him in the office next week.  We will also put the referral in for radiation oncology with Dr. Woods and he is already seeing Dr. Triana from medical oncology.  I had a long discussion with the patient regarding the plan about upfront neoadjuvant chemoradiotherapy followed by restaging followed by possible esophagectomy.  I did explain to him he will need a PowerPort before surgery and before chemotherapy and radiation.  He has agreed to above plan and will see me in 2 weeks..        Medications Motrin at times  Imaging will need an outpatient PET scan  Procedures will need a port

## 2023-08-20 NOTE — PROGRESS NOTES
Patient's IV on right anterior forearm infiltrated, patient requesting to wait to replace IV until consulting with oncology and surgery. MD notified.

## 2023-08-20 NOTE — CARE PLAN
The patient is Stable - Low risk of patient condition declining or worsening    Shift Goals  Clinical Goals: monitor H/H  Patient Goals: comfort, rest  Family Goals: ANUPAM    Progress made toward(s) clinical / shift goals:   Problem: Knowledge Deficit - Standard  Goal: Patient and family/care givers will demonstrate understanding of plan of care, disease process/condition, diagnostic tests and medications  Outcome: Progressing   Patient understands plan of care.  Problem: Communication  Goal: The ability to communicate needs accurately and effectively will improve  Outcome: Progressing  Patient able to state needs as necessary     Problem: Hemodynamics  Goal: Patient's hemodynamics, fluid balance and neurologic status will be stable or improve  Outcome: Progressing  H/H remaining stable, denies SOB, dizziness.

## 2023-08-20 NOTE — CONSULTS
Hematology/Oncology Consultation    Date of consultation: 8/20/2023 11:23 AM  Referring provider: DR Ferrara  Primary Inpatient Service:   Primary Hematologist/Oncologist: Kong    Reason for consultation: GE tumor    HPI:  Tommy is a 57 y.o. male with a PMHx history of hypertension who came to [facility] on 8/16/2023 with increased fatigue and weakness.  This has been building up over the last 6 months.  Patient has had 30 pound unintentional weight loss.  He has had fatigue.  He is a exertional fatigue.  He says his stools were may be darker but nothing black.  He said that actually had some hint of green in them.  He was started to have more issues with not sleeping well.  He also was having some mild back pain.  He started taking ibuprofen.  The patient had been seen in urgent care prior to come to the hospital.  He was advised to go to the hospital because of his low hemoglobin.  His hemoglobin was 5.1.  He is required several units of blood.  He is on a PPI.  He did get a upper endoscopy by gastroenterology. This was done on 8/18/2023.  This showed a mass in the distal esophagus starting at 34 cm from the incisor into the beginning of the cardia.  The biopsy came back consistent with adenocarcinoma.  This was seen in the distal esophagus as well as the stomach cardia.  The common and the distal esophagus there was invasive adenocarcinoma with some areas of moderate and poorly differentiated while the stomach cardia biopsy tissue predominantly nonviable with some diffuse nature.  There was some scattered signet ring.    The patient has since had CT scans with no obvious metastatic disease.  He does have a hiatal hernia.    His wife is at the bedside.  The third-year medical student is in the room as well.      Hematology/Oncology History:  Oncology History    No history exists.        PMH:    Past Medical History:   Diagnosis Date    Hypertension     Obesity (BMI 30-39.9) 4/19/2016       PSH:    Past  Surgical History:   Procedure Laterality Date    [place] UPPER GI ENDOSCOPY,DIAGNOSIS N/A 8/18/2023    Procedure: GASTROSCOPY;  Surgeon: Kale Patel M.D.;  Location: SURGERY SAME DAY [facility];  Service: Gastroenterology    [place] UPPER GI ENDOSCOPY,BIOPSY N/A 8/18/2023    Procedure: GASTROSCOPY, WITH BIOPSY;  Surgeon: Kale Patel M.D.;  Location: SURGERY SAME DAY [facility];  Service: Gastroenterology    EYE SURGERY  2015       Allergies:    Patient has no known allergies.    Medications:    Current Facility-Administered Medications   Medication Dose Route Frequency Provider Last Rate Last Admin    omeprazole (PriLOSEC) capsule 20 mg  20 mg Oral BID Elli Sanchez M.D.   20 mg at 08/20/23 0920    senna-docusate (Pericolace Or Senokot S) 8.6-50 MG per tablet 2 Tablet  2 Tablet Oral BID Cesar Appiah M.D.   2 Tablet at 08/20/23 0524    And    polyethylene glycol/lytes (Miralax) PACKET 1 Packet  1 Packet Oral QDAY LINDA Appiah M.D.        And    magnesium hydroxide (Milk Of Magnesia) suspension 30 mL  30 mL Oral QDAY LINDA Appiah M.D.        And    bisacodyl (Dulcolax) suppository 10 mg  10 mg Rectal QDAY LINDA Appiah M.D.        sucralfate (Carafate) 1 GM/10ML suspension 1 g  1 g Oral Q6HRS Cesar Appiah M.D.   1 g at 08/20/23 0524       Social History:     Social History     Socioeconomic History    Marital status:      Spouse name: Not on file    Number of children: Not on file    Years of education: Not on file    Highest education level: Not on file   Occupational History    Not on file   Tobacco Use    Smoking status: Never    Smokeless tobacco: Never   Vaping Use    Vaping Use: Never used   Substance and Sexual Activity    Alcohol use: Not Currently    Drug use: No    Sexual activity: Yes     Partners: Female   Other Topics Concern    Not on file   Social History Narrative    Works in security      Social Determinants of Health     Financial Resource Strain: Not on file   Food  "Insecurity: Not on file   Transportation Needs: Not on file   Physical Activity: Not on file   Stress: Not on file   Social Connections: Not on file   Intimate Partner Violence: Not on file   Housing Stability: Not on file       Family History:     Family History   Problem Relation Age of Onset    Diabetes Mother     Stroke Mother        Review of Systems:  Review of Systems   Constitutional:  Positive for malaise/fatigue and weight loss. Negative for chills and fever.   HENT:  Negative for congestion, ear discharge, ear pain, hearing loss, nosebleeds and tinnitus.    Eyes:  Negative for blurred vision, double vision, photophobia and pain.   Respiratory:  Negative for cough, hemoptysis and sputum production.    Cardiovascular:  Negative for chest pain, palpitations, orthopnea and claudication.   Gastrointestinal:  Positive for abdominal pain, heartburn and nausea. Negative for blood in stool, constipation, diarrhea and vomiting.   Genitourinary:  Negative for dysuria, frequency and urgency.   Musculoskeletal:  Negative for myalgias and neck pain.   Skin:  Negative for rash.   Neurological:  Negative for dizziness, tingling and headaches.   Endo/Heme/Allergies:  Does not bruise/bleed easily.   Psychiatric/Behavioral:  The patient is nervous/anxious.         Vitals:     /85   Pulse 97   Temp 36.8 °C (98.3 °F) (Temporal)   Resp 16   Ht 1.93 m (6' 4\")   Wt 98.3 kg (216 lb 11.4 oz)   SpO2 94%   BMI 26.38 kg/m²     Physical Exam:  Physical Exam  Constitutional:       Appearance: Normal appearance.   Eyes:      Extraocular Movements: Extraocular movements intact.      Conjunctiva/sclera: Conjunctivae normal.   Cardiovascular:      Rate and Rhythm: Normal rate and regular rhythm.      Heart sounds: Normal heart sounds. No murmur heard.  Pulmonary:      Effort: Pulmonary effort is normal.      Breath sounds: Normal breath sounds.   Abdominal:      General: Bowel sounds are normal. There is no distension.      " Palpations: Abdomen is soft.   Musculoskeletal:         General: No swelling.   Skin:     Coloration: Skin is not jaundiced.   Neurological:      General: No focal deficit present.      Mental Status: He is alert and oriented to person, place, and time.      Cranial Nerves: No cranial nerve deficit.   Psychiatric:         Mood and Affect: Mood normal.         Behavior: Behavior normal.         Thought Content: Thought content normal.         Judgment: Judgment normal.            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  We discussed at this current time seems to favor more of an esophageal primary.  He understands that he probably will need additional staging which may include EUS and a PET/CT as an outpatient.  He will need a radiation oncologist.  It seems as though surgical oncology is already been consulted.    We talked about the natural history.  We talked about staging in general.  We reviewed his current CT scans.  We talked about potential treatment options which could include combined chemoradiation followed by surgery.  They understand all this will depend on what we find after completion of the staging.  We discussed some goals of therapy but they understands a little early but aggressive treatment is still the plan.    I answered all their questions.  They will let me know if is anything further currently but I will see him in the clinic.    I discussed with the primary team and they will be happy to help set up the PET/CT as well as a radiation oncology consultation if needed.  The patient will be given the phone number for my office to call on Tuesday.      Thank you for allowing me to participate in his care. Please do not hesitate to reach out with any questions.    Please note that this dictation was created using voice recognition  software. I have made every reasonable attempt to correct obvious errors, but I expect that there are errors of grammar and possibly content that I did not discover before finalizing the note.      Alfonzo Triana MD  Hematologist/Oncologist  [facility]

## 2023-08-20 NOTE — PROGRESS NOTES
PET/CT scan and Rad Onc referral made.  PET/CT scan scheduled for 8/30 and pt to follow up with Dr. Cagle on 8/22 @ 2:30 in the office to discuss port placement.

## 2023-08-22 PROBLEM — R13.19 ESOPHAGEAL DYSPHAGIA: Status: ACTIVE | Noted: 2023-01-01

## 2023-08-22 PROBLEM — R63.4 WEIGHT LOSS, ABNORMAL: Status: ACTIVE | Noted: 2023-01-01

## 2023-08-22 PROBLEM — K22.89 ESOPHAGEAL MASS: Status: ACTIVE | Noted: 2023-01-01

## 2023-08-22 NOTE — TELEPHONE ENCOUNTER
I called Pt and LVM stating that we had a 1 PM cancellation and if he is available to come in at that time. I did let him know that if he can't make it by that time we will keep his appointment for 2:30 as it was scheduled.

## 2023-08-24 NOTE — TELEPHONE ENCOUNTER
[facility] have not been able to get a hold of pt. I spoke with Tommy and informed him to call [phone] to schedule an appointment for an EUS.

## 2023-08-29 NOTE — TELEPHONE ENCOUNTER
Lft msg for patient to call [facility] [phone] to schedule apptmt with Dr. Triana as [facility] has not been able to get a hold of pt.

## 2023-08-31 PROBLEM — C15.5 MALIGNANT NEOPLASM OF LOWER THIRD OF ESOPHAGUS (HCC): Chronic | Status: ACTIVE | Noted: 2023-01-01

## 2023-08-31 NOTE — PROGRESS NOTES
"Patient was seen today in clinic with Dr. Woods for consult.  Vitals signs and weight were obtained and pain assessment was completed.  Allergies and medications were reviewed with the patient.       Vitals/Pain:  Vitals:    08/31/23 1003   BP: 127/80   BP Location: Right arm   Patient Position: Sitting   BP Cuff Size: Adult   Pulse: 62   Temp: 36.1 °C (97 °F)   SpO2: 100%   Weight: 99.3 kg (218 lb 14.7 oz)   Height: 1.93 m (6' 4\")   Pain Score: No pain        Allergies:   Patient has no known allergies.    Current Medications:  Current Outpatient Medications   Medication Sig Dispense Refill    sulfamethoxazole-trimethoprim (BACTRIM DS) 800-160 MG tablet Take 1 Tablet by mouth 2 times a day. 14 Tablet 0    omeprazole (PRILOSEC) 20 MG delayed-release capsule Take 1 Capsule by mouth 2 times a day. 30 Capsule 0    Multiple Vitamins-Minerals (ONE-A-DAY MENS 50+ ADVANTAGE PO) Take 1 Tablet by mouth every day.      sucralfate (CARAFATE) 1 GM/10ML Suspension Take 10 mL by mouth every 6 hours. (Patient not taking: Reported on 8/22/2023) 560 mL 0     No current facility-administered medications for this encounter.           Courtney Zacarias R.N.   "

## 2023-08-31 NOTE — CT SIMULATION
PATIENT NAME Tommy Mckeon   PRIMARY PHYSICIAN RobinsReynaldo montez [id number]   REFERRING PHYSICIAN Corby Cagle * 1965     Malignant neoplasm of lower third of esophagus (HCC)  Staging form: Esophagus - Adenocarcinoma, AJCC 8th Edition  - Clinical stage from 8/31/2023: Stage III (cT3, cN0, cM0, G3) - Signed by Michael Woods M.D. on 8/31/2023  Total positive nodes: 0  Histologic grading system: 3 grade system         Treatment Planning CT Simulation        Order Questions       Question Answer Comment    Is this for a new course of treatment? Yes     Is this an Addendum? No     Implanted Device/Pacemaker No     Simulation Status Initial     Planned Start Date 9/18/2023     Treatment Site Esophagus     Laterality Axial     Treatment Technique IMRT     Other Technique(s) IMRT     Treatment Pattern/Frequency Daily 28 tx    Concurrent Chemotherapy Yes carbo/taxol    Ordering Provider YADIRA CLAROS     CT Technique 3D      4D     Slice Thickness 3mm     Scan Extent Chest     Contrast Esophageal     Treatment Device(s) Vac Melodie      Wing Board     Patient Attire Gown     Patient Position Supine     Patient Orientation Head First     Arm Position Up     Chin Position Neutral     Bladder No preference     Treatment Machine No preference     Treatment Image Guidance CBCT     Frequency (CBCT) Daily     Image Guidance Match PTV - Soft Tissue     Treatment Planning Image Fusion CT/PET     Other Orders Special Tx Procedure      Weekly Physics Check

## 2023-08-31 NOTE — CONSULTS
RADIATION ONCOLOGY CONSULT    DATE OF SERVICE: 8/31/2023    IDENTIFICATION: A 57 y.o. male with   Visit Diagnoses     ICD-10-CM   1. Malignant neoplasm of lower third of esophagus (HCC)  C15.5     Malignant neoplasm of lower third of esophagus (HCC)  Staging form: Esophagus - Adenocarcinoma, AJCC 8th Edition  - Clinical stage from 8/31/2023: Stage III (cT3, cN0, cM0, G3) - Signed by Michael Woods M.D. on 8/31/2023  Total positive nodes: 0  Histologic grading system: 3 grade system    He is here at the kind request of Dr. Samuel      HISTORY OF PRESENT ILLNESS:   Is a pleasant 57-year-old male who originally presented with 30 pound weight loss syncopal episode due to severe anemia and went to the ER from the urgent care and underwent CT abdomen pelvis August 16, 2023 which showed moderate hiatal hernia.  Patient had EGD by Dr. Patel 8/18/23 which showed mass lesion in distal esophagus beginning at 34 cm from incisors going into the stomach cardia 42 cm biopsy showing moderate to poorly differentiated invasive adenocarcinoma.  Patient had CT chest August 18, 2023 which showed hiatal hernia with wall thickening in the distal thoracic esophagus and portion of herniating stomach which would be in keeping with the provided clinical history.  Patient had CT pancreas August 19, 2023 which showed distal esophageal proximal gastric mass consistent with malignancy.  Patient had PET CT scan August 30, 2023 which showed heterogeneous hypermetabolic mass in distal esophagus/cardia consistent with known malignancy patient underwent EUS August 31, 2023 by Dr. Fenton which showed clinical uT3N0 Siewert 2.  Overall patient doing well and eating mostly solid foods with little difficulty but does have 30 pound weight loss over the last 3 months.    PAST MEDICAL HISTORY:  Past Medical History:   Diagnosis Date    GERD (gastroesophageal reflux disease)     Hypertension     Obesity (BMI 30-39.9) 04/19/2016       PAST SURGICAL  HISTORY:  Past Surgical History:   Procedure Laterality Date    [place] UPPER GI ENDOSCOPY,DIAGNOSIS N/A 8/18/2023    Procedure: GASTROSCOPY;  Surgeon: Kale Patel M.D.;  Location: SURGERY SAME DAY [facility];  Service: Gastroenterology    [place] UPPER GI ENDOSCOPY,BIOPSY N/A 8/18/2023    Procedure: GASTROSCOPY, WITH BIOPSY;  Surgeon: Kale Patel M.D.;  Location: SURGERY SAME DAY [facility];  Service: Gastroenterology    EYE SURGERY  2015       CURRENT MEDICATIONS:  Current Outpatient Medications   Medication Sig Dispense Refill    sulfamethoxazole-trimethoprim (BACTRIM DS) 800-160 MG tablet Take 1 Tablet by mouth 2 times a day. 14 Tablet 0    omeprazole (PRILOSEC) 20 MG delayed-release capsule Take 1 Capsule by mouth 2 times a day. 30 Capsule 0    Multiple Vitamins-Minerals (ONE-A-DAY MENS 50+ ADVANTAGE PO) Take 1 Tablet by mouth every day.      sucralfate (CARAFATE) 1 GM/10ML Suspension Take 10 mL by mouth every 6 hours. (Patient not taking: Reported on 8/22/2023) 560 mL 0     No current facility-administered medications for this encounter.       ALLERGIES:    Patient has no known allergies.    FAMILY HISTORY:    family history includes Diabetes in his mother; Stroke in his mother.    SOCIAL HISTORY:     reports that he has never smoked. He has never used smokeless tobacco. He reports that he does not currently use alcohol. He reports that he does not use drugs. He states he lives in [place] with his spouse, Grace. He is employed in security.     REVIEW OF SYSTEMS:  A review of systems for today's date of service was reviewed and uploaded into the electronic medical record.      PHYSICAL EXAM:    ECOG PERFORMANCE STATUS:      8/31/2023    10:08 AM   ECOG Performance Review   ECOG Performance Status Fully active, able to carry on all pre-disease performance without restriction         8/31/2023    10:08 AM   Karnofsky Score   Karnofsky Score 90     /80 (BP Location: Right arm, Patient Position: Sitting, BP Cuff  "Size: Adult)   Pulse 62   Temp 36.1 °C (97 °F)   Ht 1.93 m (6' 4\")   Wt 99.3 kg (218 lb 14.7 oz)   SpO2 100%   BMI 26.65 kg/m²   Physical Exam  Vitals reviewed.   HENT:      Head: Normocephalic.   Eyes:      Pupils: Pupils are equal, round, and reactive to light.   Cardiovascular:      Rate and Rhythm: Normal rate.   Pulmonary:      Effort: Pulmonary effort is normal.   Abdominal:      General: Abdomen is flat.   Musculoskeletal:         General: Normal range of motion.   Neurological:      General: No focal deficit present.      Mental Status: He is alert.   Psychiatric:         Mood and Affect: Mood normal.          LABORATORY DATA:   Lab Results   Component Value Date/Time    WBC 14.0 (H) 08/20/2023 0220    WBC 14.9 (H) 08/19/2023 0935    WBC 6.8 08/18/2023 0041    HEMOGLOBIN 9.0 (L) 08/20/2023 0220    HEMOGLOBIN 8.5 (L) 08/19/2023 0935    HEMOGLOBIN 8.6 (L) 08/18/2023 2118    HEMATOCRIT 30.3 (L) 08/20/2023 0220    HEMATOCRIT 28.7 (L) 08/19/2023 0935    HEMATOCRIT 30.0 (L) 08/18/2023 2118    MCV 75.4 (L) 08/20/2023 0220    MCV 74.9 (L) 08/19/2023 0935    MCV 75.1 (L) 08/18/2023 0041    PLATELETCT 511 (H) 08/20/2023 0220    PLATELETCT 490 (H) 08/19/2023 0935    PLATELETCT 452 (H) 08/18/2023 0041    NEUTS 10.14 (H) 08/20/2023 0220    NEUTS 11.05 (H) 08/19/2023 0935    NEUTS 4.78 08/16/2023 1729      Lab Results   Component Value Date/Time    SODIUM 137 08/19/2023 0935    SODIUM 139 08/18/2023 0041    SODIUM 142 08/16/2023 1729    POTASSIUM 4.3 08/19/2023 0935    POTASSIUM 3.9 08/18/2023 0041    POTASSIUM 3.0 (L) 08/16/2023 1729    BUN 5 (L) 08/19/2023 0935    BUN 12 08/18/2023 0041    BUN 17 08/16/2023 1729    CREATININE 0.81 08/19/2023 0935    CREATININE 0.73 08/18/2023 0041    CREATININE 0.91 08/16/2023 1729    CALCIUM 8.6 08/19/2023 0935    CALCIUM 8.5 08/18/2023 0041    CALCIUM 9.2 08/16/2023 1729    ALBUMIN 3.7 08/16/2023 1729    ALBUMIN 3.6 08/16/2023 1034    ALBUMIN 4.2 01/28/2020 0923    ASTSGOT 12 " 08/16/2023 1729    ASTSGOT 11 (L) 08/16/2023 1034    ASTSGOT 31 01/28/2020 0923    ALKPHOSPHAT 45 08/16/2023 1729    ALKPHOSPHAT 45 08/16/2023 1034    ALKPHOSPHAT 29 (L) 01/28/2020 0923    IFNOTAFR >60 04/19/2021 0704    IFNOTAFR >60 10/12/2020 1252    IFNOTAFR >60 01/28/2020 0923       RADIOLOGY DATA:  [id number] BASE TO MID-THIGH    Result Date: 8/30/2023 8/30/2023 1:19 PM HISTORY/REASON FOR EXAM:  Esophageal cancer, staging. TECHNIQUE/EXAM DESCRIPTION AND NUMBER OF VIEWS: PET body imaging. Initially, 10.69 mCi F-18 FDG was administered intravenously under standardized conditions. Approximately 45 minutes after FDG administration, the patient was placed in the supine position on the PET CT table. Blood glucose level was 83 mg/dL. Low dose spiral CT imaging was performed from the skull apex to the mid thighs. PET imaging was then performed from the skull apex to the mid thighs. CT images, PET images, and PET/CT fused images were reviewed on a PACS 3D workstation. The limited non-contrast CT data are used primarily for attenuation correction and anatomic correlation.  Evaluation of solid organs and bowel are especially limited utilizing this technique. COMPARISON: CT chest 8/19/2023, CT abdomen and pelvis 8/16/2023 FINDINGS: Head and neck: No abnormal focal FDG activity. Chest: Heterogeneous increased activity within the distal esophageal mass extending to the gastric cardia, max SUV 11.99.  No focal abnormal activity in the lungs. Abdomen and pelvis: No abnormal focal FDG activity. Musculoskeletal: Mild diffusely increased marrow activity. Incidental findings on CT: Multilobular distal esophageal mass extending to the gastric cardia.     1.  Heterogeneous hypermetabolic mass in the distal esophagus/gastric cardia consistent with known malignancy. 2.  No evidence for metastatic disease.    CT-PANCREAS AND ABDOMEN WITH & W/O    Result Date: 8/19/2023 8/19/2023 5:15 PM HISTORY/REASON FOR EXAM:  Abdominal mass,  intra-abdominal neoplasm suspected; Abdominal pain, weight loss; New dx of invasive adenocarcinoma by EGD and bx, order per Dr. Cagle. TECHNIQUE/EXAM DESCRIPTION:  CT pancreas and abdomen without and with contrast. Initial precontrast thick-section images were obtained through the pancreas.  Following this, nonionic contrast was administered by IV, and thin-section helical scanning performed from the diaphragmatic domes through the iliac crests.  Pancreatic parenchymal phase and portal venous phase (dual-phase) images were obtained following contrast administration. 100 mL of Omnipaque 350 nonionic contrast was administered. Low dose optimization technique was utilized for this CT exam including automated exposure control and adjustment of the mA and/or kV according to patient size. COMPARISON: CT abdomen and pelvis 8/16/2023 FINDINGS: LUNGS: There is a small left pleural effusion and there is mild left basilar atelectasis.. Osseous structures:  No significant finding. Abdomen: There is significant thickening of the distal esophagus and proximal stomach consistent with known malignancy. Length is at least 6.5 cm and maximal transverse dimension is 5.4 cm. Impression No adenopathy is identified in the upper abdomen. LIVER: is normal in appearance. GALLBLADDER and BILIARY SYSTEM The gallbladder is normal in CT appearance. There is no biliary dilation. SPLEEN: Normal in appearance. PANCREAS: The pancreas is normal in appearance. The splenic vein and portal vein enhance normally. ADRENAL glands: Normal in appearance. RIGHT kidney: Normal in appearance. LEFT kidney: Normal in appearance. There is no hydronephrosis. BOWEL and MESENTERY: Bowel and mesentery are normal in appearance.. AORTA and VASCULATURE: Normal in appearance..     1.  Distal esophageal/proximal gastric mass consistent with known malignancy 2.  No local or distant metastases identified 3.  Small left pleural effusion and mild left basilar  atelectasis     CT-CHEST (THORAX) WITH    Result Date: 8/19/2023 8/19/2023 5:15 PM HISTORY/REASON FOR EXAM:  New dx of invasive adenocarcinoma by EGD and bx, order per Dr. Cagle. TECHNIQUE/EXAM DESCRIPTION: CT scan of the chest with contrast. Thin-section helical images were obtained from the lung apices through the adrenal glands following the bolus administration of contrast. 100 mL of Omnipaque 350 nonionic contrast was utilized. Low dose optimization technique was utilized for this CT exam including automated exposure control and adjustment of the mA and/or kV according to patient size. COMPARISON:  Chest CT 8/18/2023 FINDINGS: Lungs: LEFT lower lobe atelectasis overlying a pleural effusion. Mediastinum/Jessa: No significant adenopathy. Pleura: Normal left pleural effusion and questionable trace right pleural effusion. Cardiac: Heart normal in size without pericardial effusion. Vascular: Unremarkable. Soft tissues: Moderate hiatal hernia. There is wall thickening within the herniated stomach extending to the gastroesophageal junction and distal esophagus. There is some adjacent mediastinal fluid.. Bones: No acute or destructive process.     1.  Hiatal hernia with wall thickening in the distal thoracic esophagus and portion of the herniated stomach which is in keeping with history of adenocarcinoma 2.  Small left pleural effusion 3.  No pulmonary nodules or enlarged mediastinal lymph nodes Fleischner Society pulmonary nodule recommendations: Not Applicable     CT-CHEST (THORAX) WITH & W/O    Result Date: 8/18/2023 8/18/2023 3:09 PM HISTORY/REASON FOR EXAM:  DISTAL ESOPHAGEAL MASS LESION CROSSING INTO CARDIA STOMACH ON ENDOSCOPY. TECHNIQUE/EXAM DESCRIPTION: CT scan of the chest without and with contrast. Initial precontrast thin-section helical images were obtained through the radiographic abnormality. Following this, postcontrast thin-section axial images were obtained from the lung apices through the  adrenal glands. 75 mL of Omnipaque 350 nonionic contrast was administered IV without complication. Low dose optimization technique was utilized for this CT exam including automated exposure control and adjustment of the mA and/or kV according to patient size. COMPARISON: CT of the abdomen and pelvis from 8/16/2023 FINDINGS: Lungs: LEFT lower lobe atelectasis overlying a pleural effusion. Mediastinum/Jessa: No significant adenopathy. Pleura: Small. Cardiac: Heart normal in size without pericardial effusion. Vascular: Unremarkable. Soft tissues: Large hiatal hernia. There is some wall thickening within the herniated stomach extending to the gastroesophageal junction. There is some adjacent mediastinal fluid.. Bones: No acute or destructive process.     1.  Large hiatal hernia with wall thickening in the distal thoracic esophagus and portion of the herniated stomach which would be in keeping with the provided clinical history small LEFT pleural effusion 2.  Trace RIGHT pleural effusion 3.  No pulmonary nodules or enlarged mediastinal lymph nodes     CT-ABDOMEN-PELVIS WITH    Result Date: 8/16/2023 8/16/2023 7:00 PM HISTORY/REASON FOR EXAM:  IV contrast only. Constipation. TECHNIQUE/EXAM DESCRIPTION:   CT scan of the abdomen and pelvis with contrast. Contrast-enhanced helical scanning was obtained from the diaphragmatic domes through the pubic symphysis following the bolus administration of nonionic contrast without complication. 100 mL of Omnipaque 350 nonionic contrast was administered without complication. Low dose optimization technique was utilized for this CT exam including automated exposure control and adjustment of the mA and/or kV according to patient size. COMPARISON: No prior studies available. FINDINGS: Lower Chest: Small left pleural effusion with adjacent atelectasis.. Liver: Normal. Spleen: Unremarkable. Pancreas: Unremarkable. Gallbladder: No calcified stones. Biliary: Nondilated. Adrenal glands:  Normal. Kidneys: No hydronephrosis. Small, simple left renal cyst does not require follow-up. Bowel: Moderate hiatal hernia. No obstruction or acute inflammation. Normal appendix. Lymph nodes: No adenopathy. Vasculature: Unremarkable. Peritoneum: Unremarkable without ascites. Musculoskeletal: No acute or destructive process. Pelvis: Small, nonspecific free fluid.     1.  No acute inflammation in the abdomen or pelvis. No bowel obstruction. 2.  Small, nonspecific free fluid in the pelvis. 3.  Small left pleural effusion with adjacent atelectasis. 4.  Moderate hiatal hernia.    DX-CHEST-PORTABLE (1 VIEW)    Result Date: 8/16/2023 8/16/2023 7:14 PM HISTORY/REASON FOR EXAM:  Blood in vomit or stool or dark tarry stool. TECHNIQUE/EXAM DESCRIPTION AND NUMBER OF VIEWS: Single portable view of the chest. COMPARISON: 6/14/2018 FINDINGS: Cardiomediastinal silhouette is stable. Mild left basilar opacity possibly atelectasis/scarring given the similarity to prior peritonitis with trace left pleural effusion. No acute osseous abnormality.     Mild blunted left costophrenic angle could be atelectasis/scarring and/or trace left pleural effusion.    [id number] VIEW    Result Date: 8/16/2023 8/16/2023 8:50 AM HISTORY/REASON FOR EXAM:  Gastrointestinal Complaint; Constipation. TECHNIQUE/EXAM DESCRIPTION AND NUMBER OF VIEWS:  2 view(s) of the abdomen. COMPARISON: None FINDINGS: Nonobstructive bowel gas pattern. Small amount of stool throughout the colon. No signs of free air. No abnormal calcifications projecting over the field of view. No acute fracture.     Nonobstructive bowel gas pattern. Small amount of stool throughout the colon.      IMPRESSION:    A 57 y.o. with  Visit Diagnoses     ICD-10-CM   1. Malignant neoplasm of lower third of esophagus (HCC)  C15.5     Malignant neoplasm of lower third of esophagus (HCC)  Staging form: Esophagus - Adenocarcinoma, AJCC 8th Edition  - Clinical stage from 8/31/2023: Stage III (cT3,  cN0, cM0, G3) - Signed by Michael Woods M.D. on 8/31/2023  Total positive nodes: 0  Histologic grading system: 3 grade system    RECOMMENDATIONS:   We will plan to treat the patient with preoperative chemo-radiotherapy.  The patient has a Stage T3N0M0 esophagus cancer.  We talked about the general prognosis and treatment rationale.  We discussed our goal to control the tumor in the thorax, prevent regional recurrence, and hopeful help to prevent or delay metastatic progression.   We discussed the goal of facilitating complete surgical resection. We will plan 50.4 Gy in 28 fractions with concurrent chemo per Dr. Triana.     We discussed that for  adenocarcinoma pathologic complete response rate is 29%.    We discussed the treatment planning process and treatments.  We discussed the risks of treatment at length including lung injury, shortness of breath, fibrosis, pneumonitis, acute and chronic esophageal injury, heart injury, spinal cord injury which is rare, skin toxicity, chest wall injury, pain, dehydration, supplemental oxygen use or dependence, and the risk of treatment related death.  They would like to proceed forward with treatment.  We will set up CT simulation for treatment planning imaging 9/6/23 3PM.  That will consist of supine immobilization, possibly IV contrast depending on kidney function and targeting requirements, appropriate skin surface marking for radiation treatment.     We will then complete the behind the scenes planning process over several days using appropriate image fusion, target delineation, dosimetry,  checks, and treatment scheduling. They were given my contact information and encouraged to call with questions or concerns.  We will coordinate with medical oncology Dr. Triana regarding the combined starting of chemotherapy with the radiotherapy around 9/18/23.  Informed consent was signed and placed in the chart.    [facility]  through [employer] and my research coordinator Ihsan will reach out to him 9/6/23 for consent.       Thank you for the opportunity to participate in his care.  If any questions or comments, please do not hesitate in calling.    Orders Placed This Encounter    Referral to Oncology Psychosocial Screening for Distress       CC: Dr. Fenton, Dr. Patel, Dr. Triana, Dr. Samuel

## 2023-08-31 NOTE — PROGRESS NOTES
Nutrition Services: [facility] Referral  Tommy Mckeon is a 57 y.o. male with diagnosis of malignant neoplasm of lower third of esophagus. Referral received for nutrition services. Due to high volume of referrals, please allow for up to 14 days for initial RD consultation per policy. RD will attempt to see at next oncology appointment or will contact per policy for nutrition assessment.    Please contact as needed.  [phone]

## 2023-09-01 NOTE — OR NURSING
Pt transported to room via gurney. Pt ia awake, alert and oriented. Pt able to ambulate up to chair. Denies pain at this time. No episodes of nausea or vomiting. Dressing in place right chest, CDI.   1015 Pt discharged home in good and stable condition. Reviewed all discharge instructions and answered any questions. IV discontinued. Escorted downstairs via WC at 1010

## 2023-09-01 NOTE — ANESTHESIA POSTPROCEDURE EVALUATION
Patient: Tommy Mckeon    Procedure Summary     Date: 09/01/23 Room / Location: [place] [address] / SURGERY [facility]    Anesthesia Start: 0735 Anesthesia Stop:     Procedure: RIGHT CEPHALIC PORT PLACEMENT (Right: Chest) Diagnosis: (ESOPHAGUS CANCER)    Surgeons: Corby Cagle M.D. Responsible Provider: Jere Emerson M.D.    Anesthesia Type: general ASA Status: 3          Final Anesthesia Type: general  Last vitals  BP   Blood Pressure: 112/68    Temp   36.2 °C (97.1 °F)    Pulse   70   Resp   14    SpO2   92 %      Anesthesia Post Evaluation    Patient location during evaluation: PACU  Patient participation: complete - patient participated  Level of consciousness: awake and alert  Pain score: 2    Airway patency: patent  Anesthetic complications: no  Cardiovascular status: hemodynamically stable  Respiratory status: acceptable  Hydration status: euvolemic    PONV: none          No notable events documented.     Nurse Pain Score: 0 (NPRS)

## 2023-09-01 NOTE — ANESTHESIA PREPROCEDURE EVALUATION
Case: [id number] Date/Time: 09/01/23 0715    Procedure: RIGHT CEPHALIC PORT PLACEMENT    Pre-op diagnosis: ESOPHAGUS CANCER    Location: [facility] OR 10 / SURGERY [facility]    Surgeons: Corby Cagle M.D.          Relevant Problems   CARDIAC   (positive) Hypertension      GI   (positive) GERD (gastroesophageal reflux disease)       Physical Exam    Airway   Mallampati: II  TM distance: >3 FB  Neck ROM: full       Cardiovascular - normal exam  Rhythm: regular  Rate: normal  (-) murmur     Dental - normal exam           Pulmonary - normal exam  Breath sounds clear to auscultation     Abdominal    Neurological - normal exam                 Anesthesia Plan    ASA 3   ASA physical status 3 criteria: other (comment)    Plan - general       Airway plan will be LMA    (ESOPHAGUS CANCER)      Induction: intravenous    Postoperative Plan: Postoperative administration of opioids is intended.    Pertinent diagnostic labs and testing reviewed    Informed Consent:    Anesthetic plan and risks discussed with patient.    Use of blood products discussed with: patient whom consented to blood products.

## 2023-09-01 NOTE — OR NURSING
0825 Pt arrived from OR via [facility]. Report given by anesthesia and RN. Sleeping, perrla, opa, 6L mask even non labored breathing. Lungs clear airway patent. SR. Skin pink warm dry. Piv patent. R chest dressing cdi, no drainage, no hematoma. Glasses in chart    0835 no changes.     0835 updated Grace, spouse.     0850 awake, gag reflex intact, opa removed. Spontaneous even non labored breathing.     0851 clear speech, follows commands, DENIES pain, nausea, chest pain, sob. R chest dressing cdi, no changes.     0914 awake alert, denies pain and nausea. Updated Grace, spouse.    0931 sitting up. Denies pain and nausea.     0936 updated Grace, spouse.     0939 report given to Cass SENIOR, phase 2 rm 22. Ready for transfer to phase 2

## 2023-09-01 NOTE — ANESTHESIA TIME REPORT
Anesthesia Start and Stop Event Times     Date Time Event    9/1/2023 0715 Ready for Procedure     0735 Anesthesia Start     0827 Anesthesia Stop        Responsible Staff  09/01/23    Name Role Begin End    Jere Emerson M.D. Anesth 0735 0832        Overtime Reason:  no overtime (within assigned shift)    Comments:

## 2023-09-01 NOTE — DISCHARGE INSTRUCTIONS
Surgery Discharge Instructions    Tommy Mckeon   Age: 57 y.o.   : 1965    Follow-up:    Please call clinic [phone] if any concerns on the port  Please keep all follow-up appointments  Follow up with Dr. Triana  Keep dressing for six days    During this admission you have been treated for:  Malignant neoplasm of lower third of esophagus [C15.5]  Patient Active Problem List    Diagnosis Date Noted    Esophageal mass 2023    Esophageal dysphagia 2023    Weight loss, abnormal 2023    Malignant neoplasm of lower third of esophagus (HCC) 2023    Iron deficiency Anemia secondary to acute GI bleeding 2023    Prediabetes 2021    Seasonal allergies 10/26/2020    GERD (gastroesophageal reflux disease) 2020    Colonic polyp 2020    Dyslipidemia 2020    Hypertension 2018    BMI 33.0-33.9,adult 2016       Activity:   Resume light to normal activity as tolerated.  (ie - ok to walk, climb stairs, ect)  Do not engage in strenuous activity for 7-10 days.   Do not lift more than 10 pounds for the next 4-6 weeks.   It is important you are up walking several times a day to decrease the risk of a blood clot     Diet:  Resume regular diet as tolerated. To reduce risk of post-operative nausea and vomiting avoid spicy or greasy foods; eat small, frequent meals and maintain adequate fluid intake.    Medication(s):   Current Discharge Medication List        START taking these medications    Details   celecoxib (CELEBREX) 200 MG Cap Take 1 Capsule by mouth 2 times a day.  Qty: 60 Capsule, Refills: 0    Associated Diagnoses: Malignant neoplasm of lower third of esophagus (HCC)      promethazine (PHENERGAN) 25 MG Tab Take 0.5 Tablets by mouth every 6 hours as needed for Nausea/Vomiting.  Qty: 30 Tablet, Refills: 0    Associated Diagnoses: Malignant neoplasm of lower third of esophagus (HCC)           CONTINUE these medications which have NOT CHANGED    Details    sulfamethoxazole-trimethoprim (BACTRIM DS) 800-160 MG tablet Take 1 Tablet by mouth 2 times a day.  Qty: 14 Tablet, Refills: 0    Associated Diagnoses: Esophageal mass; Malignant neoplasm of esophagus, unspecified location (HCC); Malignant neoplasm of lower third of esophagus (HCC); Esophageal dysphagia; Weight loss, abnormal      omeprazole (PRILOSEC) 20 MG delayed-release capsule Take 1 Capsule by mouth 2 times a day.  Qty: 30 Capsule, Refills: 0      Multiple Vitamins-Minerals (ONE-A-DAY MENS 50+ ADVANTAGE PO) Take 1 Tablet by mouth every day.            See prescription(s); take as directed.   You do NOT have to take all of your prescription pain medication.  Take only as needed if pain is not controlled with over-the-counter medications (ex: Tylenol, ibuprofen).    If you are taking narcotic pain medications be sure to take a stool softener (available over the counter - ex: miralax, magnesium citrate, dulcolax/bisacoidyl) to reduce risk of constipation.  Additionally, make sure that you are taking in enough fluids.  Do NOT drive or make any important decision while taking prescription pain medication.  Wound Care:    Your incision has been covered with sutures. The sutures will gradually dissolve and fall out.  Do not pull on any sutures.  Do NOT apply any creams, lotions, etc to the incision unless you have been specifically instructed to do so by your surgical team.    It is normal to have a small amount of clear/yellow/pink drainage from you incision as it heals.    OK to shower and wash gently with soap and water after six days   Please call the surgical clinic if you have:              - increasing drainage from incision              - change in the color of drainage from you incision              - redness more than 1 fingerbreadth (1 centimeter) from the incision edge   - increasing pain   - fever more than 101F  Avoid exposing your incision to the sun    Bathing/Showering:    Please shower daily  starting in 24 hours. You may wash over incisions with regular soap and water and pat dry.  Ok to allow water from the shower to run over you dressing/incision.  Avoid submerging you incision under water (no baths, swimming or hot tubs) until fully healed.      Reducing post discharge nausea or vomiting:    Limit mobility, take slow, deep breaths.    Restrictions:   No smoking  No alcohol while taking prescription pain medications  No strenuous activity until cleared by surgery clinic  No heavy lifting (more than 10 lbs) for at least the next 4-6 weeks  No driving while taking prescription pain medication  No driving until you have the mobility to be a safe     Additional Instructions:   If you experience chest pain or shortness of breath, or have an acute emergency - please call 911    Please call clinic or seek evaluation at the ER if you have any of the following:  - Fever >101 F      - Wound infection (increasing redness, swelling, drainage, pus)     - Severe pain not controlled with oral medications     - Severe nausea or vomiting, inability to tolerate PO intake     - Bleeding that does not stop with holding pressure to the area       - Yellowing of the skin or eyes     - Change in mental status (confusion or lethargy)      - New numbness or weakness      - Any other concerns.    HOME CARE INSTRUCTIONS    ACTIVITY: Rest and take it easy for the first 24 hours.  A responsible adult is recommended to remain with you during that time.  It is normal to feel sleepy.  We encourage you to not do anything that requires balance, judgment or coordination.    FOR 24 HOURS DO NOT:  Drive, operate machinery or run household appliances.  Drink beer or alcoholic beverages.  Make important decisions or sign legal documents.    SPECIAL INSTRUCTIONS: See above.    DIET: To avoid nausea, slowly advance diet as tolerated, avoiding spicy or greasy foods for the first day.  Add more substantial food to your diet according to  your physician's instructions.  Babies can be fed formula or breast milk as soon as they are hungry.  INCREASE FLUIDS AND FIBER TO AVOID CONSTIPATION.    SURGICAL DRESSING/BATHING: See above.    MEDICATIONS: Resume taking daily medication.  Take prescribed pain medication with food.  If no medication is prescribed, you may take non-aspirin pain medication if needed.  PAIN MEDICATION CAN BE VERY CONSTIPATING.  Take a stool softener or laxative such as senokot, pericolace, or milk of magnesia if needed.    Prescription given for Celebrex and Phenergan.  Last pain medication given at ____________.    A follow-up appointment should be arranged with your doctor as scheduled; call to schedule.    You should CALL YOUR PHYSICIAN if you develop:  Fever greater than 101 degrees F.  Pain not relieved by medication, or persistent nausea or vomiting.  Excessive bleeding (blood soaking through dressing) or unexpected drainage from the wound.  Extreme redness or swelling around the incision site, drainage of pus or foul smelling drainage.  Inability to urinate or empty your bladder within 8 hours.  Problems with breathing or chest pain.    You should call 911 if you develop problems with breathing or chest pain.  If you are unable to contact your doctor or surgical center, you should go to the nearest emergency room or urgent care center.  Physician's telephone #: Dr. Cagle [phone]    MILD FLU-LIKE SYMPTOMS ARE NORMAL.  YOU MAY EXPERIENCE GENERALIZED MUSCLE ACHES, THROAT IRRITATION, HEADACHE AND/OR SOME NAUSEA.    If any questions arise, call your doctor.  If your doctor is not available, please feel free to call the Surgical Center at [phone].  The Center is open Monday through Friday from 7AM to 7PM.      A registered nurse may call you a few days after your surgery to see how you are doing after your procedure.    You may also receive a survey in the mail within the next two weeks and we ask that you take a  few moments to complete the survey and return it to us.  Our goal is to provide you with very good care and we value your comments.     Depression / Suicide Risk    As you are discharged from this [facility] facility, it is important to learn how to keep safe from harming yourself.    Recognize the warning signs:  Abrupt changes in personality, positive or negative- including increase in energy   Giving away possessions  Change in eating patterns- significant weight changes-  positive or negative  Change in sleeping patterns- unable to sleep or sleeping all the time   Unwillingness or inability to communicate  Depression  Unusual sadness, discouragement and loneliness  Talk of wanting to die  Neglect of personal appearance   Rebelliousness- reckless behavior  Withdrawal from people/activities they love  Confusion- inability to concentrate     If you or a loved one observes any of these behaviors or has concerns about self-harm, here's what you can do:  Talk about it- your feelings and reasons for harming yourself  Remove any means that you might use to hurt yourself (examples: pills, rope, extension cords, firearm)  Get professional help from the community (Mental Health, Substance Abuse, psychological counseling)  Do not be alone:Call your Safe Contact- someone whom you trust who will be there for you.  Call your local CRISIS HOTLINE 357-4219 or 252-070-3519  Call your local Children's Mobile Crisis Response Team [place] (885) 302-1605 or www.Elevate  Call the toll free National Suicide Prevention Hotlines   National Suicide Prevention Lifeline 313-584-QDPJ (3476)  National Hope Line Network 800-SUICIDE (039-6343)    I acknowledge receipt and understanding of these Home Care instructions.

## 2023-09-01 NOTE — OP REPORT
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  He is   scheduled to start neoadjuvant chemotherapy and chemoradiotherapy and was in   need of PowerPort.     SURGEON:  Corby Cagle MD     ASSISTANT SURGEON:  TITA Hewitt:The indication for a surgical assistant in this surgery were indicated due to the complexity of the procedure.  Their role included aiding in the incision, retraction, holding of devices including cameras for laparoscopic procedures and closure of wounds.      ANESTHESIOLOGIST:  Jere Gómez MD     ANESTHESIA:  General and local anesthetic.     ESTIMATED BLOOD LOSS:  Less than 5 mL.     COMPLICATIONS:  No complications.     FINDINGS:  Fluoroscopically placed catheter to the superior vena cava atrial   junction with great inflow and outflow x3 without any ectopy.     PREOPERATIVE DIAGNOSIS:  Esophageal carcinoma.     POSTOPERATIVE DIAGNOSIS:  Esophageal carcinoma.     PROCEDURE:  Placement of right cephalic cutdown PowerPort.     CASE CLASS:  Clean.     DESCRIPTION OF PROCEDURE:  The patient was brought to OR, placed under general   anesthetic, both arms tucked, shaved, prepped with chlorhexidine, right   shoulder, neck and chest, covered with sterile drapes, given preoperative   antibiotics.  Timeout was done, which was adequate.  At that point, he was   given 5 mL of 0.5% Marcaine with epinephrine in the right deltopectoral   groove.  Incision made with #10 blade Bovie electrocautery until we entered   the deltopectoral groove, we found moderately sized cephalic vein, got   proximal and distal control of that vein using 3-0 Vicryl.  Distal was tied,   proximal was elevated.  Small venotomy was created.  An 8-Turkmen flushed   PowerPort catheter was inserted under  fluoroscopic guidance to the superior   vena cava atrial junction with good inflow and outflow.  At that point, the   proximal tie was tied.  It was clamped with a rubber shot, cut and attached to   a flushed PowerPort, unclamped, tested for a second time having great inflow   and outflow.  At that point, the patient was placed in reverse Trendelenburg   and a pocket was created along the pectoral fascia.  The port was then secured   to the pectoral fascia with interrupted 2-0 Prolene x4 tested for a third   time having great inflow and outflow.  At that point, the operation was   completed.  We gave a total of 30 mL of 0.5% Marcaine with epinephrine   throughout the area of the soft tissue, pectoralis muscle and skin.  The soft   tissue was brought together with interrupted 3-0 Vicryl.  Skin was closed with   4-0 Monocryl.  Mastisol, Steri-Strips, 2x2 Tegaderms, extubated and   transferred to recovery room.           ______________________________  MD LOLLY Camp/JOLIE    DD:  09/01/2023 08:48  DT:  09/01/2023 09:29    Job#:  [id number]    CC:MD Michael Wilder MD Vodur Suresh Reddy, MD

## 2023-09-01 NOTE — ANESTHESIA PROCEDURE NOTES
Airway    Date/Time: 9/1/2023 7:35 AM    Performed by: Jere Emerson M.D.  Authorized by: Jere Emerson M.D.    Location:  OR  Urgency:  Elective  Indications for Airway Management:  Anesthesia      Spontaneous Ventilation: absent    Sedation Level:  Deep  Preoxygenated: Yes    Final Airway Type:  Supraglottic airway  Final Supraglottic Airway:  Standard LMA    SGA Size:  5  Number of Attempts at Approach:  1

## 2023-09-05 NOTE — LETTER
[facility]    [address] [place] [address]  [place] [address]  Phone: [phone] - Fax: [phone]              Tommy Mckeon,  [address] [place] [address]     Date: 09/05/23    Dear Tommy,    I am a Cancer Nurse Navigator, a certified oncology nurse. My role is to assess any needs you may have with education, guidance and support. I am available to you and your family through your treatment at [facility].       I am available to address your needs during your journey with the following services:     Care Coordination  I can assist you in facilitating communication between your cancer care treatment team to ensure timely treatment and follow-up.  I can also assist with transition of care back to your primary care provider, or other specialist, as needed.  My goal is to bridge gaps for you throughout the course of your active treatment.       Education Services  Understanding the recommended treatment course by your physician is key. I can provide educational resources personalized to your cancer diagnosis to help you understand your diagnosis and treatment. Please let me know if you would like to receive information about your diagnosis and treatment plan.  I am here to help.     Support Services/Resource Information  [facility] we offer a full scope of support services.  I can assist you with referral information to:  Cancer Clinical Trials & Research  Nutrition counseling  Support groups  Complementary Therapies such as Mind-Body Techniques Meditation  Patient Financial Advocates    [facility] [place], an [employer] affiliate office, our volunteers can assist you with accessing our lending library, support services information, head coverings and comfort items  Community and national resources, included eligibility based sandro assistance and pharmaceutical access programs, if you are in need of additional information.     [facility]  Health offers services that include:  Behavioral Health  Genetic counseling & testing  Acupuncture  Lymphedema prevention/treatment program  Palliative care services.        I hope you have an excellent patient experience.  Please feel free to share with me your comments regarding the care you have received- we value your feedback.    Sincerely,     Teresa Delarosa RN  Cancer Nurse Navigator    Office: [phone]  Main:  [phone]  Email:  [email or web address]

## 2023-09-05 NOTE — PROGRESS NOTES
"Cancer Nurse Navigation Assessment:      Assessment/Discussion:  Navigation initial outreach and follow up for DST 8/10.  Introduced self and explained role of navigator.  Let him know that his navigator will be Teresa Delarosa.  Pt reports will be seeing Dr Triana today, for medical oncology.  Pt denies transportation barriers and that his wife will be going with him to appointments.  Pt states his wife is his support and he is a \"private person\".  He said he is \"not that kind of person\" in regard to going to groups or doing counseling.  Did let him know about care giver support group as well.  Pt works as a  and has been at current job for about 6 months.  He does not have paid time off, but his boss is willing to work with him on schedule and hours.  Pt discussing, if not able to work full time, will have difficulty being able to pay his bills.  Pt also concerned regarding co-pays for daily radiation.  Will have [place] financial resource advocate Resighini back with patient regarding treatment bills.  Will update his navigator and refer to oncology social worker regarding his financial concern if not able to work full time during treatment.       TRANSPORTATION:  Denies barrier  FINANCIAL:  concern over co-pays as well as if not able to work full time, being able to pay bills  SUPPORT SYSTEM:  wife  PSYCHOLOGICAL:   Pt denies additional need for support, that he is a \"private person\"     DST:  8 previously done by radiation therapy    Resources Provided/Intervention: Introduction letter, cancer support service calendar and message sent via [employer].  Request to [place] financial resource advocate to Resighini back with patient, referral to oncology social worker in regard to work and loss of income if not able to work full time during treatment.  "

## 2023-09-06 NOTE — RADIATION COMPLETION NOTES
Clinical Treatment Planning Note    DATE OF SERVICE: 9/6/2023    DIAGNOSIS:  Malignant neoplasm of lower third of esophagus (HCC)  Staging form: Esophagus - Adenocarcinoma, AJCC 8th Edition  - Clinical stage from 8/31/2023: Stage III (cT3, cN0, cM0, G3) - Signed by Michael Woods M.D. on 8/31/2023  Total positive nodes: 0  Histologic grading system: 3 grade system         IMAGING REVIEWED:  [x] CT     [] MRI     [] PET/CT     [] BONE SCAN     [] MAMMO     [] OTHER      TREATMENT INTENT:   [x] CURATIVE     [] MAINTENANCE     []  PALLIATIVE      []  SUPPORTIVE     []  PROPHYLACTIC     [] BENIGN     []  CONSOLIDATIVE      [] DEFINITIVE   []  OLOGIMETASTATIC      LINE OF TREATMENT:  [] ADJUVANT   [x] DEFINITIVE   [] NEOADJUVANT   [] RE-TREATMENT      TECHNIQUE PLANNED:  [x] IMRT   [] 3D   [] SBRT   [] SRS/SRT   [] HDR   [] ELECTRON       IMRT JUSTIFICATION:  []   An immediately adjacent area has been previously irradiated and abutting portals must be established with high precision.    []  Dose escalation is planned to deliver radiation doses in excess of those commonly utilized for similar tumors with conventional treatment.    [x]  The target volume is concave or convex, and the critical normal tissues are within or around that convexity or concavity.    []  The target volume is in close proximity to critical structures that must be protected.    []  The volume of interest must be covered with narrow margins to adequately protect  immediately adjacent structures.      FIELDS & BLOCKING:  [x] COMPLEX BLOCKS     []  = 3 TX AREAS     [x]  ARCS     []  CUSTOM SHEILD        []  SIMPLE BLOCK      CHEMOTHERAPY:  [x]  CONCURRENT     []  INDUCTION     [] SEQUENTIAL     []  <30 DAYS FROM XRT      NOTES:  OAR CONSTRAINTS: (GUIDELINES ONLY NOT ABSOLUTE)  Target Prescribed Coverage   % of PTV covered by 95% (cGy) of RX Dose       WEI Goal   Plan Hot Spot Max Dose < 120%   Cord Max Dose < 50Gy   Cord + 0.5cm Max Dose <  50Gy   Total Lung  V20Gy < 31%   Total Lung V10Gy < 50%   Total Lung V5Gy < 60%   Total Lung  Mean Dose < 20Gy   Heart V50Gy < 1/3 volume   Heart V45Gy < 2/3 volume   Heart V40Gy < 3/3 volume   Liver Mean Dose < 30Gy   Bi. Kidney Mean Dose < 18Gy   R Kidney V50Gy < 1/3 volume   R Kidney V30Gy < 2/3 volume   R Kidney V23Gy < 3/3 volume   L Kidney V50Gy < 1/3 volume   L Kidney V30Gy < 2/3 volume   L Kidney V23Gy < 3/3 volume   Stomach Max Dose < 54Gy   Stomach V45Gy < 15%   Individual Small Bowel Loops V15Gy < 120cc   Entire Cavity Small Bowel V45Gy < 195cc   Small Bowel  Max Dose < 50.4Gy   *RTOG 0436, 0617, 0348, QUANTEC

## 2023-09-06 NOTE — RADIATION COMPLETION NOTES
PATIENT NAME Tommy Mckeon   PRIMARY PHYSICIAN Reynaldo Robins [id number]   REFERRING PHYSICIAN No ref. provider found 1965       DATE OF SERVICE: 9/6/2023    DIAGNOSIS:  Malignant neoplasm of lower third of esophagus (HCC)  Staging form: Esophagus - Adenocarcinoma, AJCC 8th Edition  - Clinical stage from 8/31/2023: Stage III (cT3, cN0, cM0, G3) - Signed by Michael Woods M.D. on 8/31/2023  Total positive nodes: 0  Histologic grading system: 3 grade system       SPECIAL TREATMENT PROCEDURE NOTE:  Considerable additional effort required in the management of this case because of administration of concurrent carbotaxol chemotherapy which may result in increased normal tissue toxicity. This includes longer and possibly more frequent on treatment visits.

## 2023-09-06 NOTE — RADIATION COMPLETION NOTES
DATE OF SERVICE: 9/6/2023    DIAGNOSIS:  Malignant neoplasm of lower third of esophagus (HCC)  Staging form: Esophagus - Adenocarcinoma, AJCC 8th Edition  - Clinical stage from 8/31/2023: Stage III (cT3, cN0, cM0, G3) - Signed by Michael Woods M.D. on 8/31/2023  Total positive nodes: 0  Histologic grading system: 3 grade system       DATE OF SERVICE: 9/6/2023    TYPE OF SIMULATION: 4d esophagus    GOAL OF TREATMENT:   [x] Curative  [] Palliative  [] Oligometastatic    CONTRAST:    [] IV Contrast*  [x] Oral Contrast               POSITION:    [x]  Supine  [] Prone     IMMOBILIZATION DEVICE: []  Body-Fix  [] Omniboard  []  Bellyboard    PROCEDURE: Patient placed in vaklok immobilization device. 4D gated and non-gated CT obtained to assess organ motion.  Images viewed and approved.  Images reconstructed as need.  Image data sets transferred to [employer] for planning.    I have personally reviewed the relevant data, performed the target localization, and determined all relevant factors for this patient’s simulation.    *Omnipaque 80 -100cc IVP in conjunction with 500cc NS

## 2023-09-13 NOTE — TELEPHONE ENCOUNTER
Nutrition Services: RD Consultation/ New Chemoradiation Start  Tommy Mckeon is a 57 y.o. 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 Pt answers, states is managing decently at this time. States eating is not particularly more challenging, though mentions that eating pattern is different related to working 9pm-7am from Friday to Monday. States eating while driving or quicker snacks have historically been easier. States his wife would be interested in joining this visit, mentions would like to see how the first week goes and will call RD to schedule an appointment. Mentions has potential interest for nutrition shakes.  Pt did not express any further nutrition-related questions or concerns at this time.     Dietary intake pattern:  Able to tolerate solids; No known allergies reported     Past Medical History:   Diagnosis Date    Bowel habit changes 08/31/2023    constipation    Cancer (HCC)     esophageal    Diabetes (HCC) 08/31/2023    prediabetic per problem list, pt denies    GERD (gastroesophageal reflux disease)     Heart burn     Hiatus hernia syndrome     Hypertension 08/31/2023    no medication    Iron deficiency anemia     per problem list    Obesity (BMI 30-39.9) 04/19/2016       Past Surgical History:   Procedure Laterality Date    CATH PLACEMENT Right 9/1/2023    Procedure: RIGHT CEPHALIC PORT PLACEMENT;  Surgeon: Corby Cagle M.D.;  Location: SURGERY [facility];  Service: General    SC UPPER GI ENDOSCOPY,DIAGNOSIS N/A 08/18/2023    Procedure: GASTROSCOPY;  Surgeon: Kale Patel M.D.;  Location: SURGERY SAME DAY [facility];  Service: Gastroenterology    SC UPPER GI ENDOSCOPY,BIOPSY N/A 08/18/2023    Procedure: GASTROSCOPY, WITH BIOPSY;  Surgeon: Kale Patel M.D.;  Location: SURGERY SAME DAY [facility];  Service: Gastroenterology     "EYE SURGERY  01/01/2015    OTHER  2015     Biochemical/ Anthropometric Assessment:  Treatment Regimen: IMRT with Dr. Woods + Chemotherapy with Dr. Triana [facility] on Mondays  Pertinent Labs: Glucose 111, BUN 5, phosphorus 2.4, iron % saturation 7%; Hematology reviewed  Pertinent Meds: phenergan, prilosec, mutlivitamin/minerals  Wt Readings from Last 1 Encounters:   09/01/23 99.3 kg (218 lb 14.7 oz)      Ht Readings from Last 1 Encounters:   09/01/23 1.93 m (6' 4\")      BMI Readings from Last 1 Encounters:   09/01/23 26.65 kg/m²   BMI Classification: Overweight  Nutrition-Focused Physical Exam: Unable to assess given telephone encounter     Weight History:  Wt Readings from Last 6 Encounters:   09/01/23 99.3 kg (218 lb 14.7 oz)   08/31/23 99.3 kg (218 lb 14.7 oz)   08/22/23 99.3 kg (219 lb)   08/19/23 98.3 kg (216 lb 11.4 oz)   08/16/23 102 kg (224 lb 5.1 oz)   10/26/20 122 kg (268 lb)     Weight Change/Malnutrition Risk: Per MD note, pt may have experienced recent 30 lb weight loss in past few months. Limited wt hx on file to confirm, appears may have minimally lost 2.7kg or 2.6% within past <1 month. While this does not meet criteria for significance, it remains concerning in setting of new diagnosis.     Interventions:  Introduced self and discussed role of dietitian throughout treatment process   Encouraged follow-up with RD to review nutrition considerations during treatment. RD provided contact information and pt agreeable to reach out following first week of treatment.     Pt reports understanding and was receptive to information provided.   RD provided contact information. Encouraged pt to reach out as questions/concerns arise.   RD available PRN   [phone]   "

## 2023-09-18 NOTE — CT SIMULATION
DATE OF SERVICE: 9/18/2023    Radiation Therapy Episodes       Active Episodes       Radiation Therapy: IMRT (9/18/2023)                   Radiation Treatments         Plan Last Treated On Elapsed Days Fractions Treated Prescribed Fraction Dose (cGy) Prescribed Total Dose (cGy)    Esophagus 9/18/2023 0 @ [id number] 1 of 25 180 4,500                  Reference Point Last Treated On Elapsed Days Most Recent Session Dose (cGy) Total Dose (cGy)    PTV45 9/18/2023 0 @ [id number] 180 180                            First Visit Simple Simulation: Called by [employer] machine to verify treatment parameters including:  treatment site, treatment dose, and treatment setup prior to first treatment. Image derived shifts reviewed in all appropriate planes.  Shifts approved.  Patient treated.    I have personally reviewed the relevant data, performed the target localization, and determined all relevant factors for this patient’s simulation.

## 2023-09-19 PROBLEM — C16.0 ADENOCARCINOMA OF CARDIO-ESOPHAGEAL JUNCTION (HCC): Status: ACTIVE | Noted: 2023-01-01

## 2023-09-19 NOTE — PROGRESS NOTES
Oncology nurse navigator received a call from the patient stating that he received his first treatment at [facility] stating that in addition to the $34 out of pocket payment he had to pay additional amount totaling close to $100.  Patient states that he is not able to afford that and called for some guidance.  KORTNEY called [facility] and spoke to the liaison requesting that the patients chemotherapy be scheduled at the [facility].  KORTNEY called patient and notified the patient in a VM.

## 2023-09-20 NOTE — ON TREATMENT VISIT
ON TREATMENT  NOTE  RADIATION ONCOLOGY DEPARTMENT    Patient name:  Tommy Mckeon    Primary Physician:  Reynaldo Robins M.D. MRN: [id number]  CSN: [id number]   Referring physician:  Corby Cagle *   : 1965, 57 y.o.     ENCOUNTER DATE:  2023    DIAGNOSIS:  Malignant neoplasm of lower third of esophagus (HCC)  Staging form: Esophagus - Adenocarcinoma, AJCC 8th Edition  - Clinical stage from 2023: Stage III (cT3, cN0, cM0, G3) - Signed by Michael Woods M.D. on 2023  Total positive nodes: 0  Histologic grading system: 3 grade system      TREATMENT SUMMARY:  Course First Treatment Date 2023  Course Last Treatment Date 2023  Radiation Treatments       Active   Plans   Esophagus   Most recent treatment: Dose planned: 180 cGy (fraction 3 of 25 on 2023)   Total: Dose planned: 4,500 cGy   Elapsed Days: 2 @            Historical   No historical radiation treatments to show.                 SUBJECTIVE:  Well appearing    VITAL SIGNS:      2023    10:57 AM 2023    10:10 AM 2023     9:47 AM 2023     9:30 AM 2023     9:15 AM 2023     9:00 AM 2023     8:52 AM   Vitals   SYSTOLIC 104 104 97 104 102 105    DIASTOLIC 73 59 55 60 57 58    Pulse 60 68 71 72 67 69 81   Temperature 36.4 °C (97.5 °F) 35.9 °C (96.7 °F) 35.9 °C (96.7 °F) 36.4 °C (97.5 °F)  36.4 °C (97.5 °F)    Respiration  17 18 22 20 18 19   Weight 218.48         BMI 26.59 kg/m2         Pulse Oximetry 93 %  95 % 95 % 97 % 99 % 98 %     KPS: 100, Fully active, able to carry on all pre-disease performed without restriction (ECOG equivalent 0)  Encounter Vitals  Temperature: 36.4 °C (97.5 °F)  Temp src: Temporal  Blood Pressure: 104/73  Pulse: 60  Pulse Oximetry: 93 %  Weight: 99.1 kg (218 lb 7.6 oz)  Pain Score: No pain      2023    10:57 AM 2023    10:03 AM   Pain Assessment   Pain Score NO PAIN NO PAIN          PHYSICAL EXAM:  Physical Exam  Constitutional:       Appearance:  Normal appearance.   Abdominal:      General: There is no distension.      Palpations: Abdomen is soft.   Skin:     Findings: No erythema.   Neurological:      Mental Status: He is alert.              9/20/2023    10:59 AM   Toxicity Assessment   Toxicity Assessment Abdomen   Fatigue (lethargy, malaise, asthenia) None   Radiation Dermatitis None   Anorexia None   Dehydration None   Diarrhea w/o Colostomy None   Nausea None   Dysphagia, Esophagitis, odynophagoa (painful swallowing) None   Gastritis None   Abdominal Pain or cramping Mild pain not interfering with function       CURRENT MEDICATIONS:    Current Outpatient Medications:     celecoxib (CELEBREX) 200 MG Cap, Take 1 Capsule by mouth 2 times a day., Disp: 60 Capsule, Rfl: 0    promethazine (PHENERGAN) 25 MG Tab, Take 0.5 Tablets by mouth every 6 hours as needed for Nausea/Vomiting., Disp: 30 Tablet, Rfl: 0    sulfamethoxazole-trimethoprim (BACTRIM DS) 800-160 MG tablet, Take 1 Tablet by mouth 2 times a day., Disp: 14 Tablet, Rfl: 0    omeprazole (PRILOSEC) 20 MG delayed-release capsule, Take 1 Capsule by mouth 2 times a day., Disp: 30 Capsule, Rfl: 0    Multiple Vitamins-Minerals (ONE-A-DAY MENS 50+ ADVANTAGE PO), Take 1 Tablet by mouth every day., Disp: , Rfl:     LABORATORY DATA:   Lab Results   Component Value Date/Time    SODIUM 145 09/15/2023 10:51 AM    POTASSIUM 4.1 09/15/2023 10:51 AM    CHLORIDE 108 09/15/2023 10:51 AM    CO2 28 09/15/2023 10:51 AM    GLUCOSE 104 (H) 09/15/2023 10:51 AM    BUN 18 09/15/2023 10:51 AM    CREATININE 0.90 09/15/2023 10:51 AM       Lab Results   Component Value Date/Time    WBC 14.0 (H) 08/20/2023 02:20 AM    RBC 4.02 (L) 08/20/2023 02:20 AM    HEMOGLOBIN 9.0 (L) 08/20/2023 02:20 AM    HEMATOCRIT 30.3 (L) 08/20/2023 02:20 AM    MCV 75.4 (L) 08/20/2023 02:20 AM    MCH 22.4 (L) 08/20/2023 02:20 AM    MCHC 29.7 (L) 08/20/2023 02:20 AM    PLATELETCT 511 (H) 08/20/2023 02:20 AM         RADIOLOGY DATA:  CT-ABDOMEN-PELVIS  WITH    Result Date: 8/16/2023  1.  No acute inflammation in the abdomen or pelvis. No bowel obstruction. 2.  Small, nonspecific free fluid in the pelvis. 3.  Small left pleural effusion with adjacent atelectasis. 4.  Moderate hiatal hernia.    CT-CHEST (THORAX) WITH    Result Date: 8/19/2023  1.  Hiatal hernia with wall thickening in the distal thoracic esophagus and portion of the herniated stomach which is in keeping with history of adenocarcinoma 2.  Small left pleural effusion 3.  No pulmonary nodules or enlarged mediastinal lymph nodes Fleischner Society pulmonary nodule recommendations: Not Applicable     CT-CHEST (THORAX) WITH & W/O    Result Date: 8/18/2023  1.  Large hiatal hernia with wall thickening in the distal thoracic esophagus and portion of the herniated stomach which would be in keeping with the provided clinical history small LEFT pleural effusion 2.  Trace RIGHT pleural effusion 3.  No pulmonary nodules or enlarged mediastinal lymph nodes     CT-PANCREAS AND ABDOMEN WITH & W/O    Result Date: 8/19/2023  1.  Distal esophageal/proximal gastric mass consistent with known malignancy 2.  No local or distant metastases identified 3.  Small left pleural effusion and mild left basilar atelectasis     [id number] VIEW    Result Date: 8/16/2023  Nonobstructive bowel gas pattern. Small amount of stool throughout the colon.    DX-CHEST-PORTABLE (1 VIEW)    Result Date: 8/16/2023  Mild blunted left costophrenic angle could be atelectasis/scarring and/or trace left pleural effusion.    DX-PORTABLE FLUOROSCOPY < 1 HOUR    Result Date: 9/1/2023  Portable fluoroscopy utilized for 1 second. INTERPRETING LOCATION: [address] [place] [address], [id number]    [id number] BASE TO MID-THIGH    Result Date: 8/30/2023  1.  Heterogeneous hypermetabolic mass in the distal esophagus/gastric cardia consistent with known malignancy. 2.  No evidence for metastatic disease.      IMPRESSION:  Cancer Staging   Malignant neoplasm of  lower third of esophagus (HCC)  Staging form: Esophagus - Adenocarcinoma, AJCC 8th Edition  - Clinical stage from 8/31/2023: Stage III (cT3, cN0, cM0, G3) - Signed by Michael Woods M.D. on 8/31/2023      PLAN:  No change in treatment plan    Disposition:  Treatment plan and imaging reviewed. Questions answered. Continue therapy outlined.     Michael Woods M.D.    No orders of the defined types were placed in this encounter.

## 2023-09-24 NOTE — PROGRESS NOTES
Pharmacy Chemotherapy Calculations    Dx: Esophageal cancer  Cycle 2  Previous treatment = C1 given at [facility] 9/18/23    Protocol: Paclitaxel/Carboplatin with concurrent radiation  Paclitaxel 50 mg/m2 IV over 60 minutes followed by  Carboplatin AUC 2 IV over 30 minutes   Weekly cycle for 5 weeks with radiation  NCCN Guidelines for Esophageal and Esophagogastric Cancers V.2.2023.  denzel Velarde al. NEJM. 2012;366(22):3039-73.  Van Villa E, et al. Br J Cancer. 2006;94(10):1389-94.    Allergies:  Patient has no known allergies.       /70   Pulse 80   Temp 36.5 °C (97.7 °F) (Temporal)   Resp 18   Wt 98.3 kg (216 lb 11.4 oz)   SpO2 95%   BMI 26.38 kg/m²  Body surface area is 2.3 meters squared.    Labs 9/22/23:  ANC~ 4210 Plt = 332k   Hgb = 10.9     SCr = 0.65 mg/dL CrCl >125 mL/min     Paclitaxel 50 mg/m² x 2.3 m² = 115 mg   <10% difference, OK to treat with final dose = 114 mg    Carboplatin AUC 2 x (125 mL/min + 25) = 300 mg   <10% difference, OK to treat with final dose = 300 mg    James Jara, PharmD

## 2023-09-25 NOTE — PROGRESS NOTES
Pt arrived ambulatory to [facility] for D1C2 Paclitaxel, Carboplatin infusion for malignant neoplasm of lower third od esophagus. POC discussed with pt and he agrees with plan. Pt with call light in reach for safety. Pt verbalized understanding to call for needs/assist.    Lab results reviewed from 9/22/2023, ok to proceed with treatment. Port accessed in sterile fashion, brisk blood return noted. Pt medicated per MAR. Paclitaxel titrated to hourly rate as today is cycle #2. Pt tolerated treatment without s/s adverse reaction. Port with brisk blood return, flushed with NS then heparin. Port de-accessed, gauze dressing applied. Pt discharged to self care, NAD. Pt given printout of future appts. Pt's next appt confirmed 10/2/2023.

## 2023-09-25 NOTE — PROGRESS NOTES
Chemotherapy Verification - PRIMARY RN    D1C2    Height = 1.93m^2  Weight = 98.3kg  BSA = 2.3m^2       Medication: PACLitaxel  Dose: 50mg/m^2  Calculated Dose: 115mg                               Medication: CARBOplatin  Dose: AUC 2  Calculated Dose: 300mg                             (In mg/m2, AUC, mg/kg)      Carboplatin calculation (if applicable):    (2*(125+25)) = 300        I confirm this process was performed independently with the BSA and all final chemotherapy dosing calculations congruent.  Any discrepancies of 10% or greater have been addressed with the chemotherapy pharmacist. The resolution of the discrepancy has been documented in the EPIC progress notes.

## 2023-09-25 NOTE — PROGRESS NOTES
Chemotherapy Verification - SECONDARY RN       Height = 193 cm  Weight = 98.3 kg  BSA = 2.3 m2       Medication: Taxol  Dose: 50 mg/m2  Calculated Dose: 115 mg                             (In mg/m2, AUC, mg/kg)     Medication: Carboplatin  Dose: AUC 2  Calculated Dose: 300 mg                             (In mg/m2, AUC, mg/kg)    Carboplatin calculation (if applicable):  (2*(125+25)) = 300    I confirm that this process was performed independently.

## 2023-09-25 NOTE — PROGRESS NOTES
Pharmacy Chemotherapy Calculation:    Dx: Adenocarcinoma of cardio-esophageal junction         Protocol: PACLItaxel/CARBOplatin/XRT     *Dosing Reference*  PACLItaxel 50 mg/m2 IV over 60 min on Day 1   CARBOplatin AUC 2 IV over 30 min on Day 1  Repeat weekly x 5 weeks with radiation therapy     NCCN Guidelines for Esophageal and Esophagogastric Junction Cancers V.2.2023.  dulce maria Velarde. N Engl J Med. 2012;366(22):5299-27.  Van Villa E, et al. Br J Cancer.2006;94(10):1389-94.    Allergies:  Patient has no known allergies.     /70   Pulse 80   Temp 36.5 °C (97.7 °F) (Temporal)   Resp 18   Wt 98.3 kg (216 lb 11.4 oz)   SpO2 95%   BMI 26.38 kg/m²  Body surface area is 2.3 meters squared.    Labs 9/22/23:  ANC~ 4210 Plt = 332k   Hgb = 10.9     SCr = 0.65 mg/dL CrCl > 125 mL/min (min SCr 0.7 used)  AST/ALT/AP = 10/6/35 TBili = 0.3        Drug Order   (Drug name, dose, route, IV Fluid & volume, frequency, number of doses) Cycle 2     Previous treatment: C1 at [facility] on 9/18/23     Medication = PACLItaxel  Base Dose = 50 mg/m2  Calc Dose: Base Dose x 2.3 m² = 115 mg  Final Dose = 114 mg  Route = IV  Fluid & Volume =  mL  Admin Duration = Over 60 min           <10% difference, okay to treat with final dose.   Medication = CARBOplatin  Base Dose = AUC 2   Calc Dose: Base Dose x (125 + 25) = 300 mg  Final Dose = 300 mg  Route = IV  Fluid & Volume =  mL  Admin Duration = Over 30 min           <10% difference, okay to treat with final dose       By my signature below, I confirm this process was performed independently with the BSA and all final chemotherapy dosing calculations congruent. I have reviewed the above chemotherapy order and that my calculation of the final dose and BSA (when applicable) corroborate those calculations of the  pharmacist. Discrepancies of 10% or greater in the written dose have been addressed and documented within the [facility] Progress notes.      Melinda Leslie,  PharmD

## 2023-09-27 NOTE — ON TREATMENT VISIT
ON TREATMENT  NOTE  RADIATION ONCOLOGY DEPARTMENT    Patient name:  Tommy Mckeon    Primary Physician:  Reynaldo Robins M.D. MRN: [id number]  CSN: [id number]   Referring physician:  Corby Cagle *   : 1965, 57 y.o.     ENCOUNTER DATE:  2023    DIAGNOSIS:  Malignant neoplasm of lower third of esophagus (HCC)  Staging form: Esophagus - Adenocarcinoma, AJCC 8th Edition  - Clinical stage from 2023: Stage III (cT3, cN0, cM0, G3) - Signed by Michael Woods M.D. on 2023  Total positive nodes: 0  Histologic grading system: 3 grade system      TREATMENT SUMMARY:  Course First Treatment Date 2023  Course Last Treatment Date 2023  Radiation Treatments       Active   Plans   Esophagus   Most recent treatment: Dose planned: 180 cGy (fraction 8 of 25 on 2023)   Total: Dose planned: 4,500 cGy   Elapsed Days: 9 @            Historical   No historical radiation treatments to show.                 SUBJECTIVE:  Well appearing    VITAL SIGNS:      2023    11:16 AM 2023    11:40 AM 2023    10:57 AM 2023    10:10 AM 2023     9:47 AM 2023     9:30 AM 2023     9:15 AM   Vitals   SYSTOLIC 177 100 104 104 97 104 102   DIASTOLIC 66 70 73 59 55 60 57   Pulse 68 80 60 68 71 72 67   Temperature 36.3 °C (97.4 °F) 36.5 °C (97.7 °F) 36.4 °C (97.5 °F) 35.9 °C (96.7 °F) 35.9 °C (96.7 °F) 36.4 °C (97.5 °F)    Respiration  18  17 18 22 20   Weight 217.81 216.71 218.48       BMI 26.51 kg/m2 26.38 kg/m2 26.59 kg/m2       Pulse Oximetry 97 % 95 % 93 %  95 % 95 % 97 %     KPS: 100, Fully active, able to carry on all pre-disease performed without restriction (ECOG equivalent 0)  Encounter Vitals  Temperature: 36.3 °C (97.4 °F)  Temp src: Temporal  Blood Pressure: (!) 177/66  Pulse: 68  Pulse Oximetry: 97 %  Weight: 98.8 kg (217 lb 13 oz)  Pain Score: No pain      2023    11:16 AM 2023    10:57 AM 2023    10:03 AM   Pain Assessment   Pain Score NO PAIN  NO PAIN NO PAIN          PHYSICAL EXAM:  Physical Exam         9/27/2023    11:17 AM 9/20/2023    10:59 AM   Toxicity Assessment   Toxicity Assessment Abdomen Abdomen   Fatigue (lethargy, malaise, asthenia) Increased fatigue over baseline, but not altering normal activities None   Radiation Dermatitis None None   Anorexia None None   Dehydration None None   Diarrhea w/o Colostomy None None   Nausea None None   Vomiting None    Dyspepsia/heartburn None    Dysphagia, Esophagitis, odynophagoa (painful swallowing) None None   Gastritis None None   Abdominal Pain or cramping None Mild pain not interfering with function       CURRENT MEDICATIONS:    Current Outpatient Medications:     celecoxib (CELEBREX) 200 MG Cap, Take 1 Capsule by mouth 2 times a day., Disp: 60 Capsule, Rfl: 0    promethazine (PHENERGAN) 25 MG Tab, Take 0.5 Tablets by mouth every 6 hours as needed for Nausea/Vomiting., Disp: 30 Tablet, Rfl: 0    omeprazole (PRILOSEC) 20 MG delayed-release capsule, Take 1 Capsule by mouth 2 times a day., Disp: 30 Capsule, Rfl: 0    Multiple Vitamins-Minerals (ONE-A-DAY MENS 50+ ADVANTAGE PO), Take 1 Tablet by mouth every day., Disp: , Rfl:     LABORATORY DATA:   Lab Results   Component Value Date/Time    SODIUM 141 09/22/2023 09:21 AM    POTASSIUM 4.2 09/22/2023 09:21 AM    CHLORIDE 106 09/22/2023 09:21 AM    CO2 26 09/22/2023 09:21 AM    GLUCOSE 116 (H) 09/22/2023 09:21 AM    BUN 16 09/22/2023 09:21 AM    CREATININE 0.65 09/22/2023 09:21 AM       Lab Results   Component Value Date/Time    WBC 5.2 09/22/2023 09:21 AM    RBC 4.47 (L) 09/22/2023 09:21 AM    HEMOGLOBIN 10.9 (L) 09/22/2023 09:21 AM    HEMATOCRIT 37.2 (L) 09/22/2023 09:21 AM    MCV 83.2 09/22/2023 09:21 AM    MCH 24.4 (L) 09/22/2023 09:21 AM    MCHC 29.3 (L) 09/22/2023 09:21 AM    PLATELETCT 332 09/22/2023 09:21 AM         RADIOLOGY DATA:  CT-ABDOMEN-PELVIS WITH    Result Date: 8/16/2023  1.  No acute inflammation in the abdomen or pelvis. No bowel  obstruction. 2.  Small, nonspecific free fluid in the pelvis. 3.  Small left pleural effusion with adjacent atelectasis. 4.  Moderate hiatal hernia.    CT-CHEST (THORAX) WITH    Result Date: 8/19/2023  1.  Hiatal hernia with wall thickening in the distal thoracic esophagus and portion of the herniated stomach which is in keeping with history of adenocarcinoma 2.  Small left pleural effusion 3.  No pulmonary nodules or enlarged mediastinal lymph nodes Fleischner Society pulmonary nodule recommendations: Not Applicable     CT-CHEST (THORAX) WITH & W/O    Result Date: 8/18/2023  1.  Large hiatal hernia with wall thickening in the distal thoracic esophagus and portion of the herniated stomach which would be in keeping with the provided clinical history small LEFT pleural effusion 2.  Trace RIGHT pleural effusion 3.  No pulmonary nodules or enlarged mediastinal lymph nodes     CT-PANCREAS AND ABDOMEN WITH & W/O    Result Date: 8/19/2023  1.  Distal esophageal/proximal gastric mass consistent with known malignancy 2.  No local or distant metastases identified 3.  Small left pleural effusion and mild left basilar atelectasis     [id number] VIEW    Result Date: 8/16/2023  Nonobstructive bowel gas pattern. Small amount of stool throughout the colon.    DX-CHEST-PORTABLE (1 VIEW)    Result Date: 8/16/2023  Mild blunted left costophrenic angle could be atelectasis/scarring and/or trace left pleural effusion.    DX-PORTABLE FLUOROSCOPY < 1 HOUR    Result Date: 9/1/2023  Portable fluoroscopy utilized for 1 second. INTERPRETING LOCATION: [address] [place] [address], [id number]    [id number] BASE TO MID-THIGH    Result Date: 8/30/2023  1.  Heterogeneous hypermetabolic mass in the distal esophagus/gastric cardia consistent with known malignancy. 2.  No evidence for metastatic disease.      IMPRESSION:  Cancer Staging   Malignant neoplasm of lower third of esophagus (HCC)  Staging form: Esophagus - Adenocarcinoma, AJCC 8th Edition  -  Clinical stage from 8/31/2023: Stage III (cT3, cN0, cM0, G3) - Signed by Michael Woods M.D. on 8/31/2023      PLAN:  No change in treatment plan    Disposition:  Treatment plan and imaging reviewed. Questions answered. Continue therapy outlined.     Michael Woods M.D.    Orders Placed This Encounter    Referral to Oncology Nutrition Services    Referral to Speech Therapy

## 2023-09-27 NOTE — PROGRESS NOTES
Nutrition Services: [place] [facility] Referral  Tommy Mckeon is a 57 y.o. male with diagnosis of malignant neoplasm of lower third of esophagus  . Referral received for nutrition services. RD established with pt, pending follow-up per pt. RD to follow-up next week otherwise.     Please contact as needed.  [phone]

## 2023-09-30 NOTE — PROGRESS NOTES
"Pharmacy Chemotherapy Verification    Dx: Adenocarcinoma of cardio-esophageal junction         Protocol: PACLItaxel/CARBOplatin/XRT     PACLItaxel 50 mg/m2 IV over 60 min on Day 1   CARBOplatin AUC 2 IV over 30 min on Day 1  Repeat weekly x 5 weeks with radiation therapy     NCCN Guidelines for Esophageal and Esophagogastric Junction Cancers V.2.2023.  denzel Velarde al. N Engl J Med. 2012;366(22):3614-84.  Van Villa E, et al. Br J Cancer.2006;94(10):1389-94.    Allergies:  Patient has no known allergies.     /66   Pulse 82   Temp 36.2 °C (97.1 °F) (Temporal)   Resp 18   Ht 1.918 m (6' 3.5\")   Wt 97.5 kg (214 lb 15.2 oz)   SpO2 95%   BMI 26.51 kg/m²  Body surface area is 2.28 meters squared.    Labs 10/2/23  ANC~2700 Hgb 10.2 Plt 250k  SCr 0.66 CrCl >125 ml/min   AST/ALT/AP = 12/8/32 Tbili = 0.2     Drug Order   (Drug name, dose, route, IV Fluid & volume, frequency, number of doses) Cycle 3  Previous treatment: C2 9/25/23     Medication = PACLItaxel  Base Dose = 50 mg/m2  Calc Dose: Base Dose x 2.28 m² = 114 mg  Final Dose = 114 mg  Route = IV  Fluid & Volume =  mL  Admin Duration = Over 60 minutes           <10% difference, okay to treat with final dose.   Medication = CARBOplatin  Base Dose = AUC 2   Calc Dose: Base Dose x (125 + 25) = 300 mg  Final Dose = 300 mg  Route = IV  Fluid & Volume =  mL  Admin Duration = Over 30 min           <10% difference, okay to treat with final dose       By my signature below, I confirm this process was performed independently with the BSA and all final chemotherapy dosing calculations congruent. I have reviewed the above chemotherapy order and that my calculation of the final dose and BSA (when applicable) corroborate those calculations of the  pharmacist. Discrepancies of 10% or greater in the written dose have been addressed and documented within the [facility] Progress notes.      Cris Sutton, PharmD  "

## 2023-10-01 NOTE — PROGRESS NOTES
"Pharmacy Chemotherapy Calculations    Dx: Esophageal cancer  Cycle 3  Previous treatment = C2 9/25/23    Protocol: Paclitaxel/Carboplatin with concurrent radiation  Paclitaxel 50 mg/m2 IV over 60 minutes followed by  Carboplatin AUC 2 IV over 30 minutes   Weekly cycle for 5 weeks with radiation  NCCN Guidelines for Esophageal and Esophagogastric Cancers V.2.2023.  denzel Velarde al. NEJM. 2012;366(22):1147-08.  Van Villa E, et al. Br J Cancer. 2006;94(10):1389-94.    Allergies:  Patient has no known allergies.       /66   Pulse 82   Temp 36.2 °C (97.1 °F) (Temporal)   Resp 18   Ht 1.918 m (6' 3.5\")   Wt 97.5 kg (214 lb 15.2 oz)   SpO2 95%   BMI 26.51 kg/m²  Body surface area is 2.28 meters squared.    Labs 10/2/23:  ANC~ 2700 Plt = 250k   Hgb = 10.2     SCr = 0.66 mg/dL CrCl >125 mL/min   AST/ALT/AP = 12/8/32 TBili = 0.2     Paclitaxel 50 mg/m² x 2.28 m² = 114 mg   <10% difference, OK to treat with final dose = 114 mg    Carboplatin AUC 2 x (125 mL/min + 25) = 300 mg   <10% difference, OK to treat with final dose = 300 mg    James Jara, PharmD  "

## 2023-10-02 NOTE — PROGRESS NOTES
Chemotherapy Verification - PRIMARY RN      Height = 191.8 cm    Weight = 97.5 kg    BSA =  2.28 m^2      Medication: Taxol   Dose:  50 mg/m2   Calculated Dose: 114 mg                            (In mg/m2, AUC, mg/kg)     Medication:  Carboplatin  Dose: AUC 2     Calculated Dose:  300 mg                            (In mg/m2, AUC, mg/kg)        Carboplatin calculation (if applicable):     (2*(125+25)) = 300    I confirm this process was performed independently with the BSA and all final chemotherapy dosing calculations congruent.  Any discrepancies of 10% or greater have been addressed with the chemotherapy pharmacist. The resolution of the discrepancy has been documented in the EPIC progress notes.

## 2023-10-02 NOTE — PROGRESS NOTES
Chemotherapy Verification - SECONDARY RN   C3 D1     Height = 191.8 cm  Weight = 97.5 kg  BSA = 2.28 m^2       Medication: paclitaxel (TAXOL)  Dose: 50 mg/m2  Calculated Dose: 114 mg                             (In mg/m2, AUC, mg/kg)     Medication: carboplatin (PARAPLATIN)  Dose: target AUC 2  Calculated Dose: 300 mg                             (In mg/m2, AUC, mg/kg)      Carboplatin calculation (if applicable):  (2*(125+25)) = 300    I confirm that this process was performed independently.

## 2023-10-02 NOTE — PROGRESS NOTES
On October 2nd, 2023, Oncology Social Worker Orquidea Shirley attempted telephone contact with pt.  OSW Casper left voicemail message for pt. requesting pt. call back at earliest convenience.  OSW Casper left contact information in voicemail message.

## 2023-10-02 NOTE — PROGRESS NOTES
[facility] for Day 1/ Cycle 3 of Taxol/Carbo for neoplasm of lower third of esophagus, ambulatory, wife at side. Pt denied having any new or acute complaints today, reports tolerating past treatments well. Port accessed in a sterile manner, brisk blood return noted/ labs drawn off line, Pt tolerated well. Lab results reviewed and within parameters for treatment. Premeds administered per MD order, Tommy tolerated well.   Pt given Taxol (filter and non-DEHP tubing intact)/Carbo as prescribed, tolerated well, denied having any complaints during or after infusion. Port had + blood return after, flushed per [facility] policy, de-accessed, needle intact, insertion site covered with sterile gauze and paper tape. Confirmed future appointments and Tommy was discharged home in no acute distress.

## 2023-10-03 NOTE — TELEPHONE ENCOUNTER
Nutrition Services: Telephone Encounter   Wt Readings from Last 7 Encounters:   10/02/23 97.5 kg (214 lb 15.2 oz)   09/27/23 98.8 kg (217 lb 13 oz)   09/20/23 99.1 kg (218 lb 7.6 oz)   09/25/23 98.3 kg (216 lb 11.4 oz)   09/01/23 99.3 kg (218 lb 14.7 oz)   08/31/23 99.3 kg (218 lb 14.7 oz)   08/22/23 99.3 kg (219 lb)     Weight Change:   Per [facility] scales from 9/25 and 10/2, pt presents with potential 0.8 kg/ <1% loss. This does not meet criteria for significance, though remains worth noting during active chemoradiation.     Pertinent Recent Lab work (10/2/23): reviewed    RD called for nutrition check-in, though pt did not answer. RD able to leave brief voice message with contact information for callback as desired.     RD to continue to monitor throughout treatment and provide nutrition recommendations as indicated.  Please contact [phone]

## 2023-10-04 NOTE — ON TREATMENT VISIT
"ON TREATMENT  NOTE  RADIATION ONCOLOGY DEPARTMENT    Patient name:  Tommy Mckeon    Primary Physician:  Reynaldo Robins M.D. MRN: [id number]  CSN: [id number]   Referring physician:  Corby Cagle *   : 1965, 57 y.o.     ENCOUNTER DATE:  10/4/2023      DIAGNOSIS:  Malignant neoplasm of lower third of esophagus (HCC)  Staging form: Esophagus - Adenocarcinoma, AJCC 8th Edition  - Clinical stage from 2023: Stage III (cT3, cN0, cM0, G3) - Signed by Michael Woods M.D. on 2023  Total positive nodes: 0  Histologic grading system: 3 grade system      TREATMENT SUMMARY:  Course First Treatment Date 2023  Course Last Treatment Date 10/04/2023  Radiation Treatments       Active   Plans   Esophagus   Most recent treatment: Dose planned: 180 cGy (fraction 13 of 25 on 10/4/2023)   Total: Dose planned: 4,500 cGy   Elapsed Days: 16 @            Historical   No historical radiation treatments to show.                 SUBJECTIVE:  Well appearing    VITAL SIGNS:      10/4/2023    11:09 AM 10/2/2023     9:09 AM 2023    11:16 AM 2023    11:40 AM 2023    10:57 AM 2023    10:10 AM 2023     9:47 AM   Vitals   SYSTOLIC 123 111 177 100 104 104 97   DIASTOLIC 79 66 66 70 73 59 55   Pulse 63 82 68 80 60 68 71   Temperature 36.5 °C (97.7 °F) 36.2 °C (97.1 °F) 36.3 °C (97.4 °F) 36.5 °C (97.7 °F) 36.4 °C (97.5 °F) 35.9 °C (96.7 °F) 35.9 °C (96.7 °F)   Respiration  18  18  17 18   Weight 216.71 214.95 217.81 216.71 218.48     Height  1.918 m (6' 3.5\")        BMI 26.73 kg/m2 26.51 kg/m2 26.51 kg/m2 26.38 kg/m2 26.59 kg/m2     Pulse Oximetry 95 % 95 % 97 % 95 % 93 %  95 %     KPS: 100, Fully active, able to carry on all pre-disease performed without restriction (ECOG equivalent 0)  Encounter Vitals  Temperature: 36.5 °C (97.7 °F)  Temp src: Temporal  Blood Pressure: 123/79  Pulse: 63  Pulse Oximetry: 95 %  Weight: 98.3 kg (216 lb 11.4 oz)  Pain Score: No pain      10/4/2023    11:09 " AM 9/27/2023    11:16 AM 9/20/2023    10:57 AM 8/31/2023    10:03 AM   Pain Assessment   Pain Score NO PAIN NO PAIN NO PAIN NO PAIN          PHYSICAL EXAM:  Physical Exam  Constitutional:       Appearance: Normal appearance.   Abdominal:      General: There is no distension.      Palpations: Abdomen is soft.   Skin:     Findings: No erythema.   Neurological:      Mental Status: He is alert.              10/4/2023    11:10 AM 9/27/2023    11:17 AM 9/20/2023    10:59 AM   Toxicity Assessment   Toxicity Assessment Abdomen Abdomen Abdomen   Fatigue (lethargy, malaise, asthenia) None Increased fatigue over baseline, but not altering normal activities None   Radiation Dermatitis None None None   Anorexia None None None   Dehydration None None None   Diarrhea w/o Colostomy None None None   Nausea None None None   Vomiting None None    Dyspepsia/heartburn None None    Dysphagia, Esophagitis, odynophagoa (painful swallowing) None None None   Gastritis None None None   Abdominal Pain or cramping Mild pain not interfering with function None Mild pain not interfering with function       CURRENT MEDICATIONS:    Current Outpatient Medications:     ondansetron (ZOFRAN ODT) 8 MG TABLET DISPERSIBLE, , Disp: , Rfl:     prochlorperazine (COMPAZINE) 10 MG Tab, , Disp: , Rfl:     celecoxib (CELEBREX) 200 MG Cap, Take 1 Capsule by mouth 2 times a day., Disp: 60 Capsule, Rfl: 0    promethazine (PHENERGAN) 25 MG Tab, Take 0.5 Tablets by mouth every 6 hours as needed for Nausea/Vomiting., Disp: 30 Tablet, Rfl: 0    omeprazole (PRILOSEC) 20 MG delayed-release capsule, Take 1 Capsule by mouth 2 times a day., Disp: 30 Capsule, Rfl: 0    Multiple Vitamins-Minerals (ONE-A-DAY MENS 50+ ADVANTAGE PO), Take 1 Tablet by mouth every day., Disp: , Rfl:     LABORATORY DATA:   Lab Results   Component Value Date/Time    SODIUM 142 10/02/2023 09:28 AM    POTASSIUM 4.1 10/02/2023 09:28 AM    CHLORIDE 109 10/02/2023 09:28 AM    CO2 24 10/02/2023 09:28 AM     GLUCOSE 88 10/02/2023 09:28 AM    BUN 20 10/02/2023 09:28 AM    CREATININE 0.66 10/02/2023 09:28 AM       Lab Results   Component Value Date/Time    WBC 3.4 (L) 10/02/2023 09:28 AM    RBC 3.98 (L) 10/02/2023 09:28 AM    HEMOGLOBIN 10.2 (L) 10/02/2023 09:28 AM    HEMATOCRIT 33.2 (L) 10/02/2023 09:28 AM    MCV 83.4 10/02/2023 09:28 AM    MCH 25.6 (L) 10/02/2023 09:28 AM    MCHC 30.7 (L) 10/02/2023 09:28 AM    PLATELETCT 250 10/02/2023 09:28 AM         RADIOLOGY DATA:  CT-ABDOMEN-PELVIS WITH    Result Date: 8/16/2023  1.  No acute inflammation in the abdomen or pelvis. No bowel obstruction. 2.  Small, nonspecific free fluid in the pelvis. 3.  Small left pleural effusion with adjacent atelectasis. 4.  Moderate hiatal hernia.    CT-CHEST (THORAX) WITH    Result Date: 8/19/2023  1.  Hiatal hernia with wall thickening in the distal thoracic esophagus and portion of the herniated stomach which is in keeping with history of adenocarcinoma 2.  Small left pleural effusion 3.  No pulmonary nodules or enlarged mediastinal lymph nodes Fleischner Society pulmonary nodule recommendations: Not Applicable     CT-CHEST (THORAX) WITH & W/O    Result Date: 8/18/2023  1.  Large hiatal hernia with wall thickening in the distal thoracic esophagus and portion of the herniated stomach which would be in keeping with the provided clinical history small LEFT pleural effusion 2.  Trace RIGHT pleural effusion 3.  No pulmonary nodules or enlarged mediastinal lymph nodes     CT-PANCREAS AND ABDOMEN WITH & W/O    Result Date: 8/19/2023  1.  Distal esophageal/proximal gastric mass consistent with known malignancy 2.  No local or distant metastases identified 3.  Small left pleural effusion and mild left basilar atelectasis     JA-MEDCUIO-8 VIEW    Result Date: 8/16/2023  Nonobstructive bowel gas pattern. Small amount of stool throughout the colon.    DX-CHEST-PORTABLE (1 VIEW)    Result Date: 8/16/2023  Mild blunted left costophrenic angle could be  atelectasis/scarring and/or trace left pleural effusion.    DX-PORTABLE FLUOROSCOPY < 1 HOUR    Result Date: 9/1/2023  Portable fluoroscopy utilized for 1 second. INTERPRETING LOCATION: [facility] [place], [address]    [id number] BASE TO MID-THIGH    Result Date: 8/30/2023  1.  Heterogeneous hypermetabolic mass in the distal esophagus/gastric cardia consistent with known malignancy. 2.  No evidence for metastatic disease.      IMPRESSION:  Cancer Staging   Malignant neoplasm of lower third of esophagus (HCC)  Staging form: Esophagus - Adenocarcinoma, AJCC 8th Edition  - Clinical stage from 8/31/2023: Stage III (cT3, cN0, cM0, G3) - Signed by Michael Woods M.D. on 8/31/2023      PLAN:  No change in treatment plan    Disposition:  Treatment plan and imaging reviewed. Questions answered. Continue therapy outlined.     Michael Woods M.D.    No orders of the defined types were placed in this encounter.

## 2023-10-07 NOTE — PROGRESS NOTES
"Pharmacy Chemotherapy Verification    Dx: Adenocarcinoma of cardio-esophageal junction         Protocol: PACLItaxel/CARBOplatin/XRT     PACLItaxel 50 mg/m2 IV over 60 min on Day 1   CARBOplatin AUC 2 IV over 30 min on Day 1  Repeat weekly x 5 weeks with radiation therapy     NCCN Guidelines for Esophageal and Esophagogastric Junction Cancers V.2.2023.  dulce maria Velarde. N Engl J Med. 2012;366(22):4314-38.  Van Villa E, et al. Br J Cancer.2006;94(10):1389-94.    Allergies:  Patient has no known allergies.     /71   Pulse 88   Temp 36.7 °C (98.1 °F) (Temporal)   Resp 18   Ht 1.918 m (6' 3.51\")   Wt 98.8 kg (217 lb 13 oz)   SpO2 96%   BMI 26.86 kg/m²  Body surface area is 2.29 meters squared.    Labs 10/8/23:  ANC~ 2160 Plt = 189k   Hgb = 10.1     SCr = 0.65 mg/dL CrCl > 125 mL/min (min SCr 0.7 used)     Labs 10/2/23  AST/ALT/AP = 12/8/32 Tbili = 0.2     Drug Order   (Drug name, dose, route, IV Fluid & volume, frequency, number of doses) Cycle 4  Previous treatment: C3 on 10/2/23     Medication = PACLItaxel  Base Dose = 50 mg/m2  Calc Dose: Base Dose x 2.29 m² = 114.5 mg  Final Dose = 114 mg  Route = IV  Fluid & Volume =  mL  Admin Duration = Over 60 minutes           <10% difference, okay to treat with final dose.   Medication = CARBOplatin  Base Dose = AUC 2   Calc Dose: Base Dose x (125 + 25) = 300 mg  Final Dose = 300 mg  Route = IV  Fluid & Volume =  mL  Admin Duration = Over 30 min           <10% difference, okay to treat with final dose       By my signature below, I confirm this process was performed independently with the BSA and all final chemotherapy dosing calculations congruent. I have reviewed the above chemotherapy order and that my calculation of the final dose and BSA (when applicable) corroborate those calculations of the  pharmacist. Discrepancies of 10% or greater in the written dose have been addressed and documented within the EPIC Progress " notes.    Melinda Leslie, PharmD,BCOP

## 2023-10-08 NOTE — PROGRESS NOTES
"Pharmacy Chemotherapy Calculations    Dx: Esophageal cancer  Cycle 4  Previous treatment = C3 10/2/23    Protocol: Paclitaxel/Carboplatin with concurrent radiation  Paclitaxel 50 mg/m2 IV over 60 minutes followed by  Carboplatin AUC 2 IV over 30 minutes   Weekly cycle for 5 weeks with radiation  NCCN Guidelines for Esophageal and Esophagogastric Cancers V.2.2023.  denzel Velarde al. NEJM. 2012;366(22):8130-10.  Van Villa E, et al. Br J Cancer. 2006;94(10):1389-94.    Allergies:  Patient has no known allergies.       /71   Pulse 88   Temp 36.7 °C (98.1 °F) (Temporal)   Resp 18   Ht 1.918 m (6' 3.51\")   Wt 98.8 kg (217 lb 13 oz)   SpO2 96%   BMI 26.86 kg/m²  Body surface area is 2.29 meters squared.    Labs 10/8/23:  ANC~ 2160 Plt = 189k   Hgb = 10.1     SCr = 0.65 mg/dL CrCl >125 mL/min     Paclitaxel 50 mg/m² x 2.29 m² = 114.5 mg   <10% difference, OK to treat with final dose = 114 mg    Carboplatin AUC 2 x (125 mL/min + 25) = 300 mg   <10% difference, OK to treat with final dose = 300 mg    James Jara, PharmD  "

## 2023-10-08 NOTE — PROGRESS NOTES
Mr Mckeon is here today for pre chemo labs.    He has no new concerns to report.    Venipuncture to the right hand. Labs drawn.    Discharged in stable condition.

## 2023-10-09 NOTE — PROGRESS NOTES
Chemotherapy Verification - SECONDARY RN       Height = 191.8 cm  Weight = 98.8 kg  BSA = 2.29 m2       Medication: Taxol  Dose: 50 mg/m2  Calculated Dose: 114.5 mg                             (In mg/m2, AUC, mg/kg)     Medication: Carboplatin  Dose: AUC 2   Calculated Dose: 300 mg                             (In mg/m2, AUC, mg/kg)    Carboplatin calculation (if applicable):  (2*(125+25)) = 300    I confirm that this process was performed independently.

## 2023-10-09 NOTE — PROGRESS NOTES
Tommy to infusion for cycle 4 of weekly Taxol/carbo with concurrent radiation. Reports tolerating treatment well thus far aside from fatigue and taste changes. Port accessed in sterile manner and flushed with NS with positive blood return. Labs from 10/8 reviewed, within parameters for treatment today. Pre-treatment medications given as ordered. Taxol infused with filter over 1 hour followed by carboplatin over 30 minutes, patient tolerated both well with no adverse effects. Port flushed post infusion with positive blood return, heparinized and d/evi with tip intact, covered with gauze and tape. Patient left in stable condition, knows when to return for next appt.

## 2023-10-09 NOTE — PROGRESS NOTES
Chemotherapy Verification - PRIMARY RN      Height = 1.918m  Weight = 98.8 kg  BSA = 2.29m2       Medication: paclitaxel  Dose: 50mg/m2  Calculated Dose: 114.5 mg                             (In mg/m2, AUC, mg/kg)     Medication: carboplatin  Dose: AUC 2  Calculated Dose: 300 mg                             (In mg/m2, AUC, mg/kg)        Carboplatin calculation (if applicable):  (2*(125+25)) = 300      I confirm this process was performed independently with the BSA and all final chemotherapy dosing calculations congruent.  Any discrepancies of 10% or greater have been addressed with the chemotherapy pharmacist. The resolution of the discrepancy has been documented in the EPIC progress notes.

## 2023-10-11 NOTE — PROGRESS NOTES
"Pharmacy Chemotherapy Verification    Dx: Adenocarcinoma of cardio-esophageal junction         Protocol: PACLItaxel/CARBOplatin/XRT     PACLItaxel 50 mg/m2 IV over 60 min on Day 1   CARBOplatin AUC 2 IV over 30 min on Day 1  Repeat weekly x 5 weeks with radiation therapy     NCCN Guidelines for Esophageal and Esophagogastric Junction Cancers V.2.2023.  denzel Velarde al. N Engl J Med. 2012;366(22):6674-84.  Van Villa E, et al. Br J Cancer.2006;94(10):1389-94.    Allergies:  Patient has no known allergies.     /69   Pulse 84   Temp 36.5 °C (97.7 °F) (Temporal)   Resp 16   Ht 1.918 m (6' 3.51\")   Wt 96.7 kg (213 lb 3 oz)   SpO2 96%   BMI 26.29 kg/m²  Body surface area is 2.27 meters squared.    Labs 10/15/23:  ANC~ 1420 Plt = 133k   Hgb = 11.6     SCr = 0.71 mg/dL CrCl > 125 mL/min   AST/ALT/AP = WNL TBili = 0.2        Drug Order   (Drug name, dose, route, IV Fluid & volume, frequency, number of doses) Cycle 5  Previous treatment: C4 on 10/9/23     Medication = PACLItaxel  Base Dose = 50 mg/m2  Calc Dose: Base Dose x 2.27 m² = 113.5 mg  Final Dose = 114 mg  Route = IV  Fluid & Volume =  mL  Admin Duration = Over 60 minutes           <10% difference, okay to treat with final dose.   Medication = CARBOplatin  Base Dose = AUC 2   Calc Dose: Base Dose x (125 + 25) = 300 mg  Final Dose = 300 mg  Route = IV  Fluid & Volume =  mL  Admin Duration = Over 30 min           <10% difference, okay to treat with final dose       By my signature below, I confirm this process was performed independently with the BSA and all final chemotherapy dosing calculations congruent. I have reviewed the above chemotherapy order and that my calculation of the final dose and BSA (when applicable) corroborate those calculations of the  pharmacist. Discrepancies of 10% or greater in the written dose have been addressed and documented within the [facility] Progress notes.    Melinda Leslie, PharmD,BCOP  "

## 2023-10-11 NOTE — ON TREATMENT VISIT
"ON TREATMENT  NOTE  RADIATION ONCOLOGY DEPARTMENT    Patient name:  Tommy Mckeon    Primary Physician:  Reynaldo Robins M.D. MRN: [id number]  CSN: [id number]   Referring physician:  Corby Cagle *   : 1965, 58 y.o.     ENCOUNTER DATE:  10/11/2023      DIAGNOSIS:  Malignant neoplasm of lower third of esophagus (HCC)  Staging form: Esophagus - Adenocarcinoma, AJCC 8th Edition  - Clinical stage from 2023: Stage III (cT3, cN0, cM0, G3) - Signed by Michael Woods M.D. on 2023  Total positive nodes: 0  Histologic grading system: 3 grade system      TREATMENT SUMMARY:  Course First Treatment Date 2023  Course Last Treatment Date 10/11/2023  Radiation Treatments       Active   Plans   Esophagus   Most recent treatment: Dose planned: 180 cGy (fraction 18 of 25 on 10/11/2023)   Total: Dose planned: 4,500 cGy   Elapsed Days:  @            Historical   No historical radiation treatments to show.                 SUBJECTIVE:  No complaints, swallowing slightly improved    VITAL SIGNS:      10/11/2023    10:48 AM 10/9/2023    11:19 AM 10/8/2023     4:43 PM 10/4/2023    11:09 AM 10/2/2023     9:09 AM 2023    11:16 AM 2023    11:40 AM   Vitals   SYSTOLIC 115 118 103 123 111 177 100   DIASTOLIC 65 71 60 79 66 66 70   Pulse 68 88 101 63 82 68 80   Temperature 36.6 °C (97.8 °F) 36.7 °C (98.1 °F) 36.7 °C (98.1 °F) 36.5 °C (97.7 °F) 36.2 °C (97.1 °F) 36.3 °C (97.4 °F) 36.5 °C (97.7 °F)   Respiration  18 18  18  18   Weight 221.12 217.81  216.71 214.95 217.81 216.71   Height  1.918 m (6' 3.51\")   1.918 m (6' 3.5\")     BMI 27.26 kg/m2 26.86 kg/m2  26.73 kg/m2 26.51 kg/m2 26.51 kg/m2 26.38 kg/m2   Pulse Oximetry 96 % 96 % 94 % 95 % 95 % 97 % 95 %     KPS: 100, Fully active, able to carry on all pre-disease performed without restriction (ECOG equivalent 0)  Encounter Vitals  Temperature: 36.6 °C (97.8 °F)  Temp src: Temporal  Blood Pressure: 115/65  Pulse: 68  Pulse Oximetry: 96 " %  Weight: 100 kg (221 lb 1.9 oz)  Pain Score: No pain      10/11/2023    10:48 AM 10/4/2023    11:09 AM 9/27/2023    11:16 AM 9/20/2023    10:57 AM 8/31/2023    10:03 AM   Pain Assessment   Pain Score NO PAIN NO PAIN NO PAIN NO PAIN NO PAIN          PHYSICAL EXAM:  Physical Exam  Constitutional:       Appearance: Normal appearance.   Abdominal:      General: There is no distension.      Palpations: Abdomen is soft.   Skin:     Findings: No erythema.   Neurological:      Mental Status: He is alert.              10/11/2023    10:51 AM 10/4/2023    11:10 AM 9/27/2023    11:17 AM 9/20/2023    10:59 AM   Toxicity Assessment   Toxicity Assessment Abdomen Abdomen Abdomen Abdomen   Fatigue (lethargy, malaise, asthenia) None None Increased fatigue over baseline, but not altering normal activities None   Radiation Dermatitis None None None None   Anorexia None None None None   Dehydration None None None None   Diarrhea w/o Colostomy None None None None   Nausea None None None None   Vomiting None None None    Dyspepsia/heartburn None None None    Dysphagia, Esophagitis, odynophagoa (painful swallowing) None None None None   Gastritis None None None None   Abdominal Pain or cramping None Mild pain not interfering with function None Mild pain not interfering with function       CURRENT MEDICATIONS:    Current Outpatient Medications:     ondansetron (ZOFRAN ODT) 8 MG TABLET DISPERSIBLE, , Disp: , Rfl:     prochlorperazine (COMPAZINE) 10 MG Tab, , Disp: , Rfl:     celecoxib (CELEBREX) 200 MG Cap, Take 1 Capsule by mouth 2 times a day., Disp: 60 Capsule, Rfl: 0    promethazine (PHENERGAN) 25 MG Tab, Take 0.5 Tablets by mouth every 6 hours as needed for Nausea/Vomiting., Disp: 30 Tablet, Rfl: 0    omeprazole (PRILOSEC) 20 MG delayed-release capsule, Take 1 Capsule by mouth 2 times a day., Disp: 30 Capsule, Rfl: 0    Multiple Vitamins-Minerals (ONE-A-DAY MENS 50+ ADVANTAGE PO), Take 1 Tablet by mouth every day., Disp: , Rfl:      LABORATORY DATA:   Lab Results   Component Value Date/Time    SODIUM 143 10/08/2023 04:50 PM    POTASSIUM 3.9 10/08/2023 04:50 PM    CHLORIDE 111 10/08/2023 04:50 PM    CO2 21 10/08/2023 04:50 PM    GLUCOSE 107 (H) 10/08/2023 04:50 PM    BUN 21 10/08/2023 04:50 PM    CREATININE 0.65 10/08/2023 04:50 PM       Lab Results   Component Value Date/Time    WBC 3.0 (L) 10/08/2023 04:50 PM    RBC 3.84 (L) 10/08/2023 04:50 PM    HEMOGLOBIN 10.1 (L) 10/08/2023 04:50 PM    HEMATOCRIT 32.2 (L) 10/08/2023 04:50 PM    MCV 83.9 10/08/2023 04:50 PM    MCH 26.3 (L) 10/08/2023 04:50 PM    MCHC 31.4 (L) 10/08/2023 04:50 PM    PLATELETCT 189 10/08/2023 04:50 PM         RADIOLOGY DATA:  CT-ABDOMEN-PELVIS WITH    Result Date: 8/16/2023  1.  No acute inflammation in the abdomen or pelvis. No bowel obstruction. 2.  Small, nonspecific free fluid in the pelvis. 3.  Small left pleural effusion with adjacent atelectasis. 4.  Moderate hiatal hernia.    CT-CHEST (THORAX) WITH    Result Date: 8/19/2023  1.  Hiatal hernia with wall thickening in the distal thoracic esophagus and portion of the herniated stomach which is in keeping with history of adenocarcinoma 2.  Small left pleural effusion 3.  No pulmonary nodules or enlarged mediastinal lymph nodes Fleischner Society pulmonary nodule recommendations: Not Applicable     CT-CHEST (THORAX) WITH & W/O    Result Date: 8/18/2023  1.  Large hiatal hernia with wall thickening in the distal thoracic esophagus and portion of the herniated stomach which would be in keeping with the provided clinical history small LEFT pleural effusion 2.  Trace RIGHT pleural effusion 3.  No pulmonary nodules or enlarged mediastinal lymph nodes     CT-PANCREAS AND ABDOMEN WITH & W/O    Result Date: 8/19/2023  1.  Distal esophageal/proximal gastric mass consistent with known malignancy 2.  No local or distant metastases identified 3.  Small left pleural effusion and mild left basilar atelectasis     [id number]  VIEW    Result Date: 8/16/2023  Nonobstructive bowel gas pattern. Small amount of stool throughout the colon.    DX-CHEST-PORTABLE (1 VIEW)    Result Date: 8/16/2023  Mild blunted left costophrenic angle could be atelectasis/scarring and/or trace left pleural effusion.    DX-PORTABLE FLUOROSCOPY < 1 HOUR    Result Date: 9/1/2023  Portable fluoroscopy utilized for 1 second. INTERPRETING LOCATION: [address] [place] [address], [facility], [id number]    [id number] BASE TO MID-THIGH    Result Date: 8/30/2023  1.  Heterogeneous hypermetabolic mass in the distal esophagus/gastric cardia consistent with known malignancy. 2.  No evidence for metastatic disease.      IMPRESSION:  Cancer Staging   Malignant neoplasm of lower third of esophagus (HCC)  Staging form: Esophagus - Adenocarcinoma, AJCC 8th Edition  - Clinical stage from 8/31/2023: Stage III (cT3, cN0, cM0, G3) - Signed by Michael Woods M.D. on 8/31/2023      PLAN:  No change in treatment plan    Disposition:  Treatment plan and imaging reviewed. Questions answered. Continue therapy outlined.     Michael Woods M.D.    No orders of the defined types were placed in this encounter.

## 2023-10-12 NOTE — TELEPHONE ENCOUNTER
Attempted to call pt with no success to connect to update education of importance to keep appt for pre chemo labs on 10/15. Bernardo sent to [facility] office for appt on 10/13 to educate pt on importance and timing of prechemo labs.

## 2023-10-13 NOTE — PROGRESS NOTES
Nutrition Services: Brief Update  Wt Readings from Last 7 Encounters:   10/11/23 100 kg (221 lb 1.9 oz)   10/09/23 98.8 kg (217 lb 13 oz)   10/04/23 98.3 kg (216 lb 11.4 oz)   10/02/23 97.5 kg (214 lb 15.2 oz)   09/27/23 98.8 kg (217 lb 13 oz)   09/20/23 99.1 kg (218 lb 7.6 oz)   09/25/23 98.3 kg (216 lb 11.4 oz)     Weight Change: Pt's weight appears to be trending up and it up 1.7 kg in the past 1 week.     Pertinent Recent Lab work (DATE 10/12): reviewed    RD able to visit pt following XRT with his spouse Grace. Per pt, he is looking for some guidelines of what to eat and what foods to avoid. He is also interested in more ideas for protein foods and snacks. Per pt, he has been eating 5-6x/ day and he appears to be eating well. He reports eating meals plus a whey protein powder with milk (30-60g protein), Equate protein shake (15g protein) and eating eggs and string cheese for snacks. Per pt, he is drinking water with lemonade or cates added but has been avoiding soda for the past month. Pt states he has been working on what he eats and keeping up with the protein. He reports eating steaks with salads for many meals. Pt reports some swallowing difficulty if not sitting up or eating too fast. Pt states he has been more tired. Per pt, he is trying to stay active by doing chores and taking care of the chickens.     Plan/Recommend:  Provided and reviewed the Nutrition for Esophageal cancer handout. Reviewed nutrition recommendations, snack ideas and high protein foods.  Recommended that when pt makes his protein shake to aim for 30g protein per serving vs 60g protein per serving  Encouraged pt to continue following his current diet regimen  Provided information for [email or web address] for more ideas for recipes    Pt verbalizes understanding and is receptive to information provided.   RD available PRN and will make further recommendations as indicated within policy.    [phone]

## 2023-10-15 NOTE — PROGRESS NOTES
"Pharmacy Chemotherapy Calculations    Dx: Esophageal cancer  Cycle 5  Previous treatment = C4 10/9/23    Protocol: Paclitaxel/Carboplatin with concurrent radiation  Paclitaxel 50 mg/m2 IV over 60 minutes followed by  Carboplatin AUC 2 IV over 30 minutes   Weekly cycle for 5 weeks with radiation  NCCN Guidelines for Esophageal and Esophagogastric Cancers V.2.2023.  denzel Velarde al. NEJM. 2012;366(22):1714-71.  Van Villa E, et al. Br J Cancer. 2006;94(10):1389-94.    Allergies:  Patient has no known allergies.       /69   Pulse 84   Temp 36.5 °C (97.7 °F) (Temporal)   Resp 16   Ht 1.918 m (6' 3.51\")   Wt 96.7 kg (213 lb 3 oz)   SpO2 96%   BMI 26.29 kg/m²  Body surface area is 2.27 meters squared.    Labs 10/12/23:  ANC~ 2030 Plt = 139k   Hgb = 11.3     SCr = 0.58 mg/dL CrCl >125 mL/min     Paclitaxel 50 mg/m² x 2.27 m² = 113.5 mg   <10% difference, OK to treat with final dose = 114 mg    Carboplatin AUC 2 x (125 mL/min + 25) = 300 mg   <10% difference, OK to treat with final dose = 300 mg    Naila Miner, PharmD  "

## 2023-10-16 NOTE — PROGRESS NOTES
Chemotherapy Verification - PRIMARY RN      Height =  191.8 cm    Weight =  96.7 Kg    BSA =  2.27 m^2      Medication: Taxol  Dose: 50 mg/m^2    Calculated Dose:  113.5 mg                            (In mg/m2, AUC, mg/kg)     Medication: Carboplatin    Dose:  AUC 2  Calculated Dose:  300 mg                            (In mg/m2, AUC, mg/kg)        Carboplatin calculation (if applicable):  (2*(125+25)) = 300      I confirm this process was performed independently with the BSA and all final chemotherapy dosing calculations congruent.  Any discrepancies of 10% or greater have been addressed with the chemotherapy pharmacist. The resolution of the discrepancy has been documented in the EPIC progress notes.

## 2023-10-16 NOTE — PROGRESS NOTES
Chemotherapy Verification - SECONDARY RN       Height = 191.8 cm  Weight = 96.7 kg  BSA = 2.27 m2       Medication: Taxol  Dose: 50 mg/m2  Calculated Dose: 133.5 mg                             (In mg/m2, AUC, mg/kg)     Medication: Carboplatin  Dose: AUC 2  Calculated Dose: 300 mg                             (In mg/m2, AUC, mg/kg)    Carboplatin calculation (if applicable):  (2*(125+25)) = 300    I confirm that this process was performed independently.

## 2023-10-16 NOTE — PROGRESS NOTES
Patient came into infusion independently. Orders and vital signs reviewed, assessment done.  Labs collected peripherally and reviewed. Patient meets parameters to proceed with chemo treatment tomorrow. Patient left in stable condition with next appointment confirmed.

## 2023-10-18 NOTE — ON TREATMENT VISIT
"ON TREATMENT  NOTE  RADIATION ONCOLOGY DEPARTMENT    Patient name:  Tommy Mckeon    Primary Physician:  Reynaldo Robins M.D. MRN: [id number]  CSN: [id number]   Referring physician:  Corby Cagle *   : 1965, 58 y.o.     ENCOUNTER DATE:  10/18/2023      DIAGNOSIS:  Malignant neoplasm of lower third of esophagus (HCC)  Staging form: Esophagus - Adenocarcinoma, AJCC 8th Edition  - Clinical stage from 2023: Stage III (cT3, cN0, cM0, G3) - Signed by Michael Woods M.D. on 2023  Total positive nodes: 0  Histologic grading system: 3 grade system      TREATMENT SUMMARY:  Course First Treatment Date 2023  Course Last Treatment Date 10/18/2023  Radiation Treatments       Active   Plans   Esophagus   Most recent treatment: Dose planned: 180 cGy (fraction 23 of 25 on 10/18/2023)   Total: Dose planned: 4,500 cGy   Elapsed Days:  @            Historical   No historical radiation treatments to show.                 SUBJECTIVE:  Well appearing    VITAL SIGNS:      10/18/2023    11:04 AM 10/16/2023     9:22 AM 10/15/2023     4:52 PM 10/11/2023    10:48 AM 10/9/2023    11:19 AM 10/8/2023     4:43 PM 10/4/2023    11:09 AM   Vitals   SYSTOLIC 115 106 115 115 118 103 123   DIASTOLIC 67 69 71 65 71 60 79   Pulse 71 84 95 68 88 101 63   Temperature 36.4 °C (97.6 °F) 36.5 °C (97.7 °F) 36.2 °C (97.2 °F) 36.6 °C (97.8 °F) 36.7 °C (98.1 °F) 36.7 °C (98.1 °F) 36.5 °C (97.7 °F)   Respiration  16 18  18 18    Weight 216.71 213.19  221.12 217.81  216.71   Height  1.918 m (6' 3.51\")   1.918 m (6' 3.51\")     BMI 26.72 kg/m2 26.29 kg/m2  27.26 kg/m2 26.86 kg/m2  26.73 kg/m2   Pulse Oximetry 95 % 96 % 98 % 96 % 96 % 94 % 95 %     KPS: 100, Fully active, able to carry on all pre-disease performed without restriction (ECOG equivalent 0)  Encounter Vitals  Temperature: 36.4 °C (97.6 °F)  Temp src: Temporal  Blood Pressure: 115/67  Pulse: 71  Pulse Oximetry: 95 %  Weight: 98.3 kg (216 lb 11.4 oz)  Pain Score: " No pain      10/18/2023    11:04 AM 10/11/2023    10:48 AM 10/4/2023    11:09 AM 9/27/2023    11:16 AM 9/20/2023    10:57 AM 8/31/2023    10:03 AM   Pain Assessment   Pain Score NO PAIN NO PAIN NO PAIN NO PAIN NO PAIN NO PAIN          PHYSICAL EXAM:  Physical Exam  Constitutional:       Appearance: Normal appearance.   Abdominal:      General: There is no distension.      Palpations: Abdomen is soft.   Skin:     Findings: No erythema.   Neurological:      Mental Status: He is alert.              10/18/2023    11:05 AM 10/11/2023    10:51 AM 10/4/2023    11:10 AM 9/27/2023    11:17 AM 9/20/2023    10:59 AM   Toxicity Assessment   Toxicity Assessment Abdomen Abdomen Abdomen Abdomen Abdomen   Fatigue (lethargy, malaise, asthenia) None None None Increased fatigue over baseline, but not altering normal activities None   Radiation Dermatitis None None None None None   Anorexia None None None None None   Dehydration None None None None None   Diarrhea w/o Colostomy None None None None None   Nausea None None None None None   Vomiting None None None None    Dyspepsia/heartburn None None None None    Dysphagia, Esophagitis, odynophagoa (painful swallowing) None None None None None   Gastritis None None None None None   Abdominal Pain or cramping None None Mild pain not interfering with function None Mild pain not interfering with function       CURRENT MEDICATIONS:    Current Outpatient Medications:     ondansetron (ZOFRAN ODT) 8 MG TABLET DISPERSIBLE, , Disp: , Rfl:     prochlorperazine (COMPAZINE) 10 MG Tab, , Disp: , Rfl:     celecoxib (CELEBREX) 200 MG Cap, Take 1 Capsule by mouth 2 times a day., Disp: 60 Capsule, Rfl: 0    promethazine (PHENERGAN) 25 MG Tab, Take 0.5 Tablets by mouth every 6 hours as needed for Nausea/Vomiting., Disp: 30 Tablet, Rfl: 0    omeprazole (PRILOSEC) 20 MG delayed-release capsule, Take 1 Capsule by mouth 2 times a day., Disp: 30 Capsule, Rfl: 0    Multiple Vitamins-Minerals (ONE-A-DAY MENS 50+  ADVANTAGE PO), Take 1 Tablet by mouth every day., Disp: , Rfl:     LABORATORY DATA:   Lab Results   Component Value Date/Time    SODIUM 139 10/15/2023 05:00 PM    POTASSIUM 4.4 10/15/2023 05:00 PM    CHLORIDE 105 10/15/2023 05:00 PM    CO2 25 10/15/2023 05:00 PM    GLUCOSE 107 (H) 10/15/2023 05:00 PM    BUN 18 10/15/2023 05:00 PM    CREATININE 0.71 10/15/2023 05:00 PM       Lab Results   Component Value Date/Time    WBC 1.9 (LL) 10/15/2023 05:00 PM    RBC 4.33 (L) 10/15/2023 05:00 PM    HEMOGLOBIN 11.6 (L) 10/15/2023 05:00 PM    HEMATOCRIT 37.1 (L) 10/15/2023 05:00 PM    MCV 85.7 10/15/2023 05:00 PM    MCH 26.8 (L) 10/15/2023 05:00 PM    MCHC 31.3 (L) 10/15/2023 05:00 PM    PLATELETCT 133 (L) 10/15/2023 05:00 PM         RADIOLOGY DATA:  CT-CHEST (THORAX) WITH    Result Date: 8/19/2023  1.  Hiatal hernia with wall thickening in the distal thoracic esophagus and portion of the herniated stomach which is in keeping with history of adenocarcinoma 2.  Small left pleural effusion 3.  No pulmonary nodules or enlarged mediastinal lymph nodes Fleischner Society pulmonary nodule recommendations: Not Applicable     CT-PANCREAS AND ABDOMEN WITH & W/O    Result Date: 8/19/2023  1.  Distal esophageal/proximal gastric mass consistent with known malignancy 2.  No local or distant metastases identified 3.  Small left pleural effusion and mild left basilar atelectasis     DX-PORTABLE FLUOROSCOPY < 1 HOUR    Result Date: 9/1/2023  Portable fluoroscopy utilized for 1 second. INTERPRETING LOCATION: [address] [facility], [address]    [id number] BASE TO MID-THIGH    Result Date: 8/30/2023  1.  Heterogeneous hypermetabolic mass in the distal esophagus/gastric cardia consistent with known malignancy. 2.  No evidence for metastatic disease.      IMPRESSION:  Cancer Staging   Malignant neoplasm of lower third of esophagus (HCC)  Staging form: Esophagus - Adenocarcinoma, AJCC 8th Edition  - Clinical stage from 8/31/2023: Stage III (cT3, cN0,  cM0, G3) - Signed by Michael Woods M.D. on 8/31/2023      PLAN:  No change in treatment plan    Disposition:  Treatment plan and imaging reviewed. Questions answered. Continue therapy outlined.     Michael Woods M.D.    Orders Placed This Encounter    [id number] BASE TO MID-THIGH

## 2023-10-21 NOTE — PROGRESS NOTES
"Pharmacy Chemotherapy Verification    Dx: Adenocarcinoma of cardio-esophageal junction         Protocol: PACLItaxel/CARBOplatin/XRT     PACLItaxel 50 mg/m2 IV over 60 min on Day 1   CARBOplatin AUC 2 IV over 30 min on Day 1  Repeat weekly x 5 weeks with radiation therapy     NCCN Guidelines for Esophageal and Esophagogastric Junction Cancers V.2.2023.  denzel Velarde al. N Engl J Med. 2012;366(22):3584-84.  Van Villa E, et al. Br J Cancer.2006;94(10):1389-94.    Allergies:  Patient has no known allergies.     /71   Pulse 95   Temp 36.6 °C (97.8 °F) (Temporal)   Resp 18   Ht 1.918 m (6' 3.51\")   Wt 97.5 kg (214 lb 15.2 oz)   SpO2 98%   BMI 26.50 kg/m²  Body surface area is 2.28 meters squared.    Labs 10/22/23  ANC~ 1910 Hgb 11.5 Plt 176k  SCr 0.64 CrCl >125 ml/min     Labs 10/19/23    AST/ALT/AP = 22/14/33 Tbili = 0.3    Drug Order   (Drug name, dose, route, IV Fluid & volume, frequency, number of doses) Cycle 6  Previous treatment: C5 10/16/23     Medication = PACLItaxel  Base Dose = 50 mg/m2  Calc Dose: Base Dose x 2.28 m² = 114 mg  Final Dose = 114 mg  Route = IV  Fluid & Volume =  mL  Admin Duration = Over 60 minutes           <10% difference, okay to treat with final dose.   Medication = CARBOplatin  Base Dose = AUC 2   Calc Dose: Base Dose x (125 + 25) = 300 mg  Final Dose = 300 mg  Route = IV  Fluid & Volume =  mL  Admin Duration = Over 30 min           <10% difference, okay to treat with final dose       By my signature below, I confirm this process was performed independently with the BSA and all final chemotherapy dosing calculations congruent. I have reviewed the above chemotherapy order and that my calculation of the final dose and BSA (when applicable) corroborate those calculations of the  pharmacist. Discrepancies of 10% or greater in the written dose have been addressed and documented within the [facility] Progress notes.    Cris Sutton, PharmD,BCOP  "

## 2023-10-22 NOTE — PROGRESS NOTES
Pt arrived to IS, ambulatory, for pre-chemo labs. Pt voices no complaints. Labs drawn per orders. Pt left IS with no s/sx of distress. Follow up appointment confirmed.

## 2023-10-22 NOTE — PROGRESS NOTES
"Pharmacy Chemotherapy Calculations    Dx: Esophageal cancer  Cycle 6  Previous treatment = C5 10/16/23    Protocol: Paclitaxel/Carboplatin with concurrent radiation  Paclitaxel 50 mg/m2 IV over 60 minutes followed by  Carboplatin AUC 2 IV over 30 minutes   Weekly cycle for 5 weeks with radiation  NCCN Guidelines for Esophageal and Esophagogastric Cancers V.2.2023.  denzel Velarde al. NEJM. 2012;366(22):6211-71.  Van Villa E, et al. Br J Cancer. 2006;94(10):1389-94.    Allergies:  Patient has no known allergies.       /71   Pulse 95   Temp 36.6 °C (97.8 °F) (Temporal)   Resp 18   Ht 1.918 m (6' 3.51\")   Wt 97.5 kg (214 lb 15.2 oz)   SpO2 98%   BMI 26.50 kg/m²  Body surface area is 2.28 meters squared.    Labs 10/19/23:  ANC~ 2000 Plt = 154k   Hgb = 10.7     SCr = 0.53 mg/dL CrCl >125 mL/min     Paclitaxel 50 mg/m² x 2.28 m² = 114 mg   <10% difference, OK to treat with final dose = 114 mg    Carboplatin AUC 2 x (125 mL/min + 25) = 300 mg   <10% difference, OK to treat with final dose = 300 mg    Naila Miner, PharmD  "

## 2023-10-23 NOTE — PROGRESS NOTES
Patient came into INF independently. Orders and vitals reviewed, assessment done. Port accessed in sterile manner with blood return noted and flushed briskly. Labs from 10/22/23 reviewed, patient meets parameters to proceed with treatment today. Cycle 6 of Taxol and Carboplatin treatment given as ordered with no adverse events. Port flushed, heparin locked, and de-accessed. Pt left in stable condition with no future appointments.

## 2023-10-23 NOTE — PROGRESS NOTES
Chemotherapy Verification - SECONDARY RN       Height = 191.8 cm  Weight = 97.5 kg  BSA = 2.28 m2       Medication: Taxol  Dose: 50 mg/m2  Calculated Dose: 114 mg                             (In mg/m2, AUC, mg/kg)     Medication: Carboplatin  Dose: AUC 2  Calculated Dose: 300 mg                             (In mg/m2, AUC, mg/kg)    Carboplatin calculation (if applicable):  (2*(125+25)) = 300    I confirm that this process was performed independently.

## 2023-10-23 NOTE — PROGRESS NOTES
Chemotherapy Verification - PRIMARY RN      Height = 1.918m  Weight = 97.5kg  BSA = 2.28m2       Medication: PACLitaxel (Taxol)  Dose: 50mg/m2  Calculated Dose: 114mg                             (In mg/m2)     Medication: CARBOplatin (Paraplatin)  Dose: AUC=2  Calculated Dose: 300mg                             (In AUC)      Carboplatin calculation (if applicable):  (2*(125+25)) = 300      I confirm this process was performed independently with the BSA and all final chemotherapy dosing calculations congruent.  Any discrepancies of 10% or greater have been addressed with the chemotherapy pharmacist. The resolution of the discrepancy has been documented in the EPIC progress notes.

## 2023-10-25 NOTE — RADIATION COMPLETION NOTES
END OF TREATMENT SUMMARY    Patient name:  Tommy Mckeon    Primary Physician:  Reynaldo Robins M.D. MRN: [id number]  CSN: [id number]   Referring physician:  Dr. Samuel : 1965, 58 y.o.       TREATMENT SUMMARY:        Course First Treatment Date 2023    Course Last Treatment Date 10/25/2023   Course Elapsed Days 37 @ [id number]   Course Intent Curative     Radiation Therapy Episodes       Active Episodes       Radiation Therapy: IMRT (2023)                   Radiation Treatments         Plan Last Treated On Elapsed Days Fractions Treated Prescribed Fraction Dose (cGy) Prescribed Total Dose (cGy)    Esophagus 10/20/2023 32 @ [id number] 25 of  180 4,500    EsophagusBst 10/25/2023 37 @ [id number] 3 of 3 180 540                  Reference Point Last Treated On Elapsed Days Most Recent Session Dose (cGy) Total Dose (cGy)    PTV45 10/20/2023 32 @ [id number] 180 4,500    PTV50.4 10/25/2023 37 @ [id number] 180 540                                     STAGE:   Malignant neoplasm of lower third of esophagus (HCC)  Staging form: Esophagus - Adenocarcinoma, AJCC 8th Edition  - Clinical stage from 2023: Stage III (cT3, cN0, cM0, G3) - Signed by Michael Woods M.D. on 2023  Total positive nodes: 0  Histologic grading system: 3 grade system       TREATMENT INDICATION:   curative     CONCURRENT SYSTEMIC TREATMENT:   concurrent     RT COURSE DISCONTINUED EARLY:   No     PATIENT EXPERIENCE:       10/25/2023    11:10 AM 10/18/2023    11:05 AM 10/11/2023    10:51 AM 10/4/2023    11:10 AM 2023    11:17 AM 2023    10:59 AM   Toxicity Assessment   Toxicity Assessment Abdomen Abdomen Abdomen Abdomen Abdomen Abdomen   Fatigue (lethargy, malaise, asthenia) Moderate (e.g., decrease in performance status by 1 ECOG level or 20% Karnofsky) or causing difficulty performing some activities None None None Increased fatigue over baseline, but not altering normal activities None   Radiation  Dermatitis None None None None None None   Anorexia None None None None None None   Dehydration None None None None None None   Diarrhea w/o Colostomy None None None None None None   Nausea None None None None None None   Vomiting None None None None None    Dyspepsia/heartburn None None None None None    Dysphagia, Esophagitis, odynophagoa (painful swallowing) None None None None None None   Gastritis None None None None None None   Abdominal Pain or cramping None None None Mild pain not interfering with function None Mild pain not interfering with function        FOLLOW-UP PLAN:   8 Weeks     COMMENT:          ANATOMIC TARGET SUMMARY    ANATOMIC TARGET MODALITY TECHNIQUE   esophagus   External beam, photons IMRT            COMMENT:         DIAGRAMS:      DOSE VOLUME HISTOGRAMS:

## 2023-10-25 NOTE — ON TREATMENT VISIT
"ON TREATMENT  NOTE  RADIATION ONCOLOGY DEPARTMENT    Patient name:  Tommy Mckeon    Primary Physician:  Reynaldo Robins M.D. MRN: [id number]  CSN: [id number]   Referring physician:  Corby Cagle *   : 1965, 58 y.o.     ENCOUNTER DATE:  10/25/2023    DIAGNOSIS:  Malignant neoplasm of lower third of esophagus (HCC)  Staging form: Esophagus - Adenocarcinoma, AJCC 8th Edition  - Clinical stage from 2023: Stage III (cT3, cN0, cM0, G3) - Signed by Michael Woods M.D. on 2023  Total positive nodes: 0  Histologic grading system: 3 grade system      TREATMENT SUMMARY:  Course First Treatment Date 2023  Course Last Treatment Date 10/25/2023  Radiation Treatments       Active   Plans   Esophagus   Most recent treatment: Dose planned: 180 cGy (fraction 25 of 25 on 10/20/2023)   Total: Dose planned: 4,500 cGy   Elapsed Days: 32 @       EsophagusBst   Most recent treatment: Dose planned: 180 cGy (fraction 3 of 3 on 10/25/2023)   Total: Dose planned: 540 cGy   Elapsed Days: 37 @ [id number]           Historical   No historical radiation treatments to show.                 SUBJECTIVE:  Well appearing, mild esophagitis    VITAL SIGNS:      10/25/2023    11:09 AM 10/23/2023    11:05 AM 10/22/2023     3:02 PM 10/18/2023    11:04 AM 10/16/2023     9:22 AM 10/15/2023     4:52 PM 10/11/2023    10:48 AM   Vitals   SYSTOLIC 117 100 102 115 106 115 115   DIASTOLIC 82 71 73 67 69 71 65   Pulse 66 95 90 71 84 95 68   Temperature 36.4 °C (97.5 °F) 36.6 °C (97.8 °F) 36.7 °C (98 °F) 36.4 °C (97.6 °F) 36.5 °C (97.7 °F) 36.2 °C (97.2 °F) 36.6 °C (97.8 °F)   Respiration  18 18  16 18    Weight 220.68 214.95  216.71 213.19  221.12   Height  1.918 m (6' 3.51\")   1.918 m (6' 3.51\")     BMI 27.21 kg/m2 26.5 kg/m2  26.72 kg/m2 26.29 kg/m2  27.26 kg/m2   Pulse Oximetry 97 % 98 % 98 % 95 % 96 % 98 % 96 %     KPS: 100, Fully active, able to carry on all pre-disease performed without restriction (ECOG " equivalent 0)  Encounter Vitals  Temperature: 36.4 °C (97.5 °F)  Temp src: Temporal  Blood Pressure: 117/82  Pulse: 66  Pulse Oximetry: 97 %  Weight: 100 kg (220 lb 10.9 oz)  Pain Score: No pain      10/25/2023    11:09 AM 10/18/2023    11:04 AM 10/11/2023    10:48 AM 10/4/2023    11:09 AM 9/27/2023    11:16 AM 9/20/2023    10:57 AM   Pain Assessment   Pain Score NO PAIN NO PAIN NO PAIN NO PAIN NO PAIN NO PAIN          PHYSICAL EXAM:  Physical Exam  Constitutional:       Appearance: Normal appearance.   Abdominal:      General: There is no distension.      Palpations: Abdomen is soft.   Skin:     Findings: No erythema.   Neurological:      Mental Status: He is alert.              10/25/2023    11:10 AM 10/18/2023    11:05 AM 10/11/2023    10:51 AM 10/4/2023    11:10 AM 9/27/2023    11:17 AM 9/20/2023    10:59 AM   Toxicity Assessment   Toxicity Assessment Abdomen Abdomen Abdomen Abdomen Abdomen Abdomen   Fatigue (lethargy, malaise, asthenia) Moderate (e.g., decrease in performance status by 1 ECOG level or 20% Karnofsky) or causing difficulty performing some activities None None None Increased fatigue over baseline, but not altering normal activities None   Radiation Dermatitis None None None None None None   Anorexia None None None None None None   Dehydration None None None None None None   Diarrhea w/o Colostomy None None None None None None   Nausea None None None None None None   Vomiting None None None None None    Dyspepsia/heartburn None None None None None    Dysphagia, Esophagitis, odynophagoa (painful swallowing) None None None None None None   Gastritis None None None None None None   Abdominal Pain or cramping None None None Mild pain not interfering with function None Mild pain not interfering with function       CURRENT MEDICATIONS:    Current Outpatient Medications:     ondansetron (ZOFRAN ODT) 8 MG TABLET DISPERSIBLE, , Disp: , Rfl:     prochlorperazine (COMPAZINE) 10 MG Tab, , Disp: , Rfl:      celecoxib (CELEBREX) 200 MG Cap, Take 1 Capsule by mouth 2 times a day., Disp: 60 Capsule, Rfl: 0    promethazine (PHENERGAN) 25 MG Tab, Take 0.5 Tablets by mouth every 6 hours as needed for Nausea/Vomiting., Disp: 30 Tablet, Rfl: 0    omeprazole (PRILOSEC) 20 MG delayed-release capsule, Take 1 Capsule by mouth 2 times a day., Disp: 30 Capsule, Rfl: 0    Multiple Vitamins-Minerals (ONE-A-DAY MENS 50+ ADVANTAGE PO), Take 1 Tablet by mouth every day., Disp: , Rfl:     LABORATORY DATA:   Lab Results   Component Value Date/Time    SODIUM 141 10/22/2023 03:05 PM    POTASSIUM 4.2 10/22/2023 03:05 PM    CHLORIDE 107 10/22/2023 03:05 PM    CO2 25 10/22/2023 03:05 PM    GLUCOSE 102 (H) 10/22/2023 03:05 PM    BUN 18 10/22/2023 03:05 PM    CREATININE 0.64 10/22/2023 03:05 PM       Lab Results   Component Value Date/Time    WBC 2.4 (L) 10/22/2023 03:05 PM    RBC 4.11 (L) 10/22/2023 03:05 PM    HEMOGLOBIN 11.5 (L) 10/22/2023 03:05 PM    HEMATOCRIT 35.6 (L) 10/22/2023 03:05 PM    MCV 86.6 10/22/2023 03:05 PM    MCH 28.0 10/22/2023 03:05 PM    MCHC 32.3 10/22/2023 03:05 PM    PLATELETCT 176 10/22/2023 03:05 PM         RADIOLOGY DATA:  DX-PORTABLE FLUOROSCOPY < 1 HOUR    Result Date: 9/1/2023  Portable fluoroscopy utilized for 1 second. INTERPRETING LOCATION: [address] [place] [address], [id number]    FC-GSXYO-PPEPO BASE TO MID-THIGH    Result Date: 8/30/2023  1.  Heterogeneous hypermetabolic mass in the distal esophagus/gastric cardia consistent with known malignancy. 2.  No evidence for metastatic disease.      IMPRESSION:  Cancer Staging   Malignant neoplasm of lower third of esophagus (HCC)  Staging form: Esophagus - Adenocarcinoma, AJCC 8th Edition  - Clinical stage from 8/31/2023: Stage III (cT3, cN0, cM0, G3) - Signed by Michael Woods M.D. on 8/31/2023      PLAN:  No change in treatment plan    Disposition:  Treatment plan and imaging reviewed. Questions answered. Continue therapy outlined. Follow up post PET CT scan    Michael SALINAS  SHIRA Woods    No orders of the defined types were placed in this encounter.

## 2023-10-26 NOTE — PROGRESS NOTES
Oncology nurse navigator called patient to follow up and assist in getting him a follow up appointment with Dr. BEAUCHAMP the first week of December per Dr. Woods' s request.  ONN unable to reach patient left a detailed voicemail for the patient.  ONN called surgical oncology in addition and left a voicemail for the MA to reach out to the patient to get the patient scheduled for a follow up with Dr. BEAUCHAMP first week of December.

## 2023-11-13 NOTE — PROGRESS NOTES
Oncology nurse navigator called patient to follow up and assist him with scheduling a follow up with [facility] for the first week of December.  ONN was unable to reach the patient left a detailed message for the patient requesting him to call provided the phone number to Surgical Oncology.  ONN also suggested that he could respond the Medical Assistant for Surgical Oncology and have her assist the patient with an appointment.

## 2023-12-01 NOTE — PROGRESS NOTES
Patient was seen today in clinic with Dr. Woods for follow up.  Vitals signs and weight were obtained and pain assessment was completed.  Allergies and medications were reviewed with the patient.      Vitals/Pain:  Vitals:    12/01/23 0745   BP: 128/80   BP Location: Left arm   Patient Position: Sitting   BP Cuff Size: Adult   Pulse: 74   Temp: 36.4 °C (97.6 °F)   TempSrc: Temporal   SpO2: 97%   Weight: 98.4 kg (217 lb)   Pain Score: No pain        Allergies:   Patient has no known allergies.    Current Medications:  Current Outpatient Medications   Medication Sig Dispense Refill    ondansetron (ZOFRAN ODT) 8 MG TABLET DISPERSIBLE       prochlorperazine (COMPAZINE) 10 MG Tab       celecoxib (CELEBREX) 200 MG Cap Take 1 Capsule by mouth 2 times a day. 60 Capsule 0    promethazine (PHENERGAN) 25 MG Tab Take 0.5 Tablets by mouth every 6 hours as needed for Nausea/Vomiting. 30 Tablet 0    omeprazole (PRILOSEC) 20 MG delayed-release capsule Take 1 Capsule by mouth 2 times a day. 30 Capsule 0    Multiple Vitamins-Minerals (ONE-A-DAY MENS 50+ ADVANTAGE PO) Take 1 Tablet by mouth every day.       No current facility-administered medications for this encounter.           Trixie Green, Med Ass't

## 2023-12-01 NOTE — PROGRESS NOTES
RADIATION ONCOLOGY FOLLOW-UP    Patient name:  Tommy Mckeon    Primary Physician:  Reynaldo Robins M.D. MRN: [id number]  [facility]: [id number]   Referring physician:  Corby Cagle *  : 1965, 58 y.o.     DATE OF SERVICE: 2023    IDENTIFICATION:   A 58 y.o. male with    Malignant neoplasm of lower third of esophagus (HCC)  Staging form: Esophagus - Adenocarcinoma, AJCC 8th Edition  - Clinical stage from 2023: Stage III (cT3, cN0, cM0, G3) - Signed by Michael Woods M.D. on 2023  Total positive nodes: 0  Histologic grading system: 3 grade system      RADIATION SUMMARY:  Radiation Oncology          10/25/2023 10/26/2023    10:43   Aria Course Treatment Dates   Course First Treatment Date 2023    Course Last Treatment Date 10/25/2023  10/25/2023    Aria Treatment Summary   Esophagus  Plan from Course C1_Esophagus   Fraction 25 of 25    Elapsed Course Days  @     Prescribed Fraction Dose 180 cGy    Prescribed Total Dose 4,500 cGy    EsophagusBst  Plan from Course C1_Esophagus   Fraction 3 of 3    3 of 3    Elapsed Course Days 37 @     37 @     Prescribed Fraction Dose 180 cGy    180 cGy    Prescribed Total Dose 540 cGy    540 cGy    PTV45  Reference Point from Course C1_Esophagus   Elapsed Course Days 37 @     Session Dose --    Total Dose 4,500 cGy    PTV50.4  Reference Point from Course C1_Esophagus   Elapsed Course Days 37 @     37 @     Session Dose 180 cGy    --    Total Dose 540 cGy    540 cGy         HISTORY OF PRESENT ILLNESS:  Tommy Mckeon Is a pleasant 57-year-old male who originally presented with 30 pound weight loss syncopal episode due to severe anemia and went to the ER from the urgent care and underwent CT abdomen pelvis 2023 which showed moderate hiatal hernia.  Patient had EGD by Dr. Patel 23 which showed mass lesion in distal esophagus beginning at 34 cm from  incisors going into the stomach cardia 42 cm biopsy showing moderate to poorly differentiated invasive adenocarcinoma.  Patient had CT chest August 18, 2023 which showed hiatal hernia with wall thickening in the distal thoracic esophagus and portion of herniating stomach which would be in keeping with the provided clinical history.  Patient had CT pancreas August 19, 2023 which showed distal esophageal proximal gastric mass consistent with malignancy.  Patient had PET CT scan August 30, 2023 which showed heterogeneous hypermetabolic mass in distal esophagus/cardia consistent with known malignancy patient underwent EUS August 31, 2023 by Dr. Fenton which showed clinical uT3N0 Siewert 2.  Overall patient doing well and eating mostly solid foods with little difficulty but does have 30 pound weight loss over the last 3 months.      INTERVAL HISTORY:  Patient completed chemoradiation 50.4 Mauricio in 28 fractions October 25, 2023.  Patient is doing well had posttreatment PET CT scan November 29, 2023 which showed excellent radiologic response with mild residual uptake in distal esophagus and hiatal hernia.  Patient has gained 1 pound radiation and is eating well with no dysphagia or odynophagia.      PROBLEM LIST:  Patient Active Problem List   Diagnosis    BMI 33.0-33.9,adult    Hypertension    Dyslipidemia    GERD (gastroesophageal reflux disease)    Colonic polyp    Seasonal allergies    Prediabetes    Iron deficiency Anemia secondary to acute GI bleeding    Malignant neoplasm of lower third of esophagus (HCC)    Esophageal mass    Esophageal dysphagia    Weight loss, abnormal    Adenocarcinoma of cardio-esophageal junction (HCC)       CURRENT MEDICATIONS:  Current Outpatient Medications   Medication Sig Dispense Refill    ondansetron (ZOFRAN ODT) 8 MG TABLET DISPERSIBLE       prochlorperazine (COMPAZINE) 10 MG Tab       celecoxib (CELEBREX) 200 MG Cap Take 1 Capsule by mouth 2 times a day. 60 Capsule 0    promethazine  (PHENERGAN) 25 MG Tab Take 0.5 Tablets by mouth every 6 hours as needed for Nausea/Vomiting. 30 Tablet 0    omeprazole (PRILOSEC) 20 MG delayed-release capsule Take 1 Capsule by mouth 2 times a day. 30 Capsule 0    Multiple Vitamins-Minerals (ONE-A-DAY MENS 50+ ADVANTAGE PO) Take 1 Tablet by mouth every day.       No current facility-administered medications for this encounter.       ALLERGIES:  Patient has no known allergies.    REVIEW OF SYSTEMS:    A complete review of system taken. Pertinent items in HPI.  All others negative.    PHYSICAL EXAM:  PERFORMANCE STATUS:      12/1/2023     7:50 AM 8/31/2023    10:08 AM   ECOG Performance Review   ECOG Performance Status Fully active, able to carry on all pre-disease performance without restriction Fully active, able to carry on all pre-disease performance without restriction         8/31/2023    10:08 AM   Karnofsky Score   Karnofsky Score 90     /80 (BP Location: Left arm, Patient Position: Sitting, BP Cuff Size: Adult)   Pulse 74   Temp 36.4 °C (97.6 °F) (Temporal)   Wt 98.4 kg (217 lb)   SpO2 97%   BMI 26.76 kg/m²   Physical Exam  Vitals reviewed.   HENT:      Head: Normocephalic.      Mouth/Throat:      Mouth: Mucous membranes are moist.   Eyes:      Pupils: Pupils are equal, round, and reactive to light.   Cardiovascular:      Rate and Rhythm: Normal rate.   Pulmonary:      Effort: Pulmonary effort is normal.   Abdominal:      General: Abdomen is flat.   Musculoskeletal:         General: Normal range of motion.   Neurological:      General: No focal deficit present.      Mental Status: He is alert.   Psychiatric:         Mood and Affect: Mood normal.         LABORATORY DATA:   Lab Results   Component Value Date/Time    WBC 2.4 (L) 10/22/2023 03:05 PM    RBC 4.11 (L) 10/22/2023 03:05 PM    HEMOGLOBIN 11.5 (L) 10/22/2023 03:05 PM    HEMATOCRIT 35.6 (L) 10/22/2023 03:05 PM    MCV 86.6 10/22/2023 03:05 PM    MCH 28.0 10/22/2023 03:05 PM    MCHC 32.3  10/22/2023 03:05 PM    RDW 79.5 (H) 10/22/2023 03:05 PM    PLATELETCT 176 10/22/2023 03:05 PM    MPV 9.3 10/22/2023 03:05 PM    NEUTSPOLYS 79.60 (H) 10/22/2023 03:05 PM    LYMPHOCYTES 6.70 (L) 10/22/2023 03:05 PM    MONOCYTES 10.80 10/22/2023 03:05 PM    EOSINOPHILS 1.30 10/22/2023 03:05 PM    BASOPHILS 0.80 10/22/2023 03:05 PM    HYPOCHROMIA 1+ 10/12/2023 09:23 AM    ANISOCYTOSIS 1+ 10/12/2023 09:23 AM      Lab Results   Component Value Date/Time    SODIUM 141 10/22/2023 03:05 PM    POTASSIUM 4.2 10/22/2023 03:05 PM    CHLORIDE 107 10/22/2023 03:05 PM    CO2 25 10/22/2023 03:05 PM    GLUCOSE 102 (H) 10/22/2023 03:05 PM    BUN 18 10/22/2023 03:05 PM    CREATININE 0.64 10/22/2023 03:05 PM         RADIOLOGY DATA:  DC-RSOMV-SWZUX BASE TO MID-THIGH    Result Date: 11/29/2023  1. Mild residual uptake seen in the distal esophagus and the hiatal hernia (SUV max 7.1). This is nonspecific and could relate to post therapy inflammation or residual malignancy.      IMPRESSION:    A 58 y.o. with   Malignant neoplasm of lower third of esophagus (HCC)  Staging form: Esophagus - Adenocarcinoma, AJCC 8th Edition  - Clinical stage from 8/31/2023: Stage III (cT3, cN0, cM0, G3) - Signed by Michael Woods M.D. on 8/31/2023  Total positive nodes: 0  Histologic grading system: 3 grade system      CANCER STATUS:  Disease Partial Response    RECOMMENDATIONS:   I reviewed with patient his PET/CT scan and encouraged him to follow-up with Dr. Samuel for completion surgery as soon as possible ideally before 12 weeks posttreatment.  I will follow-up with him in early March to review pathology.    Thank you for the opportunity to participate in his care.  If any questions or comments, please do not hesitate in calling.    No orders of the defined types were placed in this encounter.

## 2023-12-09 NOTE — PROGRESS NOTES
Patient came into INF independently. Orders and vitals reviewed. Port accessed in sterile manner with blood return noted. Labs collected and reviewed. Port flushed per [facility] policy and heparin locked. Port de-accessed and covered with gauze and tape. Patient left in stable condition.

## 2024-01-01 ENCOUNTER — APPOINTMENT (OUTPATIENT)
Dept: RADIOLOGY | Facility: MEDICAL CENTER | Age: 59
DRG: 871 | End: 2024-01-01
Attending: EMERGENCY MEDICINE
Payer: OTHER GOVERNMENT

## 2024-01-01 ENCOUNTER — HOSPITAL ENCOUNTER (OUTPATIENT)
Dept: LAB | Facility: MEDICAL CENTER | Age: 59
End: 2024-01-31
Attending: INTERNAL MEDICINE
Payer: OTHER GOVERNMENT

## 2024-01-01 ENCOUNTER — APPOINTMENT (OUTPATIENT)
Dept: RADIOLOGY | Facility: MEDICAL CENTER | Age: 59
DRG: 064 | End: 2024-01-01
Attending: EMERGENCY MEDICINE
Payer: OTHER GOVERNMENT

## 2024-01-01 ENCOUNTER — APPOINTMENT (OUTPATIENT)
Dept: RADIOLOGY | Facility: MEDICAL CENTER | Age: 59
DRG: 871 | End: 2024-01-01
Attending: STUDENT IN AN ORGANIZED HEALTH CARE EDUCATION/TRAINING PROGRAM
Payer: OTHER GOVERNMENT

## 2024-01-01 ENCOUNTER — APPOINTMENT (OUTPATIENT)
Dept: RADIOLOGY | Facility: MEDICAL CENTER | Age: 59
DRG: 064 | End: 2024-01-01
Attending: INTERNAL MEDICINE
Payer: OTHER GOVERNMENT

## 2024-01-01 ENCOUNTER — HOSPITAL ENCOUNTER (INPATIENT)
Facility: MEDICAL CENTER | Age: 59
LOS: 4 days | DRG: 064 | End: 2024-01-17
Attending: EMERGENCY MEDICINE | Admitting: INTERNAL MEDICINE
Payer: OTHER GOVERNMENT

## 2024-01-01 ENCOUNTER — APPOINTMENT (OUTPATIENT)
Dept: CARDIOLOGY | Facility: MEDICAL CENTER | Age: 59
DRG: 871 | End: 2024-01-01
Attending: HOSPITALIST
Payer: OTHER GOVERNMENT

## 2024-01-01 ENCOUNTER — TELEPHONE (OUTPATIENT)
Dept: MEDICAL GROUP | Facility: PHYSICIAN GROUP | Age: 59
End: 2024-01-01
Payer: OTHER GOVERNMENT

## 2024-01-01 ENCOUNTER — APPOINTMENT (OUTPATIENT)
Dept: RADIOLOGY | Facility: MEDICAL CENTER | Age: 59
DRG: 871 | End: 2024-01-01
Attending: HOSPITALIST
Payer: OTHER GOVERNMENT

## 2024-01-01 ENCOUNTER — PHARMACY VISIT (OUTPATIENT)
Dept: PHARMACY | Facility: MEDICAL CENTER | Age: 59
End: 2024-01-01
Payer: COMMERCIAL

## 2024-01-01 ENCOUNTER — TELEPHONE (OUTPATIENT)
Dept: SURGICAL ONCOLOGY | Facility: MEDICAL CENTER | Age: 59
End: 2024-01-01
Payer: OTHER GOVERNMENT

## 2024-01-01 ENCOUNTER — APPOINTMENT (OUTPATIENT)
Dept: RADIOLOGY | Facility: MEDICAL CENTER | Age: 59
DRG: 064 | End: 2024-01-01
Attending: STUDENT IN AN ORGANIZED HEALTH CARE EDUCATION/TRAINING PROGRAM
Payer: OTHER GOVERNMENT

## 2024-01-01 ENCOUNTER — APPOINTMENT (OUTPATIENT)
Dept: RADIOLOGY | Facility: MEDICAL CENTER | Age: 59
DRG: 871 | End: 2024-01-01
Attending: NURSE PRACTITIONER
Payer: OTHER GOVERNMENT

## 2024-01-01 ENCOUNTER — OFFICE VISIT (OUTPATIENT)
Dept: MEDICAL GROUP | Facility: PHYSICIAN GROUP | Age: 59
End: 2024-01-01
Payer: OTHER GOVERNMENT

## 2024-01-01 ENCOUNTER — APPOINTMENT (OUTPATIENT)
Dept: RADIOLOGY | Facility: MEDICAL CENTER | Age: 59
DRG: 871 | End: 2024-01-01
Payer: OTHER GOVERNMENT

## 2024-01-01 ENCOUNTER — HOSPITAL ENCOUNTER (INPATIENT)
Facility: MEDICAL CENTER | Age: 59
LOS: 7 days | DRG: 871 | End: 2024-02-26
Attending: EMERGENCY MEDICINE | Admitting: STUDENT IN AN ORGANIZED HEALTH CARE EDUCATION/TRAINING PROGRAM
Payer: OTHER GOVERNMENT

## 2024-01-01 VITALS
HEART RATE: 85 BPM | SYSTOLIC BLOOD PRESSURE: 115 MMHG | DIASTOLIC BLOOD PRESSURE: 81 MMHG | OXYGEN SATURATION: 94 % | BODY MASS INDEX: 22.98 KG/M2 | RESPIRATION RATE: 28 BRPM | WEIGHT: 188.71 LBS | HEIGHT: 76 IN | TEMPERATURE: 98.4 F

## 2024-01-01 VITALS
TEMPERATURE: 97.7 F | RESPIRATION RATE: 18 BRPM | SYSTOLIC BLOOD PRESSURE: 107 MMHG | WEIGHT: 220.9 LBS | BODY MASS INDEX: 26.9 KG/M2 | HEART RATE: 71 BPM | DIASTOLIC BLOOD PRESSURE: 70 MMHG | HEIGHT: 76 IN | OXYGEN SATURATION: 91 %

## 2024-01-01 VITALS
TEMPERATURE: 98.5 F | HEIGHT: 76 IN | DIASTOLIC BLOOD PRESSURE: 78 MMHG | BODY MASS INDEX: 27.38 KG/M2 | WEIGHT: 224.8 LBS | OXYGEN SATURATION: 90 % | SYSTOLIC BLOOD PRESSURE: 120 MMHG | HEART RATE: 94 BPM

## 2024-01-01 DIAGNOSIS — I61.0 THALAMIC HEMORRHAGE (HCC): ICD-10-CM

## 2024-01-01 DIAGNOSIS — I10 PRIMARY HYPERTENSION: ICD-10-CM

## 2024-01-01 DIAGNOSIS — D50.9 IRON DEFICIENCY ANEMIA, UNSPECIFIED IRON DEFICIENCY ANEMIA TYPE: ICD-10-CM

## 2024-01-01 DIAGNOSIS — D64.9 ANEMIA, UNSPECIFIED TYPE: ICD-10-CM

## 2024-01-01 DIAGNOSIS — R13.19 ESOPHAGEAL DYSPHAGIA: ICD-10-CM

## 2024-01-01 DIAGNOSIS — E78.5 DYSLIPIDEMIA: Chronic | ICD-10-CM

## 2024-01-01 DIAGNOSIS — J18.9 PNEUMONIA DUE TO INFECTIOUS ORGANISM, UNSPECIFIED LATERALITY, UNSPECIFIED PART OF LUNG: ICD-10-CM

## 2024-01-01 DIAGNOSIS — J90 PLEURAL EFFUSION: ICD-10-CM

## 2024-01-01 DIAGNOSIS — R55 SYNCOPE, UNSPECIFIED SYNCOPE TYPE: ICD-10-CM

## 2024-01-01 DIAGNOSIS — K21.01 GASTROESOPHAGEAL REFLUX DISEASE WITH ESOPHAGITIS AND HEMORRHAGE: Chronic | ICD-10-CM

## 2024-01-01 DIAGNOSIS — D72.829 LEUKOCYTOSIS, UNSPECIFIED TYPE: ICD-10-CM

## 2024-01-01 DIAGNOSIS — C15.5 MALIGNANT NEOPLASM OF LOWER THIRD OF ESOPHAGUS (HCC): Chronic | ICD-10-CM

## 2024-01-01 DIAGNOSIS — I10 PRIMARY HYPERTENSION: Chronic | ICD-10-CM

## 2024-01-01 DIAGNOSIS — C15.9 MALIGNANT NEOPLASM OF ESOPHAGUS, UNSPECIFIED LOCATION (HCC): ICD-10-CM

## 2024-01-01 DIAGNOSIS — E87.6 HYPOKALEMIA: ICD-10-CM

## 2024-01-01 DIAGNOSIS — J18.9 PNEUMONIA OF BOTH LUNGS DUE TO INFECTIOUS ORGANISM, UNSPECIFIED PART OF LUNG: ICD-10-CM

## 2024-01-01 DIAGNOSIS — R18.8 OTHER ASCITES: ICD-10-CM

## 2024-01-01 DIAGNOSIS — R33.9 URINARY RETENTION: ICD-10-CM

## 2024-01-01 DIAGNOSIS — R41.82 ALTERED MENTAL STATUS, UNSPECIFIED ALTERED MENTAL STATUS TYPE: ICD-10-CM

## 2024-01-01 DIAGNOSIS — R65.10 SIRS (SYSTEMIC INFLAMMATORY RESPONSE SYNDROME) (HCC): ICD-10-CM

## 2024-01-01 DIAGNOSIS — N17.9 ACUTE RENAL FAILURE, UNSPECIFIED ACUTE RENAL FAILURE TYPE (HCC): ICD-10-CM

## 2024-01-01 LAB
ABO GROUP BLD: NORMAL
ALBUMIN FLD-MCNC: 1.6 G/DL
ALBUMIN SERPL BCP-MCNC: 2.7 G/DL (ref 3.2–4.9)
ALBUMIN SERPL BCP-MCNC: 2.7 G/DL (ref 3.2–4.9)
ALBUMIN SERPL BCP-MCNC: 2.8 G/DL (ref 3.2–4.9)
ALBUMIN SERPL BCP-MCNC: 2.8 G/DL (ref 3.2–4.9)
ALBUMIN SERPL BCP-MCNC: 3 G/DL (ref 3.2–4.9)
ALBUMIN SERPL BCP-MCNC: 3.1 G/DL (ref 3.2–4.9)
ALBUMIN/GLOB SERPL: 0.8 G/DL
ALBUMIN/GLOB SERPL: 0.9 G/DL
ALP SERPL-CCNC: 50 U/L (ref 30–99)
ALP SERPL-CCNC: 53 U/L (ref 30–99)
ALP SERPL-CCNC: 54 U/L (ref 30–99)
ALP SERPL-CCNC: 59 U/L (ref 30–99)
ALT SERPL-CCNC: 10 U/L (ref 2–50)
ALT SERPL-CCNC: 10 U/L (ref 2–50)
ALT SERPL-CCNC: 13 U/L (ref 2–50)
ALT SERPL-CCNC: 7 U/L (ref 2–50)
AMMONIA PLAS-SCNC: <10 UMOL/L (ref 11–45)
AMPHET UR QL SCN: NEGATIVE
AMYLASE FLD-CCNC: 14 U/L
AMYLASE FLD-CCNC: 15 U/L
ANION GAP SERPL CALC-SCNC: 11 MMOL/L (ref 7–16)
ANION GAP SERPL CALC-SCNC: 14 MMOL/L (ref 7–16)
ANION GAP SERPL CALC-SCNC: 14 MMOL/L (ref 7–16)
ANION GAP SERPL CALC-SCNC: 15 MMOL/L (ref 7–16)
ANION GAP SERPL CALC-SCNC: 16 MMOL/L (ref 7–16)
ANION GAP SERPL CALC-SCNC: 17 MMOL/L (ref 7–16)
ANION GAP SERPL CALC-SCNC: 18 MMOL/L (ref 7–16)
APPEARANCE FLD: NORMAL
APPEARANCE UR: ABNORMAL
APPEARANCE UR: CLEAR
APPEARANCE UR: CLEAR
APTT PPP: 28.8 SEC (ref 24.7–36)
AST SERPL-CCNC: 10 U/L (ref 12–45)
AST SERPL-CCNC: 15 U/L (ref 12–45)
AST SERPL-CCNC: 17 U/L (ref 12–45)
AST SERPL-CCNC: 18 U/L (ref 12–45)
BACTERIA #/AREA URNS HPF: NEGATIVE /HPF
BACTERIA BLD CULT: NORMAL
BACTERIA FLD AEROBE CULT: NORMAL
BACTERIA FLD AEROBE CULT: NORMAL
BACTERIA UR CULT: NORMAL
BACTERIA UR CULT: NORMAL
BARBITURATES UR QL SCN: NEGATIVE
BARCODED ABORH UBTYP: 7300
BARCODED ABORH UBTYP: 7300
BARCODED PRD CODE UBPRD: NORMAL
BARCODED PRD CODE UBPRD: NORMAL
BARCODED UNIT NUM UBUNT: NORMAL
BARCODED UNIT NUM UBUNT: NORMAL
BASOPHILS # BLD AUTO: 0.1 % (ref 0–1.8)
BASOPHILS # BLD AUTO: 0.2 % (ref 0–1.8)
BASOPHILS # BLD AUTO: 0.2 % (ref 0–1.8)
BASOPHILS # BLD AUTO: 0.4 % (ref 0–1.8)
BASOPHILS # BLD AUTO: 0.7 % (ref 0–1.8)
BASOPHILS # BLD AUTO: 0.8 % (ref 0–1.8)
BASOPHILS # BLD: 0.03 K/UL (ref 0–0.12)
BASOPHILS # BLD: 0.04 K/UL (ref 0–0.12)
BASOPHILS # BLD: 0.05 K/UL (ref 0–0.12)
BASOPHILS # BLD: 0.06 K/UL (ref 0–0.12)
BASOPHILS # BLD: 0.06 K/UL (ref 0–0.12)
BASOPHILS # BLD: 0.07 K/UL (ref 0–0.12)
BENZODIAZ UR QL SCN: NEGATIVE
BILIRUB SERPL-MCNC: 0.2 MG/DL (ref 0.1–1.5)
BILIRUB SERPL-MCNC: 0.2 MG/DL (ref 0.1–1.5)
BILIRUB SERPL-MCNC: 0.3 MG/DL (ref 0.1–1.5)
BILIRUB SERPL-MCNC: 0.4 MG/DL (ref 0.1–1.5)
BILIRUB UR QL STRIP.AUTO: NEGATIVE
BLD GP AB SCN SERPL QL: NORMAL
BODY FLD TYPE: NORMAL
BUN SERPL-MCNC: 28 MG/DL (ref 8–22)
BUN SERPL-MCNC: 29 MG/DL (ref 8–22)
BUN SERPL-MCNC: 35 MG/DL (ref 8–22)
BUN SERPL-MCNC: 47 MG/DL (ref 8–22)
BUN SERPL-MCNC: 56 MG/DL (ref 8–22)
BUN SERPL-MCNC: 67 MG/DL (ref 8–22)
BUN SERPL-MCNC: 68 MG/DL (ref 8–22)
BUN SERPL-MCNC: 73 MG/DL (ref 8–22)
BUN SERPL-MCNC: 82 MG/DL (ref 8–22)
BUN SERPL-MCNC: 87 MG/DL (ref 8–22)
BUN SERPL-MCNC: 87 MG/DL (ref 8–22)
BUN SERPL-MCNC: 88 MG/DL (ref 8–22)
BZE UR QL SCN: NEGATIVE
CALCIUM ALBUM COR SERPL-MCNC: 9 MG/DL (ref 8.5–10.5)
CALCIUM ALBUM COR SERPL-MCNC: 9.1 MG/DL (ref 8.5–10.5)
CALCIUM ALBUM COR SERPL-MCNC: 9.5 MG/DL (ref 8.5–10.5)
CALCIUM ALBUM COR SERPL-MCNC: 9.9 MG/DL (ref 8.5–10.5)
CALCIUM SERPL-MCNC: 7.8 MG/DL (ref 8.5–10.5)
CALCIUM SERPL-MCNC: 7.9 MG/DL (ref 8.5–10.5)
CALCIUM SERPL-MCNC: 8 MG/DL (ref 8.5–10.5)
CALCIUM SERPL-MCNC: 8.1 MG/DL (ref 8.5–10.5)
CALCIUM SERPL-MCNC: 8.4 MG/DL (ref 8.5–10.5)
CALCIUM SERPL-MCNC: 8.5 MG/DL (ref 8.5–10.5)
CALCIUM SERPL-MCNC: 8.6 MG/DL (ref 8.5–10.5)
CALCIUM SERPL-MCNC: 8.8 MG/DL (ref 8.5–10.5)
CALCIUM SERPL-MCNC: 9.1 MG/DL (ref 8.5–10.5)
CALCIUM SERPL-MCNC: 9.1 MG/DL (ref 8.5–10.5)
CANNABINOIDS UR QL SCN: NEGATIVE
CELLS FLD: 26
CELLS FLD: 29
CELLS FLD: 36
CHLORIDE SERPL-SCNC: 101 MMOL/L (ref 96–112)
CHLORIDE SERPL-SCNC: 103 MMOL/L (ref 96–112)
CHLORIDE SERPL-SCNC: 105 MMOL/L (ref 96–112)
CHLORIDE SERPL-SCNC: 106 MMOL/L (ref 96–112)
CHLORIDE SERPL-SCNC: 107 MMOL/L (ref 96–112)
CHLORIDE SERPL-SCNC: 109 MMOL/L (ref 96–112)
CHLORIDE SERPL-SCNC: 98 MMOL/L (ref 96–112)
CHLORIDE SERPL-SCNC: 98 MMOL/L (ref 96–112)
CHOLEST SERPL-MCNC: 201 MG/DL (ref 100–199)
CO2 SERPL-SCNC: 20 MMOL/L (ref 20–33)
CO2 SERPL-SCNC: 20 MMOL/L (ref 20–33)
CO2 SERPL-SCNC: 21 MMOL/L (ref 20–33)
CO2 SERPL-SCNC: 22 MMOL/L (ref 20–33)
CO2 SERPL-SCNC: 23 MMOL/L (ref 20–33)
CO2 SERPL-SCNC: 23 MMOL/L (ref 20–33)
CO2 SERPL-SCNC: 25 MMOL/L (ref 20–33)
COLOR FLD: YELLOW
COLOR UR: YELLOW
COMPONENT R 8504R: NORMAL
COMPONENT R 8504R: NORMAL
CREAT SERPL-MCNC: 2.45 MG/DL (ref 0.5–1.4)
CREAT SERPL-MCNC: 2.73 MG/DL (ref 0.5–1.4)
CREAT SERPL-MCNC: 2.83 MG/DL (ref 0.5–1.4)
CREAT SERPL-MCNC: 2.89 MG/DL (ref 0.5–1.4)
CREAT SERPL-MCNC: 2.9 MG/DL (ref 0.5–1.4)
CREAT SERPL-MCNC: 3.01 MG/DL (ref 0.5–1.4)
CREAT SERPL-MCNC: 3.05 MG/DL (ref 0.5–1.4)
CREAT SERPL-MCNC: 3.24 MG/DL (ref 0.5–1.4)
CREAT SERPL-MCNC: 3.26 MG/DL (ref 0.5–1.4)
CREAT SERPL-MCNC: 3.32 MG/DL (ref 0.5–1.4)
CREAT SERPL-MCNC: 3.68 MG/DL (ref 0.5–1.4)
CREAT SERPL-MCNC: 3.96 MG/DL (ref 0.5–1.4)
CREAT UR-MCNC: 54.71 MG/DL
CREAT UR-MCNC: 55.17 MG/DL
CRP SERPL HS-MCNC: 12.84 MG/DL (ref 0–0.75)
CSF COMMENTS 1658: NORMAL
CSF COMMENTS 1658: NORMAL
CYTOLOGY REG CYTOL: NORMAL
EKG IMPRESSION: NORMAL
EKG IMPRESSION: NORMAL
EOSINOPHIL # BLD AUTO: 0 K/UL (ref 0–0.51)
EOSINOPHIL # BLD AUTO: 0.01 K/UL (ref 0–0.51)
EOSINOPHIL # BLD AUTO: 0.02 K/UL (ref 0–0.51)
EOSINOPHIL # BLD AUTO: 0.05 K/UL (ref 0–0.51)
EOSINOPHIL # BLD AUTO: 0.28 K/UL (ref 0–0.51)
EOSINOPHIL # BLD AUTO: 0.31 K/UL (ref 0–0.51)
EOSINOPHIL NFR BLD: 0 % (ref 0–6.9)
EOSINOPHIL NFR BLD: 0.1 % (ref 0–6.9)
EOSINOPHIL NFR BLD: 0.1 % (ref 0–6.9)
EOSINOPHIL NFR BLD: 0.4 % (ref 0–6.9)
EOSINOPHIL NFR BLD: 3.3 % (ref 0–6.9)
EOSINOPHIL NFR BLD: 3.5 % (ref 0–6.9)
EPI CELLS #/AREA URNS HPF: NEGATIVE /HPF
ERYTHROCYTE [DISTWIDTH] IN BLOOD BY AUTOMATED COUNT: 52.2 FL (ref 35.9–50)
ERYTHROCYTE [DISTWIDTH] IN BLOOD BY AUTOMATED COUNT: 54.2 FL (ref 35.9–50)
ERYTHROCYTE [DISTWIDTH] IN BLOOD BY AUTOMATED COUNT: 54.2 FL (ref 35.9–50)
ERYTHROCYTE [DISTWIDTH] IN BLOOD BY AUTOMATED COUNT: 54.3 FL (ref 35.9–50)
ERYTHROCYTE [DISTWIDTH] IN BLOOD BY AUTOMATED COUNT: 54.9 FL (ref 35.9–50)
ERYTHROCYTE [DISTWIDTH] IN BLOOD BY AUTOMATED COUNT: 55.3 FL (ref 35.9–50)
ERYTHROCYTE [DISTWIDTH] IN BLOOD BY AUTOMATED COUNT: 55.8 FL (ref 35.9–50)
ERYTHROCYTE [DISTWIDTH] IN BLOOD BY AUTOMATED COUNT: 56 FL (ref 35.9–50)
ERYTHROCYTE [DISTWIDTH] IN BLOOD BY AUTOMATED COUNT: 56.2 FL (ref 35.9–50)
ERYTHROCYTE [DISTWIDTH] IN BLOOD BY AUTOMATED COUNT: 58.8 FL (ref 35.9–50)
ERYTHROCYTE [DISTWIDTH] IN BLOOD BY AUTOMATED COUNT: 58.9 FL (ref 35.9–50)
ERYTHROCYTE [DISTWIDTH] IN BLOOD BY AUTOMATED COUNT: 59.7 FL (ref 35.9–50)
ERYTHROCYTE [DISTWIDTH] IN BLOOD BY AUTOMATED COUNT: 59.9 FL (ref 35.9–50)
ERYTHROCYTE [SEDIMENTATION RATE] IN BLOOD BY WESTERGREN METHOD: >140 MM/HOUR (ref 0–20)
FASTING STATUS PATIENT QL REPORTED: NORMAL
FENTANYL UR QL: NEGATIVE
FERRITIN SERPL-MCNC: 54.4 NG/ML (ref 22–322)
FLUAV RNA SPEC QL NAA+PROBE: NEGATIVE
FLUAV RNA SPEC QL NAA+PROBE: NEGATIVE
FLUBV RNA SPEC QL NAA+PROBE: NEGATIVE
FLUBV RNA SPEC QL NAA+PROBE: NEGATIVE
FOLATE SERPL-MCNC: 4.5 NG/ML
FUNGUS SPEC FUNGUS STN: NORMAL
FUNGUS SPEC FUNGUS STN: NORMAL
GFR SERPLBLD CREATININE-BSD FMLA CKD-EPI: 16 ML/MIN/1.73 M 2
GFR SERPLBLD CREATININE-BSD FMLA CKD-EPI: 18 ML/MIN/1.73 M 2
GFR SERPLBLD CREATININE-BSD FMLA CKD-EPI: 20 ML/MIN/1.73 M 2
GFR SERPLBLD CREATININE-BSD FMLA CKD-EPI: 21 ML/MIN/1.73 M 2
GFR SERPLBLD CREATININE-BSD FMLA CKD-EPI: 21 ML/MIN/1.73 M 2
GFR SERPLBLD CREATININE-BSD FMLA CKD-EPI: 23 ML/MIN/1.73 M 2
GFR SERPLBLD CREATININE-BSD FMLA CKD-EPI: 23 ML/MIN/1.73 M 2
GFR SERPLBLD CREATININE-BSD FMLA CKD-EPI: 24 ML/MIN/1.73 M 2
GFR SERPLBLD CREATININE-BSD FMLA CKD-EPI: 24 ML/MIN/1.73 M 2
GFR SERPLBLD CREATININE-BSD FMLA CKD-EPI: 25 ML/MIN/1.73 M 2
GFR SERPLBLD CREATININE-BSD FMLA CKD-EPI: 26 ML/MIN/1.73 M 2
GFR SERPLBLD CREATININE-BSD FMLA CKD-EPI: 29 ML/MIN/1.73 M 2
GLOBULIN SER CALC-MCNC: 3.2 G/DL (ref 1.9–3.5)
GLOBULIN SER CALC-MCNC: 3.3 G/DL (ref 1.9–3.5)
GLOBULIN SER CALC-MCNC: 3.4 G/DL (ref 1.9–3.5)
GLOBULIN SER CALC-MCNC: 3.4 G/DL (ref 1.9–3.5)
GLUCOSE BLD STRIP.AUTO-MCNC: 131 MG/DL (ref 65–99)
GLUCOSE FLD-MCNC: 72 MG/DL
GLUCOSE FLD-MCNC: 78 MG/DL
GLUCOSE SERPL-MCNC: 101 MG/DL (ref 65–99)
GLUCOSE SERPL-MCNC: 104 MG/DL (ref 65–99)
GLUCOSE SERPL-MCNC: 112 MG/DL (ref 65–99)
GLUCOSE SERPL-MCNC: 120 MG/DL (ref 65–99)
GLUCOSE SERPL-MCNC: 129 MG/DL (ref 65–99)
GLUCOSE SERPL-MCNC: 133 MG/DL (ref 65–99)
GLUCOSE SERPL-MCNC: 134 MG/DL (ref 65–99)
GLUCOSE SERPL-MCNC: 149 MG/DL (ref 65–99)
GLUCOSE SERPL-MCNC: 174 MG/DL (ref 65–99)
GLUCOSE SERPL-MCNC: 88 MG/DL (ref 65–99)
GLUCOSE SERPL-MCNC: 92 MG/DL (ref 65–99)
GLUCOSE SERPL-MCNC: 99 MG/DL (ref 65–99)
GLUCOSE UR STRIP.AUTO-MCNC: NEGATIVE MG/DL
GRAM STN SPEC: NORMAL
HCT VFR BLD AUTO: 22.7 % (ref 42–52)
HCT VFR BLD AUTO: 23.4 % (ref 42–52)
HCT VFR BLD AUTO: 24.9 % (ref 42–52)
HCT VFR BLD AUTO: 28 % (ref 42–52)
HCT VFR BLD AUTO: 28.1 % (ref 42–52)
HCT VFR BLD AUTO: 28.2 % (ref 42–52)
HCT VFR BLD AUTO: 29 % (ref 42–52)
HCT VFR BLD AUTO: 29.2 % (ref 42–52)
HCT VFR BLD AUTO: 29.6 % (ref 42–52)
HCT VFR BLD AUTO: 30.3 % (ref 42–52)
HCT VFR BLD AUTO: 30.6 % (ref 42–52)
HCT VFR BLD AUTO: 32.3 % (ref 42–52)
HCT VFR BLD AUTO: 35.7 % (ref 42–52)
HDLC SERPL-MCNC: 45 MG/DL
HGB BLD-MCNC: 10 G/DL (ref 14–18)
HGB BLD-MCNC: 10.3 G/DL (ref 14–18)
HGB BLD-MCNC: 11.1 G/DL (ref 14–18)
HGB BLD-MCNC: 7.1 G/DL (ref 14–18)
HGB BLD-MCNC: 7.6 G/DL (ref 14–18)
HGB BLD-MCNC: 7.9 G/DL (ref 14–18)
HGB BLD-MCNC: 8.9 G/DL (ref 14–18)
HGB BLD-MCNC: 9 G/DL (ref 14–18)
HGB BLD-MCNC: 9.3 G/DL (ref 14–18)
HGB BLD-MCNC: 9.3 G/DL (ref 14–18)
HGB BLD-MCNC: 9.4 G/DL (ref 14–18)
HGB BLD-MCNC: 9.6 G/DL (ref 14–18)
HGB BLD-MCNC: 9.8 G/DL (ref 14–18)
HGB RETIC QN AUTO: 25.5 PG/CELL (ref 29–35)
HYALINE CASTS #/AREA URNS LPF: ABNORMAL /LPF
IMM GRANULOCYTES # BLD AUTO: 0.05 K/UL (ref 0–0.11)
IMM GRANULOCYTES # BLD AUTO: 0.07 K/UL (ref 0–0.11)
IMM GRANULOCYTES # BLD AUTO: 0.07 K/UL (ref 0–0.11)
IMM GRANULOCYTES # BLD AUTO: 0.08 K/UL (ref 0–0.11)
IMM GRANULOCYTES # BLD AUTO: 0.46 K/UL (ref 0–0.11)
IMM GRANULOCYTES # BLD AUTO: 0.54 K/UL (ref 0–0.11)
IMM GRANULOCYTES # BLD AUTO: 0.7 K/UL (ref 0–0.11)
IMM GRANULOCYTES # BLD AUTO: 0.82 K/UL (ref 0–0.11)
IMM GRANULOCYTES NFR BLD AUTO: 0.4 % (ref 0–0.9)
IMM GRANULOCYTES NFR BLD AUTO: 0.5 % (ref 0–0.9)
IMM GRANULOCYTES NFR BLD AUTO: 0.8 % (ref 0–0.9)
IMM GRANULOCYTES NFR BLD AUTO: 0.8 % (ref 0–0.9)
IMM GRANULOCYTES NFR BLD AUTO: 1.9 % (ref 0–0.9)
IMM GRANULOCYTES NFR BLD AUTO: 2 % (ref 0–0.9)
IMM GRANULOCYTES NFR BLD AUTO: 2.3 % (ref 0–0.9)
IMM GRANULOCYTES NFR BLD AUTO: 3.2 % (ref 0–0.9)
IMM RETICS NFR: 43.7 % (ref 2.6–16.1)
INR PPP: 1.24 (ref 0.87–1.13)
INR PPP: 1.24 (ref 0.87–1.13)
IRON SATN MFR SERPL: 7 % (ref 15–55)
IRON SERPL-MCNC: 17 UG/DL (ref 50–180)
KETONES UR STRIP.AUTO-MCNC: ABNORMAL MG/DL
KETONES UR STRIP.AUTO-MCNC: ABNORMAL MG/DL
KETONES UR STRIP.AUTO-MCNC: NEGATIVE MG/DL
LACTATE SERPL-SCNC: 0.8 MMOL/L (ref 0.5–2)
LACTATE SERPL-SCNC: 1 MMOL/L (ref 0.5–2)
LACTATE SERPL-SCNC: 1.2 MMOL/L (ref 0.5–2)
LACTATE SERPL-SCNC: 1.3 MMOL/L (ref 0.5–2)
LACTATE SERPL-SCNC: 2.1 MMOL/L (ref 0.5–2)
LACTATE SERPL-SCNC: 2.4 MMOL/L (ref 0.5–2)
LACTATE SERPL-SCNC: 4 MMOL/L (ref 0.5–2)
LDH FLD L TO P-CCNC: 261 U/L
LDH FLD L TO P-CCNC: 344 U/L
LDH SERPL L TO P-CCNC: 388 U/L (ref 107–266)
LDLC SERPL CALC-MCNC: 130 MG/DL
LEUKOCYTE ESTERASE UR QL STRIP.AUTO: NEGATIVE
LV EJECT FRACT  99904: 50
LV EJECT FRACT MOD 2C 99903: 31.08
LV EJECT FRACT MOD 4C 99902: 36.43
LV EJECT FRACT MOD BP 99901: 34.81
LYMPHOCYTES # BLD AUTO: 0.62 K/UL (ref 1–4.8)
LYMPHOCYTES # BLD AUTO: 0.62 K/UL (ref 1–4.8)
LYMPHOCYTES # BLD AUTO: 0.64 K/UL (ref 1–4.8)
LYMPHOCYTES # BLD AUTO: 0.72 K/UL (ref 1–4.8)
LYMPHOCYTES # BLD AUTO: 0.82 K/UL (ref 1–4.8)
LYMPHOCYTES # BLD AUTO: 0.85 K/UL (ref 1–4.8)
LYMPHOCYTES # BLD AUTO: 1.03 K/UL (ref 1–4.8)
LYMPHOCYTES # BLD AUTO: 1.04 K/UL (ref 1–4.8)
LYMPHOCYTES NFR BLD: 10.1 % (ref 22–41)
LYMPHOCYTES NFR BLD: 2.4 % (ref 22–41)
LYMPHOCYTES NFR BLD: 2.4 % (ref 22–41)
LYMPHOCYTES NFR BLD: 2.5 % (ref 22–41)
LYMPHOCYTES NFR BLD: 3.8 % (ref 22–41)
LYMPHOCYTES NFR BLD: 4 % (ref 22–41)
LYMPHOCYTES NFR BLD: 9 % (ref 22–41)
LYMPHOCYTES NFR BLD: 9.4 % (ref 22–41)
LYMPHOCYTES NFR FLD: 12 %
LYMPHOCYTES NFR FLD: 4 %
LYMPHOCYTES NFR FLD: 9 %
MAGNESIUM SERPL-MCNC: 2 MG/DL (ref 1.5–2.5)
MAGNESIUM SERPL-MCNC: 2.1 MG/DL (ref 1.5–2.5)
MAGNESIUM SERPL-MCNC: 2.2 MG/DL (ref 1.5–2.5)
MAGNESIUM SERPL-MCNC: 2.4 MG/DL (ref 1.5–2.5)
MAGNESIUM SERPL-MCNC: 2.5 MG/DL (ref 1.5–2.5)
MAGNESIUM SERPL-MCNC: 2.5 MG/DL (ref 1.5–2.5)
MAGNESIUM SERPL-MCNC: 2.7 MG/DL (ref 1.5–2.5)
MCH RBC QN AUTO: 27.3 PG (ref 27–33)
MCH RBC QN AUTO: 27.4 PG (ref 27–33)
MCH RBC QN AUTO: 27.6 PG (ref 27–33)
MCH RBC QN AUTO: 27.8 PG (ref 27–33)
MCH RBC QN AUTO: 28.1 PG (ref 27–33)
MCH RBC QN AUTO: 28.1 PG (ref 27–33)
MCH RBC QN AUTO: 28.2 PG (ref 27–33)
MCH RBC QN AUTO: 28.2 PG (ref 27–33)
MCH RBC QN AUTO: 28.3 PG (ref 27–33)
MCH RBC QN AUTO: 28.3 PG (ref 27–33)
MCH RBC QN AUTO: 28.4 PG (ref 27–33)
MCH RBC QN AUTO: 28.4 PG (ref 27–33)
MCH RBC QN AUTO: 29 PG (ref 27–33)
MCHC RBC AUTO-ENTMCNC: 31.1 G/DL (ref 32.3–36.5)
MCHC RBC AUTO-ENTMCNC: 31.3 G/DL (ref 32.3–36.5)
MCHC RBC AUTO-ENTMCNC: 31.7 G/DL (ref 32.3–36.5)
MCHC RBC AUTO-ENTMCNC: 31.8 G/DL (ref 32.3–36.5)
MCHC RBC AUTO-ENTMCNC: 31.9 G/DL (ref 32.3–36.5)
MCHC RBC AUTO-ENTMCNC: 31.9 G/DL (ref 32.3–36.5)
MCHC RBC AUTO-ENTMCNC: 32 G/DL (ref 32.3–36.5)
MCHC RBC AUTO-ENTMCNC: 32.1 G/DL (ref 32.3–36.5)
MCHC RBC AUTO-ENTMCNC: 32.2 G/DL (ref 32.3–36.5)
MCHC RBC AUTO-ENTMCNC: 32.4 G/DL (ref 32.3–36.5)
MCHC RBC AUTO-ENTMCNC: 32.5 G/DL (ref 32.3–36.5)
MCHC RBC AUTO-ENTMCNC: 33 G/DL (ref 32.3–36.5)
MCHC RBC AUTO-ENTMCNC: 33.1 G/DL (ref 32.3–36.5)
MCV RBC AUTO: 85.7 FL (ref 81.4–97.8)
MCV RBC AUTO: 86.1 FL (ref 81.4–97.8)
MCV RBC AUTO: 86.5 FL (ref 81.4–97.8)
MCV RBC AUTO: 86.8 FL (ref 81.4–97.8)
MCV RBC AUTO: 87.5 FL (ref 81.4–97.8)
MCV RBC AUTO: 87.9 FL (ref 81.4–97.8)
MCV RBC AUTO: 88 FL (ref 81.4–97.8)
MCV RBC AUTO: 88 FL (ref 81.4–97.8)
MCV RBC AUTO: 88.3 FL (ref 81.4–97.8)
MCV RBC AUTO: 88.3 FL (ref 81.4–97.8)
MCV RBC AUTO: 88.4 FL (ref 81.4–97.8)
MCV RBC AUTO: 88.4 FL (ref 81.4–97.8)
MCV RBC AUTO: 88.7 FL (ref 81.4–97.8)
METHADONE UR QL SCN: NEGATIVE
MICRO URNS: ABNORMAL
MICROALBUMIN UR-MCNC: <1.2 MG/DL
MICROALBUMIN/CREAT UR: NORMAL MG/G (ref 0–30)
MONOCYTES # BLD AUTO: 0.93 K/UL (ref 0–0.85)
MONOCYTES # BLD AUTO: 0.94 K/UL (ref 0–0.85)
MONOCYTES # BLD AUTO: 0.94 K/UL (ref 0–0.85)
MONOCYTES # BLD AUTO: 1.03 K/UL (ref 0–0.85)
MONOCYTES # BLD AUTO: 1.03 K/UL (ref 0–0.85)
MONOCYTES # BLD AUTO: 1.1 K/UL (ref 0–0.85)
MONOCYTES # BLD AUTO: 1.23 K/UL (ref 0–0.85)
MONOCYTES # BLD AUTO: 1.47 K/UL (ref 0–0.85)
MONOCYTES NFR BLD AUTO: 10.7 % (ref 0–13.4)
MONOCYTES NFR BLD AUTO: 12.3 % (ref 0–13.4)
MONOCYTES NFR BLD AUTO: 3.6 % (ref 0–13.4)
MONOCYTES NFR BLD AUTO: 4.2 % (ref 0–13.4)
MONOCYTES NFR BLD AUTO: 4.5 % (ref 0–13.4)
MONOCYTES NFR BLD AUTO: 5.7 % (ref 0–13.4)
MONOCYTES NFR BLD AUTO: 5.8 % (ref 0–13.4)
MONOCYTES NFR BLD AUTO: 8.1 % (ref 0–13.4)
MONOS+MACROS NFR FLD MANUAL: 37 %
MONOS+MACROS NFR FLD MANUAL: 43 %
MONOS+MACROS NFR FLD MANUAL: 46 %
MYCOBACTERIUM SPEC CULT: NORMAL
MYCOBACTERIUM SPEC CULT: NORMAL
NEUTROPHILS # BLD AUTO: 14.61 K/UL (ref 1.82–7.42)
NEUTROPHILS # BLD AUTO: 22.48 K/UL (ref 1.82–7.42)
NEUTROPHILS # BLD AUTO: 22.59 K/UL (ref 1.82–7.42)
NEUTROPHILS # BLD AUTO: 24.63 K/UL (ref 1.82–7.42)
NEUTROPHILS # BLD AUTO: 27.72 K/UL (ref 1.82–7.42)
NEUTROPHILS # BLD AUTO: 6.11 K/UL (ref 1.82–7.42)
NEUTROPHILS # BLD AUTO: 6.54 K/UL (ref 1.82–7.42)
NEUTROPHILS # BLD AUTO: 9.42 K/UL (ref 1.82–7.42)
NEUTROPHILS NFR BLD: 72.8 % (ref 44–72)
NEUTROPHILS NFR BLD: 74.8 % (ref 44–72)
NEUTROPHILS NFR BLD: 81.7 % (ref 44–72)
NEUTROPHILS NFR BLD: 87 % (ref 44–72)
NEUTROPHILS NFR BLD: 89.6 % (ref 44–72)
NEUTROPHILS NFR BLD: 91 % (ref 44–72)
NEUTROPHILS NFR BLD: 91.2 % (ref 44–72)
NEUTROPHILS NFR BLD: 91.5 % (ref 44–72)
NEUTROPHILS NFR FLD: 21 %
NEUTROPHILS NFR FLD: 22 %
NEUTROPHILS NFR FLD: 5 %
NITRITE UR QL STRIP.AUTO: NEGATIVE
NRBC # BLD AUTO: 0 K/UL
NRBC # BLD AUTO: 0.05 K/UL
NRBC # BLD AUTO: 0.09 K/UL
NRBC # BLD AUTO: 0.36 K/UL
NRBC # BLD AUTO: 0.59 K/UL
NRBC BLD-RTO: 0 /100 WBC (ref 0–0.2)
NRBC BLD-RTO: 0.2 /100 WBC (ref 0–0.2)
NRBC BLD-RTO: 0.4 /100 WBC (ref 0–0.2)
NRBC BLD-RTO: 1.3 /100 WBC (ref 0–0.2)
NRBC BLD-RTO: 2.3 /100 WBC (ref 0–0.2)
NT-PROBNP SERPL IA-MCNC: 321 PG/ML (ref 0–125)
NT-PROBNP SERPL IA-MCNC: 523 PG/ML (ref 0–125)
NUC CELL # FLD: 2282 CELLS/UL
NUC CELL # FLD: 2897 CELLS/UL
NUC CELL # FLD: 444 CELLS/UL
OPIATES UR QL SCN: NEGATIVE
OXYCODONE UR QL SCN: NEGATIVE
PATH REV: NORMAL
PATH REV: NORMAL
PCP UR QL SCN: NEGATIVE
PH FLD: 7 [PH]
PH FLD: 8 [PH]
PH UR STRIP.AUTO: 5 [PH] (ref 5–8)
PH UR STRIP.AUTO: 5 [PH] (ref 5–8)
PH UR STRIP.AUTO: 5.5 [PH] (ref 5–8)
PHOSPHATE SERPL-MCNC: 4.6 MG/DL (ref 2.5–4.5)
PHOSPHATE SERPL-MCNC: 4.7 MG/DL (ref 2.5–4.5)
PHOSPHATE SERPL-MCNC: 4.8 MG/DL (ref 2.5–4.5)
PHOSPHATE SERPL-MCNC: 5.5 MG/DL (ref 2.5–4.5)
PLATELET # BLD AUTO: 264 K/UL (ref 164–446)
PLATELET # BLD AUTO: 285 K/UL (ref 164–446)
PLATELET # BLD AUTO: 285 K/UL (ref 164–446)
PLATELET # BLD AUTO: 298 K/UL (ref 164–446)
PLATELET # BLD AUTO: 301 K/UL (ref 164–446)
PLATELET # BLD AUTO: 316 K/UL (ref 164–446)
PLATELET # BLD AUTO: 320 K/UL (ref 164–446)
PLATELET # BLD AUTO: 322 K/UL (ref 164–446)
PLATELET # BLD AUTO: 347 K/UL (ref 164–446)
PLATELET # BLD AUTO: 371 K/UL (ref 164–446)
PLATELET # BLD AUTO: 418 K/UL (ref 164–446)
PLATELET # BLD AUTO: 433 K/UL (ref 164–446)
PLATELET # BLD AUTO: 444 K/UL (ref 164–446)
PMV BLD AUTO: 10 FL (ref 9–12.9)
PMV BLD AUTO: 10 FL (ref 9–12.9)
PMV BLD AUTO: 10.1 FL (ref 9–12.9)
PMV BLD AUTO: 10.3 FL (ref 9–12.9)
PMV BLD AUTO: 9.3 FL (ref 9–12.9)
PMV BLD AUTO: 9.5 FL (ref 9–12.9)
PMV BLD AUTO: 9.7 FL (ref 9–12.9)
PMV BLD AUTO: 9.8 FL (ref 9–12.9)
PMV BLD AUTO: 9.9 FL (ref 9–12.9)
POTASSIUM SERPL-SCNC: 3 MMOL/L (ref 3.6–5.5)
POTASSIUM SERPL-SCNC: 3.2 MMOL/L (ref 3.6–5.5)
POTASSIUM SERPL-SCNC: 3.3 MMOL/L (ref 3.6–5.5)
POTASSIUM SERPL-SCNC: 3.3 MMOL/L (ref 3.6–5.5)
POTASSIUM SERPL-SCNC: 3.4 MMOL/L (ref 3.6–5.5)
POTASSIUM SERPL-SCNC: 3.5 MMOL/L (ref 3.6–5.5)
POTASSIUM SERPL-SCNC: 3.6 MMOL/L (ref 3.6–5.5)
POTASSIUM SERPL-SCNC: 3.7 MMOL/L (ref 3.6–5.5)
POTASSIUM SERPL-SCNC: 3.8 MMOL/L (ref 3.6–5.5)
POTASSIUM SERPL-SCNC: 4.3 MMOL/L (ref 3.6–5.5)
PROCALCITONIN SERPL-MCNC: 1 NG/ML
PROCALCITONIN SERPL-MCNC: 1.01 NG/ML
PROCALCITONIN SERPL-MCNC: 1.43 NG/ML
PROCALCITONIN SERPL-MCNC: 1.5 NG/ML
PRODUCT TYPE UPROD: NORMAL
PRODUCT TYPE UPROD: NORMAL
PROPOXYPH UR QL SCN: NEGATIVE
PROT FLD-MCNC: 3.6 G/DL
PROT FLD-MCNC: 3.8 G/DL
PROT SERPL-MCNC: 6 G/DL (ref 6–8.2)
PROT SERPL-MCNC: 6.1 G/DL (ref 6–8.2)
PROT SERPL-MCNC: 6.4 G/DL (ref 6–8.2)
PROT SERPL-MCNC: 6.5 G/DL (ref 6–8.2)
PROT UR QL STRIP: 30 MG/DL
PROT UR QL STRIP: NEGATIVE MG/DL
PROT UR QL STRIP: NEGATIVE MG/DL
PROT UR-MCNC: 5 MG/DL (ref 0–15)
PROT/CREAT UR: 91 MG/G (ref 15–68)
PROTHROMBIN TIME: 15.8 SEC (ref 12–14.6)
PROTHROMBIN TIME: 15.8 SEC (ref 12–14.6)
RBC # BLD AUTO: 2.57 M/UL (ref 4.7–6.1)
RBC # BLD AUTO: 2.73 M/UL (ref 4.7–6.1)
RBC # BLD AUTO: 2.88 M/UL (ref 4.7–6.1)
RBC # BLD AUTO: 3.17 M/UL (ref 4.7–6.1)
RBC # BLD AUTO: 3.19 M/UL (ref 4.7–6.1)
RBC # BLD AUTO: 3.21 M/UL (ref 4.7–6.1)
RBC # BLD AUTO: 3.27 M/UL (ref 4.7–6.1)
RBC # BLD AUTO: 3.32 M/UL (ref 4.7–6.1)
RBC # BLD AUTO: 3.41 M/UL (ref 4.7–6.1)
RBC # BLD AUTO: 3.46 M/UL (ref 4.7–6.1)
RBC # BLD AUTO: 3.52 M/UL (ref 4.7–6.1)
RBC # BLD AUTO: 3.67 M/UL (ref 4.7–6.1)
RBC # BLD AUTO: 4.06 M/UL (ref 4.7–6.1)
RBC # FLD: <2000 CELLS/UL
RBC # URNS HPF: ABNORMAL /HPF
RBC UR QL AUTO: NEGATIVE
RETICS # AUTO: 0.11 M/UL (ref 0.04–0.12)
RETICS/RBC NFR: 4.4 % (ref 0.8–2.6)
RH BLD: NORMAL
RHODAMINE-AURAMINE STN SPEC: NORMAL
RSV RNA SPEC QL NAA+PROBE: NEGATIVE
RSV RNA SPEC QL NAA+PROBE: NEGATIVE
SARS-COV-2 RNA RESP QL NAA+PROBE: NOTDETECTED
SARS-COV-2 RNA RESP QL NAA+PROBE: NOTDETECTED
SIGNIFICANT IND 70042: NORMAL
SITE SITE: NORMAL
SODIUM SERPL-SCNC: 138 MMOL/L (ref 135–145)
SODIUM SERPL-SCNC: 138 MMOL/L (ref 135–145)
SODIUM SERPL-SCNC: 139 MMOL/L (ref 135–145)
SODIUM SERPL-SCNC: 141 MMOL/L (ref 135–145)
SODIUM SERPL-SCNC: 142 MMOL/L (ref 135–145)
SODIUM SERPL-SCNC: 142 MMOL/L (ref 135–145)
SODIUM SERPL-SCNC: 144 MMOL/L (ref 135–145)
SODIUM SERPL-SCNC: 144 MMOL/L (ref 135–145)
SODIUM SERPL-SCNC: 145 MMOL/L (ref 135–145)
SODIUM SERPL-SCNC: 147 MMOL/L (ref 135–145)
SOURCE SOURCE: NORMAL
SP GR UR STRIP.AUTO: 1.01
SP GR UR STRIP.AUTO: 1.01
SP GR UR STRIP.AUTO: 1.02
SPECIMEN SOURCE: NORMAL
SPECIMEN SOURCE: NORMAL
TIBC SERPL-MCNC: 230 UG/DL (ref 250–450)
TRIGL SERPL-MCNC: 128 MG/DL (ref 0–149)
TROPONIN T SERPL-MCNC: 29 NG/L (ref 6–19)
TSH SERPL DL<=0.005 MIU/L-ACNC: 1.49 UIU/ML (ref 0.38–5.33)
UIBC SERPL-MCNC: 213 UG/DL (ref 110–370)
UNIT STATUS USTAT: NORMAL
UNIT STATUS USTAT: NORMAL
UROBILINOGEN UR STRIP.AUTO-MCNC: 0.2 MG/DL
VIT B12 SERPL-MCNC: 826 PG/ML (ref 211–911)
WBC # BLD AUTO: 11.5 K/UL (ref 4.8–10.8)
WBC # BLD AUTO: 16.3 K/UL (ref 4.8–10.8)
WBC # BLD AUTO: 18.9 K/UL (ref 4.8–10.8)
WBC # BLD AUTO: 21.3 K/UL (ref 4.8–10.8)
WBC # BLD AUTO: 23.1 K/UL (ref 4.8–10.8)
WBC # BLD AUTO: 24.6 K/UL (ref 4.8–10.8)
WBC # BLD AUTO: 26 K/UL (ref 4.8–10.8)
WBC # BLD AUTO: 27.1 K/UL (ref 4.8–10.8)
WBC # BLD AUTO: 27.8 K/UL (ref 4.8–10.8)
WBC # BLD AUTO: 30.3 K/UL (ref 4.8–10.8)
WBC # BLD AUTO: 8.3 K/UL (ref 4.8–10.8)
WBC # BLD AUTO: 8.4 K/UL (ref 4.8–10.8)
WBC # BLD AUTO: 8.8 K/UL (ref 4.8–10.8)
WBC #/AREA URNS HPF: ABNORMAL /HPF
WBC OTHER NFR FLD: 10 %

## 2024-01-01 PROCEDURE — 99223 1ST HOSP IP/OBS HIGH 75: CPT | Mod: AI | Performed by: STUDENT IN AN ORGANIZED HEALTH CARE EDUCATION/TRAINING PROGRAM

## 2024-01-01 PROCEDURE — 700111 HCHG RX REV CODE 636 W/ 250 OVERRIDE (IP): Mod: JZ | Performed by: STUDENT IN AN ORGANIZED HEALTH CARE EDUCATION/TRAINING PROGRAM

## 2024-01-01 PROCEDURE — 36415 COLL VENOUS BLD VENIPUNCTURE: CPT

## 2024-01-01 PROCEDURE — 76775 US EXAM ABDO BACK WALL LIM: CPT

## 2024-01-01 PROCEDURE — 83880 ASSAY OF NATRIURETIC PEPTIDE: CPT

## 2024-01-01 PROCEDURE — 3074F SYST BP LT 130 MM HG: CPT | Performed by: INTERNAL MEDICINE

## 2024-01-01 PROCEDURE — 700102 HCHG RX REV CODE 250 W/ 637 OVERRIDE(OP): Performed by: INTERNAL MEDICINE

## 2024-01-01 PROCEDURE — 94669 MECHANICAL CHEST WALL OSCILL: CPT

## 2024-01-01 PROCEDURE — P9047 ALBUMIN (HUMAN), 25%, 50ML: HCPCS | Mod: JZ,JG | Performed by: HOSPITALIST

## 2024-01-01 PROCEDURE — 71045 X-RAY EXAM CHEST 1 VIEW: CPT

## 2024-01-01 PROCEDURE — 80503 PATH CLIN CONSLTJ SF 5-20: CPT

## 2024-01-01 PROCEDURE — 700102 HCHG RX REV CODE 250 W/ 637 OVERRIDE(OP): Performed by: STUDENT IN AN ORGANIZED HEALTH CARE EDUCATION/TRAINING PROGRAM

## 2024-01-01 PROCEDURE — A9270 NON-COVERED ITEM OR SERVICE: HCPCS | Performed by: STUDENT IN AN ORGANIZED HEALTH CARE EDUCATION/TRAINING PROGRAM

## 2024-01-01 PROCEDURE — 83615 LACTATE (LD) (LDH) ENZYME: CPT

## 2024-01-01 PROCEDURE — 99233 SBSQ HOSP IP/OBS HIGH 50: CPT | Performed by: INTERNAL MEDICINE

## 2024-01-01 PROCEDURE — 87205 SMEAR GRAM STAIN: CPT | Mod: 91

## 2024-01-01 PROCEDURE — 94640 AIRWAY INHALATION TREATMENT: CPT

## 2024-01-01 PROCEDURE — 84100 ASSAY OF PHOSPHORUS: CPT

## 2024-01-01 PROCEDURE — 70450 CT HEAD/BRAIN W/O DYE: CPT

## 2024-01-01 PROCEDURE — 80061 LIPID PANEL: CPT

## 2024-01-01 PROCEDURE — 80053 COMPREHEN METABOLIC PANEL: CPT

## 2024-01-01 PROCEDURE — 84156 ASSAY OF PROTEIN URINE: CPT

## 2024-01-01 PROCEDURE — 85025 COMPLETE CBC W/AUTO DIFF WBC: CPT

## 2024-01-01 PROCEDURE — 89051 BODY FLUID CELL COUNT: CPT

## 2024-01-01 PROCEDURE — A9270 NON-COVERED ITEM OR SERVICE: HCPCS | Performed by: INTERNAL MEDICINE

## 2024-01-01 PROCEDURE — 93308 TTE F-UP OR LMTD: CPT | Mod: 26 | Performed by: INTERNAL MEDICINE

## 2024-01-01 PROCEDURE — 80048 BASIC METABOLIC PNL TOTAL CA: CPT

## 2024-01-01 PROCEDURE — 88112 CYTOPATH CELL ENHANCE TECH: CPT

## 2024-01-01 PROCEDURE — 0241U HCHG SARS-COV-2 COVID-19 NFCT DS RESP RNA 4 TRGT MIC: CPT

## 2024-01-01 PROCEDURE — 99223 1ST HOSP IP/OBS HIGH 75: CPT | Performed by: INTERNAL MEDICINE

## 2024-01-01 PROCEDURE — 84145 PROCALCITONIN (PCT): CPT

## 2024-01-01 PROCEDURE — 83550 IRON BINDING TEST: CPT

## 2024-01-01 PROCEDURE — 3078F DIAST BP <80 MM HG: CPT | Performed by: INTERNAL MEDICINE

## 2024-01-01 PROCEDURE — 700111 HCHG RX REV CODE 636 W/ 250 OVERRIDE (IP): Performed by: HOSPITALIST

## 2024-01-01 PROCEDURE — 700111 HCHG RX REV CODE 636 W/ 250 OVERRIDE (IP): Performed by: STUDENT IN AN ORGANIZED HEALTH CARE EDUCATION/TRAINING PROGRAM

## 2024-01-01 PROCEDURE — 99239 HOSP IP/OBS DSCHRG MGMT >30: CPT | Performed by: STUDENT IN AN ORGANIZED HEALTH CARE EDUCATION/TRAINING PROGRAM

## 2024-01-01 PROCEDURE — 93306 TTE W/DOPPLER COMPLETE: CPT

## 2024-01-01 PROCEDURE — 700105 HCHG RX REV CODE 258: Performed by: INTERNAL MEDICINE

## 2024-01-01 PROCEDURE — 83986 ASSAY PH BODY FLUID NOS: CPT

## 2024-01-01 PROCEDURE — A9270 NON-COVERED ITEM OR SERVICE: HCPCS | Performed by: HOSPITALIST

## 2024-01-01 PROCEDURE — 88305 TISSUE EXAM BY PATHOLOGIST: CPT

## 2024-01-01 PROCEDURE — 99285 EMERGENCY DEPT VISIT HI MDM: CPT

## 2024-01-01 PROCEDURE — 99231 SBSQ HOSP IP/OBS SF/LOW 25: CPT | Performed by: NURSE PRACTITIONER

## 2024-01-01 PROCEDURE — 92526 ORAL FUNCTION THERAPY: CPT

## 2024-01-01 PROCEDURE — 700105 HCHG RX REV CODE 258: Performed by: HOSPITALIST

## 2024-01-01 PROCEDURE — 84157 ASSAY OF PROTEIN OTHER: CPT

## 2024-01-01 PROCEDURE — 99222 1ST HOSP IP/OBS MODERATE 55: CPT | Performed by: SURGERY

## 2024-01-01 PROCEDURE — 82746 ASSAY OF FOLIC ACID SERUM: CPT

## 2024-01-01 PROCEDURE — 99221 1ST HOSP IP/OBS SF/LOW 40: CPT | Mod: 25 | Performed by: NURSE PRACTITIONER

## 2024-01-01 PROCEDURE — 84132 ASSAY OF SERUM POTASSIUM: CPT

## 2024-01-01 PROCEDURE — 87206 SMEAR FLUORESCENT/ACID STAI: CPT

## 2024-01-01 PROCEDURE — 87040 BLOOD CULTURE FOR BACTERIA: CPT | Mod: 91

## 2024-01-01 PROCEDURE — 84484 ASSAY OF TROPONIN QUANT: CPT

## 2024-01-01 PROCEDURE — 770000 HCHG ROOM/CARE - INTERMEDIATE ICU *

## 2024-01-01 PROCEDURE — 93005 ELECTROCARDIOGRAM TRACING: CPT | Performed by: EMERGENCY MEDICINE

## 2024-01-01 PROCEDURE — 700101 HCHG RX REV CODE 250: Performed by: STUDENT IN AN ORGANIZED HEALTH CARE EDUCATION/TRAINING PROGRAM

## 2024-01-01 PROCEDURE — 36430 TRANSFUSION BLD/BLD COMPNT: CPT

## 2024-01-01 PROCEDURE — 99497 ADVNCD CARE PLAN 30 MIN: CPT | Performed by: NURSE PRACTITIONER

## 2024-01-01 PROCEDURE — 88341 IMHCHEM/IMCYTCHM EA ADD ANTB: CPT | Mod: 91

## 2024-01-01 PROCEDURE — 0W9B3ZZ DRAINAGE OF LEFT PLEURAL CAVITY, PERCUTANEOUS APPROACH: ICD-10-PCS | Performed by: NURSE PRACTITIONER

## 2024-01-01 PROCEDURE — 87070 CULTURE OTHR SPECIMN AEROBIC: CPT

## 2024-01-01 PROCEDURE — 700102 HCHG RX REV CODE 250 W/ 637 OVERRIDE(OP): Performed by: HOSPITALIST

## 2024-01-01 PROCEDURE — 99497 ADVNCD CARE PLAN 30 MIN: CPT | Performed by: INTERNAL MEDICINE

## 2024-01-01 PROCEDURE — 99233 SBSQ HOSP IP/OBS HIGH 50: CPT | Performed by: HOSPITALIST

## 2024-01-01 PROCEDURE — 87070 CULTURE OTHR SPECIMN AEROBIC: CPT | Mod: 91

## 2024-01-01 PROCEDURE — 700117 HCHG RX CONTRAST REV CODE 255: Performed by: HOSPITALIST

## 2024-01-01 PROCEDURE — 49083 ABD PARACENTESIS W/IMAGING: CPT

## 2024-01-01 PROCEDURE — 83540 ASSAY OF IRON: CPT

## 2024-01-01 PROCEDURE — 83735 ASSAY OF MAGNESIUM: CPT

## 2024-01-01 PROCEDURE — 85610 PROTHROMBIN TIME: CPT

## 2024-01-01 PROCEDURE — 86923 COMPATIBILITY TEST ELECTRIC: CPT

## 2024-01-01 PROCEDURE — 770006 HCHG ROOM/CARE - MED/SURG/GYN SEMI*

## 2024-01-01 PROCEDURE — 74176 CT ABD & PELVIS W/O CONTRAST: CPT

## 2024-01-01 PROCEDURE — 770004 HCHG ROOM/CARE - ONCOLOGY PRIVATE *

## 2024-01-01 PROCEDURE — 700102 HCHG RX REV CODE 250 W/ 637 OVERRIDE(OP)

## 2024-01-01 PROCEDURE — 770001 HCHG ROOM/CARE - MED/SURG/GYN PRIV*

## 2024-01-01 PROCEDURE — 82140 ASSAY OF AMMONIA: CPT

## 2024-01-01 PROCEDURE — 82607 VITAMIN B-12: CPT

## 2024-01-01 PROCEDURE — 99417 PROLNG OP E/M EACH 15 MIN: CPT | Performed by: INTERNAL MEDICINE

## 2024-01-01 PROCEDURE — 700111 HCHG RX REV CODE 636 W/ 250 OVERRIDE (IP): Performed by: INTERNAL MEDICINE

## 2024-01-01 PROCEDURE — 85027 COMPLETE CBC AUTOMATED: CPT

## 2024-01-01 PROCEDURE — C9113 INJ PANTOPRAZOLE SODIUM, VIA: HCPCS | Performed by: HOSPITALIST

## 2024-01-01 PROCEDURE — 97165 OT EVAL LOW COMPLEX 30 MIN: CPT

## 2024-01-01 PROCEDURE — 99232 SBSQ HOSP IP/OBS MODERATE 35: CPT | Performed by: HOSPITALIST

## 2024-01-01 PROCEDURE — 700111 HCHG RX REV CODE 636 W/ 250 OVERRIDE (IP): Mod: JZ | Performed by: INTERNAL MEDICINE

## 2024-01-01 PROCEDURE — 88342 IMHCHEM/IMCYTCHM 1ST ANTB: CPT

## 2024-01-01 PROCEDURE — 85046 RETICYTE/HGB CONCENTRATE: CPT

## 2024-01-01 PROCEDURE — 96365 THER/PROPH/DIAG IV INF INIT: CPT | Mod: XU

## 2024-01-01 PROCEDURE — 93306 TTE W/DOPPLER COMPLETE: CPT | Mod: 26 | Performed by: INTERNAL MEDICINE

## 2024-01-01 PROCEDURE — 99232 SBSQ HOSP IP/OBS MODERATE 35: CPT | Performed by: STUDENT IN AN ORGANIZED HEALTH CARE EDUCATION/TRAINING PROGRAM

## 2024-01-01 PROCEDURE — 700105 HCHG RX REV CODE 258: Performed by: EMERGENCY MEDICINE

## 2024-01-01 PROCEDURE — 700111 HCHG RX REV CODE 636 W/ 250 OVERRIDE (IP): Mod: JZ,JG | Performed by: HOSPITALIST

## 2024-01-01 PROCEDURE — 0W9G3ZZ DRAINAGE OF PERITONEAL CAVITY, PERCUTANEOUS APPROACH: ICD-10-PCS | Performed by: NURSE PRACTITIONER

## 2024-01-01 PROCEDURE — 700111 HCHG RX REV CODE 636 W/ 250 OVERRIDE (IP): Performed by: NURSE PRACTITIONER

## 2024-01-01 PROCEDURE — 86850 RBC ANTIBODY SCREEN: CPT

## 2024-01-01 PROCEDURE — 80307 DRUG TEST PRSMV CHEM ANLYZR: CPT

## 2024-01-01 PROCEDURE — 85730 THROMBOPLASTIN TIME PARTIAL: CPT

## 2024-01-01 PROCEDURE — 87040 BLOOD CULTURE FOR BACTERIA: CPT

## 2024-01-01 PROCEDURE — 83605 ASSAY OF LACTIC ACID: CPT

## 2024-01-01 PROCEDURE — 85652 RBC SED RATE AUTOMATED: CPT

## 2024-01-01 PROCEDURE — 700105 HCHG RX REV CODE 258: Performed by: STUDENT IN AN ORGANIZED HEALTH CARE EDUCATION/TRAINING PROGRAM

## 2024-01-01 PROCEDURE — RXMED WILLOW AMBULATORY MEDICATION CHARGE: Performed by: STUDENT IN AN ORGANIZED HEALTH CARE EDUCATION/TRAINING PROGRAM

## 2024-01-01 PROCEDURE — 87015 SPECIMEN INFECT AGNT CONCNTJ: CPT

## 2024-01-01 PROCEDURE — 86140 C-REACTIVE PROTEIN: CPT

## 2024-01-01 PROCEDURE — 82150 ASSAY OF AMYLASE: CPT

## 2024-01-01 PROCEDURE — 80069 RENAL FUNCTION PANEL: CPT

## 2024-01-01 PROCEDURE — 82042 OTHER SOURCE ALBUMIN QUAN EA: CPT

## 2024-01-01 PROCEDURE — 86900 BLOOD TYPING SEROLOGIC ABO: CPT

## 2024-01-01 PROCEDURE — 87102 FUNGUS ISOLATION CULTURE: CPT

## 2024-01-01 PROCEDURE — 87205 SMEAR GRAM STAIN: CPT

## 2024-01-01 PROCEDURE — 93005 ELECTROCARDIOGRAM TRACING: CPT

## 2024-01-01 PROCEDURE — 81001 URINALYSIS AUTO W/SCOPE: CPT

## 2024-01-01 PROCEDURE — 0W993ZZ DRAINAGE OF RIGHT PLEURAL CAVITY, PERCUTANEOUS APPROACH: ICD-10-PCS | Performed by: NURSE PRACTITIONER

## 2024-01-01 PROCEDURE — 82962 GLUCOSE BLOOD TEST: CPT

## 2024-01-01 PROCEDURE — 99222 1ST HOSP IP/OBS MODERATE 55: CPT | Performed by: PSYCHIATRY & NEUROLOGY

## 2024-01-01 PROCEDURE — 99233 SBSQ HOSP IP/OBS HIGH 50: CPT | Performed by: STUDENT IN AN ORGANIZED HEALTH CARE EDUCATION/TRAINING PROGRAM

## 2024-01-01 PROCEDURE — 70551 MRI BRAIN STEM W/O DYE: CPT

## 2024-01-01 PROCEDURE — 700111 HCHG RX REV CODE 636 W/ 250 OVERRIDE (IP): Mod: JZ

## 2024-01-01 PROCEDURE — 81003 URINALYSIS AUTO W/O SCOPE: CPT | Mod: 91

## 2024-01-01 PROCEDURE — P9016 RBC LEUKOCYTES REDUCED: HCPCS

## 2024-01-01 PROCEDURE — 700111 HCHG RX REV CODE 636 W/ 250 OVERRIDE (IP): Mod: JZ | Performed by: HOSPITALIST

## 2024-01-01 PROCEDURE — 92610 EVALUATE SWALLOWING FUNCTION: CPT

## 2024-01-01 PROCEDURE — 83605 ASSAY OF LACTIC ACID: CPT | Mod: 91

## 2024-01-01 PROCEDURE — 87116 MYCOBACTERIA CULTURE: CPT

## 2024-01-01 PROCEDURE — A9270 NON-COVERED ITEM OR SERVICE: HCPCS

## 2024-01-01 PROCEDURE — C1729 CATH, DRAINAGE: HCPCS

## 2024-01-01 PROCEDURE — 86901 BLOOD TYPING SEROLOGIC RH(D): CPT

## 2024-01-01 PROCEDURE — 76705 ECHO EXAM OF ABDOMEN: CPT

## 2024-01-01 PROCEDURE — 87015 SPECIMEN INFECT AGNT CONCNTJ: CPT | Mod: 91

## 2024-01-01 PROCEDURE — 87086 URINE CULTURE/COLONY COUNT: CPT

## 2024-01-01 PROCEDURE — 82043 UR ALBUMIN QUANTITATIVE: CPT

## 2024-01-01 PROCEDURE — 82945 GLUCOSE OTHER FLUID: CPT

## 2024-01-01 PROCEDURE — 82570 ASSAY OF URINE CREATININE: CPT

## 2024-01-01 PROCEDURE — 99221 1ST HOSP IP/OBS SF/LOW 40: CPT | Performed by: INTERNAL MEDICINE

## 2024-01-01 PROCEDURE — 97535 SELF CARE MNGMENT TRAINING: CPT

## 2024-01-01 PROCEDURE — 99215 OFFICE O/P EST HI 40 MIN: CPT | Performed by: INTERNAL MEDICINE

## 2024-01-01 PROCEDURE — 93325 DOPPLER ECHO COLOR FLOW MAPG: CPT

## 2024-01-01 PROCEDURE — 97162 PT EVAL MOD COMPLEX 30 MIN: CPT

## 2024-01-01 PROCEDURE — 700111 HCHG RX REV CODE 636 W/ 250 OVERRIDE (IP): Performed by: EMERGENCY MEDICINE

## 2024-01-01 PROCEDURE — 82728 ASSAY OF FERRITIN: CPT

## 2024-01-01 PROCEDURE — 84443 ASSAY THYROID STIM HORMONE: CPT

## 2024-01-01 RX ORDER — ONDANSETRON 2 MG/ML
4 INJECTION INTRAMUSCULAR; INTRAVENOUS EVERY 4 HOURS PRN
Status: DISCONTINUED | OUTPATIENT
Start: 2024-01-01 | End: 2024-01-01 | Stop reason: HOSPADM

## 2024-01-01 RX ORDER — ACETAMINOPHEN 650 MG/1
650 SUPPOSITORY RECTAL EVERY 4 HOURS PRN
Status: DISCONTINUED | OUTPATIENT
Start: 2024-01-01 | End: 2024-01-01 | Stop reason: HOSPADM

## 2024-01-01 RX ORDER — AMLODIPINE BESYLATE 5 MG/1
5 TABLET ORAL DAILY
Qty: 90 TABLET | Refills: 3 | Status: SHIPPED
Start: 2024-01-01 | End: 2024-01-01

## 2024-01-01 RX ORDER — LIDOCAINE 4 G/G
1 PATCH TOPICAL EVERY 24 HOURS
COMMUNITY

## 2024-01-01 RX ORDER — FUROSEMIDE 10 MG/ML
40 INJECTION INTRAMUSCULAR; INTRAVENOUS ONCE
Status: COMPLETED | OUTPATIENT
Start: 2024-01-01 | End: 2024-01-01

## 2024-01-01 RX ORDER — TAMSULOSIN HYDROCHLORIDE 0.4 MG/1
0.4 CAPSULE ORAL
Status: DISCONTINUED | OUTPATIENT
Start: 2024-01-01 | End: 2024-01-01

## 2024-01-01 RX ORDER — LORAZEPAM 2 MG/ML
2 INJECTION INTRAMUSCULAR EVERY 4 HOURS PRN
Status: DISCONTINUED | OUTPATIENT
Start: 2024-01-01 | End: 2024-01-01

## 2024-01-01 RX ORDER — ACETAMINOPHEN 325 MG/1
650 TABLET ORAL EVERY 6 HOURS PRN
Status: DISCONTINUED | OUTPATIENT
Start: 2024-01-01 | End: 2024-01-01 | Stop reason: HOSPADM

## 2024-01-01 RX ORDER — CALCIUM CARBONATE 500 MG/1
500 TABLET, CHEWABLE ORAL ONCE
Status: COMPLETED | OUTPATIENT
Start: 2024-01-01 | End: 2024-01-01

## 2024-01-01 RX ORDER — POTASSIUM CHLORIDE 20 MEQ/1
20 TABLET, EXTENDED RELEASE ORAL DAILY
Qty: 7 TABLET | Refills: 0 | Status: SHIPPED
Start: 2024-01-01 | End: 2024-01-01

## 2024-01-01 RX ORDER — ONDANSETRON 2 MG/ML
4 INJECTION INTRAMUSCULAR; INTRAVENOUS ONCE
Status: COMPLETED | OUTPATIENT
Start: 2024-01-01 | End: 2024-01-01

## 2024-01-01 RX ORDER — LIDOCAINE 4 G/G
1 PATCH TOPICAL EVERY 24 HOURS
Status: DISCONTINUED | OUTPATIENT
Start: 2024-01-01 | End: 2024-01-01 | Stop reason: HOSPADM

## 2024-01-01 RX ORDER — POLYETHYLENE GLYCOL 3350 17 G/17G
1 POWDER, FOR SOLUTION ORAL DAILY
Status: DISCONTINUED | OUTPATIENT
Start: 2024-01-01 | End: 2024-01-01

## 2024-01-01 RX ORDER — SODIUM CHLORIDE, SODIUM LACTATE, POTASSIUM CHLORIDE, AND CALCIUM CHLORIDE .6; .31; .03; .02 G/100ML; G/100ML; G/100ML; G/100ML
30 INJECTION, SOLUTION INTRAVENOUS ONCE
Status: COMPLETED | OUTPATIENT
Start: 2024-01-01 | End: 2024-01-01

## 2024-01-01 RX ORDER — POLYETHYLENE GLYCOL 3350 17 G/17G
1 POWDER, FOR SOLUTION ORAL
Status: DISCONTINUED | OUTPATIENT
Start: 2024-01-01 | End: 2024-01-01 | Stop reason: HOSPADM

## 2024-01-01 RX ORDER — ACETAMINOPHEN 325 MG/1
650 TABLET ORAL EVERY 4 HOURS PRN
Status: DISCONTINUED | OUTPATIENT
Start: 2024-01-01 | End: 2024-01-01 | Stop reason: HOSPADM

## 2024-01-01 RX ORDER — SODIUM CHLORIDE 9 MG/ML
INJECTION, SOLUTION INTRAVENOUS CONTINUOUS
Status: DISCONTINUED | OUTPATIENT
Start: 2024-01-01 | End: 2024-01-01

## 2024-01-01 RX ORDER — ATENOLOL 25 MG/1
25 TABLET ORAL
Status: DISCONTINUED | OUTPATIENT
Start: 2024-01-01 | End: 2024-01-01 | Stop reason: HOSPADM

## 2024-01-01 RX ORDER — HEPARIN SODIUM 5000 [USP'U]/ML
5000 INJECTION, SOLUTION INTRAVENOUS; SUBCUTANEOUS EVERY 8 HOURS
Status: DISCONTINUED | OUTPATIENT
Start: 2024-01-01 | End: 2024-01-01

## 2024-01-01 RX ORDER — SODIUM CHLORIDE, SODIUM LACTATE, POTASSIUM CHLORIDE, AND CALCIUM CHLORIDE .6; .31; .03; .02 G/100ML; G/100ML; G/100ML; G/100ML
30 INJECTION, SOLUTION INTRAVENOUS
Status: DISCONTINUED | OUTPATIENT
Start: 2024-01-01 | End: 2024-01-01

## 2024-01-01 RX ORDER — BISACODYL 10 MG
10 SUPPOSITORY, RECTAL RECTAL
Status: DISCONTINUED | OUTPATIENT
Start: 2024-01-01 | End: 2024-01-01 | Stop reason: HOSPADM

## 2024-01-01 RX ORDER — LORAZEPAM 2 MG/ML
2 INJECTION INTRAMUSCULAR EVERY 4 HOURS
Status: DISCONTINUED | OUTPATIENT
Start: 2024-01-01 | End: 2024-01-01 | Stop reason: HOSPADM

## 2024-01-01 RX ORDER — FUROSEMIDE 10 MG/ML
20 INJECTION INTRAMUSCULAR; INTRAVENOUS
Status: DISCONTINUED | OUTPATIENT
Start: 2024-01-01 | End: 2024-01-01

## 2024-01-01 RX ORDER — ONDANSETRON 4 MG/1
4 TABLET, ORALLY DISINTEGRATING ORAL EVERY 4 HOURS PRN
Status: DISCONTINUED | OUTPATIENT
Start: 2024-01-01 | End: 2024-01-01 | Stop reason: HOSPADM

## 2024-01-01 RX ORDER — HYDROMORPHONE HYDROCHLORIDE 2 MG/ML
4 INJECTION, SOLUTION INTRAMUSCULAR; INTRAVENOUS; SUBCUTANEOUS
Status: DISCONTINUED | OUTPATIENT
Start: 2024-01-01 | End: 2024-01-01 | Stop reason: HOSPADM

## 2024-01-01 RX ORDER — HALOPERIDOL 5 MG/ML
5 INJECTION INTRAMUSCULAR EVERY 6 HOURS PRN
Status: DISCONTINUED | OUTPATIENT
Start: 2024-01-01 | End: 2024-01-01

## 2024-01-01 RX ORDER — POTASSIUM CHLORIDE 20 MEQ/1
20 TABLET, EXTENDED RELEASE ORAL ONCE
Status: COMPLETED | OUTPATIENT
Start: 2024-01-01 | End: 2024-01-01

## 2024-01-01 RX ORDER — AMOXICILLIN 250 MG
2 CAPSULE ORAL EVERY EVENING
Status: DISCONTINUED | OUTPATIENT
Start: 2024-01-01 | End: 2024-01-01

## 2024-01-01 RX ORDER — IBUPROFEN 200 MG
400 TABLET ORAL
COMMUNITY
End: 2024-01-01

## 2024-01-01 RX ORDER — POTASSIUM CHLORIDE 20 MEQ/1
40 TABLET, EXTENDED RELEASE ORAL ONCE
Status: COMPLETED | OUTPATIENT
Start: 2024-01-01 | End: 2024-01-01

## 2024-01-01 RX ORDER — LORAZEPAM 2 MG/ML
1 INJECTION INTRAMUSCULAR
Status: DISCONTINUED | OUTPATIENT
Start: 2024-01-01 | End: 2024-01-01 | Stop reason: HOSPADM

## 2024-01-01 RX ORDER — IPRATROPIUM BROMIDE AND ALBUTEROL SULFATE 2.5; .5 MG/3ML; MG/3ML
3 SOLUTION RESPIRATORY (INHALATION)
Status: ACTIVE | OUTPATIENT
Start: 2024-01-01 | End: 2024-01-01

## 2024-01-01 RX ORDER — POTASSIUM CHLORIDE 7.45 MG/ML
10 INJECTION INTRAVENOUS
Status: COMPLETED | OUTPATIENT
Start: 2024-01-01 | End: 2024-01-01

## 2024-01-01 RX ORDER — AMOXICILLIN 250 MG
2 CAPSULE ORAL 2 TIMES DAILY
Status: DISCONTINUED | OUTPATIENT
Start: 2024-01-01 | End: 2024-01-01 | Stop reason: HOSPADM

## 2024-01-01 RX ORDER — LORAZEPAM 2 MG/ML
2 INJECTION INTRAMUSCULAR
Status: DISCONTINUED | OUTPATIENT
Start: 2024-01-01 | End: 2024-01-01 | Stop reason: HOSPADM

## 2024-01-01 RX ORDER — ACETAMINOPHEN 325 MG/1
650 TABLET ORAL EVERY 6 HOURS PRN
Status: DISCONTINUED | OUTPATIENT
Start: 2024-01-01 | End: 2024-01-01

## 2024-01-01 RX ORDER — AMOXICILLIN AND CLAVULANATE POTASSIUM 875; 125 MG/1; MG/1
1 TABLET, FILM COATED ORAL 2 TIMES DAILY
Qty: 2 TABLET | Refills: 0 | Status: ACTIVE | OUTPATIENT
Start: 2024-01-01 | End: 2024-01-01

## 2024-01-01 RX ORDER — HYDROMORPHONE HYDROCHLORIDE 2 MG/ML
2 INJECTION, SOLUTION INTRAMUSCULAR; INTRAVENOUS; SUBCUTANEOUS
Status: DISCONTINUED | OUTPATIENT
Start: 2024-01-01 | End: 2024-01-01 | Stop reason: HOSPADM

## 2024-01-01 RX ORDER — AMOXICILLIN 250 MG
1 CAPSULE ORAL DAILY
Status: DISCONTINUED | OUTPATIENT
Start: 2024-01-01 | End: 2024-01-01

## 2024-01-01 RX ORDER — DEXTROSE AND SODIUM CHLORIDE 5; .45 G/100ML; G/100ML
INJECTION, SOLUTION INTRAVENOUS CONTINUOUS
Status: ACTIVE | OUTPATIENT
Start: 2024-01-01 | End: 2024-01-01

## 2024-01-01 RX ORDER — AMLODIPINE BESYLATE 5 MG/1
5 TABLET ORAL DAILY
Qty: 30 TABLET | Refills: 0 | Status: SHIPPED | OUTPATIENT
Start: 2024-01-01 | End: 2024-01-01

## 2024-01-01 RX ORDER — MIRTAZAPINE 15 MG/1
15 TABLET, FILM COATED ORAL
Status: DISCONTINUED | OUTPATIENT
Start: 2024-01-01 | End: 2024-01-01

## 2024-01-01 RX ORDER — OMEPRAZOLE 20 MG/1
20 CAPSULE, DELAYED RELEASE ORAL 2 TIMES DAILY
Status: DISCONTINUED | OUTPATIENT
Start: 2024-01-01 | End: 2024-01-01

## 2024-01-01 RX ORDER — SODIUM CHLORIDE, SODIUM LACTATE, POTASSIUM CHLORIDE, CALCIUM CHLORIDE 600; 310; 30; 20 MG/100ML; MG/100ML; MG/100ML; MG/100ML
1000 INJECTION, SOLUTION INTRAVENOUS ONCE
Status: COMPLETED | OUTPATIENT
Start: 2024-01-01 | End: 2024-01-01

## 2024-01-01 RX ORDER — ATROPINE SULFATE 10 MG/ML
2 SOLUTION/ DROPS OPHTHALMIC EVERY 4 HOURS PRN
Status: DISCONTINUED | OUTPATIENT
Start: 2024-01-01 | End: 2024-01-01 | Stop reason: HOSPADM

## 2024-01-01 RX ORDER — OMEPRAZOLE 20 MG/1
20 CAPSULE, DELAYED RELEASE ORAL 2 TIMES DAILY
Status: DISCONTINUED | OUTPATIENT
Start: 2024-01-01 | End: 2024-01-01 | Stop reason: HOSPADM

## 2024-01-01 RX ORDER — AZITHROMYCIN 250 MG/1
500 TABLET, FILM COATED ORAL ONCE
Status: DISCONTINUED | OUTPATIENT
Start: 2024-01-01 | End: 2024-01-01

## 2024-01-01 RX ORDER — DOXYCYCLINE 100 MG/1
100 TABLET ORAL EVERY 12 HOURS
Qty: 2 TABLET | Refills: 0 | Status: ACTIVE | OUTPATIENT
Start: 2024-01-01 | End: 2024-01-01

## 2024-01-01 RX ORDER — AMOXICILLIN 250 MG
2 CAPSULE ORAL 2 TIMES DAILY
Status: DISCONTINUED | OUTPATIENT
Start: 2024-01-01 | End: 2024-01-01

## 2024-01-01 RX ORDER — AZITHROMYCIN 250 MG/1
500 TABLET, FILM COATED ORAL DAILY
Status: DISCONTINUED | OUTPATIENT
Start: 2024-01-01 | End: 2024-01-01

## 2024-01-01 RX ORDER — TAMSULOSIN HYDROCHLORIDE 0.4 MG/1
0.4 CAPSULE ORAL
Qty: 90 CAPSULE | Refills: 3 | Status: SHIPPED
Start: 2024-01-01 | End: 2024-01-01

## 2024-01-01 RX ORDER — POLYETHYLENE GLYCOL 3350 17 G/17G
1 POWDER, FOR SOLUTION ORAL
Status: DISCONTINUED | OUTPATIENT
Start: 2024-01-01 | End: 2024-01-01

## 2024-01-01 RX ORDER — ALBUMIN (HUMAN) 12.5 G/50ML
62.5 SOLUTION INTRAVENOUS ONCE
Status: DISCONTINUED | OUTPATIENT
Start: 2024-01-01 | End: 2024-01-01

## 2024-01-01 RX ORDER — IPRATROPIUM BROMIDE AND ALBUTEROL SULFATE 2.5; .5 MG/3ML; MG/3ML
3 SOLUTION RESPIRATORY (INHALATION) ONCE
Status: COMPLETED | OUTPATIENT
Start: 2024-01-01 | End: 2024-01-01

## 2024-01-01 RX ORDER — LORAZEPAM 2 MG/ML
1 CONCENTRATE ORAL
Status: DISCONTINUED | OUTPATIENT
Start: 2024-01-01 | End: 2024-01-01 | Stop reason: HOSPADM

## 2024-01-01 RX ORDER — PANTOPRAZOLE SODIUM 40 MG/10ML
40 INJECTION, POWDER, LYOPHILIZED, FOR SOLUTION INTRAVENOUS 2 TIMES DAILY
Status: DISCONTINUED | OUTPATIENT
Start: 2024-01-01 | End: 2024-01-01 | Stop reason: HOSPADM

## 2024-01-01 RX ORDER — ENOXAPARIN SODIUM 100 MG/ML
30 INJECTION SUBCUTANEOUS DAILY
Status: DISCONTINUED | OUTPATIENT
Start: 2024-01-01 | End: 2024-01-01

## 2024-01-01 RX ORDER — ALBUMIN (HUMAN) 12.5 G/50ML
75 SOLUTION INTRAVENOUS ONCE
Status: COMPLETED | OUTPATIENT
Start: 2024-01-01 | End: 2024-01-01

## 2024-01-01 RX ORDER — SODIUM CHLORIDE 9 MG/ML
1000 INJECTION, SOLUTION INTRAVENOUS ONCE
Status: COMPLETED | OUTPATIENT
Start: 2024-01-01 | End: 2024-01-01

## 2024-01-01 RX ORDER — SODIUM CHLORIDE, SODIUM LACTATE, POTASSIUM CHLORIDE, AND CALCIUM CHLORIDE .6; .31; .03; .02 G/100ML; G/100ML; G/100ML; G/100ML
1000 INJECTION, SOLUTION INTRAVENOUS
Status: DISCONTINUED | OUTPATIENT
Start: 2024-01-01 | End: 2024-01-01

## 2024-01-01 RX ORDER — TAMSULOSIN HYDROCHLORIDE 0.4 MG/1
0.4 CAPSULE ORAL
Qty: 30 CAPSULE | Refills: 0 | Status: SHIPPED | OUTPATIENT
Start: 2024-01-01 | End: 2024-01-01

## 2024-01-01 RX ORDER — AMLODIPINE BESYLATE 5 MG/1
5 TABLET ORAL
Status: DISCONTINUED | OUTPATIENT
Start: 2024-01-01 | End: 2024-01-01 | Stop reason: HOSPADM

## 2024-01-01 RX ORDER — ACETAMINOPHEN 500 MG
1500 TABLET ORAL
COMMUNITY

## 2024-01-01 RX ORDER — TAMSULOSIN HYDROCHLORIDE 0.4 MG/1
0.4 CAPSULE ORAL
Status: DISCONTINUED | OUTPATIENT
Start: 2024-01-01 | End: 2024-01-01 | Stop reason: HOSPADM

## 2024-01-01 RX ORDER — IPRATROPIUM BROMIDE AND ALBUTEROL SULFATE 2.5; .5 MG/3ML; MG/3ML
3 SOLUTION RESPIRATORY (INHALATION)
Status: DISCONTINUED | OUTPATIENT
Start: 2024-01-01 | End: 2024-01-01 | Stop reason: HOSPADM

## 2024-01-01 RX ORDER — OMEPRAZOLE 20 MG/1
20 CAPSULE, DELAYED RELEASE ORAL 2 TIMES DAILY
Qty: 90 CAPSULE | Refills: 3 | Status: SHIPPED
Start: 2024-01-01 | End: 2024-02-27

## 2024-01-01 RX ORDER — HYDROMORPHONE HYDROCHLORIDE 1 MG/ML
1 INJECTION, SOLUTION INTRAMUSCULAR; INTRAVENOUS; SUBCUTANEOUS EVERY 4 HOURS
Status: DISCONTINUED | OUTPATIENT
Start: 2024-01-01 | End: 2024-01-01 | Stop reason: HOSPADM

## 2024-01-01 RX ORDER — DOXYCYCLINE 100 MG/1
100 TABLET ORAL EVERY 12 HOURS
Status: DISCONTINUED | OUTPATIENT
Start: 2024-01-01 | End: 2024-01-01 | Stop reason: HOSPADM

## 2024-01-01 RX ORDER — SODIUM CHLORIDE, SODIUM LACTATE, POTASSIUM CHLORIDE, AND CALCIUM CHLORIDE .6; .31; .03; .02 G/100ML; G/100ML; G/100ML; G/100ML
500 INJECTION, SOLUTION INTRAVENOUS
Status: DISCONTINUED | OUTPATIENT
Start: 2024-01-01 | End: 2024-01-01 | Stop reason: HOSPADM

## 2024-01-01 RX ADMIN — HEPARIN SODIUM 5000 UNITS: 5000 INJECTION, SOLUTION INTRAVENOUS; SUBCUTANEOUS at 05:06

## 2024-01-01 RX ADMIN — DOCUSATE SODIUM 50 MG AND SENNOSIDES 8.6 MG 2 TABLET: 8.6; 5 TABLET, FILM COATED ORAL at 17:33

## 2024-01-01 RX ADMIN — AMPICILLIN SODIUM, SULBACTAM SODIUM 3 G: 2; 1 INJECTION, POWDER, FOR SOLUTION INTRAMUSCULAR; INTRAVENOUS at 00:38

## 2024-01-01 RX ADMIN — POTASSIUM BICARBONATE 25 MEQ: 978 TABLET, EFFERVESCENT ORAL at 10:03

## 2024-01-01 RX ADMIN — LORAZEPAM 2 MG: 2 INJECTION INTRAMUSCULAR; INTRAVENOUS at 13:25

## 2024-01-01 RX ADMIN — LIDOCAINE 1 PATCH: 4 PATCH TOPICAL at 05:06

## 2024-01-01 RX ADMIN — PANTOPRAZOLE SODIUM 40 MG: 40 INJECTION, POWDER, FOR SOLUTION INTRAVENOUS at 05:22

## 2024-01-01 RX ADMIN — AMPICILLIN SODIUM, SULBACTAM SODIUM 3 G: 2; 1 INJECTION, POWDER, FOR SOLUTION INTRAMUSCULAR; INTRAVENOUS at 05:45

## 2024-01-01 RX ADMIN — LORAZEPAM 2 MG: 2 INJECTION INTRAMUSCULAR; INTRAVENOUS at 07:54

## 2024-01-01 RX ADMIN — LORAZEPAM 2 MG: 2 INJECTION INTRAMUSCULAR; INTRAVENOUS at 11:47

## 2024-01-01 RX ADMIN — DOCUSATE SODIUM 50 MG AND SENNOSIDES 8.6 MG 2 TABLET: 8.6; 5 TABLET, FILM COATED ORAL at 18:30

## 2024-01-01 RX ADMIN — OMEPRAZOLE 20 MG: 20 CAPSULE, DELAYED RELEASE ORAL at 17:42

## 2024-01-01 RX ADMIN — PANTOPRAZOLE SODIUM 40 MG: 40 INJECTION, POWDER, FOR SOLUTION INTRAVENOUS at 17:41

## 2024-01-01 RX ADMIN — HEPARIN SODIUM 5000 UNITS: 5000 INJECTION, SOLUTION INTRAVENOUS; SUBCUTANEOUS at 21:11

## 2024-01-01 RX ADMIN — DOXYCYCLINE 100 MG: 100 TABLET, FILM COATED ORAL at 06:13

## 2024-01-01 RX ADMIN — AMPICILLIN SODIUM, SULBACTAM SODIUM 3 G: 2; 1 INJECTION, POWDER, FOR SOLUTION INTRAMUSCULAR; INTRAVENOUS at 05:23

## 2024-01-01 RX ADMIN — DOCUSATE SODIUM 50 MG AND SENNOSIDES 8.6 MG 2 TABLET: 8.6; 5 TABLET, FILM COATED ORAL at 04:49

## 2024-01-01 RX ADMIN — AMLODIPINE BESYLATE 5 MG: 5 TABLET ORAL at 14:31

## 2024-01-01 RX ADMIN — POTASSIUM CHLORIDE 10 MEQ: 7.46 INJECTION, SOLUTION INTRAVENOUS at 09:50

## 2024-01-01 RX ADMIN — POTASSIUM BICARBONATE 25 MEQ: 978 TABLET, EFFERVESCENT ORAL at 05:39

## 2024-01-01 RX ADMIN — AMPICILLIN SODIUM, SULBACTAM SODIUM 3 G: 2; 1 INJECTION, POWDER, FOR SOLUTION INTRAMUSCULAR; INTRAVENOUS at 14:12

## 2024-01-01 RX ADMIN — AMPICILLIN SODIUM, SULBACTAM SODIUM 3 G: 2; 1 INJECTION, POWDER, FOR SOLUTION INTRAMUSCULAR; INTRAVENOUS at 17:43

## 2024-01-01 RX ADMIN — DOXYCYCLINE 100 MG: 100 TABLET, FILM COATED ORAL at 17:39

## 2024-01-01 RX ADMIN — LORAZEPAM 2 MG: 2 INJECTION INTRAMUSCULAR; INTRAVENOUS at 12:10

## 2024-01-01 RX ADMIN — TAMSULOSIN HYDROCHLORIDE 0.4 MG: 0.4 CAPSULE ORAL at 09:30

## 2024-01-01 RX ADMIN — AMPICILLIN SODIUM, SULBACTAM SODIUM 3 G: 2; 1 INJECTION, POWDER, FOR SOLUTION INTRAMUSCULAR; INTRAVENOUS at 23:06

## 2024-01-01 RX ADMIN — LIDOCAINE 1 PATCH: 4 PATCH TOPICAL at 05:10

## 2024-01-01 RX ADMIN — AMPICILLIN SODIUM, SULBACTAM SODIUM 3 G: 2; 1 INJECTION, POWDER, FOR SOLUTION INTRAMUSCULAR; INTRAVENOUS at 17:41

## 2024-01-01 RX ADMIN — AMPICILLIN SODIUM, SULBACTAM SODIUM 3 G: 2; 1 INJECTION, POWDER, FOR SOLUTION INTRAMUSCULAR; INTRAVENOUS at 11:35

## 2024-01-01 RX ADMIN — PANTOPRAZOLE SODIUM 40 MG: 40 INJECTION, POWDER, FOR SOLUTION INTRAVENOUS at 18:22

## 2024-01-01 RX ADMIN — OMEPRAZOLE 20 MG: 20 CAPSULE, DELAYED RELEASE ORAL at 04:24

## 2024-01-01 RX ADMIN — SODIUM CHLORIDE: 9 INJECTION, SOLUTION INTRAVENOUS at 13:35

## 2024-01-01 RX ADMIN — AMPICILLIN AND SULBACTAM 3 G: 1; 2 INJECTION, POWDER, FOR SOLUTION INTRAMUSCULAR; INTRAVENOUS at 17:34

## 2024-01-01 RX ADMIN — MIRTAZAPINE 15 MG: 15 TABLET, FILM COATED ORAL at 20:16

## 2024-01-01 RX ADMIN — HALOPERIDOL LACTATE 5 MG: 5 INJECTION, SOLUTION INTRAMUSCULAR at 01:44

## 2024-01-01 RX ADMIN — AMPICILLIN AND SULBACTAM 3 G: 1; 2 INJECTION, POWDER, FOR SOLUTION INTRAMUSCULAR; INTRAVENOUS at 04:10

## 2024-01-01 RX ADMIN — POTASSIUM CHLORIDE 10 MEQ: 7.46 INJECTION, SOLUTION INTRAVENOUS at 11:51

## 2024-01-01 RX ADMIN — ONDANSETRON 4 MG: 2 INJECTION INTRAMUSCULAR; INTRAVENOUS at 22:00

## 2024-01-01 RX ADMIN — PANTOPRAZOLE SODIUM 40 MG: 40 INJECTION, POWDER, FOR SOLUTION INTRAVENOUS at 18:28

## 2024-01-01 RX ADMIN — AMPICILLIN AND SULBACTAM 3 G: 1; 2 INJECTION, POWDER, FOR SOLUTION INTRAMUSCULAR; INTRAVENOUS at 11:44

## 2024-01-01 RX ADMIN — AMPICILLIN AND SULBACTAM 3 G: 1; 2 INJECTION, POWDER, FOR SOLUTION INTRAMUSCULAR; INTRAVENOUS at 04:23

## 2024-01-01 RX ADMIN — DOCUSATE SODIUM 50 MG AND SENNOSIDES 8.6 MG 2 TABLET: 8.6; 5 TABLET, FILM COATED ORAL at 17:42

## 2024-01-01 RX ADMIN — HYDROMORPHONE HYDROCHLORIDE 1 MG: 1 INJECTION, SOLUTION INTRAMUSCULAR; INTRAVENOUS; SUBCUTANEOUS at 13:24

## 2024-01-01 RX ADMIN — MIRTAZAPINE 15 MG: 15 TABLET, FILM COATED ORAL at 20:33

## 2024-01-01 RX ADMIN — DEXTROSE AND SODIUM CHLORIDE: 5; 450 INJECTION, SOLUTION INTRAVENOUS at 04:47

## 2024-01-01 RX ADMIN — ACETAMINOPHEN 650 MG: 325 TABLET, FILM COATED ORAL at 13:52

## 2024-01-01 RX ADMIN — AMPICILLIN AND SULBACTAM 3 G: 1; 2 INJECTION, POWDER, FOR SOLUTION INTRAMUSCULAR; INTRAVENOUS at 17:40

## 2024-01-01 RX ADMIN — LORAZEPAM 2 MG: 2 INJECTION INTRAMUSCULAR; INTRAVENOUS at 23:00

## 2024-01-01 RX ADMIN — LORAZEPAM 2 MG: 2 INJECTION INTRAMUSCULAR; INTRAVENOUS at 21:36

## 2024-01-01 RX ADMIN — LIDOCAINE 1 PATCH: 4 PATCH TOPICAL at 05:22

## 2024-01-01 RX ADMIN — AMPICILLIN AND SULBACTAM 3 G: 1; 2 INJECTION, POWDER, FOR SOLUTION INTRAMUSCULAR; INTRAVENOUS at 06:14

## 2024-01-01 RX ADMIN — ACETAMINOPHEN 650 MG: 325 TABLET, FILM COATED ORAL at 18:14

## 2024-01-01 RX ADMIN — LIDOCAINE 1 PATCH: 4 PATCH TOPICAL at 06:25

## 2024-01-01 RX ADMIN — AMPICILLIN AND SULBACTAM 3 G: 1; 2 INJECTION, POWDER, FOR SOLUTION INTRAMUSCULAR; INTRAVENOUS at 06:47

## 2024-01-01 RX ADMIN — IPRATROPIUM BROMIDE AND ALBUTEROL SULFATE 3 ML: 2.5; .5 SOLUTION RESPIRATORY (INHALATION) at 22:40

## 2024-01-01 RX ADMIN — AMPICILLIN AND SULBACTAM 3 G: 1; 2 INJECTION, POWDER, FOR SOLUTION INTRAMUSCULAR; INTRAVENOUS at 01:14

## 2024-01-01 RX ADMIN — AMPICILLIN SODIUM, SULBACTAM SODIUM 3 G: 2; 1 INJECTION, POWDER, FOR SOLUTION INTRAMUSCULAR; INTRAVENOUS at 23:34

## 2024-01-01 RX ADMIN — DOXYCYCLINE 100 MG: 100 TABLET, FILM COATED ORAL at 05:07

## 2024-01-01 RX ADMIN — IPRATROPIUM BROMIDE AND ALBUTEROL SULFATE 3 ML: 2.5; .5 SOLUTION RESPIRATORY (INHALATION) at 02:33

## 2024-01-01 RX ADMIN — TAMSULOSIN HYDROCHLORIDE 0.4 MG: 0.4 CAPSULE ORAL at 08:18

## 2024-01-01 RX ADMIN — AMLODIPINE BESYLATE 5 MG: 5 TABLET ORAL at 05:07

## 2024-01-01 RX ADMIN — OMEPRAZOLE 20 MG: 20 CAPSULE, DELAYED RELEASE ORAL at 06:13

## 2024-01-01 RX ADMIN — PANTOPRAZOLE SODIUM 40 MG: 40 INJECTION, POWDER, FOR SOLUTION INTRAVENOUS at 12:01

## 2024-01-01 RX ADMIN — AMLODIPINE BESYLATE 5 MG: 5 TABLET ORAL at 04:49

## 2024-01-01 RX ADMIN — ACETAMINOPHEN 650 MG: 325 TABLET, FILM COATED ORAL at 01:30

## 2024-01-01 RX ADMIN — ACETAMINOPHEN 650 MG: 325 TABLET, FILM COATED ORAL at 20:28

## 2024-01-01 RX ADMIN — OMEPRAZOLE 20 MG: 20 CAPSULE, DELAYED RELEASE ORAL at 17:17

## 2024-01-01 RX ADMIN — AMPICILLIN SODIUM, SULBACTAM SODIUM 3 G: 2; 1 INJECTION, POWDER, FOR SOLUTION INTRAMUSCULAR; INTRAVENOUS at 04:30

## 2024-01-01 RX ADMIN — DOCUSATE SODIUM 50 MG AND SENNOSIDES 8.6 MG 2 TABLET: 8.6; 5 TABLET, FILM COATED ORAL at 17:19

## 2024-01-01 RX ADMIN — OMEPRAZOLE 20 MG: 20 CAPSULE, DELAYED RELEASE ORAL at 18:31

## 2024-01-01 RX ADMIN — POTASSIUM CHLORIDE 40 MEQ: 1500 TABLET, EXTENDED RELEASE ORAL at 08:21

## 2024-01-01 RX ADMIN — LIDOCAINE 1 PATCH: 4 PATCH TOPICAL at 05:40

## 2024-01-01 RX ADMIN — DOCUSATE SODIUM 50 MG AND SENNOSIDES 8.6 MG 2 TABLET: 8.6; 5 TABLET, FILM COATED ORAL at 17:40

## 2024-01-01 RX ADMIN — PANTOPRAZOLE SODIUM 40 MG: 40 INJECTION, POWDER, FOR SOLUTION INTRAVENOUS at 17:34

## 2024-01-01 RX ADMIN — SODIUM CHLORIDE, POTASSIUM CHLORIDE, SODIUM LACTATE AND CALCIUM CHLORIDE 1000 ML: 600; 310; 30; 20 INJECTION, SOLUTION INTRAVENOUS at 11:34

## 2024-01-01 RX ADMIN — AMPICILLIN AND SULBACTAM 3 G: 1; 2 INJECTION, POWDER, FOR SOLUTION INTRAMUSCULAR; INTRAVENOUS at 11:29

## 2024-01-01 RX ADMIN — TAMSULOSIN HYDROCHLORIDE 0.4 MG: 0.4 CAPSULE ORAL at 09:57

## 2024-01-01 RX ADMIN — FUROSEMIDE 40 MG: 10 INJECTION INTRAMUSCULAR; INTRAVENOUS at 02:40

## 2024-01-01 RX ADMIN — HEPARIN 500 UNITS: 100 SYRINGE at 11:04

## 2024-01-01 RX ADMIN — PANTOPRAZOLE SODIUM 40 MG: 40 INJECTION, POWDER, FOR SOLUTION INTRAVENOUS at 17:42

## 2024-01-01 RX ADMIN — SODIUM CHLORIDE: 9 INJECTION, SOLUTION INTRAVENOUS at 06:33

## 2024-01-01 RX ADMIN — TAMSULOSIN HYDROCHLORIDE 0.4 MG: 0.4 CAPSULE ORAL at 09:31

## 2024-01-01 RX ADMIN — HYDROMORPHONE HYDROCHLORIDE 1 MG: 1 INJECTION, SOLUTION INTRAMUSCULAR; INTRAVENOUS; SUBCUTANEOUS at 10:23

## 2024-01-01 RX ADMIN — DEXTROSE AND SODIUM CHLORIDE: 5; 450 INJECTION, SOLUTION INTRAVENOUS at 14:23

## 2024-01-01 RX ADMIN — DOCUSATE SODIUM 50 MG AND SENNOSIDES 8.6 MG 2 TABLET: 8.6; 5 TABLET, FILM COATED ORAL at 17:39

## 2024-01-01 RX ADMIN — ACETAMINOPHEN 650 MG: 325 TABLET, FILM COATED ORAL at 06:15

## 2024-01-01 RX ADMIN — OMEPRAZOLE 20 MG: 20 CAPSULE, DELAYED RELEASE ORAL at 17:33

## 2024-01-01 RX ADMIN — ALBUMIN (HUMAN) 75 G: 12.5 SOLUTION INTRAVENOUS at 10:16

## 2024-01-01 RX ADMIN — DOXYCYCLINE 100 MG: 100 TABLET, FILM COATED ORAL at 17:17

## 2024-01-01 RX ADMIN — HEPARIN SODIUM 5000 UNITS: 5000 INJECTION, SOLUTION INTRAVENOUS; SUBCUTANEOUS at 04:24

## 2024-01-01 RX ADMIN — AMPICILLIN AND SULBACTAM 3 G: 1; 2 INJECTION, POWDER, FOR SOLUTION INTRAMUSCULAR; INTRAVENOUS at 23:41

## 2024-01-01 RX ADMIN — OMEPRAZOLE 20 MG: 20 CAPSULE, DELAYED RELEASE ORAL at 17:39

## 2024-01-01 RX ADMIN — ATENOLOL 25 MG: 25 TABLET ORAL at 05:07

## 2024-01-01 RX ADMIN — AMPICILLIN AND SULBACTAM 3 G: 1; 2 INJECTION, POWDER, FOR SOLUTION INTRAMUSCULAR; INTRAVENOUS at 17:19

## 2024-01-01 RX ADMIN — FUROSEMIDE 40 MG: 10 INJECTION INTRAMUSCULAR; INTRAVENOUS at 10:05

## 2024-01-01 RX ADMIN — POLYETHYLENE GLYCOL 3350 1 PACKET: 17 POWDER, FOR SOLUTION ORAL at 16:18

## 2024-01-01 RX ADMIN — ACETAMINOPHEN 650 MG: 325 TABLET, FILM COATED ORAL at 05:13

## 2024-01-01 RX ADMIN — MIRTAZAPINE 15 MG: 15 TABLET, FILM COATED ORAL at 22:00

## 2024-01-01 RX ADMIN — TAMSULOSIN HYDROCHLORIDE 0.4 MG: 0.4 CAPSULE ORAL at 11:26

## 2024-01-01 RX ADMIN — TAMSULOSIN HYDROCHLORIDE 0.4 MG: 0.4 CAPSULE ORAL at 09:54

## 2024-01-01 RX ADMIN — AMPICILLIN SODIUM, SULBACTAM SODIUM 3 G: 2; 1 INJECTION, POWDER, FOR SOLUTION INTRAMUSCULAR; INTRAVENOUS at 13:46

## 2024-01-01 RX ADMIN — AMPICILLIN SODIUM, SULBACTAM SODIUM 3 G: 2; 1 INJECTION, POWDER, FOR SOLUTION INTRAMUSCULAR; INTRAVENOUS at 00:04

## 2024-01-01 RX ADMIN — PANTOPRAZOLE SODIUM 40 MG: 40 INJECTION, POWDER, FOR SOLUTION INTRAVENOUS at 05:10

## 2024-01-01 RX ADMIN — LIDOCAINE 1 PATCH: 4 PATCH TOPICAL at 05:23

## 2024-01-01 RX ADMIN — AMPICILLIN SODIUM, SULBACTAM SODIUM 3 G: 2; 1 INJECTION, POWDER, FOR SOLUTION INTRAMUSCULAR; INTRAVENOUS at 12:23

## 2024-01-01 RX ADMIN — OMEPRAZOLE 20 MG: 20 CAPSULE, DELAYED RELEASE ORAL at 04:49

## 2024-01-01 RX ADMIN — HUMAN ALBUMIN MICROSPHERES AND PERFLUTREN 3 ML: 10; .22 INJECTION, SOLUTION INTRAVENOUS at 17:15

## 2024-01-01 RX ADMIN — LORAZEPAM 2 MG: 2 INJECTION INTRAMUSCULAR; INTRAVENOUS at 16:19

## 2024-01-01 RX ADMIN — AMPICILLIN SODIUM, SULBACTAM SODIUM 3 G: 2; 1 INJECTION, POWDER, FOR SOLUTION INTRAMUSCULAR; INTRAVENOUS at 18:34

## 2024-01-01 RX ADMIN — AMPICILLIN AND SULBACTAM 3 G: 1; 2 INJECTION, POWDER, FOR SOLUTION INTRAMUSCULAR; INTRAVENOUS at 13:34

## 2024-01-01 RX ADMIN — LIDOCAINE 1 PATCH: 4 PATCH TOPICAL at 05:05

## 2024-01-01 RX ADMIN — POTASSIUM CHLORIDE 20 MEQ: 1500 TABLET, EXTENDED RELEASE ORAL at 12:17

## 2024-01-01 RX ADMIN — LORAZEPAM 2 MG: 2 INJECTION INTRAMUSCULAR; INTRAVENOUS at 10:24

## 2024-01-01 RX ADMIN — DOXYCYCLINE 100 MG: 100 TABLET, FILM COATED ORAL at 04:24

## 2024-01-01 RX ADMIN — AMPICILLIN AND SULBACTAM 3 G: 1; 2 INJECTION, POWDER, FOR SOLUTION INTRAMUSCULAR; INTRAVENOUS at 23:55

## 2024-01-01 RX ADMIN — LORAZEPAM 2 MG: 2 INJECTION INTRAMUSCULAR; INTRAVENOUS at 05:11

## 2024-01-01 RX ADMIN — POTASSIUM BICARBONATE 25 MEQ: 978 TABLET, EFFERVESCENT ORAL at 17:40

## 2024-01-01 RX ADMIN — DOCUSATE SODIUM 50 MG AND SENNOSIDES 8.6 MG 2 TABLET: 8.6; 5 TABLET, FILM COATED ORAL at 17:41

## 2024-01-01 RX ADMIN — PANTOPRAZOLE SODIUM 40 MG: 40 INJECTION, POWDER, FOR SOLUTION INTRAVENOUS at 05:05

## 2024-01-01 RX ADMIN — PANTOPRAZOLE SODIUM 40 MG: 40 INJECTION, POWDER, FOR SOLUTION INTRAVENOUS at 17:19

## 2024-01-01 RX ADMIN — AMPICILLIN SODIUM, SULBACTAM SODIUM 3 G: 2; 1 INJECTION, POWDER, FOR SOLUTION INTRAMUSCULAR; INTRAVENOUS at 18:24

## 2024-01-01 RX ADMIN — DOCUSATE SODIUM 50 MG AND SENNOSIDES 8.6 MG 2 TABLET: 8.6; 5 TABLET, FILM COATED ORAL at 05:06

## 2024-01-01 RX ADMIN — LORAZEPAM 1 MG: 2 LIQUID ORAL at 05:55

## 2024-01-01 RX ADMIN — POTASSIUM CHLORIDE 10 MEQ: 7.46 INJECTION, SOLUTION INTRAVENOUS at 10:50

## 2024-01-01 RX ADMIN — DOCUSATE SODIUM 50 MG AND SENNOSIDES 8.6 MG 2 TABLET: 8.6; 5 TABLET, FILM COATED ORAL at 18:19

## 2024-01-01 RX ADMIN — LORAZEPAM 2 MG: 2 INJECTION INTRAMUSCULAR; INTRAVENOUS at 23:55

## 2024-01-01 RX ADMIN — OMEPRAZOLE 20 MG: 20 CAPSULE, DELAYED RELEASE ORAL at 05:06

## 2024-01-01 RX ADMIN — SODIUM CHLORIDE, POTASSIUM CHLORIDE, SODIUM LACTATE AND CALCIUM CHLORIDE 2994 ML: 600; 310; 30; 20 INJECTION, SOLUTION INTRAVENOUS at 15:00

## 2024-01-01 RX ADMIN — DOXYCYCLINE 100 MG: 100 TABLET, FILM COATED ORAL at 17:41

## 2024-01-01 RX ADMIN — HEPARIN SODIUM 5000 UNITS: 5000 INJECTION, SOLUTION INTRAVENOUS; SUBCUTANEOUS at 13:33

## 2024-01-01 RX ADMIN — DOCUSATE SODIUM 50 MG AND SENNOSIDES 8.6 MG 2 TABLET: 8.6; 5 TABLET, FILM COATED ORAL at 17:34

## 2024-01-01 RX ADMIN — TAMSULOSIN HYDROCHLORIDE 0.4 MG: 0.4 CAPSULE ORAL at 10:03

## 2024-01-01 RX ADMIN — DOCUSATE SODIUM 50 MG AND SENNOSIDES 8.6 MG 1 TABLET: 8.6; 5 TABLET, FILM COATED ORAL at 16:18

## 2024-01-01 RX ADMIN — DOXYCYCLINE 100 MG: 100 TABLET, FILM COATED ORAL at 04:49

## 2024-01-01 RX ADMIN — AMLODIPINE BESYLATE 5 MG: 5 TABLET ORAL at 06:13

## 2024-01-01 RX ADMIN — AMPICILLIN AND SULBACTAM 3 G: 1; 2 INJECTION, POWDER, FOR SOLUTION INTRAMUSCULAR; INTRAVENOUS at 05:14

## 2024-01-01 RX ADMIN — ATENOLOL 25 MG: 25 TABLET ORAL at 06:13

## 2024-01-01 RX ADMIN — PANTOPRAZOLE SODIUM 40 MG: 40 INJECTION, POWDER, FOR SOLUTION INTRAVENOUS at 06:26

## 2024-01-01 RX ADMIN — ANTACID TABLETS 500 MG: 500 TABLET, CHEWABLE ORAL at 10:32

## 2024-01-01 RX ADMIN — DOXYCYCLINE 100 MG: 100 TABLET, FILM COATED ORAL at 16:36

## 2024-01-01 RX ADMIN — LORAZEPAM 2 MG: 2 INJECTION INTRAMUSCULAR; INTRAVENOUS at 10:15

## 2024-01-01 RX ADMIN — OMEPRAZOLE 20 MG: 20 CAPSULE, DELAYED RELEASE ORAL at 05:07

## 2024-01-01 RX ADMIN — AMPICILLIN AND SULBACTAM 3 G: 1; 2 INJECTION, POWDER, FOR SOLUTION INTRAMUSCULAR; INTRAVENOUS at 17:43

## 2024-01-01 RX ADMIN — PANTOPRAZOLE SODIUM 40 MG: 40 INJECTION, POWDER, FOR SOLUTION INTRAVENOUS at 05:39

## 2024-01-01 RX ADMIN — ATENOLOL 25 MG: 25 TABLET ORAL at 08:21

## 2024-01-01 RX ADMIN — AMPICILLIN SODIUM, SULBACTAM SODIUM 3 G: 2; 1 INJECTION, POWDER, FOR SOLUTION INTRAMUSCULAR; INTRAVENOUS at 06:34

## 2024-01-01 RX ADMIN — SODIUM CHLORIDE 1000 ML: 9 INJECTION, SOLUTION INTRAVENOUS at 23:26

## 2024-01-01 ASSESSMENT — ENCOUNTER SYMPTOMS
SHORTNESS OF BREATH: 1
DOUBLE VISION: 0
COUGH: 1
ABDOMINAL PAIN: 0
CLAUDICATION: 0
HEADACHES: 0
PALPITATIONS: 0
CONSTIPATION: 0
VOMITING: 0
NAUSEA: 0
BLURRED VISION: 0
SORE THROAT: 1
SHORTNESS OF BREATH: 1
COUGH: 0
SHORTNESS OF BREATH: 1
VOMITING: 1
VOMITING: 1
ABDOMINAL PAIN: 0
SHORTNESS OF BREATH: 1
HEMOPTYSIS: 0
HEADACHES: 1
SHORTNESS OF BREATH: 0
NAUSEA: 0
ABDOMINAL PAIN: 0
FEVER: 0
HEMOPTYSIS: 0
MEMORY LOSS: 0
DIZZINESS: 0
CHILLS: 0
SORE THROAT: 0
ABDOMINAL PAIN: 1
FEVER: 0
VOMITING: 0
PALPITATIONS: 0
BACK PAIN: 0
SORE THROAT: 0
BLOOD IN STOOL: 0
HEMOPTYSIS: 1
NERVOUS/ANXIOUS: 0
PALPITATIONS: 0
HALLUCINATIONS: 0
PALPITATIONS: 0
HEMOPTYSIS: 0
FALLS: 0
MYALGIAS: 0
FEVER: 0
ABDOMINAL PAIN: 0
CHILLS: 0
BLURRED VISION: 0
FEVER: 0
COUGH: 1
SORE THROAT: 0
LOSS OF CONSCIOUSNESS: 0
BRUISES/BLEEDS EASILY: 0
SORE THROAT: 0
HEADACHES: 0
CHILLS: 0
VOMITING: 0
BACK PAIN: 0
CHILLS: 0
ABDOMINAL PAIN: 0
ABDOMINAL PAIN: 0
HEMOPTYSIS: 0
HEADACHES: 0
NERVOUS/ANXIOUS: 0
DIARRHEA: 0
ABDOMINAL PAIN: 0
SORE THROAT: 1
LOSS OF CONSCIOUSNESS: 0
HALLUCINATIONS: 0
SORE THROAT: 0
FEVER: 0
SORE THROAT: 1
COUGH: 1
COUGH: 1
ABDOMINAL PAIN: 0
COUGH: 1
WEIGHT LOSS: 1
HEMOPTYSIS: 0
BLURRED VISION: 0
DOUBLE VISION: 0
SHORTNESS OF BREATH: 1
SPEECH CHANGE: 0
BLURRED VISION: 0
DIARRHEA: 0
FEVER: 0
SHORTNESS OF BREATH: 0
FEVER: 0
NERVOUS/ANXIOUS: 0
FALLS: 0
NAUSEA: 0
SPEECH CHANGE: 1
ABDOMINAL PAIN: 0
DOUBLE VISION: 0
HEADACHES: 0
SHORTNESS OF BREATH: 0
SPEECH CHANGE: 1
NAUSEA: 0
BACK PAIN: 0
COUGH: 1
VOMITING: 0
DIZZINESS: 0
ORTHOPNEA: 0
SHORTNESS OF BREATH: 1
COUGH: 0
PALPITATIONS: 0
SEIZURES: 0
BACK PAIN: 0
CHILLS: 0
SPEECH CHANGE: 1
HEADACHES: 0
FEVER: 0
HEADACHES: 0
NECK PAIN: 0
LOSS OF CONSCIOUSNESS: 0
COUGH: 0
NAUSEA: 1
NAUSEA: 1
CONSTIPATION: 1
SHORTNESS OF BREATH: 1
HEADACHES: 0
NAUSEA: 0
ROS GI COMMENTS: DYSPHAGIA
COUGH: 1
FALLS: 0
CHILLS: 0
NERVOUS/ANXIOUS: 0
COUGH: 1
DOUBLE VISION: 0
ABDOMINAL PAIN: 0
BLURRED VISION: 0
NAUSEA: 0
WEIGHT LOSS: 0
NERVOUS/ANXIOUS: 0
HEMOPTYSIS: 0
PALPITATIONS: 0
NAUSEA: 0
WEAKNESS: 1
NAUSEA: 0
CONSTIPATION: 1
NERVOUS/ANXIOUS: 0
NAUSEA: 1
SHORTNESS OF BREATH: 1
PHOTOPHOBIA: 0
COUGH: 0
SEIZURES: 0
SEIZURES: 0
NERVOUS/ANXIOUS: 0
BACK PAIN: 0
HEADACHES: 0
DOUBLE VISION: 0
HEADACHES: 0
NAUSEA: 0
MYALGIAS: 0
ABDOMINAL PAIN: 0
SORE THROAT: 1
NAUSEA: 0
VOMITING: 1
COUGH: 0
FEVER: 0
CHILLS: 0
HALLUCINATIONS: 0
BACK PAIN: 0
WEAKNESS: 0
PALPITATIONS: 0

## 2024-01-01 ASSESSMENT — COGNITIVE AND FUNCTIONAL STATUS - GENERAL
MOVING FROM LYING ON BACK TO SITTING ON SIDE OF FLAT BED: A LITTLE
MOVING TO AND FROM BED TO CHAIR: A LITTLE
WALKING IN HOSPITAL ROOM: A LITTLE
DRESSING REGULAR LOWER BODY CLOTHING: A LITTLE
DRESSING REGULAR UPPER BODY CLOTHING: A LITTLE
HELP NEEDED FOR BATHING: A LITTLE
TURNING FROM BACK TO SIDE WHILE IN FLAT BAD: A LITTLE
PERSONAL GROOMING: A LITTLE
MOBILITY SCORE: 24
DAILY ACTIVITIY SCORE: 24
MOBILITY SCORE: 24
EATING MEALS: A LITTLE
SUGGESTED CMS G CODE MODIFIER MOBILITY: CH
PERSONAL GROOMING: A LITTLE
STANDING UP FROM CHAIR USING ARMS: A LITTLE
SUGGESTED CMS G CODE MODIFIER MOBILITY: CK
TOILETING: A LITTLE
HELP NEEDED FOR BATHING: A LITTLE
DAILY ACTIVITIY SCORE: 18
SUGGESTED CMS G CODE MODIFIER DAILY ACTIVITY: CK
SUGGESTED CMS G CODE MODIFIER DAILY ACTIVITY: CH
TOILETING: A LITTLE
MOBILITY SCORE: 18
SUGGESTED CMS G CODE MODIFIER MOBILITY: CH
SUGGESTED CMS G CODE MODIFIER DAILY ACTIVITY: CJ
CLIMB 3 TO 5 STEPS WITH RAILING: A LITTLE
DAILY ACTIVITIY SCORE: 21

## 2024-01-01 ASSESSMENT — LIFESTYLE VARIABLES
HAVE PEOPLE ANNOYED YOU BY CRITICIZING YOUR DRINKING: NO
DOES PATIENT WANT TO STOP DRINKING: NO
EVER FELT BAD OR GUILTY ABOUT YOUR DRINKING: NO
HOW MANY TIMES IN THE PAST YEAR HAVE YOU HAD 5 OR MORE DRINKS IN A DAY: 0
ALCOHOL_USE: NO
EVER HAD A DRINK FIRST THING IN THE MORNING TO STEADY YOUR NERVES TO GET RID OF A HANGOVER: NO
HAVE PEOPLE ANNOYED YOU BY CRITICIZING YOUR DRINKING: NO
DOES PATIENT WANT TO STOP DRINKING: CANNOT ASSESS
EVER HAD A DRINK FIRST THING IN THE MORNING TO STEADY YOUR NERVES TO GET RID OF A HANGOVER: NO
HAVE YOU EVER FELT YOU SHOULD CUT DOWN ON YOUR DRINKING: NO
TOTAL SCORE: 0
TOTAL SCORE: 0
EVER FELT BAD OR GUILTY ABOUT YOUR DRINKING: NO
SUBSTANCE_ABUSE: 0
AVERAGE NUMBER OF DAYS PER WEEK YOU HAVE A DRINK CONTAINING ALCOHOL: 0
ON A TYPICAL DAY WHEN YOU DRINK ALCOHOL HOW MANY DRINKS DO YOU HAVE: 0
ALCOHOL_USE: NO
SUBSTANCE_ABUSE: 0
ON A TYPICAL DAY WHEN YOU DRINK ALCOHOL HOW MANY DRINKS DO YOU HAVE: 0
AVERAGE NUMBER OF DAYS PER WEEK YOU HAVE A DRINK CONTAINING ALCOHOL: 0
CONSUMPTION TOTAL: NEGATIVE
TOTAL SCORE: 0
SUBSTANCE_ABUSE: 0
CONSUMPTION TOTAL: NEGATIVE
HAVE YOU EVER FELT YOU SHOULD CUT DOWN ON YOUR DRINKING: NO
HOW MANY TIMES IN THE PAST YEAR HAVE YOU HAD 5 OR MORE DRINKS IN A DAY: 0
TOTAL SCORE: 0

## 2024-01-01 ASSESSMENT — PAIN DESCRIPTION - PAIN TYPE
TYPE: ACUTE PAIN

## 2024-01-01 ASSESSMENT — HEART SCORE
AGE: 45-64
HEART SCORE: 4
ECG: NON-SPECIFIC REPOLARIZATION DISTURBANCE
HISTORY: MODERATELY SUSPICIOUS
TROPONIN: 1-3 TIMES NORMAL LIMIT
RISK FACTORS: NO KNOWN RISK FACTORS

## 2024-01-01 ASSESSMENT — FIBROSIS 4 INDEX
FIB4 SCORE: 1.06
FIB4 SCORE: 1.05
FIB4 SCORE: 1.07
FIB4 SCORE: .6746353737874408241
FIB4 SCORE: 1.06
FIB4 SCORE: 1.05
FIB4 SCORE: 0.44

## 2024-01-01 ASSESSMENT — COPD QUESTIONNAIRES
DO YOU EVER COUGH UP ANY MUCUS OR PHLEGM?: NO/ONLY WITH OCCASIONAL COLDS OR INFECTIONS
DURING THE PAST 4 WEEKS HOW MUCH DID YOU FEEL SHORT OF BREATH: NONE/LITTLE OF THE TIME
COPD SCREENING SCORE: 2
HAVE YOU SMOKED AT LEAST 100 CIGARETTES IN YOUR ENTIRE LIFE: NO/DON'T KNOW

## 2024-01-01 ASSESSMENT — PATIENT HEALTH QUESTIONNAIRE - PHQ9
1. LITTLE INTEREST OR PLEASURE IN DOING THINGS: NOT AT ALL
1. LITTLE INTEREST OR PLEASURE IN DOING THINGS: NOT AT ALL
SUM OF ALL RESPONSES TO PHQ9 QUESTIONS 1 AND 2: 0
2. FEELING DOWN, DEPRESSED, IRRITABLE, OR HOPELESS: NOT AT ALL
SUM OF ALL RESPONSES TO PHQ9 QUESTIONS 1 AND 2: 0
2. FEELING DOWN, DEPRESSED, IRRITABLE, OR HOPELESS: NOT AT ALL
1. LITTLE INTEREST OR PLEASURE IN DOING THINGS: NOT AT ALL
2. FEELING DOWN, DEPRESSED, IRRITABLE, OR HOPELESS: NOT AT ALL
SUM OF ALL RESPONSES TO PHQ9 QUESTIONS 1 AND 2: 0

## 2024-01-01 ASSESSMENT — GAIT ASSESSMENTS
DEVIATION: NO DEVIATION
GAIT LEVEL OF ASSIST: SUPERVISED
DISTANCE (FEET): 200

## 2024-01-01 ASSESSMENT — ACTIVITIES OF DAILY LIVING (ADL): TOILETING: INDEPENDENT

## 2024-01-13 PROBLEM — R41.82 ALTERED MENTAL STATE: Status: ACTIVE | Noted: 2024-01-01

## 2024-01-13 PROBLEM — Z71.89 ACP (ADVANCE CARE PLANNING): Status: ACTIVE | Noted: 2024-01-01

## 2024-01-13 PROBLEM — J96.00 ACUTE RESPIRATORY FAILURE (HCC): Status: ACTIVE | Noted: 2024-01-01

## 2024-01-13 PROBLEM — N17.9 AKI (ACUTE KIDNEY INJURY) (HCC): Status: ACTIVE | Noted: 2024-01-01

## 2024-01-13 PROBLEM — J18.9 PNEUMONIA: Status: ACTIVE | Noted: 2024-01-01

## 2024-01-13 NOTE — PROGRESS NOTES
4 Eyes Skin Assessment Completed by NADEEN Villalobos and NADEEN Storey.    Head WDL  Ears WDL  Nose WDL  Mouth WDL  Neck WDL  Breast/Chest WDL  Shoulder Blades WDL  Spine WDL  (R) Arm/Elbow/Hand WDL  (L) Arm/Elbow/Hand WDL  Abdomen WDL  Groin WDL  Scrotum/Coccyx/Buttocks WDL  (R) Leg WDL  (L) Leg WDL  (R) Heel/Foot/Toe Callus  (L) Heel/Foot/Toe Callus          Devices In Places Nasal Cannula      Interventions In Place Gray Ear Foams and Pillows    Possible Skin Injury No    Pictures Uploaded Into Epic N/A  Wound Consult Placed N/A  RN Wound Prevention Protocol Ordered No

## 2024-01-13 NOTE — ED PROVIDER NOTES
"  ER Provider Note    Scribed for Malika Sahni M.d. by Theresa Cerda. 1/13/2024  9:14 AM    Primary Care Provider: Reynaldo Robins M.D.    CHIEF COMPLAINT  Chief Complaint   Patient presents with    ALOC     LIMITATION TO HISTORY   Select: Confusion    HPI/ROS  OUTSIDE HISTORIAN(S):  None    EXTERNAL RECORDS REVIEWED  Outpatient Notes radiation oncology note reviewed.  Patient has esophageal adenocarcinoma.  This is all apparently diagnosed in August of this year he had a 30 pound weight loss and severe anemia which was found.  He had an EGD that showed a large mass lesion in his esophagus.    Tommy Mckeon is a 58 y.o. male with a history of esophageal cancer who presents to the ED for an altered level of consciousness onset earlier today. Patient was pulled over for erratic driving by police. He states having an anxiety attack while driving due to being late for work. He repots that he was driving without shoes despite normally doing this. The patients reports feeling normal the day before but \"not feeling like himself today\". Patient was confused about the events leading up to today. The patient was able to state the day and his current location, but was confused on his last cancer treatment stating he was \"last treated at 2 or 3 O clock\". Patient was trying to explain where he works but he was unable to specify where he worked. He denies any pain, nausea, and vomiting. The patient notes eating and drinking normally. Denies any drug or alcohol use proceeding his symptoms today.     PAST MEDICAL HISTORY  Past Medical History:   Diagnosis Date    Bowel habit changes 08/31/2023    constipation    Cancer (HCC)     esophageal    Diabetes (HCC) 08/31/2023    prediabetic per problem list, pt denies    GERD (gastroesophageal reflux disease)     Heart burn     Hiatus hernia syndrome     Hypertension 08/31/2023    no medication    Iron deficiency anemia     per problem list    Obesity (BMI 30-39.9) 04/19/2016 " "      SURGICAL HISTORY  Past Surgical History:   Procedure Laterality Date    CATH PLACEMENT Right 9/1/2023    Procedure: RIGHT CEPHALIC PORT PLACEMENT;  Surgeon: Corby Cagle M.D.;  Location: SURGERY [facility];  Service: General    ID UPPER GI ENDOSCOPY,DIAGNOSIS N/A 08/18/2023    Procedure: GASTROSCOPY;  Surgeon: Kale Patel M.D.;  Location: SURGERY SAME DAY [facility];  Service: Gastroenterology    ID UPPER GI ENDOSCOPY,BIOPSY N/A 08/18/2023    Procedure: GASTROSCOPY, WITH BIOPSY;  Surgeon: Kael Patel M.D.;  Location: SURGERY SAME DAY [facility];  Service: Gastroenterology    EYE SURGERY  01/01/2015    OTHER  2015       FAMILY HISTORY  Family History   Problem Relation Age of Onset    Diabetes Mother     Stroke Mother        SOCIAL HISTORY   reports that he has never smoked. He has been exposed to tobacco smoke. He has never used smokeless tobacco. He reports that he does not currently use alcohol. He reports that he does not use drugs.    CURRENT MEDICATIONS  Previous Medications    CELECOXIB (CELEBREX) 200 MG CAP    Take 1 Capsule by mouth 2 times a day.    MULTIPLE VITAMINS-MINERALS (ONE-A-DAY MENS 50+ ADVANTAGE PO)    Take 1 Tablet by mouth every day.    OMEPRAZOLE (PRILOSEC) 20 MG DELAYED-RELEASE CAPSULE    Take 1 Capsule by mouth 2 times a day.    ONDANSETRON (ZOFRAN ODT) 8 MG TABLET DISPERSIBLE        PROCHLORPERAZINE (COMPAZINE) 10 MG TAB        PROMETHAZINE (PHENERGAN) 25 MG TAB    Take 0.5 Tablets by mouth every 6 hours as needed for Nausea/Vomiting.       ALLERGIES  Patient has no known allergies.    PHYSICAL EXAM  BP (!) 175/86   Pulse (!) 103   Temp 36.8 °C (98.2 °F) (Temporal)   Resp (!) 26   Ht 1.93 m (6' 4\")   Wt 97.5 kg (215 lb)   SpO2 95%   BMI 26.17 kg/m²   Constitutional: Alert in no apparent distress.Seems confused, Conversant oriented of person and place, Seems unclear of events leading up to today  HENT: No signs of trauma, Bilateral external ears normal, Nose " normal.   Eyes: Pupils are equal and reactive, Conjunctiva normal, Non-icteric.   Neck: Normal range of motion, No tenderness, Supple, No stridor.   Cardiovascular: Regular rate and rhythm, no murmurs.   Thorax & Lungs: Normal breath sounds, No respiratory distress, No wheezing, No chest tenderness.   Abdomen: Bowel sounds normal, Soft, No tenderness, No masses, No peritoneal signs.  Skin: Warm, Dry, No erythema, No rash.   Musculoskeletal:  No major deformities noted.   Neurologic: Alert, moving all extremities without difficulty, no focal deficits.    DIAGNOSTIC STUDIES & PROCEDURES    Labs:   Results for orders placed or performed during the hospital encounter of 01/13/24   Lactic acid (lactate)   Result Value Ref Range    Lactic Acid 0.8 0.5 - 2.0 mmol/L   CBC With Differential   Result Value Ref Range    WBC 16.3 (H) 4.8 - 10.8 K/uL    RBC 3.67 (L) 4.70 - 6.10 M/uL    Hemoglobin 10.3 (L) 14.0 - 18.0 g/dL    Hematocrit 32.3 (L) 42.0 - 52.0 %    MCV 88.0 81.4 - 97.8 fL    MCH 28.1 27.0 - 33.0 pg    MCHC 31.9 (L) 32.3 - 36.5 g/dL    RDW 59.9 (H) 35.9 - 50.0 fL    Platelet Count 285 164 - 446 K/uL    MPV 10.1 9.0 - 12.9 fL    Neutrophils-Polys 89.60 (H) 44.00 - 72.00 %    Lymphocytes 3.80 (L) 22.00 - 41.00 %    Monocytes 5.80 0.00 - 13.40 %    Eosinophils 0.10 0.00 - 6.90 %    Basophils 0.20 0.00 - 1.80 %    Immature Granulocytes 0.50 0.00 - 0.90 %    Nucleated RBC 0.00 0.00 - 0.20 /100 WBC    Neutrophils (Absolute) 14.61 (H) 1.82 - 7.42 K/uL    Lymphs (Absolute) 0.62 (L) 1.00 - 4.80 K/uL    Monos (Absolute) 0.94 (H) 0.00 - 0.85 K/uL    Eos (Absolute) 0.01 0.00 - 0.51 K/uL    Baso (Absolute) 0.03 0.00 - 0.12 K/uL    Immature Granulocytes (abs) 0.08 0.00 - 0.11 K/uL    NRBC (Absolute) 0.00 K/uL   Comp Metabolic Panel   Result Value Ref Range    Sodium 145 135 - 145 mmol/L    Potassium 3.2 (L) 3.6 - 5.5 mmol/L    Chloride 107 96 - 112 mmol/L    Co2 21 20 - 33 mmol/L    Anion Gap 17.0 (H) 7.0 - 16.0    Glucose 104 (H)  65 - 99 mg/dL    Bun 56 (H) 8 - 22 mg/dL    Creatinine 3.96 (H) 0.50 - 1.40 mg/dL    Calcium 9.1 8.5 - 10.5 mg/dL    Correct Calcium 9.9 8.5 - 10.5 mg/dL    AST(SGOT) 18 12 - 45 U/L    ALT(SGPT) 13 2 - 50 U/L    Alkaline Phosphatase 50 30 - 99 U/L    Total Bilirubin 0.4 0.1 - 1.5 mg/dL    Albumin 3.0 (L) 3.2 - 4.9 g/dL    Total Protein 6.4 6.0 - 8.2 g/dL    Globulin 3.4 1.9 - 3.5 g/dL    A-G Ratio 0.9 g/dL   AMMONIA   Result Value Ref Range    Ammonia <10 (L) 11 - 45 umol/L   ESTIMATED GFR   Result Value Ref Range    GFR (CKD-EPI) 16 (A) >60 mL/min/1.73 m 2   EKG   Result Value Ref Range    Report       [facility] Emergency Dept.    Test Date:  2024  Pt Name:    MIRELLA PIPER                  Department: ER  MRN:        [id number]                      Room:       [facility]  Gender:     Male                         Technician: TECH  :        1965                   Requested By:ER TRIAGE PROTOCOL  Order #:    [id number]                    Reading MD:    Measurements  Intervals                                Axis  Rate:       99                           P:          52  MN:         154                          QRS:        -52  QRSD:       125                          T:          -7  QT:         392  QTc:        504    Interpretive Statements  Sinus rhythm  Nonspecific IVCD with LAD  Left ventricular hypertrophy  Borderline T abnormalities, anterior leads  No previous ECG available for comparison       All labs reviewed by me.    EKG:   I have independently interpreted this EKG as seen above.    Radiology:   The attending Emergency Physician has independently interpreted the diagnostic imaging associated with this visit and is awaiting the final reading from the radiologist, which will be displayed below.    Preliminary interpretation is a follows: No obvious intracranial bleed or abnormality  Radiologist interpretation:   DX-CHEST-PORTABLE (1 VIEW)   Final Result      1.  Hypoinflation with  probable LEFT lower lung pneumonia and possible associated small pleural fluid collection.   2.  Pulmonary vascular congestion.   3.  No pneumothorax.   4.  RIGHT chest infusion port.      CT-HEAD W/O    (Results Pending)      COURSE & MEDICAL DECISION MAKING    ED Observation Status? No; the patient does not meet the criteria for ED Observation.     9:14 AM - Patient seen and evaluated at bedside. Ordered for CT- Head w/o, DX-Chest, Blood culture, Urine culture, UA, CMP, CBC w/Diff, Lactic acid to evaluate. He understands and agrees to the plan of care. Differential diagnoses include but are not limited to: Electrolyte Abnormalities vs. Anemia vs. Metastatic Brain Lesion.     9:25 AM - Ordered for Ammonia Analysis and a Urine drug screen.     10:48 AM - Patient was reevaluated at bedside. I update him on his kidney function lab work and the plan for hospitalization. He will be medicated with LR Bolus. Patient was allowed to ask questions at this time and agrees to the plan of care.     10:54 AM - I discussed the patient's case and the above findings with Dr. Fierro (Hospitalist) who agreed to hospitalize the patient. Patient's care was transferred at this time.    INITIAL ASSESSMENT AND PLAN  Care Narrative: This is a 58-year-old gentleman who presents altered.  He is alert he is protecting his airway as he just seems slightly confused and it is unclear what the etiology of this is.  Labs are obtained and he has evidence of new onset renal failure of unclear etiology.  His chest x-ray shows some vascular congestion.  He is hypoxic as well.  I would be concern for PE given his underlying malignancy however given his renal function I am unable to do a CTA.  He has no fever or infectious-like symptoms and therefore I did not start antibiotics empirically.  I will defer to the hospitalist service for this.  He does need hospitalization for altered mental status in the setting of acute renal failure.  I spoke with the  hospitalist who is agreeable consult for hospitalization.  Patient be hospitalized in guarded condition.    HYDRATION: Based on the patient's presentation of Acute Kidney Injury the patient was given IV fluids. IV Hydration was used because oral hydration was not adequate alone. Upon recheck following hydration, the patient was improved.    ADDITIONAL PROBLEM LIST AND DISPOSITION  Esophageal cancer               DISPOSITION AND DISCUSSIONS  I have discussed management of the patient with the following physicians and PAIGE's: Dr. Fierro (Hospitalist)    Discussion of management with other HP or appropriate source(s): None      DISPOSITION:  Patient will be hospitalized by Dr. Fierro (Hospitalist) in guarded condition.    FINAL IMPRESSION   1. Altered mental status, unspecified altered mental status type    2. Acute renal failure, unspecified acute renal failure type (HCC)      Malika BLOOM M.D. (Scribe), am scribing for, and in the presence of, Malika Sahni M.D..    Electronically signed by: Theresa Cerda (Scribe), 1/13/2024    IMalika M.D. personally performed the services described in this documentation, as scribed by Malika Sahni M.D. in my presence, and it is both accurate and complete.    The note accurately reflects work and decisions made by me.  Malika Sahni M.D.  1/13/2024  3:53 PM

## 2024-01-13 NOTE — H&P
Hospital Medicine History & Physical Note    Date of Service  1/13/2024    Primary Care Physician  Reynaldo Robins M.D.    Consultants  None    Code Status  DNAR/DNI    Chief Complaint  Chief Complaint   Patient presents with    ALOC       History of Presenting Illness:    58-year-old male with history of esophageal cancer who presented to the hospital on 1/13 for altered mental status.  Patient has some mild dysphagia and confusion, not able to provide good history,  the history was taken from the patient's wife and chart, patient was pulled over for erratic driving by police and trip to the emergency, patient found he does not feel well.  Denied any significant symptoms.  On admission patient was found to have confused, alert to himself and place with confusing with mild dysarthria, patient needed oxygen 2 L, patient also found to have leukocytosis 16 with acute kidney injury 3.9, chest x-ray showed infiltration of the left lung with procalcitonin elevated and normal lactic acid.  Antibiotics Unasyn and azithromycin were started.    Patient has history of esophageal cancer and treated with radiation and chemotherapy, plan of care was discussed in detail with the patient and his wife, answered all the question, discussed the CODE STATUS, the wife and patient wanted to be DNR/DNI.      I discussed the plan of care with patient, family, bedside RN, and ED physician .    Review of Systems  Review of Systems   Constitutional:  Positive for malaise/fatigue. Negative for chills, fever and weight loss.   HENT:  Negative for ear pain, hearing loss and tinnitus.    Eyes:  Negative for blurred vision, double vision and photophobia.   Respiratory:  Positive for cough and shortness of breath. Negative for hemoptysis.    Cardiovascular:  Negative for chest pain, palpitations, orthopnea and claudication.   Gastrointestinal:  Negative for abdominal pain, constipation, diarrhea, nausea and vomiting.   Genitourinary:  Negative for  dysuria, frequency and urgency.   Musculoskeletal:  Negative for myalgias and neck pain.   Skin:  Negative for rash.   Neurological:  Negative for dizziness, speech change and weakness.       Past Medical History   has a past medical history of Bowel habit changes (08/31/2023), Cancer (HCC), Diabetes (HCC) (08/31/2023), GERD (gastroesophageal reflux disease), Heart burn, Hiatus hernia syndrome, Hypertension (08/31/2023), Iron deficiency anemia, Obesity (BMI 30-39.9) (04/19/2016), and Pneumonia (1/13/2024).    Surgical History   has a past surgical history that includes eye surgery (01/01/2015); pr upper gi endoscopy,diagnosis (N/A, 08/18/2023); pr upper gi endoscopy,biopsy (N/A, 08/18/2023); other (2015); and cath placement (Right, 9/1/2023).     Family History  family history includes Diabetes in his mother; Stroke in his mother.   Family history reviewed with patient. There is no family history that is pertinent to the chief complaint.     Social History   reports that he has never smoked. He has been exposed to tobacco smoke. He has never used smokeless tobacco. He reports that he does not currently use alcohol. He reports that he does not use drugs.    Allergies  No Known Allergies    Medications  Prior to Admission Medications   Prescriptions Last Dose Informant Patient Reported? Taking?   Multiple Vitamins-Minerals (ONE-A-DAY MENS 50+ ADVANTAGE PO) 1/12/2024 at PM Patient Yes No   Sig: Take 1 Tablet by mouth every day.   ibuprofen (MOTRIN) 200 MG Tab 1/12/2024 at PRN Patient Yes Yes   Sig: Take 400 mg by mouth 1 time a day as needed for Headache.   omeprazole (PRILOSEC) 20 MG delayed-release capsule UNK at UNK Patient No No   Sig: Take 1 Capsule by mouth 2 times a day.      Facility-Administered Medications: None       Physical Exam  Temp:  [36.8 °C (98.2 °F)-36.9 °C (98.5 °F)] 36.9 °C (98.5 °F)  Pulse:  [] 67  Resp:  [20-26] 20  BP: (136-175)/() 158/99  SpO2:  [92 %-95 %] 93 %  Blood Pressure: (!)  "150/103   Temperature: 36.8 °C (98.2 °F)   Pulse: 74   Respiration: (!) 31   Pulse Oximetry: 95 %       Physical Exam  Constitutional:       General: He is not in acute distress.     Appearance: He is ill-appearing.   Eyes:      General: No scleral icterus.  Cardiovascular:      Rate and Rhythm: Normal rate.      Heart sounds: No murmur heard.  Pulmonary:      Effort: No respiratory distress.      Breath sounds: Rales present. No wheezing.   Abdominal:      General: There is no distension.      Tenderness: There is no abdominal tenderness. There is no right CVA tenderness, left CVA tenderness or guarding.   Musculoskeletal:      Right lower leg: No edema.      Left lower leg: No edema.   Lymphadenopathy:      Cervical: No cervical adenopathy.   Skin:     Coloration: Skin is not jaundiced.      Findings: No bruising, lesion or rash.   Neurological:      General: No focal deficit present.      Mental Status: He is alert. Mental status is at baseline. He is disoriented.      Cranial Nerves: No cranial nerve deficit.      Motor: No weakness.      Gait: Gait normal.         Laboratory:  Recent Labs     01/13/24  0955   WBC 16.3*   RBC 3.67*   HEMOGLOBIN 10.3*   HEMATOCRIT 32.3*   MCV 88.0   MCH 28.1   MCHC 31.9*   RDW 59.9*   PLATELETCT 285   MPV 10.1     Recent Labs     01/13/24  0955   SODIUM 145   POTASSIUM 3.2*   CHLORIDE 107   CO2 21   GLUCOSE 104*   BUN 56*   CREATININE 3.96*   CALCIUM 9.1     Recent Labs     01/13/24  0955   ALTSGPT 13   ASTSGOT 18   ALKPHOSPHAT 50   TBILIRUBIN 0.4   GLUCOSE 104*     Recent Labs     01/13/24  1108   INR 1.24*     No results for input(s): \"NTPROBNP\" in the last 72 hours.      No results for input(s): \"TROPONINT\" in the last 72 hours.    Imaging:  CT-HEAD W/O   Final Result      1.  No acute intracranial abnormality.   2.  Chronic paranasal sinus disease.         DX-CHEST-PORTABLE (1 VIEW)   Final Result      1.  Hypoinflation with probable LEFT lower lung pneumonia and possible " associated small pleural fluid collection.   2.  Pulmonary vascular congestion.   3.  No pneumothorax.   4.  RIGHT chest infusion port.      MR-BRAIN-W/O    (Results Pending)       X-Ray:  I have personally reviewed the images and compared with prior images.  EKG:  I have personally reviewed the images and compared with prior images.    Assessment/Plan:  Justification for Admission Status  I anticipate this patient will require at least two midnights for appropriate medical management, necessitating inpatient admission because acute respiratory failure    Patient will need a Med/Surg bed on MEDICAL service .  The need is secondary to pneumonia.    * Altered mental state- (present on admission)  Assessment & Plan  Patient is alert oriented x 2 with confusing and mild dysarthria  CT scan of her head did not show any acute finding  Possible metabolic encephalopathy  Treating the pneumonia  Will order MRI to rule out metastasis or stroke  Speech eval    LEO (acute kidney injury) (HCC)  Assessment & Plan  Came with creatinine 3.9  And none of the patient is using NSAID at home  Patient has dehydration  IV fluid  Ultrasound and UA  Labs daily and consider nephrology consult if there is worsening on kidney function    Acute respiratory failure (HCC)  Assessment & Plan  Likely related to pneumonia  Patient needed 2 L nasal cannula, his baseline is room air  IV antibiotics  Chest x-ray daily to rule out developing empyema      Pneumonia  Assessment & Plan  Came with hypoxia, leukocytosis and infiltration on chest x-ray  Unasyn and azithromycin  Check COVID test  Oxygen therapy  IV fluid and RT  Speech  Chest x-ray daily to rule out developing empyema    Esophageal dysphagia- (present on admission)  Assessment & Plan  Came with a pneumonia  Speech eval    Malignant neoplasm of lower third of esophagus (HCC)- (present on admission)  Assessment & Plan  Patient treated with chemotherapy and radiation therapy  Follow-up with his  oncologist    Hypertension- (present on admission)  Assessment & Plan  Holding blood pressure medications due to sepsis    ACP (advance care planning)  Assessment & Plan  1/13, long discussion was done with the patient and his wife, answered all their questions, discussed the plan of care, discussed the CODE STATUS, the patient and wife wanted to be DNR/DNI.  Time 30 minutes        VTE prophylaxis: SCDs/TEDs and enoxaparin ppx

## 2024-01-13 NOTE — ED NOTES
Medication history reviewed with PT at bedside    Med rec is complete per PT reporting    Allergies reviewed.     Patient denies any outpatient antibiotics in the last 30 days.     Patient is not taking anticoagulants.    Preferred pharmacy for this visit - [facility] ([phone])

## 2024-01-13 NOTE — THERAPY
Speech Language Pathology   Clinical Swallow Evaluation     Patient Name: Tommy Mckeon  AGE:  58 y.o., SEX:  male  Medical Record #: [id number]  Date of Service: 1/13/2024      History of Present Illness  58 y.o. male who presented on 1/13 with AMS.     PMHx: esophageal cancer s/p chemotherapy and radiation    CT-Head:   1.  No acute intracranial abnormality.  2.  Chronic paranasal sinus disease.    CXR:  1.  Hypoinflation with probable LEFT lower lung pneumonia and possible associated small pleural fluid collection.  2.  Pulmonary vascular congestion.  3.  No pneumothorax.  4.  RIGHT chest infusion port.      General Information:  Vitals  O2 (LPM): 2  O2 Delivery Device: Nasal Cannula  Level of Consciousness: Alert, Awake  Patient Behaviors: Confused  Orientation: Self, General place  Follows Directives: Yes      Prior Living Situation & Level of Function:  Housing / Facility: [address] [place] [address] House  Lives with - Patient's Self Care Capacity: Spouse  Communication: Unable to obtain hx  Swallowing: Unable to obtain hx       Oral Mechanism Evaluation:  Dentition: Poor, Natural dentition   Facial Symmetry: Equal  Labial Observations: WFL   Lingual Observations: Midline  Motor Speech: WFL            Laryngeal Function:  Secretion Management: Adequate  Voice Quality: WFL  Cough: Perceptually WNL         Subjective  RN cleared patient for clinical swallow evaluation. Pt asleep upon arrival, easily roused to verbal cues. Pt confused and easily distracted throughout session; thus unable to obtain thorough history. Pt unable to provide information about baseline diet or history of esophageal dysphagia. Interview questions mostly met with off-topic responses. Pt agreeable to PO trials with SLP.      Assessment  Current Method of Nutrition: NPO until cleared by speech pathology  Positioning: Ontiveros's (60-90 degrees)  Bolus Administration: Patient  O2 (LPM): 2 O2 Delivery Device: Nasal Cannula  Factor(s) Affecting Performance:  "Impaired mental status  Tracheostomy : No        Swallowing Trials:  Swallowing Trials  Thin Liquid (TN0): WFL  Liquidised (LQ3): WFL  Soft & Bite Sized (SB6): WFL  Regular (RG7): WFL      Comments: Patient able to self-feed with appropriate rate and volume of intake. Adequate oral bolus acceptance and containment. Prolonged mastication of regular solids. Improved with soft solids. No oral bolus residue appreciated upon oral inspection. No cough or throat clear noted across all trials. Vocal quality remained clear throughout oral intake. When queried if presence of globus sensation, pt stated \"yes,\" but was unable to provide additional details or point to general area of sensation. One-two swallows completed per bolus.       Clinical Impressions  Patient presents without s/sx of aspiration with oral intake this date. Recommend diet initiation of soft&bite sized solids and thin liquids. Please hold PO with any s/sx of aspiration or decline in respiratory status. Given history of esophageal cancer and dysphagia, pt may benefit from a diagnostic swallow study to objectively assess swallow function pending clinical progress. SLP to follow to ensure tolerance to diet and monitor for changes.       Recommendations  Diet Consistency: Soft&bite sized solids/thin liquids  Instrumentation: Instrumental swallow study pending clinical progress  Medication: As tolerated  Supervision: Direct supervision during meals  Positioning: Fully upright and midline during oral intake, Meals sitting upright in a chair, as tolerated  Risk Management : Small bites/sips, Slow rate of intake, Physical mobility, as tolerated  Oral Care: Q6h         SLP Treatment Plan  Treatment Plan: Dysphagia Treatment  SLP Frequency: 3x Per Week  Estimated Duration: Until Therapy Goals Met      Anticipated Discharge Needs  Discharge Recommendations: Anticipate that the patient will have no further speech therapy needs after discharge from the hospital " "  Therapy Recommendations Upon DC: Dysphagia Training, Patient / Family / Caregiver Education        Patient / Family Goals  Patient / Family Goal #1: \"Can I go home?\"  Short Term Goals  Short Term Goal # 1: Pt will consume a diet of soft&bite sized solids and thin liquids without overt s/sx of aspiration or decline in respiratory status      Carmen Lombardo, SLP   "

## 2024-01-13 NOTE — ED NOTES
Patient power port accessed, + blood return, blood drawn and sent. Flushes with ease.  Patient to CT.

## 2024-01-13 NOTE — CARE PLAN
The patient is Stable - Low risk of patient condition declining or worsening    Shift Goals  Clinical Goals: Patient will remain O2 sats above 90 % this shift and wean O2  Patient Goals: Rest    Progress made toward(s) clinical / shift goals:      Patient is not progressing towards the following goals:      Problem: Respiratory  Goal: Patient will achieve/maintain optimum respiratory ventilation and gas exchange  Description: Target End Date:  Prior to discharge or change in level of care    Document on Assessment flowsheet    1.  Assess and monitor rate, rhythm, depth and effort of respiration  2.  Breath sounds assessed qshift and/or as needed  3.  Assess O2 saturation, administer/titrate oxygen as ordered  4.  Position patient for maximum ventilatory efficiency  5.  Turn, cough, and deep breath with splinting to improve effectiveness  6.  Collaborate with RT to administer medication/treatments per order  7.  Encourage use of incentive spirometer and encourage patient to cough after use and utilize splinting techniques if applicable  8.  Airway suctioning  9.  Monitor sputum production for changes in color, consistency and frequency  10. Perform frequent oral hygiene  11. Alternate physical activity with rest periods  Outcome: Not Progressing  Flowsheets  Taken 1/13/2024 1406 by Cordelia Quinn R.N.  Deep Breathe and Cough: Refuses   Taken 1/13/2024 1201 by Lisa Lundberg  O2 Delivery Device: Nasal Cannula  Note: Patient on 2 L O2 to maintain O2 sats above 90% this shift

## 2024-01-13 NOTE — ED TRIAGE NOTES
BIB EMS to [facility].  Per EMS patient was driving down [address] and was pulled over by PD for erratic driving.  Patient was found altered, stated he was driving to work, patient was not wearing shoes.  Patient denies etoh or drug use.  FSBS 130.  Patient arrives A&Ox2.  Speech clear but confused to questions.  Otherwise no neuro deficits noted. Patient has a hx of Esophageal CA.  States he has competed chemotherapy.  Patient in gown, on monitor, chart up for ERP.

## 2024-01-14 PROBLEM — I61.0 THALAMIC HEMORRHAGE (HCC): Status: ACTIVE | Noted: 2024-01-01

## 2024-01-14 NOTE — CONSULTS
Neurology initial consultation note  Neurohospitalist Service, [facility]    Referring Physician: Leonardo Macias D.O.    STROKE:   Chief Complaint   Patient presents with    [facility]         HPI: Tommy Mckeon is a 58 y.o. right-handed male with past medical history significant for esophageal cancer currently undergoing radiation and chemotherapy, hypertension for which he is not on any medication at this time due to financial burden, diabetes who was admitted yesterday for evaluation of alteration of mental status.  Apparently police pulled him over for erratic driving and found him to be confused and directed him to emergency room.  He underwent a brain CT which reported as no acute intracranial abnormalities, however there is hyperintensity in right thalamus suspicious for small hemorrhage.  His initial evaluation revealed leukocytosis at 16 with acute kidney injury and left lung infiltration concerning for pneumonia.  He was started on Unasyn and azithromycin.  Subsequently he underwent a brain MRI which revealed small acute right thalamic hemorrhage.  Patient's confusion has almost resolved.    Review of systems: In addition to what is detailed in the HPI above, all other systems reviewed and are negative.    Past Medical History:    has a past medical history of Bowel habit changes (08/31/2023), Cancer ([facility]), Diabetes (HCC) (08/31/2023), GERD (gastroesophageal reflux disease), Heart burn, Hiatus hernia syndrome, Hypertension (08/31/2023), Iron deficiency anemia, Obesity (BMI 30-39.9) (04/19/2016), and Pneumonia (1/13/2024).    He has no past medical history of Acute nasopharyngitis, Anesthesia, Anginal syndrome ([facility]), Arrhythmia, Arthritis, Asthma, Blood clotting disorder (HCC), Breath shortness, Bronchitis, Carcinoma in situ of respiratory system, Cataract, Congestive heart failure (HCC), Continuous ambulatory peritoneal dialysis status ([facility]), Coughing blood, Dental disorder,  Dialysis patient (HCC), Disorder of thyroid, Emphysema of lung (HCC), Glaucoma, Gynecological disorder, Heart murmur, Heart valve disease, Hemorrhagic disorder (HCC), Hepatitis A, Hepatitis B, Hepatitis C, High cholesterol, Infectious disease, Jaundice, Myocardial infarct (HCC), Pacemaker, Pain, Pregnant, Psychiatric problem, Renal disorder, Rheumatic fever, Seizure (HCC), Sleep apnea, Snoring, Stroke (HCC), Tuberculosis, Urinary bladder disorder, or Urinary incontinence.    FHx:  family history includes Diabetes in his mother; Stroke in his mother.    SHx:   reports that he has never smoked. He has been exposed to tobacco smoke. He has never used smokeless tobacco. He reports that he does not currently use alcohol. He reports that he does not use drugs.    Allergies:  No Known Allergies    Medications:    Current Facility-Administered Medications:     Respiratory Therapy Consult, , Nebulization, Continuous RT, Leonardo Macias D.O.    LORazepam (Ativan) injection 2 mg, 2 mg, Intravenous, Q5 MIN PRN, Leonardo Macias D.O.    acetaminophen (Tylenol) tablet 650 mg, 650 mg, Oral, Q4HRS PRN **OR** acetaminophen (Tylenol) suppository 650 mg, 650 mg, Rectal, Q4HRS PRN, Leonardo Macias D.O.    ondansetron (Zofran ODT) dispertab 4 mg, 4 mg, Oral, Q4HRS PRN **OR** ondansetron (Zofran) syringe/vial injection 4 mg, 4 mg, Intravenous, Q4HRS PRN, MAXIMO GarnicaO.    omeprazole (PriLOSEC) capsule 20 mg, 20 mg, Oral, BID, Peter Fierro M.D., 20 mg at 01/14/24 0424    senna-docusate (Pericolace Or Senokot S) 8.6-50 MG per tablet 2 Tablet, 2 Tablet, Oral, BID, 2 Tablet at 01/13/24 7013 **AND** polyethylene glycol/lytes (Miralax) Packet 1 Packet, 1 Packet, Oral, QDAY PRN **AND** magnesium hydroxide (Milk Of Magnesia) suspension 30 mL, 30 mL, Oral, QDAY PRN **AND** bisacodyl (Dulcolax) suppository 10 mg, 10 mg, Rectal, QDAY PRN, Peter Fierro M.D.    LR (Bolus) infusion 500 mL, 500 mL, Intravenous, Once PRN, Peter  SHIRA Fierro    NS infusion, , Intravenous, Continuous, Peter Fierro M.D., Last Rate: 83 mL/hr at 01/14/24 0633, New Bag at 01/14/24 0633    ampicillin/sulbactam (Unasyn) 3 g in  mL IVPB, 3 g, Intravenous, Q12HRS, Peter Fierro M.D., Stopped at 01/14/24 0453    doxycycline monohydrate (Adoxa) tablet 100 mg, 100 mg, Oral, Q12HRS, Peter Fierro M.D., 100 mg at 01/14/24 0424    Physical Examination:    Vitals:    01/13/24 1531 01/13/24 1940 01/14/24 0350 01/14/24 0812   BP: (!) 123/92 (!) 157/102 (!) 152/75 (!) 148/101   Pulse: 100 100 96 81   Resp: 20 20 20 18   Temp: 36.9 °C (98.5 °F) 36.8 °C (98.3 °F) 36.7 °C (98 °F) 36.8 °C (98.2 °F)   TempSrc: Temporal Temporal Temporal Temporal   SpO2: 92% 93% 92% 96%   Weight:       Height:           General:   Patient is awake and in no acute distress  Neck: Full range of motion  Eyes: Midline, Pupils reactive to light.  CV: RRR  Lungs: No respiratory distress  Extremities: No cyanosis, warm, no significant edema.    NEUROLOGICAL EXAM:   Mental status: Awake, alert and fully oriented, follows commands  Speech and language: speech is fluent and not dysarthric. The patient is able to name and repeat.  Cranial nerve exam: Pupils are equal, round and reactive to light bilaterally. Visual fields are full. Extraocular muscles are intact. Sensation in the face is intact to light touch. Face is symmetric. Hearing to finger rub equal. Palate elevates symmetrically. Shoulder shrug is full. Tongue is midline.  Motor exam: Sustain antigravity in all 4 extremities with no downward drift. Tone is normal. No abnormal movements were seen on exam.  Sensory exam: No sensory deficits identified   Coordination: no gross ataxia noted on exam  Plantar reflexes: Equivocal  Gait: deferred given patient preference    NIH Stroke Scale:    1a. Level of Consciousness (Alert, drowsy, etc): 0= Alert    1b. LOC Questions (Month, age): 0= Answers both correctly    1c. LOC Commands  (Open/close eyes make fist/let go): 0= Obeys both correctly    2.   Best Gaze (Eyes open - patient follows examiner's finger on face): 0= Normal    3.   Visual Fields (introduce visual stimulus/threat to patient's field quadrants): 0= No visual loss  4.   Facial Paresis (Show teeth, raise eyebrows and squeeze eyes shut): 0= Normal     5a. Motor Arm - Left (Elevate arm to 90 degrees if patient is sitting, 45 degrees if  supine): 0= No drift    5b. Motor Arm - Right (Elevate arm to 90 degrees if patient is sitting, 45 degrees if supine): 0= No drift    6a. Motor Leg - Left (Elevate leg 30 degrees with patient supine): 0= No drift    6b. Motor Leg - Right  (Elevate leg 30 degrees with patient supine): 0= No drift    7.   Limb Ataxia (Finger-nose, heel down shin): 0= No ataxia    8.   Sensory (Pin prick to face, arm, trunk and leg - compare side to side): 0= Normal    9.  Best Language (Name item, describe a picture and read sentences): 0= No aphasia    10. Dysarthria (Evaluate speech clarity by patient repeating listed words): 0= Normal articulation    11. Extinction and Inattention (Use information from prior testing to identify neglect or  double simultaneous stimuli testing): 0= No neglect    Total NIH Score: 0    Baseline modified Gentry Scale (MRS): 0 = No symptoms    INTRACEREBRAL HEMORRHAGE SCORE:    ICH SCORE:  [place] Coma Score:  14  Age:  58  ICH Volume (using ABC/2 Formula) greater than 30cc =no= 0   Intraventricular Hemorrhage:  No  Infratentorial Origin of Hemorrhage:  No  ICH TOTAL SCORE:   0    Objective Data:    Labs:  Lab Results   Component Value Date/Time    PROTHROMBTM 15.8 (H) 01/13/2024 11:08 AM    INR 1.24 (H) 01/13/2024 11:08 AM      Lab Results   Component Value Date/Time    WBC 11.5 (H) 01/14/2024 04:19 AM    RBC 3.52 (L) 01/14/2024 04:19 AM    HEMOGLOBIN 10.0 (L) 01/14/2024 04:19 AM    HEMATOCRIT 30.3 (L) 01/14/2024 04:19 AM    MCV 86.1 01/14/2024 04:19 AM    MCH 28.4 01/14/2024 04:19 AM     MCHC 33.0 01/14/2024 04:19 AM    MPV 10.3 01/14/2024 04:19 AM    NEUTSPOLYS 81.70 (H) 01/14/2024 04:19 AM    LYMPHOCYTES 9.00 (L) 01/14/2024 04:19 AM    MONOCYTES 8.10 01/14/2024 04:19 AM    EOSINOPHILS 0.40 01/14/2024 04:19 AM    BASOPHILS 0.40 01/14/2024 04:19 AM    HYPOCHROMIA 1+ 10/12/2023 09:23 AM    ANISOCYTOSIS 1+ 10/12/2023 09:23 AM      Lab Results   Component Value Date/Time    SODIUM 147 (H) 01/14/2024 04:19 AM    POTASSIUM 3.3 (L) 01/14/2024 04:19 AM    CHLORIDE 109 01/14/2024 04:19 AM    CO2 20 01/14/2024 04:19 AM    GLUCOSE 92 01/14/2024 04:19 AM    BUN 47 (H) 01/14/2024 04:19 AM    CREATININE 3.01 (H) 01/14/2024 04:19 AM      Lab Results   Component Value Date/Time    CHOLSTRLTOT 183 04/19/2021 07:04 AM     (H) 04/19/2021 07:04 AM    HDL 49 04/19/2021 07:04 AM    TRIGLYCERIDE 104 04/19/2021 07:04 AM       Lab Results   Component Value Date/Time    ALKPHOSPHAT 50 01/13/2024 09:55 AM    ASTSGOT 18 01/13/2024 09:55 AM    ALTSGPT 13 01/13/2024 09:55 AM    TBILIRUBIN 0.4 01/13/2024 09:55 AM        Imaging/Testing:    I interpreted and/or reviewed the patient's neuroimaging    MR-BRAIN-W/O   Final Result      1.  Subcentimeter hemorrhagic lesion in the RIGHT thalamus and adjacent cerebral peduncle. Appearance favors bland/hypertensive hemorrhage rather than hemorrhagic mass though consideration should be given to MRI with contrast for completion of imaging    evaluation in this patient with history of esophageal cancer.   2.  Mild atrophy      Findings were discussed with Dr. Macias on 1/14/2024 8:39 AM.      US-RENAL   Final Result      1.  Mild bilateral hydronephrosis.   2.  Prostatomegaly.   3.  Incidental note of ascites.      CT-HEAD W/O   Final Result      1.  No acute intracranial abnormality.   2.  Chronic paranasal sinus disease.         DX-CHEST-PORTABLE (1 VIEW)   Final Result      1.  Hypoinflation with probable LEFT lower lung pneumonia and possible associated small pleural fluid  collection.   2.  Pulmonary vascular congestion.   3.  No pneumothorax.   4.  RIGHT chest infusion port.          Assessment:  Tommy Mckeon is a 58 y.o. right-handed male with past medical history significant for esophageal cancer currently undergoing radiation and chemotherapy, poorly controlled hypertension, diabetes who was admitted yesterday for evaluation of alteration of mental status.  He underwent a brain MRI which revealed small acute right thalamic hemorrhage, of note this was also noted on initial head CT but was not reported.  His initial evaluation revealed leukocytosis at 16 with acute kidney injury and left lung infiltration concerning for pneumonia.  He was started on Unasyn and azithromycin.  Patient's confusion has almost resolved.  Etiology likely hypertensive thalamic hemorrhage, however given history of esophageal cancer metastasis should be ruled out.    Plan:  -q4h and PRN neuro assessment. VS per nursing/unit protocol.   -No known underlying coagulopathy, maintain INR < 1.5, platelets > 100 K  -Maintain systolic blood pressure less than 140, antihypertensives per primary team.    -Obtain MRI Brain w/wo contrast to rule out underlying lesion.  -No antiplatelet or anticoagulant.  -Keep head of bed elevated at 30 degrees.  -Maintain bowel regimen to avoid Valsalva and increase intracranial pressure.  -Recommend aggressive BG management per primary team. Avoid IVF with Dextrose. -BG goal .   -Maintain normothermia, eunatremia and normoglycemia.  -If no seizure, no need for prophylactic antiepileptic medication from neurology perspective.  -PT/OT/SLP eval and treat for early mobilization if blood pressure remains stable.  -All other medical management per primary team.   -DVT PPX: SCDs.   -No further recommendation from neurological standpoint, neurology will sign off, please call us if there is any question, discussed with Leonardo Macias D.O.    I personally spent a total of 52  minutes face-to-face and non-face-to-face time reviewing medical record, hospital notes, lab works, neuroimaging, discussing with the patient and documenting in the EMR.      Please note that this dictation was created using voice recognition software. I have made every reasonable attempt to correct obvious errors, but I expect that there are errors of grammar and possibly content that I did not discover before finalizing the note.       Marsha Donovan MD  [facility]

## 2024-01-14 NOTE — CARE PLAN
The patient is Stable - Low risk of patient condition declining or worsening    Shift Goals  Clinical Goals: patient remains O2 above 90%  Patient Goals: rest    Progress made toward(s) clinical / shift goals:    Problem: Knowledge Deficit - Standard  Goal: Patient and family/care givers will demonstrate understanding of plan of care, disease process/condition, diagnostic tests and medications  Outcome: Progressing  Note: Tommy neuro status was monitored and morning labs drawn to continue with plan of care this shift.      Problem: Hemodynamics  Goal: Patient's hemodynamics, fluid balance and neurologic status will be stable or improve  Outcome: Progressing  Note: Tommy neuro status was monitored this shift. He stayed confused this shift.      Problem: Urinary - Renal Perfusion  Goal: Ability to achieve and maintain adequate renal perfusion and functioning will improve  Outcome: Progressing  Note: Tommy was able to urinate this shift.

## 2024-01-14 NOTE — PROGRESS NOTES
[facility] Medicine Daily Progress Note    Date of Service  1/14/2024    Chief Complaint  Tommy Mckeon is a 58 y.o. male admitted 1/13/2024 with altered mentation    Hospital Course  No notes on file    Interval Problem Update  Patient was seen and examined at bedside.  No acute events overnight. Patient is resting comfortably in bed and in no acute distress.  Patient wife updated at bedside    MRI brain w/wo ordered  Maintain blood pressure less than 140  Every 4 neurochecks  IV antibiotics for pneumonia  Renal ultrasound with mild bilateral hydronephrosis, Prostatomegaly   Cr 3.96 --> 3.01  Place andrade    I have discussed this patient's plan of care and discharge plan at IDT rounds today with Case Management, Nursing, Nursing leadership, and other members of the IDT team.    Consultants/Specialty  neurology    Code Status  DNAR/DNI    Disposition  The patient is not medically cleared for discharge to home or a post-acute facility.      I have placed the appropriate orders for post-discharge needs.    Review of Systems  Review of Systems   Constitutional:  Negative for chills and fever.   HENT:  Negative for congestion and sore throat.    Eyes:  Negative for blurred vision and double vision.   Respiratory:  Negative for cough and shortness of breath.    Cardiovascular:  Negative for chest pain and palpitations.   Gastrointestinal:  Negative for abdominal pain, nausea and vomiting.   Genitourinary:  Negative for dysuria and frequency.   Musculoskeletal:  Negative for back pain and falls.   Skin:  Negative for rash.   Neurological:  Positive for speech change. Negative for seizures, loss of consciousness and headaches.   Psychiatric/Behavioral:  Negative for hallucinations and substance abuse.         Physical Exam  Temp:  [36.7 °C (98 °F)-36.9 °C (98.5 °F)] 36.8 °C (98.2 °F)  Pulse:  [] 81  Resp:  [18-20] 18  BP: (123-157)/() 148/101  SpO2:  [92 %-96 %] 96 %    Physical Exam  Vitals and nursing note  reviewed.   Constitutional:       General: He is not in acute distress.     Appearance: He is not toxic-appearing.   HENT:      Head: Normocephalic.      Right Ear: External ear normal.      Left Ear: External ear normal.      Nose: No congestion.      Mouth/Throat:      Pharynx: No oropharyngeal exudate.   Eyes:      General: No scleral icterus.  Cardiovascular:      Rate and Rhythm: Normal rate and regular rhythm.      Heart sounds: No murmur heard.  Pulmonary:      Breath sounds: No wheezing.   Abdominal:      Palpations: Abdomen is soft.      Tenderness: There is no abdominal tenderness. There is no guarding or rebound.   Musculoskeletal:         General: No swelling or deformity.   Skin:     Coloration: Skin is not jaundiced.      Findings: No bruising.   Neurological:      Mental Status: He is alert.      Motor: No weakness.      Comments: Patient sustains gravity in all extremities  No nystagmus  No dysdiadochokinesia  Patient does have some recent memory deficits   Psychiatric:         Mood and Affect: Mood normal.         Behavior: Behavior normal.         Fluids    Intake/Output Summary (Last 24 hours) at 1/14/2024 1408  Last data filed at 1/14/2024 1047  Gross per 24 hour   Intake 240 ml   Output 1150 ml   Net -910 ml       Laboratory  Recent Labs     01/13/24  0955 01/14/24  0419   WBC 16.3* 11.5*   RBC 3.67* 3.52*   HEMOGLOBIN 10.3* 10.0*   HEMATOCRIT 32.3* 30.3*   MCV 88.0 86.1   MCH 28.1 28.4   MCHC 31.9* 33.0   RDW 59.9* 58.8*   PLATELETCT 285 298   MPV 10.1 10.3     Recent Labs     01/13/24  0955 01/14/24  0419   SODIUM 145 147*   POTASSIUM 3.2* 3.3*   CHLORIDE 107 109   CO2 21 20   GLUCOSE 104* 92   BUN 56* 47*   CREATININE 3.96* 3.01*   CALCIUM 9.1 9.1     Recent Labs     01/13/24  1108   INR 1.24*               Imaging  MR-BRAIN-W/O   Final Result      1.  Subcentimeter hemorrhagic lesion in the RIGHT thalamus and adjacent cerebral peduncle. Appearance favors bland/hypertensive hemorrhage rather  than hemorrhagic mass though consideration should be given to MRI with contrast for completion of imaging    evaluation in this patient with history of esophageal cancer.   2.  Mild atrophy      Findings were discussed with Dr. Macias on 1/14/2024 8:39 AM.      US-RENAL   Final Result      1.  Mild bilateral hydronephrosis.   2.  Prostatomegaly.   3.  Incidental note of ascites.      CT-HEAD W/O   Final Result      1.  No acute intracranial abnormality.   2.  Chronic paranasal sinus disease.         DX-CHEST-PORTABLE (1 VIEW)   Final Result      1.  Hypoinflation with probable LEFT lower lung pneumonia and possible associated small pleural fluid collection.   2.  Pulmonary vascular congestion.   3.  No pneumothorax.   4.  RIGHT chest infusion port.      MR-BRAIN-WITH & W/O    (Results Pending)        Assessment/Plan  * Thalamic hemorrhage (HCC)  Assessment & Plan  As demonstrated on MRI brain  Consult placed to neurology, discussed with Dr. Donovan in person  Maintain systolic blood pressure less than 140  No anticoagulation, no antiplatelet  Every 4 neurochecks    LEO (acute kidney injury) (HCC)  Assessment & Plan  Renal ultrasound with mild bilateral hydronephrosis, Prostatomegaly   Cr 3.96 --> 3.01  Place Jules  IV fluids  Monitor    Pneumonia  Assessment & Plan  Came with hypoxia, leukocytosis and infiltration on chest x-ray  Unasyn and azithromycin  Check COVID test    Esophageal dysphagia- (present on admission)  Assessment & Plan  Came with a pneumonia  Speech eval    Acute respiratory failure (HCC)  Assessment & Plan  Likely related to pneumonia  Patient needed 2 L nasal cannula, his baseline is room air  IV antibiotics  Chest x-ray daily to rule out developing empyema      ACP (advance care planning)  Assessment & Plan  Patient is DNR/DNI as determined by my colleague on admission    Altered mental state- (present on admission)  Assessment & Plan  Patient is alert oriented x 2 with confusing and mild  dysarthria  CT scan of her head did not show any acute finding  Possible metabolic encephalopathy  Treating the pneumonia  Will order MRI to rule out metastasis or stroke  Speech eval    Malignant neoplasm of lower third of esophagus (HCC)- (present on admission)  Assessment & Plan  Patient treated with chemotherapy and radiation therapy  Follow-up with his oncologist    Hypertension- (present on admission)  Assessment & Plan  I will start Norvasc for blood pressure control  ACE/ARB not viable option given his LEO         VTE prophylaxis:   SCDs/TEDs      I have performed a physical exam and reviewed and updated ROS and Plan today (1/14/2024). In review of yesterday's note (1/13/2024), there are no changes except as documented above.    Greater than 51 minutes spent prepping to see patient (e.g. review of tests) obtaining and/or reviewing separately obtained history. Performing a medically appropriate examination and/ evaluation.  Counseling and educating the patient/family/caregiver.  Ordering medications, tests, or procedures.  Referring and communicating with other health care professionals.  Documenting clinical information in EPIC.  Independently interpreting results and communicating results to patient/family/caregiver.  Care coordination.

## 2024-01-14 NOTE — ASSESSMENT & PLAN NOTE
As demonstrated on MRI brain  Consult placed to neurology, discussed with Dr. Donovan in person  Maintain systolic blood pressure less than 140  I have added norvasc and atenolol for improved blood pressure control. ACE/ARB/thiazide not appropriate options given LEO  No anticoagulation, no antiplatelet  Every 4 neurochecks  Await repeat MRI with contrast to rule out mass  
Came with a pneumonia  Speech eval  
Came with hypoxia, leukocytosis and infiltration on chest x-ray  Flu/covid/rsv negative  Continue IV unasyn, azithromycin  
Likely related to pneumonia  Patient needed 2 L nasal cannula, his baseline is room air  IV antibiotics  resolved  
Norvasc  I have added atenolol for improved blood pressure control  ACE/ARB not viable option given his LEO  
Patient is DNR/DNI as determined by my colleague on admission  
Patient is alert oriented x 2 with confusing and mild dysarthria  CT scan of her head did not show any acute finding  Possible metabolic encephalopathy  Treating the pneumonia\  MRI brain shows small right thalamic hemorrhage  Speech eval  resolved  
Patient treated with chemotherapy and radiation therapy  Follow-up with his oncologist  
Renal ultrasound with mild bilateral hydronephrosis, Prostatomegaly   Cr 3.96 --> 2.90  Jules in place  IV fluids  Monitor  Stable creatinine at 3  
Yes

## 2024-01-14 NOTE — CARE PLAN
The patient is Watcher - Medium risk of patient condition declining or worsening      Problem: Urinary - Renal Perfusion  Goal: Ability to achieve and maintain adequate renal perfusion and functioning will improve  1/14/2024 1537 by CATARINO Wolf.N.  Outcome: Progressing  1/14/2024 1534 by ROYAL WolfN.  Outcome: Progressing     Problem: Fall Risk  Goal: Patient will remain free from falls  1/14/2024 1537 by ROYAL WolfN.  Outcome: Progressing  1/14/2024 1534 by ROYAL WolfN.  Outcome: Progressing     Shift Goals  Clinical Goals: Safety, tele sitter at bedside  Patient Goals: Rest and comfort    Progress made toward(s) clinical / shift goals:      Pt alert and oriented x 1-4 during shift. Tele sitter at bedside. Neuro check Q4. MD ordered neuro consult, scheduled for MRI w/contrast, MRI screening form done. MD also ordered PT/OT eval. No complaints of pain. On continuous D5 1/2 NS 75 ml/hr. Jules catheter inserted per MD order d/t hydronephrosis and prostatomegaly. Reinforced education on fall precaution and to use call light for help, pt verbalized understanding.

## 2024-01-14 NOTE — PROGRESS NOTES
Notified MD that Lactic over due lab draw and asked if still wants lactic lab to be collected and MD stated no need for repeat.    Lactic at 0955 was 0.8    Patient Confused in conversation and falling asleep. Tele sitter in place for impulsivity and pulling of lines.

## 2024-01-15 NOTE — HOSPITAL COURSE
58-year-old male with history of esophageal cancer who presented to the hospital on 1/13 for altered mental status. Patient has some mild dysphagia and confusion. Found to have possible pneumonia on CXR and started on IV antibiotics. MRI brain shows small thalamic hemorrhage, neuro was consulted. Await MRI brain with contrast to further rule out mass. LEO with hydronephrosis and enlarged prostate. Jules in place.

## 2024-01-15 NOTE — PROGRESS NOTES
[facility] Medicine Daily Progress Note    Date of Service  1/15/2024    Chief Complaint  Tommy Mckeon is a 58 y.o. male admitted 1/13/2024 with altered mentation    Hospital Course  58-year-old male with history of esophageal cancer who presented to the hospital on 1/13 for altered mental status. Patient has some mild dysphagia and confusion. Found to have possible pneumonia on CXR and started on IV antibiotics. MRI brain shows small thalamic hemorrhage, neuro was consulted. Await MRI brain with contrast to further rule out mass. LEO with hydronephrosis and enlarged prostate. Jules in place.     Interval Problem Update  Patient was seen and examined at bedside.  No acute events overnight. Patient is resting comfortably in bed and in no acute distress. Patient wife updated at bedside.     MRI brain w/wo ordered  Maintain blood pressure less than 140  Every 4 neurochecks  IV antibiotics for pneumonia  Renal ultrasound with mild bilateral hydronephrosis, Prostatomegaly   Cr 3.96 --> 3.01 --> 2.90  Jules in place  I have added atenolol for improved blood pressure control    I have discussed this patient's plan of care and discharge plan at IDT rounds today with Case Management, Nursing, Nursing leadership, and other members of the IDT team.    Consultants/Specialty  neurology    Code Status  DNAR/DNI    Disposition  The patient is not medically cleared for discharge to home or a post-acute facility.      I have placed the appropriate orders for post-discharge needs.    Review of Systems  Review of Systems   Constitutional:  Negative for chills and fever.   HENT:  Negative for congestion and sore throat.    Eyes:  Negative for blurred vision and double vision.   Respiratory:  Negative for cough and shortness of breath.    Cardiovascular:  Negative for chest pain and palpitations.   Gastrointestinal:  Negative for abdominal pain, nausea and vomiting.   Genitourinary:  Negative for dysuria and frequency.    Musculoskeletal:  Negative for back pain and falls.   Skin:  Negative for rash.   Neurological:  Positive for speech change. Negative for seizures, loss of consciousness and headaches.   Psychiatric/Behavioral:  Negative for hallucinations and substance abuse.         Physical Exam  Temp:  [36.5 °C (97.7 °F)-37.3 °C (99.2 °F)] 36.9 °C (98.5 °F)  Pulse:  [79-98] 90  Resp:  [16-18] 16  BP: (142-159)/() 144/88  SpO2:  [90 %-93 %] 91 %    Physical Exam  Vitals and nursing note reviewed.   Constitutional:       General: He is not in acute distress.     Appearance: He is not toxic-appearing.   HENT:      Head: Normocephalic.      Right Ear: External ear normal.      Left Ear: External ear normal.      Nose: No congestion.      Mouth/Throat:      Pharynx: No oropharyngeal exudate.   Eyes:      General: No scleral icterus.  Cardiovascular:      Rate and Rhythm: Normal rate and regular rhythm.      Heart sounds: No murmur heard.  Pulmonary:      Breath sounds: No wheezing.   Abdominal:      Palpations: Abdomen is soft.      Tenderness: There is no abdominal tenderness. There is no guarding or rebound.   Musculoskeletal:         General: No swelling or deformity.   Skin:     Coloration: Skin is not jaundiced.      Findings: No bruising.   Neurological:      Mental Status: He is alert.      Motor: No weakness.      Comments: Patient sustains gravity in all extremities  No nystagmus  No dysdiadochokinesia  Patient does have some recent memory deficits   Psychiatric:         Mood and Affect: Mood normal.         Behavior: Behavior normal.     Patient was seen and examined at bedside. No changes in physical exam from prior evaluation.      Fluids    Intake/Output Summary (Last 24 hours) at 1/15/2024 1053  Last data filed at 1/15/2024 0354  Gross per 24 hour   Intake 520 ml   Output 2125 ml   Net -1605 ml       Laboratory  Recent Labs     01/13/24  0955 01/14/24  0419 01/15/24  0435   WBC 16.3* 11.5* 8.4   RBC 3.67* 3.52*  3.41*   HEMOGLOBIN 10.3* 10.0* 9.6*   HEMATOCRIT 32.3* 30.3* 29.6*   MCV 88.0 86.1 86.8   MCH 28.1 28.4 28.2   MCHC 31.9* 33.0 32.4   RDW 59.9* 58.8* 58.9*   PLATELETCT 285 298 264   MPV 10.1 10.3 9.8     Recent Labs     01/13/24  0955 01/14/24  0419 01/15/24  0435   SODIUM 145 147* 142   POTASSIUM 3.2* 3.3* 3.0*   CHLORIDE 107 109 106   CO2 21 20 23   GLUCOSE 104* 92 101*   BUN 56* 47* 35*   CREATININE 3.96* 3.01* 2.90*   CALCIUM 9.1 9.1 8.1*     Recent Labs     01/13/24  1108   INR 1.24*               Imaging  MR-BRAIN-W/O   Final Result      1.  Subcentimeter hemorrhagic lesion in the RIGHT thalamus and adjacent cerebral peduncle. Appearance favors bland/hypertensive hemorrhage rather than hemorrhagic mass though consideration should be given to MRI with contrast for completion of imaging    evaluation in this patient with history of esophageal cancer.   2.  Mild atrophy      Findings were discussed with Dr. Macias on 1/14/2024 8:39 AM.      US-RENAL   Final Result      1.  Mild bilateral hydronephrosis.   2.  Prostatomegaly.   3.  Incidental note of ascites.      CT-HEAD W/O   Final Result      1.  No acute intracranial abnormality.   2.  Chronic paranasal sinus disease.         DX-CHEST-PORTABLE (1 VIEW)   Final Result      1.  Hypoinflation with probable LEFT lower lung pneumonia and possible associated small pleural fluid collection.   2.  Pulmonary vascular congestion.   3.  No pneumothorax.   4.  RIGHT chest infusion port.      MR-BRAIN-WITH & W/O    (Results Pending)        Assessment/Plan  * Thalamic hemorrhage (HCC)  Assessment & Plan  As demonstrated on MRI brain  Consult placed to neurology, discussed with Dr. Donovan in person  Maintain systolic blood pressure less than 140  I have added norvasc and atenolol for improved blood pressure control. ACE/ARB/thiazide not appropriate options given LEO  No anticoagulation, no antiplatelet  Every 4 neurochecks  Await repeat MRI with contrast to rule out  mass    LEO (acute kidney injury) (HCC)  Assessment & Plan  Renal ultrasound with mild bilateral hydronephrosis, Prostatomegaly   Cr 3.96 --> 2.90  Jules in place  IV fluids  Monitor    Pneumonia  Assessment & Plan  Came with hypoxia, leukocytosis and infiltration on chest x-ray  Flu/covid/rsv negative  Continue IV unasyn, azithromycin    Esophageal dysphagia- (present on admission)  Assessment & Plan  Came with a pneumonia  Speech eval    Acute respiratory failure (HCC)  Assessment & Plan  Likely related to pneumonia  Patient needed 2 L nasal cannula, his baseline is room air  IV antibiotics      ACP (advance care planning)  Assessment & Plan  Patient is DNR/DNI as determined by my colleague on admission    Altered mental state- (present on admission)  Assessment & Plan  Patient is alert oriented x 2 with confusing and mild dysarthria  CT scan of her head did not show any acute finding  Possible metabolic encephalopathy  Treating the pneumonia  Will order MRI to rule out metastasis or stroke  Speech eval    Malignant neoplasm of lower third of esophagus (HCC)- (present on admission)  Assessment & Plan  Patient treated with chemotherapy and radiation therapy  Follow-up with his oncologist    Hypertension- (present on admission)  Assessment & Plan  Norvasc  I have added atenolol for improved blood pressure control  ACE/ARB not viable option given his LEO         VTE prophylaxis:   SCDs/TEDs  No anticoagulation, no antiplatelet    I have performed a physical exam and reviewed and updated ROS and Plan today (1/15/2024). In review of yesterday's note (1/14/2024), there are no changes except as documented above.    Greater than 53 minutes spent prepping to see patient (e.g. review of tests) obtaining and/or reviewing separately obtained history. Performing a medically appropriate examination and/ evaluation.  Counseling and educating the patient/family/caregiver.  Ordering medications, tests, or procedures.  Referring  and communicating with other health care professionals.  Documenting clinical information in EPIC.  Independently interpreting results and communicating results to patient/family/caregiver.  Care coordination.

## 2024-01-15 NOTE — DOCUMENTATION QUERY
"[facility]                                                                       Query Response Note      PATIENT:               MIRELLA PIPER  ACCT #:                  [id number]  MRN:                     [id number]  :                      1965  ADMIT DATE:       2024 8:43 AM  DISCH DATE:          RESPONDING  PROVIDER #:        [id number]           QUERY TEXT:    The diagnosis of sepsis has been documented in the H&P.    Please clarify the status of sepsis after study:      The patient's clinical indicators include:  H&P: \"Altered mental state ... Possible metabolic encephalopathy ... LEO ... Acute respiratory failure ... Likely related to pneumonia. Patient needed 2 L nasal cannula, his baseline is room air ... Came with hypoxia, leukocytosis and infiltration on chest x-ray ... Holding blood pressure medications due to sepsis\"     PN: \"Thalamic hemorrhage ... LEO ... Renal ultrasound with mild bilateral hydronephrosis, Prostatomegaly  Cr 3.96 --> 3.01 Place Jules\"     Labs: WBC 16.3, , CR 3.96, Total Bilirubin 0.4, Lactic Acid 0.8, Procalcitonin 1.50   Vitals: T98.5, HR , RR 20-26, -120, 92-93% on 2L NC    Risk Factors: PNA, acute respiratory failure, 92% on 2L = P/F Ratio 221, LEO, AMS, possible metabolic encephalopathy, thalamic hemorrhage  Treatment: IV fluids/bolus, Unasyn IV, doxycycline PO, supplemental oxygen    Thank You,  Estela Morgan RN  Clinical    Connect via [employer] or [email or web address]  Options provided:   -- Acute respiratory failure due to sepsis   -- Sepsis ruled in, (please specify sepsis-related organ dysfunction)   -- Sepsis ruled out, SIRS criteria only   -- Other explanation, (please specify other explanation)   -- Unable to determine      Query created by: Estela Morgan on 1/15/2024 8:52 AM    RESPONSE TEXT:    Acute respiratory failure due to " sepsis          Electronically signed by:  CLARICE VELAZCO MD 1/15/2024 10:44 AM

## 2024-01-15 NOTE — THERAPY
Occupational Therapy   Initial Evaluation     Patient Name: Tommy Mckeon  Age:  58 y.o., Sex:  male  Medical Record #: [id number]  Today's Date: 1/15/2024     Precautions  Precautions: Fall Risk  Comments: SBP <140    Assessment  Patient is 58 y.o. male admitted for AMS and dysphagia; found to have small hemorrhage in R thalamus, LEO, B hydronephrosis, acute respiratory failure, and PNA. PMHx of esophageal CA and HTN. Desaturation to 87-90% on RA; RN Ok'd pt remain on RA post session. Completed ADLs/txfs with SBA-SPV (has catheter) and functional ambulation w/FWW and SPV. Pt reports he is independent at baseline, and usually is assisting his wife who has had prior neurological injuries. Patient will not be actively followed for occupational therapy services at this time, however may be seen if requested by physician for 1 more visit within 30 days to address any discharge or equipment needs.     Plan    Occupational Therapy Initial Treatment Plan   Duration: Discharge Needs Only    DC Equipment Recommendations: Tub / Shower Seat, Grab Bar(s) in Tub / Shower, Grab Bar(s) by Toilet  Discharge Recommendations: Recommend home health for continued occupational therapy services      Objective     01/15/24 1351   Prior Living Situation   Prior Services Home-Independent   Housing / Facility 1 Story House   Steps Into Home 0   Bathroom Set up Bathtub / Shower Combination;Walk In Shower   Equipment Owned   (shower stool)   Lives with - Patient's Self Care Capacity Spouse   Comments Pt reports he is independent at baseline, and usually is assisting his wife who had had prior neurological injuries.   Prior Level of ADL Function   Self Feeding Independent   Grooming / Hygiene Independent   Bathing Independent   Dressing Independent   Toileting Independent   Prior Level of IADL Function   Medication Management Independent   Laundry Independent   Kitchen Mobility Independent   Finances Independent   Home Management Independent    Shopping Independent   Prior Level Of Mobility Independent Without Device in Home;Independent Without Device in Community   Driving / Transportation Driving Independent   Occupation (Pre-Hospital Vocational) Employed Full Time  (warehouse; walking and sitting)   Precautions   Precautions Fall Risk   Vitals   O2 Delivery Device Room air w/o2 available   Vitals Comments pt wearing O2 tubing, but on RA. After functional mobility, pt desaturated to 88-90%; RN aware, but ok'd pt remain on RA for now   Pain 0 - 10 Group   Location Flank   Location Orientation Left   Therapist Pain Assessment During Activity;Nurse Notified;Post Activity  (not quantified)   Cognition    Cognition / Consciousness X   Level of Consciousness Alert   Comments very cooperative with somewhat flat affect.Reports he feels his cognition is improving, but is not fully back at baseline.   Passive ROM Upper Body   Passive ROM Upper Body WDL   Active ROM Upper Body   Active ROM Upper Body  WDL   Comments slight edema to RUE   Strength Upper Body   Upper Body Strength  WDL   Sensation Upper Body   Upper Extremity Sensation  WDL   Coordination Upper Body   Coordination WDL   Balance Assessment   Sitting Balance (Static) Good   Sitting Balance (Dynamic) Fair +   Standing Balance (Static) Fair   Standing Balance (Dynamic) Fair   Weight Shift Sitting Good   Weight Shift Standing Fair   Comments w/FWW, no overt LOB   Bed Mobility    Supine to Sit Supervised   Sit to Supine Supervised   Scooting Supervised   Comments HOB elevated and use of rail   ADL Assessment   Eating Supervision   Grooming   (declined need)   Lower Body Dressing Supervision  (extra time d/t pain)   Toileting Moderate Assist  (catheter, but able to get to toilet. recommend seated toileting for now)   Comments Educated pt on home safety, DME for BR, adaptive techniques for ADLs, RUE edema mgmt (AROM/elevation), and importance of continued OOB activity at home and while in house. Pt  provided with handout for Care Chest/DME information.   mRS Prior to admission   Prior to admission mRS 0   Modified [place] (mRS)   Modified [place] Score 2   Functional Mobility   Sit to Stand Standby Assist   Bed, Chair, Wheelchair Transfer Supervised   Toilet Transfers Standby Assist  (heavy use of GB)   Transfer Method Stand Step   Mobility w/FWW; EOB>around bed>toilet>BTB   Visual Perception   Comments reports no visual changes   Edema / Skin Assessment   Edema / Skin  X   Comments slight edema in RUE   Activity Tolerance   Comments limited by fatigue and volition   Education Group   Education Provided Role of Occupational Therapist;Home Safety;Pathology of bedrest;Adaptive Equipment   Role of Occupational Therapist Patient Response Patient;Acceptance;Explanation;Verbal Demonstration;Reinforcement Needed   Home Safety Patient Response Patient;Acceptance;Explanation;Verbal Demonstration;Reinforcement Needed;Handout   Adaptive Equipment Patient Response Patient;Acceptance;Explanation;Handout;Verbal Demonstration;Reinforcement Needed   Pathology of Bedrest Patient Response Patient;Acceptance;Explanation;Verbal Demonstration;Reinforcement Needed

## 2024-01-15 NOTE — CARE PLAN
The patient is Stable - Low risk of patient condition declining or worsening      Problem: Hemodynamics  Goal: Patient's hemodynamics, fluid balance and neurologic status will be stable or improve  Outcome: Progressing     Problem: Fall Risk  Goal: Patient will remain free from falls  Outcome: Progressing     Shift Goals  Clinical Goals: Neuro check Q4hrs, Safety  Patient Goals: Rest  Family Goals: ANUPAM    Progress made toward(s) clinical / shift goals:      Pt is now alert and oriented x4, neuro check Q4, tele sitter discontinued. Now on room air, O2 sat 90-92%, denies any SOB. C/O pain during shift, acetaminophen and heat packs given. Pt OOB to bathroom using 4WW, 1 person assist, tolerated well and calls for help, bed alarm on.  Reinforced education on fall precaution, call light and personal belongings within reach.

## 2024-01-15 NOTE — CARE PLAN
The patient is Stable - Low risk of patient condition declining or worsening    Shift Goals  Clinical Goals: Safety  Patient Goals: Rest  Family Goals: ANUPAM    Progress made toward(s) clinical / shift goals:    Problem: Knowledge Deficit - Standard  Goal: Patient and family/care givers will demonstrate understanding of plan of care, disease process/condition, diagnostic tests and medications  Outcome: Progressing     Problem: Hemodynamics  Goal: Patient's hemodynamics, fluid balance and neurologic status will be stable or improve  Outcome: Progressing       Patient is not progressing towards the following goals:

## 2024-01-16 NOTE — THERAPY
"Speech Language Pathology   Daily Treatment     Patient Name: Tommy Mckeon  AGE:  58 y.o., SEX:  male  Medical Record #: [id number]  Date of Service: 1/16/2024      Precautions:  Precautions: (P) Fall Risk, Swallow Precautions         Subjective  \"I eat whatever has protein.\"      Assessment  Chest 1/13-1.  Hypoinflation with probable LEFT lower lung pneumonia and possible associated small pleural fluid collection.  2.  Pulmonary vascular congestion.  3.  No pneumothorax.  4.  RIGHT chest infusion port.  MRI 1/14-1.  Subcentimeter hemorrhagic lesion in the RIGHT thalamus and adjacent cerebral peduncle. Appearance favors bland/hypertensive hemorrhage rather than hemorrhagic mass though consideration should be given to MRI with contrast for completion of imaging   evaluation in this patient with history of esophageal cancer.  2.  Mild atrophy    Pt seen during bfast meal and denies globus or difficulty with current texture. No dysarthria or communication difficulty noted. One cough and one throat clearing post TNO by cup. No change in vocal quality. No oral reside or complaints of difficulty/globus with saltine cracker. Pt stating no previous diagnostic swallow eval.    Clinical Impressions  Upgrade to EC7/TNO with swallow strategies posted. MBSS orders with plan to complete tomorrow to further assess pt's swallowing physiology. Nurs and pt/family educ regarding texture upgrade and plan for MBSS.     Recommendations  Treatment Completed: (P) Dysphagia Treatment       Dysphagia Treatment  Diet Consistency: (P) upgrade to EC7/TNO  Instrumentation: (P) VFSS (MBSS)  Medication: (P) Whole with liquid, Whole with puree  Supervision: (P) Independent  Positioning: (P) Fully upright and midline during oral intake  Risk Management : (P) Small bites/sips, Slow rate of intake, Physical mobility, as tolerated  Oral Care: (P) Q8h                     SLP Treatment Plan  Treatment Plan: (P) Dysphagia Treatment  SLP Frequency: (P) " "3x Per Week  Estimated Duration: (P) Until Therapy Goals Met      Anticipated Discharge Needs  Discharge Recommendations: (P) Anticipate that the patient will have no further speech therapy needs after discharge from the hospital         Patient / Family Goals  Patient / Family Goal #1: (P) \"Can I go home?\"  Goal #1 Outcome: (P) Goal not met  Short Term Goals  Short Term Goal # 1: (P) Pt will consume a diet of soft&bite sized solids and thin liquids without overt s/sx of aspiration or decline in respiratory status  Goal Outcome # 1: (P) Goal met  Short Term Goal # 2: (P) 1/16 Pt will consume EC7/TNO without s/s of difficulty using posted swallow strategies and without s/s of difficulty.  Goal Outcome # 2 : (P) Goal not met  Short Term Goal # 3: (P) 1/16 Pt will complete MBSS with min cues to follow directives.  Goal Outcome  # 3: (P) Goal not met      Celeste Cabral, LIZZIE  "

## 2024-01-16 NOTE — PROGRESS NOTES
Alert x4 and able to let his needs known. Cooperative and compliant with care. Awaiting repeat MRI

## 2024-01-16 NOTE — CARE PLAN
The patient is Stable - Low risk of patient condition declining or worsening      Problem: Knowledge Deficit - Standard  Goal: Patient and family/care givers will demonstrate understanding of plan of care, disease process/condition, diagnostic tests and medications  Outcome: Progressing     Problem: Fall Risk  Goal: Patient will remain free from falls  Outcome: Progressing     Problem: Pain - Standard  Goal: Alleviation of pain or a reduction in pain to the patient’s comfort goal  Outcome: Progressing     Shift Goals  Clinical Goals: Safety, Neuro Q4  Patient Goals: Pain management  Family Goals: ANUPAM    Progress made toward(s) clinical / shift goals:     Pt alert and oriented x4, continue Q4 neuro check per hospitalist. VSS. On O2 1L via nasal cannula. Pt OOB to bathroom with 4WW, standby assist, tolerates well. Seen by ST today diet advanced to easy to chew. Jules catheter was removed this afternoon, will monitor urine output per MD. Call light and personal belongings within reach.

## 2024-01-16 NOTE — FACE TO FACE
Face to Face Supporting Documentation - Home Health    The encounter with this patient was in whole or in part the primary reason for home health admission.    Date of encounter:   Patient:                    MRN:                       YOB: 2026  Tommy Mckeon  [id number]  1965     Home health to see patient for:  Skilled Nursing care for assessment, interventions & education, Physical Therapy evaluation and treatment, and Occupational therapy evaluation and treatment    Skilled need for:  New Onset Medical Diagnosis stroke    Skilled nursing interventions to include:  Comment: PT/OT    Homebound status evidenced by:  Needs the assistance of another person in order to leave the home. Leaving home requires a considerable and taxing effort. There is a normal inability to leave the home.    Community Physician to provide follow up care: Reynaldo Robins M.D.     Optional Interventions? No      I certify the face to face encounter for this home health care referral meets the CMS requirements and the encounter/clinical assessment with the patient was, in whole, or in part, for the medical condition(s) listed above, which is the primary reason for home health care. Based on my clinical findings: the service(s) are medically necessary, support the need for home health care, and the homebound criteria are met.  I certify that this patient has had a face to face encounter by the nurse practitioner working collaboratively with me.  Everett Berger M.D. - NPI: [id number]

## 2024-01-17 NOTE — THERAPY
Physical Therapy   Initial Evaluation     Patient Name: Tommy Mckeon  Age:  58 y.o., Sex:  male  Medical Record #: [id number]  Today's Date: 1/17/2024     Precautions  Precautions: Fall Risk;Swallow Precautions  Comments: SBP <140    Assessment  Patient is a 58 y.o. male with hx of esophageal CA, DM, and HTN, now admitted with AMS, mild dysphagia, PNA with acute respiratory failure, and LEO. Found to have a small R thalamic hemorrhage on imaging. PT eval complete, pt currently presents at his functional baseline and completed all mobility at SPV level with no AD. No deficits noted on exam. Anticipate that pt will be able to safely DC home with no further needs once medically cleared. Patient will not be actively followed for physical therapy services at this time, however may be seen if requested by physician for 1 more visit within 30 days to address any discharge or equipment needs.     Plan    Physical Therapy Initial Treatment Plan   Duration: Evaluation only    DC Equipment Recommendations: None  Discharge Recommendations: Anticipate that the patient will have no further physical therapy needs after discharge from the hospital         Vitals   Pulse Oximetry 91 %   O2 (LPM) 0   O2 Delivery Device None - Room Air   Pain 0 - 10 Group   Therapist Pain Assessment Post Activity Pain Same as Prior to Activity;0   Prior Living Situation   Prior Services Home-Independent   Housing / Facility 1 Story House   Steps Into Home 1   Steps In Home 0   Equipment Owned Single Point Cane   Lives with - Patient's Self Care Capacity Spouse   Comments reports he assists his wife who has had strokes, provides physical assist for standing activity as needed   Prior Level of Functional Mobility   Bed Mobility Independent   Transfer Status Independent   Ambulation Independent   Ambulation Distance community distances   Assistive Devices Used None   Stairs Independent   Cognition    Cognition / Consciousness WDL   Level of  Consciousness Alert   Comments pleasant and participatory   Active ROM Lower Body    Active ROM Lower Body  WDL   Strength Lower Body   Lower Body Strength  WDL   Sensation Lower Body   Lower Extremity Sensation   WDL   Coordination Lower Body    Coordination Lower Body  WDL   Balance Assessment   Sitting Balance (Static) Good   Sitting Balance (Dynamic) Good   Standing Balance (Static) Good   Standing Balance (Dynamic) Fair +   Weight Shift Sitting Good   Weight Shift Standing Good   Comments no AD   Bed Mobility    Supine to Sit Supervised   Sit to Supine Supervised   Scooting Supervised   Gait Analysis   Gait Level Of Assist Supervised   Assistive Device None   Distance (Feet) 200   # of Times Distance was Traveled 1   Deviation No deviation   # of Stairs Climbed 0   Weight Bearing Status no restrictions   Functional Mobility   Sit to Stand Supervised   Bed, Chair, Wheelchair Transfer Supervised   Mobility no AD   How much difficulty does the patient currently have...   Turning over in bed (including adjusting bedclothes, sheets and blankets)? 4   Sitting down on and standing up from a chair with arms (e.g., wheelchair, bedside commode, etc.) 4   Moving from lying on back to sitting on the side of the bed? 4   How much help from another person does the patient currently need...   Moving to and from a bed to a chair (including a wheelchair)? 4   Need to walk in a hospital room? 4   Climbing 3-5 steps with a railing? 4   6 clicks Mobility Score 24   Activity Tolerance   Comments no overt pain, SOB, or fatigue   Education Group   Education Provided Role of Physical Therapist   Role of Physical Therapist Patient Response Patient;Acceptance;Explanation;Verbal Demonstration   Additional Comments IS use   Physical Therapy Initial Treatment Plan    Duration Evaluation only   Problem List    Problems None   Anticipated Discharge Equipment and Recommendations   DC Equipment Recommendations None   Discharge Recommendations  Anticipate that the patient will have no further physical therapy needs after discharge from the hospital   Interdisciplinary Plan of Care Collaboration   IDT Collaboration with  Nursing   Patient Position at End of Therapy Edge of Bed;Call Light within Reach;Tray Table within Reach;Phone within Reach  (RN ok with no bed alarm)   Collaboration Comments RN updated   Session Information   Date / Session Number  1/17- 1x only

## 2024-01-17 NOTE — CARE PLAN
The patient is Stable - Low risk of patient condition declining or worsening    Shift Goals  Clinical Goals: neuro q 4  Patient Goals: rest  Family Goals: ANUPAM    Progress made toward(s) clinical / shift goals:  patient A&O X4, resting in bed. VSS. Neuro checks q 4. No new complaints    Patient is not progressing towards the following goals:

## 2024-01-17 NOTE — FACE TO FACE
Face to Face Note  -  Durable Medical Equipment    Everett Berger M.D. - NPI: [id number]  I certify that this patient is under my care and that they had a durable medical equipment(DME)face to face encounter by myself that meets the physician DME face-to-face encounter requirements with this patient on:    Date of encounter:   Patient:                    MRN:                       YOB: 2026  Tommy Mckeon  [id number]  1965     The encounter with the patient was in whole, or in part, for the following medical condition, which is the primary reason for durable medical equipment:  CVA    I certify that, based on my findings, the following durable medical equipment is medically necessary:    Walkers.    My Clinical findings support the need for the above equipment due to:  Abnormal Gait

## 2024-01-17 NOTE — THERAPY
Physical Therapy Contact Note    Patient Name: Tommy Mckeon  Age:  58 y.o., Sex:  male  Medical Record #: [id number]  Today's Date: 1/16/2024    PT consult received, evaluation attempted. RN reports transport coming to take pt for MRI. Will reattempt PT evaluation as able. Thanks     June Ballard, PT, DPT    [place] [phone]

## 2024-01-17 NOTE — PROGRESS NOTES
Hospital Medicine Daily Progress Note    Date of Service  1/16/2024    Chief Complaint  Tommy Mckeon is a 58 y.o. male admitted 1/13/2024 with altered mentation    Hospital Course  58-year-old male with history of esophageal cancer who presented to the hospital on 1/13 for altered mental status. Patient has some mild dysphagia and confusion. Found to have possible pneumonia on CXR and started on IV antibiotics. MRI brain shows small thalamic hemorrhage, neuro was consulted. Await MRI brain with contrast to further rule out mass. LEO with hydronephrosis and enlarged prostate. Jules in place.     Interval Problem Update  No acute events overnight.  Patient feels well, wife at bedside reports patient is no longer confused as he was when he presented to the hospital.  Patient denies any focal deficits or complaints.  On room air, continue antibiotics for pneumonia.  MRI brain with contrast pending, repeat MRI ordered per neurology recommendations given hx of esophageal cancer.  Continue HTN control.  OT recommend home health, HH ordered.  Anticipate discharge to home with home health after repeat MRI brain is reviewed, possibly discharge to home tomorrow.    I have discussed this patient's plan of care and discharge plan at IDT rounds today with Case Management, Nursing, Nursing leadership, and other members of the IDT team.    Consultants/Specialty  neurology    Code Status  DNAR/DNI    Disposition  The patient is not medically cleared for discharge to home or a post-acute facility.  Anticipate discharge to: home with organized home healthcare and close outpatient follow-up    I have placed the appropriate orders for post-discharge needs.    Review of Systems  Review of Systems   Constitutional:  Negative for chills and fever.   HENT:  Negative for congestion and sore throat.    Eyes:  Negative for blurred vision and double vision.   Respiratory:  Negative for cough and shortness of breath.    Cardiovascular:   Negative for chest pain and palpitations.   Gastrointestinal:  Negative for abdominal pain, nausea and vomiting.   Genitourinary:  Negative for dysuria and frequency.   Musculoskeletal:  Negative for back pain and falls.   Skin:  Negative for rash.   Neurological:  Positive for speech change. Negative for seizures, loss of consciousness and headaches.   Psychiatric/Behavioral:  Negative for hallucinations and substance abuse.         Physical Exam  Temp:  [36.6 °C (97.9 °F)-37.1 °C (98.7 °F)] 36.7 °C (98 °F)  Pulse:  [67-76] 76  Resp:  [18-20] 20  BP: (117-127)/(69-80) 127/78  SpO2:  [92 %-95 %] 95 %    Physical Exam  Vitals and nursing note reviewed.   Constitutional:       General: He is not in acute distress.     Appearance: He is not toxic-appearing.   HENT:      Head: Normocephalic.      Right Ear: External ear normal.      Left Ear: External ear normal.      Nose: No congestion.      Mouth/Throat:      Pharynx: No oropharyngeal exudate.   Eyes:      General: No scleral icterus.  Cardiovascular:      Rate and Rhythm: Normal rate and regular rhythm.      Heart sounds: No murmur heard.  Pulmonary:      Breath sounds: No wheezing.   Abdominal:      Palpations: Abdomen is soft.      Tenderness: There is no abdominal tenderness. There is no guarding or rebound.   Musculoskeletal:         General: No swelling or deformity.   Skin:     Coloration: Skin is not jaundiced.      Findings: No bruising.   Neurological:      Mental Status: He is alert.      Motor: No weakness.      Comments: Patient sustains gravity in all extremities  No nystagmus  No dysdiadochokinesia  Patient does have some recent memory deficits   Psychiatric:         Mood and Affect: Mood normal.         Behavior: Behavior normal.     Patient was seen and examined at bedside. No changes in physical exam from prior evaluation.      Fluids    Intake/Output Summary (Last 24 hours) at 1/16/2024 1616  Last data filed at 1/16/2024 1500  Gross per 24 hour    Intake 480 ml   Output 3290 ml   Net -2810 ml       Laboratory  Recent Labs     01/14/24  0419 01/15/24  0435 01/16/24  0727   WBC 11.5* 8.4 8.8   RBC 3.52* 3.41* 3.46*   HEMOGLOBIN 10.0* 9.6* 9.8*   HEMATOCRIT 30.3* 29.6* 30.6*   MCV 86.1 86.8 88.4   MCH 28.4 28.2 28.3   MCHC 33.0 32.4 32.0*   RDW 58.8* 58.9* 59.7*   PLATELETCT 298 264 285   MPV 10.3 9.8 10.0     Recent Labs     01/14/24  0419 01/15/24  0435 01/16/24  0727   SODIUM 147* 142 144   POTASSIUM 3.3* 3.0* 3.4*   CHLORIDE 109 106 109   CO2 20 23 23   GLUCOSE 92 101* 88   BUN 47* 35* 29*   CREATININE 3.01* 2.90* 3.05*   CALCIUM 9.1 8.1* 8.0*                     Imaging  MR-BRAIN-W/O   Final Result      1.  Subcentimeter hemorrhagic lesion in the RIGHT thalamus and adjacent cerebral peduncle. Appearance favors bland/hypertensive hemorrhage rather than hemorrhagic mass though consideration should be given to MRI with contrast for completion of imaging    evaluation in this patient with history of esophageal cancer.   2.  Mild atrophy      Findings were discussed with Dr. Macias on 1/14/2024 8:39 AM.      US-RENAL   Final Result      1.  Mild bilateral hydronephrosis.   2.  Prostatomegaly.   3.  Incidental note of ascites.      CT-HEAD W/O   Final Result      1.  No acute intracranial abnormality.   2.  Chronic paranasal sinus disease.         DX-CHEST-PORTABLE (1 VIEW)   Final Result      1.  Hypoinflation with probable LEFT lower lung pneumonia and possible associated small pleural fluid collection.   2.  Pulmonary vascular congestion.   3.  No pneumothorax.   4.  RIGHT chest infusion port.      MR-BRAIN-WITH & W/O    (Results Pending)        Assessment/Plan  * Thalamic hemorrhage (HCC)  Assessment & Plan  As demonstrated on MRI brain  Consult placed to neurology, discussed with Dr. Donovan in person  Maintain systolic blood pressure less than 140  I have added norvasc and atenolol for improved blood pressure control. ACE/ARB/thiazide not appropriate  options given LEO  No anticoagulation, no antiplatelet  Every 4 neurochecks  Await repeat MRI with contrast to rule out mass    LEO (acute kidney injury) (HCC)  Assessment & Plan  Renal ultrasound with mild bilateral hydronephrosis, Prostatomegaly   Cr 3.96 --> 2.90  Jules in place  IV fluids  Monitor  Stable creatinine at 3    Acute respiratory failure (HCC)  Assessment & Plan  Likely related to pneumonia  Patient needed 2 L nasal cannula, his baseline is room air  IV antibiotics  resolved    Pneumonia  Assessment & Plan  Came with hypoxia, leukocytosis and infiltration on chest x-ray  Flu/covid/rsv negative  Continue IV unasyn, azithromycin    ACP (advance care planning)  Assessment & Plan  Patient is DNR/DNI as determined by my colleague on admission    Altered mental state- (present on admission)  Assessment & Plan  Patient is alert oriented x 2 with confusing and mild dysarthria  CT scan of her head did not show any acute finding  Possible metabolic encephalopathy  Treating the pneumonia\  MRI brain shows small right thalamic hemorrhage  Speech eval  resolved    Esophageal dysphagia- (present on admission)  Assessment & Plan  Came with a pneumonia  Speech eval    Malignant neoplasm of lower third of esophagus (HCC)- (present on admission)  Assessment & Plan  Patient treated with chemotherapy and radiation therapy  Follow-up with his oncologist    Hypertension- (present on admission)  Assessment & Plan  Norvasc  I have added atenolol for improved blood pressure control  ACE/ARB not viable option given his LEO         VTE prophylaxis: No anticoagulation, no antiplatelet

## 2024-01-17 NOTE — DISCHARGE PLANNING
Patient will be dishcarged home with no needs. Therapy recommended HH, CM spoke with patient who declined HH post discharge. No additional CM needs identified.       Case Management Discharge Planning    Admission Date: 1/13/2024  GMLOS: 4.6  ALOS: 4    6-Clicks ADL Score: 21  6-Clicks Mobility Score: 24      Anticipated Discharge Dispo: Discharge Disposition: Discharged to home/self care (01)  Discharge Address: [address] [place] [id number]  Discharge Contact Phone Number: [phone]    DME Needed: No    Action(s) Taken: Updated Provider/Nurse on Discharge Plan, Patient Conference, and DC Assessment Complete (See below)    Escalations Completed: None    Medically Clear: Yes    Barriers to Discharge: None    Care Transition Team Assessment    Information Source  Orientation Level: Oriented X4  Information Given By: Patient  Informant's Name: Tommy  Who is responsible for making decisions for patient? : Patient    Readmission Evaluation  Is this a readmission?: No    Elopement Risk  Legal Hold: No  Ambulatory or Self Mobile in Wheelchair: Yes  Disoriented: No  Psychiatric Symptoms: None  History of Wandering: No  Elopement this Admit: No  Vocalizing Wanting to Leave: No  Displays Behaviors, Body Language Wanting to Leave: No-Not at Risk for Elopement  Elopement Risk: Not at Risk for Elopement    Interdisciplinary Discharge Planning  Primary Care Physician: Reynaldo Robins  Lives with - Patient's Self Care Capacity: Spouse  Patient or legal guardian wants to designate a caregiver: No  Support Systems: Family Member(s)  Housing / Facility: [address]  Do You Take your Prescribed Medications Regularly: Yes  Able to Return to Previous ADL's: Yes  Prior Services: Home-Independent  Durable Medical Equipment: Not Applicable              Finances  Prescription Coverage: Yes    Vision / Hearing Impairment  Vision Impairment : Yes  Right Eye Vision: Wears Glasses  Left Eye Vision: Wears Glasses  Hearing Impairment :  No         Advance Directive  Advance Directive?: None    Domestic Abuse  Have you ever been the victim of abuse or violence?: No  Physical Abuse or Sexual Abuse: No  Verbal Abuse or Emotional Abuse: No  Possible Abuse/Neglect Reported to:: Not Applicable              Anticipated Discharge Information  Discharge Disposition: Discharged to home/self care (01)  Discharge Address: [address] [place] [id number]  Discharge Contact Phone Number: [phone]

## 2024-01-17 NOTE — DISCHARGE INSTRUCTIONS
"Diet    Heart Healthy Diet, Soft, Easy to chew     A Heart-Healthy diet includes increasing healthy fats and limiting unhealthy fats.  Focus on eating a balance of foods, including fruits and vegetables, low-fat or nonfat dairy, lean protein, nuts and legumes, whole grains, and heart-healthy oils and fats.  Monitor your salt (sodium) intake, especially if you have high blood pressure.  Lose weight if you are overweight. Losing just 5-10% of your body weight can help your overall health and prevent diseases such as diabetes and heart disease.    Hemorrhagic Stroke - Intracerebral Hemorrhage  Discharge Instructions    You experienced an Intracerebral Hemorrhage.  This kind of stroke is caused when a blood vessel inside the brain bursts. The bleeding causes brain cells to die and the part of the brain that is affected stops working correctly. Uncontrolled high blood pressure, also called hypertension, is the most common cause of this type of stroke.     Stroke Risk Factors    You are at increased risk of having another stroke event.  See your Patient Stroke Guide to help reduce your stroke risk. These are your specific risk factors:  Age - Over 55  Gender - Men are at a higher risk than women     Get help right away if you have any signs of a stroke.  \"BE FAST\" is an easy way to remember the main warning signs of a stroke:  B - Balance. Dizziness, sudden trouble walking, or loss of balance.  E - Eyes. Trouble seeing or a change in how you see.  F - Face. Sudden weakness or loss of feeling in the face. The face or eyelid may droop on one side.  A - Arms. Weakness or loss of feeling in an arm. This happens all of a sudden and most often on one side of the body.  S - Speech. Sudden trouble speaking, slurred speech, or trouble understanding what people say.  T - Time. Time to call emergency services. Write down what time symptoms started.      "

## 2024-01-17 NOTE — PROGRESS NOTES
DC instructions reviewed with pt. Medications provided to pt from meds to beds. PIV removed. Pt agreeable to DC plan & all questions answered.

## 2024-01-17 NOTE — THERAPY
"Speech Language Pathology   Daily Treatment     Patient Name: Tommy Mckeon  AGE:  58 y.o., SEX:  male  Medical Record #: [id number]  Date of Service: 1/17/2024      Precautions:  Precautions: Fall Risk, Swallow Precautions         Subjective  \"I am going home.\"      Assessment  Collaborated with MD and RN with pt educ regarding MBSS as outpt since pt is dcing this AM and unable to schedule MBSS before d/c. Pt denies difficulty or globus with EC7/TNO. Subtle throat clearing occasionally with TNO. No change in vocal quality. Pt educ regarding swallow strategies and able to repeat them at end of session. Pt and his wife educ regarding s/s of difficulty and demonstrated understanding.       Clinical Impressions  Cont with EC7/TNO as tolerated with recommended strategies-sitting up at 90 degrees, slow rate, small bites and sips, intermittent liquid rinse. D/w primary MD any difficulty with swallowing. MBSS as outpt.       Recommendations  Treatment Completed: (P) Dysphagia Treatment       Dysphagia Treatment  Diet Consistency: (P) EC7/TNO  Instrumentation: (P)  (Pt to d/c this am. Unable to schedule MBSS prior to d/c. Pt is tolerating EC7/TNO. SLP collaborated with MD/RN and educ pt regarding MBSS as outpt.)  Medication: (P) Whole with liquid, Whole with puree  Supervision: (P) Independent  Positioning: (P) Fully upright and midline during oral intake  Risk Management : (P) Small bites/sips, Alternate bites and sips, Slow rate of intake, Physical mobility, as tolerated  Oral Care: (P) Q8h                     SLP Treatment Plan  Treatment Plan: (P)  (Oupt pt MBSS)            Anticipated Discharge Needs  Discharge Recommendations: Anticipate that the patient will have no further speech therapy needs after discharge from the hospital         Patient / Family Goals  Patient / Family Goal #1: (P) \"Can I go home?\" (Pt to d/c this AM)  Goal #1 Outcome: Goal not met  Short Term Goals  Short Term Goal # 1: Pt will consume a " diet of soft&bite sized solids and thin liquids without overt s/sx of aspiration or decline in respiratory status  Goal Outcome # 1: Goal met  Short Term Goal # 2: (P) 1/16 Pt will consume EC7/TNO without s/s of difficulty using posted swallow strategies and without s/s of difficulty.  Goal Outcome # 2 : (P) Goal met  Short Term Goal # 3: (P) 1/16 Pt will complete MBSS with min cues to follow directives.  Goal Outcome  # 3: (P) Goal not met      Celeste Cabral, SLP

## 2024-01-17 NOTE — PROGRESS NOTES
R chest port D/C'd.  Saline flushed with 20mL and good blood return then Heparin locked with 500 units. No complications.

## 2024-01-18 NOTE — DISCHARGE SUMMARY
Discharge Summary    CHIEF COMPLAINT ON ADMISSION  Chief Complaint   Patient presents with    ALOC       Reason for Admission  ALOC     Admission Date  1/13/2024    CODE STATUS  Prior    HPI & HOSPITAL COURSE  58-year-old male with history of esophageal cancer who presented to the hospital on 1/13 for altered mental status. Patient has some mild dysphagia and confusion. Found to have possible pneumonia on CXR and started on IV antibiotics. MRI brain shows small thalamic hemorrhage, neuro was consulted who recommend hypertension control and MRI brain with contrast to rule out underlying mass given history of esophageal cancer. Patient with blood pressure controlled with hypertension medications. Patient with acute kidney injury due to urinary retention. He had andrade catheter placed with improvement in kidney function. Andrade removed and patient voiding well. Creatinine stable near 3 at time of discharge. He is started on flomax and is to follow up with his PCP for repeat labs to follow kidney function. Would recommend nephrology referral if kidney function does not improve despite adequate urination. Patient unable to get MRI brain with contrast due to kidney injury. He is advised to follow up with PCP who can coordinate MRI brain with contrast if kidney function improves. At this time patient is also advised to follow up with his oncologist as well.    Therefore, he is discharged in fair and stable condition to home with close outpatient follow-up.    The patient met 2-midnight criteria for an inpatient stay at the time of discharge.    Discharge Date  1/17/2024    FOLLOW UP ITEMS POST DISCHARGE  Take medications as prescribed.  Follow up with PCP, oncology.    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  Patient had EGD by Dr. Patel 8/18/23       which showed mass lesion in distal esophagus beginning at 34 cm from       incisors going into the stomach cardia 42 cm biopsy showing moderate to       poorly differentiated invasive adenocarcinoma.  Patient had CT chest       August 18, 2023 which showed hiatal hernia with wall thickening in the       distal thoracic esophagus and portion of herniating stomach which would be       in keeping with the provided clinical history.  Patient had CT pancreas       August 19, 2023 which showed distal esophageal proximal gastric mass       consistent with malignancy.  Patient had PET CT scan August 30, 2023 which       showed heterogeneous hypermetabolic mass in distal esophagus/cardia       consistent with known malignancy patient underwent EUS August 31, 2023 by       Dr. Fenton which showed clinical uT3N0 Siewert 2.  Overall patient doing       well and eating mostly solid foods with little difficulty but does have 30       pound weight loss over the last 3 months.            1. Malignant neoplasm of lower third of esophagus (HCC)  C15.5              Malignant neoplasm of lower third of esophagus (HCC)      Staging form: Esophagus - Adenocarcinoma, AJCC 8th Edition      - Clinical stage from 8/31/2023: Stage III (cT3, cN0, cM0, G3) - Signed by       Michael Woods M.D. on 8/31/2023      Total positive nodes: 0      Histologic grading system: 3 grade system             RECOMMENDATIONS:       We will plan to treat the patient with preoperative chemo-radiotherapy.        The patient has a Stage T3N0M0 esophagus cancer.  We talked about the       general prognosis and treatment rationale.  We discussed our goal to        control the tumor in the thorax, prevent regional recurrence, and hopeful       help to prevent or delay metastatic progression.   We discussed the goal       of facilitating complete surgical resection. We will plan 50.4 Gy in 28       fractions with concurrent chemo per Dr. Triana.              We discussed that for  adenocarcinoma pathologic complete response rate is       29%.             We discussed the treatment planning process and treatments.  We discussed       the risks of treatment at length including lung injury, shortness of       breath, fibrosis, pneumonitis, acute and chronic esophageal injury, heart       injury, spinal cord injury which is rare, skin toxicity, chest wall       injury, pain, dehydration, supplemental oxygen use or dependence, and the       risk of treatment related death.  They would like to proceed forward with       treatment.  We will set up CT simulation for treatment planning imaging       9/6/23 3PM.  That will consist of supine immobilization, possibly IV       contrast depending on kidney function and targeting requirements,       appropriate skin surface marking for radiation treatment.                  Esophageal dysphagia (POA: Yes)    Altered mental state (POA: Yes)    ACP (advance care planning) (POA: Unknown)    Pneumonia (POA: Unknown)    Acute respiratory failure (HCC) (POA: Unknown)    LEO (acute kidney injury) (HCC) (POA: Unknown)  Resolved Problems:    * No resolved hospital problems. *      FOLLOW UP  Future Appointments   Date Time Provider Department Center   1/24/2024  3:20 PM Reynaldo Robins M.D. [facility]   3/22/2024 11:00 AM Michael Woods M.D. [facility] None     Reynaldo Robins M.D.  [address]  [facility] [address]  [phone]    Follow up in 1 week(s)        MEDICATIONS ON DISCHARGE     Medication List        START taking these medications        Instructions   amLODIPine 5 MG Tabs  Start taking on: January 18, 2024  Commonly known as: Norvasc    Take 1 Tablet by mouth every day.  Dose: 5 mg     amoxicillin-clavulanate 875-125 MG Tabs  Commonly known as: Augmentin   Take 1 Tablet by mouth 2 times a day for 1 day.  Dose: 1 Tablet     doxycycline monohydrate 100 MG tablet  Commonly known as: Adoxa   Take 1 Tablet by mouth every 12 hours for 1 day.  Dose: 100 mg     tamsulosin 0.4 MG capsule  Commonly known as: Flomax   Take 1 Capsule by mouth 1/2 hour after breakfast.  Dose: 0.4 mg            CONTINUE taking these medications        Instructions   ibuprofen 200 MG Tabs  Commonly known as: Motrin   Take 400 mg by mouth 1 time a day as needed for Headache.  Dose: 400 mg     omeprazole 20 MG delayed-release capsule  Commonly known as: PriLOSEC   Take 1 Capsule by mouth 2 times a day.  Dose: 20 mg     ONE-A-DAY MENS 50+ ADVANTAGE PO   Take 1 Tablet by mouth every day.  Dose: 1 Tablet              Allergies  No Known Allergies    DIET  No orders of the defined types were placed in this encounter.      ACTIVITY  As tolerated.  Weight bearing as tolerated    CONSULTATIONS  neurology    PROCEDURES  none    LABORATORY  Lab Results   Component Value Date    SODIUM 144 01/17/2024    POTASSIUM 3.4 (L) 01/17/2024    CHLORIDE 109 01/17/2024    CO2 20 01/17/2024    GLUCOSE 99 01/17/2024    BUN 28 (H) 01/17/2024    CREATININE 2.89 (H) 01/17/2024        Lab Results   Component Value Date    WBC 8.8 01/16/2024    HEMOGLOBIN 9.8 (L) 01/16/2024    HEMATOCRIT 30.6 (L) 01/16/2024    PLATELETCT 285 01/16/2024        Total time of the discharge process exceeds 34 minutes.

## 2024-01-24 PROBLEM — N17.9 ACUTE RENAL FAILURE (HCC): Status: ACTIVE | Noted: 2024-01-01

## 2024-01-24 PROBLEM — J96.00 ACUTE RESPIRATORY FAILURE (HCC): Status: RESOLVED | Noted: 2024-01-01 | Resolved: 2024-01-01

## 2024-01-24 PROBLEM — D64.9 ANEMIA: Status: ACTIVE | Noted: 2024-01-01

## 2024-01-24 PROBLEM — N17.9 AKI (ACUTE KIDNEY INJURY) (HCC): Status: RESOLVED | Noted: 2024-01-01 | Resolved: 2024-01-01

## 2024-01-24 NOTE — ASSESSMENT & PLAN NOTE
Chronic condition.  The patient being treated with omeprazole 20 Mg twice daily.  The patient denies nausea vomiting dysphagia or unexplained weight loss.

## 2024-01-24 NOTE — ASSESSMENT & PLAN NOTE
Chronic condition.  As above due to high cost of medication he has not been taking the medication previously.  The patient has been taking amlodipine 5 mg daily since discharge from hospital.  Patient tolerating this medication well.  Today blood pressure 120/78.

## 2024-01-24 NOTE — ASSESSMENT & PLAN NOTE
Chronic condition.  The patient status post chemotherapy and radiation treatment.  Patient is followed by oncologist.  Per patient report patient is scheduled to see a surgeon for surgery soon.

## 2024-01-24 NOTE — ASSESSMENT & PLAN NOTE
This is a new condition.  Patient was admitted to the hospital generally 13.24 due to altered level consciousness.    MRI brain showed small thalamic hemorrhage.  Result discussed with the patient and his wife    MR-BRAIN-W/O  Narrative: 1/14/2024 5:48 AM    HISTORY/REASON FOR EXAM:  ALTERED LEVEL OF CONSCIOUSNESS.  History of esophageal cancer    TECHNIQUE/EXAM DESCRIPTION:  MRI of the brain without contrast.    T1 sagittal, T2 fast spin-echo axial, FLAIR axial, T1 coronal, FLAIR coronal, Diffusion weighted and Apparent Diffusion Coefficient (ADC map) axial images were obtained of the whole brain.    The study was performed on a Methoda 1.5 Deepika MRI scanner.    COMPARISON:  Head CT from 1/13/2024    FINDINGS:    The diffusion weighted axial images show no evidence of acute cerebral infarction.    Blooming on the gradient sequence in the RIGHT cerebral peduncle extending to the RIGHT thalamus with heterogenous T2 and FLAIR signal without appreciated signal abnormality on T1. This measures up to 7 mm on FLAIR. There is no discernible adjacent   vasogenic edema.    There is diffuse prominence of the CSF containing spaces.    Vascular flow voids in the large intracranial arteries are intact.  Vascular flow voids in the dural venous sinuses are intact.    The ventricular system and basal cisterns are within normal limits.    There are no mass effects or shift of midline structures.    There are no extra-axial fluid collections.    The calvariae are unremarkable.  There is mucosal thickening in the maxillary sinuses and LEFT frontal sinus and LEFT anterior ethmoid air cells.  The mastoid air cells are unremarkable.  The orbits are unremarkable.  Impression: 1.  Subcentimeter hemorrhagic lesion in the RIGHT thalamus and adjacent cerebral peduncle. Appearance favors bland/hypertensive hemorrhage rather than hemorrhagic mass though consideration should be given to MRI with contrast for completion of imaging   evaluation  in this patient with history of esophageal cancer.  2.  Mild atrophy    Findings were discussed with Dr. Macias on 1/14/2024 8:39 AM.       Patient has referral to see neurologist  Advised the patient to schedule appointment to see the specialist.  Patient reported that he has not been taking his blood pressure med due to insurance not covering the medication    Since discharge from the hospital patient denies headaches change in vision motor weakness or paresthesia.  History of slow speech.

## 2024-01-25 NOTE — ASSESSMENT & PLAN NOTE
This is a new condition.  In the hospital his GFR was in the 20s range.  It was felt that this is due to urinary retention.  Patient had Jules catheter placed with only slight improvement in kidney function    At this time discharge GFR was 24.  Recommend lab test to be done follow-up.

## 2024-01-25 NOTE — ASSESSMENT & PLAN NOTE
This is new condition.  Noted with chest x-ray.  The patient presently taking antibiotic.  Patient denies fever chills shortness of breath or wheezing.  Temperature today 98.5.  O2 sat 90%.  Advised the patient complete antibiotic treatment.

## 2024-01-25 NOTE — PROGRESS NOTES
PRIMARY CARE CLINIC VISIT        Chief Complaint   Patient presents with    Follow-Up     Hospital follow up.      Thalamic hemorrhage  Esophageal cancer  Acute renal failure  Urinary retention  Pneumonia  Hypertension  Acid reflux  Hyperlipidemia  Hypokalemia  Anemia        History of Present Illness     Malignant neoplasm of lower third of esophagus (HCC)  Chronic condition.  The patient status post chemotherapy and radiation treatment.  Patient is followed by oncologist.  Per patient report patient is scheduled to see a surgeon for surgery soon.    Thalamic hemorrhage (HCC)  This is a new condition.  Patient was admitted to the hospital generally 13.24 due to altered level consciousness.    MRI brain showed small thalamic hemorrhage.  Result discussed with the patient and his wife    MR-BRAIN-W/O  Narrative: 1/14/2024 5:48 AM    HISTORY/REASON FOR EXAM:  ALTERED LEVEL OF CONSCIOUSNESS.  History of esophageal cancer    TECHNIQUE/EXAM DESCRIPTION:  MRI of the brain without contrast.    T1 sagittal, T2 fast spin-echo axial, FLAIR axial, T1 coronal, FLAIR coronal, Diffusion weighted and Apparent Diffusion Coefficient (ADC map) axial images were obtained of the whole brain.    The study was performed on a [employer] 1.5 Deepika MRI scanner.    COMPARISON:  Head CT from 1/13/2024    FINDINGS:    The diffusion weighted axial images show no evidence of acute cerebral infarction.    Blooming on the gradient sequence in the RIGHT cerebral peduncle extending to the RIGHT thalamus with heterogenous T2 and FLAIR signal without appreciated signal abnormality on T1. This measures up to 7 mm on FLAIR. There is no discernible adjacent   vasogenic edema.    There is diffuse prominence of the CSF containing spaces.    Vascular flow voids in the large intracranial arteries are intact.  Vascular flow voids in the dural venous sinuses are intact.    The ventricular system and basal cisterns are within normal limits.    There are no mass  effects or shift of midline structures.    There are no extra-axial fluid collections.    The calvariae are unremarkable.  There is mucosal thickening in the maxillary sinuses and LEFT frontal sinus and LEFT anterior ethmoid air cells.  The mastoid air cells are unremarkable.  The orbits are unremarkable.  Impression: 1.  Subcentimeter hemorrhagic lesion in the RIGHT thalamus and adjacent cerebral peduncle. Appearance favors bland/hypertensive hemorrhage rather than hemorrhagic mass though consideration should be given to MRI with contrast for completion of imaging   evaluation in this patient with history of esophageal cancer.  2.  Mild atrophy    Findings were discussed with Dr. Macias on 1/14/2024 8:39 AM.       Patient has referral to see neurologist  Advised the patient to schedule appointment to see the specialist.  Patient reported that he has not been taking his blood pressure med due to insurance not covering the medication    Since discharge from the hospital patient denies headaches change in vision motor weakness or paresthesia.  History of slow speech.    Dyslipidemia  Chronic condition.  Patient currently on diet therapy.  Lab test ordered for follow-up.    GERD (gastroesophageal reflux disease)  Chronic condition.  The patient being treated with omeprazole 20 Mg twice daily.  The patient denies nausea vomiting dysphagia or unexplained weight loss.    Hypertension  Chronic condition.  As above due to high cost of medication he has not been taking the medication previously.  The patient has been taking amlodipine 5 mg daily since discharge from hospital.  Patient tolerating this medication well.  Today blood pressure 120/78.    Acute renal failure (HCC)  This is a new condition.  In the hospital his GFR was in the 20s range.  It was felt that this is due to urinary retention.  Patient had Jules catheter placed with only slight improvement in kidney function    At this time discharge GFR was 24.  Recommend  lab test to be done follow-up.    Pneumonia  This is new condition.  Noted with chest x-ray.  The patient presently taking antibiotic.  Patient denies fever chills shortness of breath or wheezing.  Temperature today 98.5.  O2 sat 90%.  Advised the patient complete antibiotic treatment.    Anemia  Chronic condition.  Followed by hematology/oncology.  Lab test result discussed with the patient  Patient denies any hematuria or GI bleeding.     Latest Reference Range & Units 10/12/23 09:23 10/15/23 17:00 10/19/23 11:39 10/22/23 15:05 12/08/23 16:45 01/13/24 09:55 01/14/24 04:19 01/15/24 04:35 01/16/24 07:27   Hemoglobin 14.0 - 18.0 g/dL 11.3 (L) 11.6 (L) 10.7 (L) 11.5 (L) 11.7 (L) 10.3 (L) 10.0 (L) 9.6 (L) 9.8 (L)   Hematocrit 42.0 - 52.0 % 37.1 (L) 37.1 (L) 35.1 (L) 35.6 (L) 35.9 (L) 32.3 (L) 30.3 (L) 29.6 (L) 30.6 (L)   (L): Data is abnormally low    I have ordered a repeat blood test to be done for follow-up.    Current Outpatient Medications on File Prior to Visit   Medication Sig Dispense Refill    Multiple Vitamins-Minerals (ONE-A-DAY MENS 50+ ADVANTAGE PO) Take 1 Tablet by mouth every day.       No current facility-administered medications on file prior to visit.        Allergies: Patient has no known allergies.    Current Outpatient Medications Ordered in Epic   Medication Sig Dispense Refill    amLODIPine (NORVASC) 5 MG Tab Take 1 Tablet by mouth every day. 90 Tablet 3    omeprazole (PRILOSEC) 20 MG delayed-release capsule Take 1 Capsule by mouth 2 times a day. 90 Capsule 3    tamsulosin (FLOMAX) 0.4 MG capsule Take 1 Capsule by mouth 1/2 hour after breakfast. 90 Capsule 3    potassium chloride SA (KDUR) 20 MEQ Tab CR Take 1 Tablet by mouth every day. 7 Tablet 0    Multiple Vitamins-Minerals (ONE-A-DAY MENS 50+ ADVANTAGE PO) Take 1 Tablet by mouth every day.       No current [facility]-ordered facility-administered medications on file.       Past Medical History:   Diagnosis Date    Bowel habit changes 08/31/2023     "constipation    Cancer (HCC)     esophageal    Diabetes (HCC) 08/31/2023    prediabetic per problem list, pt denies    GERD (gastroesophageal reflux disease)     Heart burn     Hiatus hernia syndrome     Hypertension 08/31/2023    no medication    Iron deficiency anemia     per problem list    Obesity (BMI 30-39.9) 04/19/2016    Pneumonia 1/13/2024       Past Surgical History:   Procedure Laterality Date    CATH PLACEMENT Right 9/1/2023    Procedure: RIGHT CEPHALIC PORT PLACEMENT;  Surgeon: Corby Cagle M.D.;  Location: SURGERY [facility];  Service: General    [place] UPPER GI ENDOSCOPY,DIAGNOSIS N/A 08/18/2023    Procedure: GASTROSCOPY;  Surgeon: Kale Patel M.D.;  Location: SURGERY SAME DAY [facility];  Service: Gastroenterology    [place] UPPER GI ENDOSCOPY,BIOPSY N/A 08/18/2023    Procedure: GASTROSCOPY, WITH BIOPSY;  Surgeon: Kale Patel M.D.;  Location: SURGERY SAME DAY [facility];  Service: Gastroenterology    EYE SURGERY  01/01/2015    OTHER  2015       Family History   Problem Relation Age of Onset    Diabetes Mother     Stroke Mother        Social History     Tobacco Use   Smoking Status Never    Passive exposure: Past   Smokeless Tobacco Never       Social History     Substance and Sexual Activity   Alcohol Use Not Currently       Review of systems.  As per HPI above. All other systems reviewed and negative.      Past Medical, Social, and Family history reviewed and updated in EPIC     Objective     /78 (BP Location: Right arm, Patient Position: Sitting, BP Cuff Size: Adult)   Pulse 94   Temp 36.9 °C (98.5 °F) (Temporal)   Ht 1.93 m (6' 4\")   Wt 102 kg (224 lb 12.8 oz)   SpO2 90%    Body mass index is 27.36 kg/m².    General: alert in no apparent distress.  Cardiovascular: regular rate and rhythm  Pulmonary: lungs : no wheezing   Gastrointestinal: BS present.   Renal nerve #2- 12 grossly intact mental status and speech appropriate        Lab Results   Component Value Date/Time    WBC " 8.8 01/16/2024 07:27 AM    HEMOGLOBIN 9.8 (L) 01/16/2024 07:27 AM    HEMATOCRIT 30.6 (L) 01/16/2024 07:27 AM    MCV 88.4 01/16/2024 07:27 AM    PLATELETCT 285 01/16/2024 07:27 AM         Lab Results   Component Value Date/Time    SODIUM 144 01/17/2024 05:28 AM    POTASSIUM 3.4 (L) 01/17/2024 05:28 AM    GLUCOSE 99 01/17/2024 05:28 AM    BUN 28 (H) 01/17/2024 05:28 AM    CREATININE 2.89 (H) 01/17/2024 05:28 AM       Lab Results   Component Value Date/Time    CHOLSTRLTOT 183 04/19/2021 07:04 AM    TRIGLYCERIDE 104 04/19/2021 07:04 AM    HDL 49 04/19/2021 07:04 AM     (H) 04/19/2021 07:04 AM       Lab Results   Component Value Date/Time    ALTSGPT 13 01/13/2024 09:55 AM             Assessment and Plan     1. Thalamic hemorrhage (HCC)  This is a new condition.  Present time the patient is medically stable.  Advised the patient to follow-up with neurologist.  In addition  - CBC WITHOUT DIFFERENTIAL; Future  - Referral to Neurosurgery    2. Malignant neoplasm of lower third of esophagus (HCC)  Chronic condition.  Advised the patient to follow-up with oncology service      3. Acute renal failure, unspecified acute renal failure type (HCC)  This is a new condition.  Cording to hospital record this is likely due to urinary retention.  Patient had Jules catheter placed however at the time of discharge GFR is still low at 24.  - Referral to Nephrology  For the patient to avoid NSAIDs.    4. Urinary retention  New condition.  Continue tamsulosin 0.4 Mg daily.  Patient was referred to nephrology as above.  - tamsulosin (FLOMAX) 0.4 MG capsule; Take 1 Capsule by mouth 1/2 hour after breakfast.  Dispense: 90 Capsule; Refill: 3    5. Pneumonia due to infectious organism, unspecified laterality, unspecified part of lung  New condition.  Medically patient is stable.  He denies shortness of breath wheezing or significant cough.  Advised the patient to complete the antibiotic as given from the hospital.  Temperature today 98.5  O2 saturation 90%.    6. Primary hypertension  - amLODIPine (NORVASC) 5 MG Tab; Take 1 Tablet by mouth every day.  Dispense: 90 Tablet; Refill: 3  Chronic condition.  BMP is stable.  Continue amlodipine 5 mg daily.  7. Gastroesophageal reflux disease with esophagitis and hemorrhage  - omeprazole (PRILOSEC) 20 MG delayed-release capsule; Take 1 Capsule by mouth 2 times a day.  Dispense: 90 Capsule; Refill: 3    8. Dyslipidemia  - Lipid Profile; Future  Chronic condition.  Current status unclear.  Lab test ordered for follow-up.      9. Hypokalemia  This is a new condition.  Potassium slightly low 3.4.  Patient asymptomatic.  Advised the patient to start potassium chloride 20 mEq daily for 7 days then repeat potassium blood test on day #8.  - POTASSIUM SERUM (K); Future  - potassium chloride SA (KDUR) 20 MEQ Tab CR; Take 1 Tablet by mouth every day.  Dispense: 7 Tablet; Refill: 0    10. Anemia, unspecified type  Chronic condition.  Patient denies significant symptoms such as fatigue chest pain dizziness etc.  Repeat CBC ordered.  Advised the patient to continue follow-up with hematology/oncology.          Attestation: I spent:   61  min -  That includes time for chart review before the visit, the actual patient visit, and time spent on documentation in EMR after the visit.  Chart review/prep, review of other providers' records, imaging/lab review, face-to-face time for history/examination, pt's counseling/education, ordering, prescribing,  review of results/meds/ treatment plan with patient, and care coordination.           Healthcare Maintenance       Health Maintenance Due   Topic Date Due    HIV Screening  Never done    COVID-19 Vaccine (1) Never done    Pneumococcal Vaccine: 0-64 Years (1 - PCV) Never done    Zoster (Shingles) Vaccines (1 of 2) Never done    Colorectal Cancer Screening  08/16/2023    Influenza Vaccine (1) Never done               Please note that this dictation was created using voice recognition  software. I have made every reasonable attempt to correct obvious errors, but I expect that there are errors of grammar and possibly content that I did not discover before finalizing the note.    Reynaldo Robins MD  Internal Medicine  [facility]

## 2024-01-25 NOTE — ASSESSMENT & PLAN NOTE
Chronic condition.  Followed by hematology/oncology.  Lab test result discussed with the patient  Patient denies any hematuria or GI bleeding.     Latest Reference Range & Units 10/12/23 09:23 10/15/23 17:00 10/19/23 11:39 10/22/23 15:05 12/08/23 16:45 01/13/24 09:55 01/14/24 04:19 01/15/24 04:35 01/16/24 07:27   Hemoglobin 14.0 - 18.0 g/dL 11.3 (L) 11.6 (L) 10.7 (L) 11.5 (L) 11.7 (L) 10.3 (L) 10.0 (L) 9.6 (L) 9.8 (L)   Hematocrit 42.0 - 52.0 % 37.1 (L) 37.1 (L) 35.1 (L) 35.6 (L) 35.9 (L) 32.3 (L) 30.3 (L) 29.6 (L) 30.6 (L)   (L): Data is abnormally low    I have ordered a repeat blood test to be done for follow-up.

## 2024-01-30 NOTE — TELEPHONE ENCOUNTER
Caller: [facility]     Message: [facility] called regarding patient, [facility] called to let us know that patient has refused home health services. [facility] has spoke with wife.

## 2024-02-19 PROBLEM — R18.0 MALIGNANT ASCITES: Status: ACTIVE | Noted: 2024-01-01

## 2024-02-19 PROBLEM — J90 BILATERAL PLEURAL EFFUSION: Status: ACTIVE | Noted: 2024-01-01

## 2024-02-19 PROBLEM — N40.1 BENIGN PROSTATIC HYPERPLASIA WITH INCOMPLETE BLADDER EMPTYING: Status: ACTIVE | Noted: 2024-01-01

## 2024-02-19 PROBLEM — N18.9 ACUTE KIDNEY INJURY SUPERIMPOSED ON CKD (HCC): Status: ACTIVE | Noted: 2024-01-01

## 2024-02-19 PROBLEM — N17.9 ACUTE KIDNEY INJURY SUPERIMPOSED ON CKD (HCC): Status: ACTIVE | Noted: 2024-01-01

## 2024-02-19 PROBLEM — A41.9 SEVERE SEPSIS (HCC): Status: ACTIVE | Noted: 2024-01-01

## 2024-02-19 PROBLEM — R39.14 BENIGN PROSTATIC HYPERPLASIA WITH INCOMPLETE BLADDER EMPTYING: Status: ACTIVE | Noted: 2024-01-01

## 2024-02-19 PROBLEM — R79.89 ELEVATED TROPONIN: Status: ACTIVE | Noted: 2024-01-01

## 2024-02-19 PROBLEM — J96.01 ACUTE RESPIRATORY FAILURE WITH HYPOXIA (HCC): Status: ACTIVE | Noted: 2024-01-01

## 2024-02-19 PROBLEM — Z78.9 FULL CODE STATUS: Status: ACTIVE | Noted: 2024-01-01

## 2024-02-19 PROBLEM — J18.9 CAP (COMMUNITY ACQUIRED PNEUMONIA): Status: ACTIVE | Noted: 2024-01-01

## 2024-02-19 PROBLEM — E43 SEVERE PROTEIN-CALORIE MALNUTRITION (HCC): Status: ACTIVE | Noted: 2024-01-01

## 2024-02-19 PROBLEM — R65.20 SEVERE SEPSIS (HCC): Status: ACTIVE | Noted: 2024-01-01

## 2024-02-19 NOTE — ED PROVIDER NOTES
ED Provider Note    CHIEF COMPLAINT  Chief Complaint   Patient presents with    Syncope     Pt found on the floor of his bathroom by EMS. Pt states that he was about to shower then felt dizzy and woke up on the floor. No obvious head trauma noted. -thinners. +LOC. FSBS per . Pt hx of esophageal CA    Shortness of Breath     Pt found to be 85% on RA by EMS. Pt arrived on 5L NC. Pt normally not on O2. Pt denies CP       EXTERNAL RECORDS REVIEWED  Inpatient Notes Recent admission    HPI/ROS  LIMITATION TO HISTORY   Select: : None  OUTSIDE HISTORIAN(S):  Family Wife and Parent Father    Tommy Mckeon is a 58 y.o. male who reports a complex past medical history including esophageal cancer [id number], recent pneumonia and TIA who presents to the ED via EMS after experiencing a syncopal event at home. The patient reports that since he was discharged from the hospital on 1/17/24 he has been very weak, fatigued, and unable to tolerate food or drink well. This morning he began to get ready to take a shower and after getting out of bed and moving a chair into his shower he experienced a syncopal episode with no precipitating event. He denies any associated chest pain, lightheadedness, weakness or any other symptoms just stating that he woke up on the floor. Wife heard the patient fall and immediately went to him. She denies any visible head injury, bleeding or vomit. He states he was admitted to hospital on 1/13/24 after being pulled over for erratic driving and sent to the hospital for concern of possible stoke. During admission the patient did not have an MRI performed due to waiting for kidney function to improve before giving contrast. Patient notes that he last had contact with his oncology team in October of last year when he had his last round of radiation and chemotherapy and has not been able to contact them since but believes they were planning for surgery in the near future. Patient notes that he does feel  "improved since EMS started supplemental O2. He notes current fatigue and generalized weakness but denies any current dizziness, lightheadedness, weakness, change in sensation, change to vision or any other concerns at this time.     PAST MEDICAL HISTORY   has a past medical history of Bowel habit changes (08/31/2023), Cancer (HCC), Diabetes (HCC) (08/31/2023), GERD (gastroesophageal reflux disease), Heart burn, Hiatus hernia syndrome, Hypertension (08/31/2023), Iron deficiency anemia, Obesity (BMI 30-39.9) (04/19/2016), and Pneumonia (1/13/2024).    SURGICAL HISTORY   has a past surgical history that includes eye surgery (01/01/2015); upper gi endoscopy,diagnosis (N/A, 08/18/2023); upper gi endoscopy,biopsy (N/A, 08/18/2023); other (2015); and cath placement (Right, 9/1/2023).    FAMILY HISTORY  Family History   Problem Relation Age of Onset    Diabetes Mother     Stroke Mother        SOCIAL HISTORY  Social History     Tobacco Use    Smoking status: Never     Passive exposure: Past    Smokeless tobacco: Never   Vaping Use    Vaping Use: Never used   Substance and Sexual Activity    Alcohol use: Not Currently    Drug use: No    Sexual activity: Yes     Partners: Female       CURRENT MEDICATIONS  Home Medications       Reviewed by Adarsh Brice (Pharmacy Tech) on 02/19/24 at 1415  Med List Status: Complete     Medication Last Dose Status   acetaminophen (TYLENOL) 500 MG Tab 2/18/2024 Active   DOCUSATE SODIUM PO 2/18/2024 Active   lidocaine (ASPERFLEX) 4 % Patch 2/17/2024 Active   omeprazole (PRILOSEC) 20 MG delayed-release capsule unk Active                    ALLERGIES  No Known Allergies    PHYSICAL EXAM  VITAL SIGNS: /72   Pulse (!) 109   Temp 36.6 °C (97.9 °F) (Temporal)   Resp (!) 22   Ht 1.93 m (6' 4\")   Wt 99.8 kg (220 lb)   SpO2 95%   BMI 26.78 kg/m²      Constitutional: Cachectic, chronically ill appearing, Mild acute distress.  HEENT: Normocephalic, Atraumatic, no swelling, no bleeding, no " visible trauma, external ears normal, pharynx pink,  Mucous  Membranes dry, No rhinorrhea or mucosal edema  Eyes: PERRL, EOMI, Conjunctiva normal, No discharge. No scleral icterus.   Neck: Normal range of motion, No tenderness, Supple, No stridor.   Lymphatic: No lymphadenopathy    Cardiovascular: Regular Rate and Rhythm, No murmurs,  rubs, or gallops. No lower extremity edema.   Thorax & Lungs: Lungs clear to auscultation bilaterally though patient will poor effort and shallow respirations, No respiratory distress, No wheezes, rhales or rhonchi, No chest wall tenderness.   Abdomen: Bowel sounds normal, Soft, non tender, mild to moderate distention,  No pulsatile masses., no rebound guarding or peritoneal signs.   Skin: Warm, Dry, No erythema, No rash,   Back:  No CVA tenderness,  No spinal tenderness, bony crepitance step offs or instability.   Extremities: Equal, intact distal pulses, No cyanosis, clubbing or edema,  No tenderness.   Musculoskeletal: Good range of motion in all major joints. No tenderness to palpation or major deformities noted.   Neurologic: Alert & oriented No focal deficits noted.  Psychiatric: Affect normal, Judgment normal, Mood normal.      DIAGNOSTIC STUDIES / PROCEDURES  EKG  I have independently interpreted this EKG  See below    LABS  Results for orders placed or performed during the hospital encounter of 02/19/24   Lactic Acid   Result Value Ref Range    Lactic Acid 2.4 (H) 0.5 - 2.0 mmol/L   Lactic Acid   Result Value Ref Range    Lactic Acid 4.0 (HH) 0.5 - 2.0 mmol/L   CBC with Differential   Result Value Ref Range    WBC 27.1 (H) 4.8 - 10.8 K/uL    RBC 2.73 (L) 4.70 - 6.10 M/uL    Hemoglobin 7.6 (L) 14.0 - 18.0 g/dL    Hematocrit 23.4 (L) 42.0 - 52.0 %    MCV 85.7 81.4 - 97.8 fL    MCH 27.8 27.0 - 33.0 pg    MCHC 32.5 32.3 - 36.5 g/dL    RDW 55.3 (H) 35.9 - 50.0 fL    Platelet Count 433 164 - 446 K/uL    MPV 10.0 9.0 - 12.9 fL    Neutrophils-Polys 91.00 (H) 44.00 - 72.00 %     Lymphocytes 2.40 (L) 22.00 - 41.00 %    Monocytes 4.50 0.00 - 13.40 %    Eosinophils 0.00 0.00 - 6.90 %    Basophils 0.10 0.00 - 1.80 %    Immature Granulocytes 2.00 (H) 0.00 - 0.90 %    Nucleated RBC 1.30 (H) 0.00 - 0.20 /100 WBC    Neutrophils (Absolute) 24.63 (H) 1.82 - 7.42 K/uL    Lymphs (Absolute) 0.64 (L) 1.00 - 4.80 K/uL    Monos (Absolute) 1.23 (H) 0.00 - 0.85 K/uL    Eos (Absolute) 0.00 0.00 - 0.51 K/uL    Baso (Absolute) 0.04 0.00 - 0.12 K/uL    Immature Granulocytes (abs) 0.54 (H) 0.00 - 0.11 K/uL    NRBC (Absolute) 0.36 K/uL   Complete Metabolic Panel   Result Value Ref Range    Sodium 138 135 - 145 mmol/L    Potassium 3.3 (L) 3.6 - 5.5 mmol/L    Chloride 98 96 - 112 mmol/L    Co2 22 20 - 33 mmol/L    Anion Gap 18.0 (H) 7.0 - 16.0    Glucose 149 (H) 65 - 99 mg/dL    Bun 87 (H) 8 - 22 mg/dL    Creatinine 3.32 (H) 0.50 - 1.40 mg/dL    Calcium 8.8 8.5 - 10.5 mg/dL    Correct Calcium 9.5 8.5 - 10.5 mg/dL    AST(SGOT) 10 (L) 12 - 45 U/L    ALT(SGPT) 10 2 - 50 U/L    Alkaline Phosphatase 59 30 - 99 U/L    Total Bilirubin 0.2 0.1 - 1.5 mg/dL    Albumin 3.1 (L) 3.2 - 4.9 g/dL    Total Protein 6.5 6.0 - 8.2 g/dL    Globulin 3.4 1.9 - 3.5 g/dL    A-G Ratio 0.9 g/dL   ESTIMATED GFR   Result Value Ref Range    GFR (CKD-EPI) 20 (A) >60 mL/min/1.73 m 2   COD - Adult (Type and Screen)   Result Value Ref Range    ABO Grouping Only B     Rh Grouping Only POS     Antibody Screen-Cod NEG     Component R       R99                 Red Cells, LR       [id number]   issued       02/19/24   15:47      Product Type R99     Dispense Status issued     Unit Number (Barcoded) [id number]     Product Code (Barcoded) F2800R80     Blood Type (Barcoded) 7300    CoV-2, FLU A/B, and RSV by PCR (2-4 Hours CEPHEID) : Collect NP swab in VTM    Specimen: Respirate   Result Value Ref Range    Influenza virus A RNA Negative Negative    Influenza virus B, PCR Negative Negative    RSV, PCR Negative Negative    SARS-CoV-2 by PCR NotDetected      SARS-CoV-2 Source NP Swab    TROPONIN   Result Value Ref Range    Troponin T 29 (H) 6 - 19 ng/L   EKG   Result Value Ref Range    Report       [facility] Emergency Dept.    Test Date:  2024  Pt Name:    MIRELLA PIPER                  Department: ER  MRN:        [id number]                      Room:       RD 09  Gender:     Male                         Technician: [id number]  :        1965                   Requested By:ER TRIAGE PROTOCOL  Order #:    [id number]                    Reading MD: ELVIE SHAY MD    Measurements  Intervals                                Axis  Rate:       106                          P:          30  OK:         146                          QRS:        -36  QRSD:       86                           T:          151  QT:         389  QTc:        517    Interpretive Statements  Sinus tachycardia  Left axis deviation  Abnormal lateral Q waves  Probable anterior infarct, age indeterminate  Prolonged QT interval  Baseline wander in lead(s) V2  Compared to ECG 2024 09:16:16  Left-axis deviation now present  Q waves now present  Myocardial infarct finding now present    Electron ically Signed On 2024 13:57:01 PST by ELVIE SHAY MD           RADIOLOGY  I have independently interpreted the diagnostic imaging associated with this visit and am waiting the final reading from the radiologist.   My preliminary interpretation is as follows:CXR right basilar opacification concerning for pneumonia  Radiologist interpretation:   CT-ABDOMEN-PELVIS W/O   Final Result      1.  Large amount of ascites is identified in the abdomen and pelvis.      2.  There is new evidence of moderate hydronephrosis bilaterally.      3.  In addition, an oval-shaped fluid collection is now identified anterior to the left psoas musculature. Urinoma due to disruption of left ureter or renal collecting system is a possibility.      4.  Moderate pleural effusions bilaterally.      5.  Visualized  thoracic esophagus is distended with fluid.      6.  Opacifications in each lung base could be due to edema or inflammation.      CT-HEAD W/O   Final Result         NO ACUTE ABNORMALITIES ARE NOTED ON CT SCAN OF THE HEAD.               DX-CHEST-PORTABLE (1 VIEW)   Final Result         1.  Increase in right basilar opacifications are noted which could be due to discoid atelectasis aspiration or developing pneumonia.      2.  There is interval decrease in opacifications and volume loss in the left lung base.      US-PARACENTESIS, ABD WITH IMAGING    (Results Pending)        COURSE & MEDICAL DECISION MAKING    ED Observation Status? No; Patient does not meet criteria for ED Observation.     INITIAL ASSESSMENT, COURSE AND PLAN  Care Narrative: 58 year old male with a complex past medical history including esophageal cancer, unable to follow up with oncology team for the past 4 months for unknown reasons, and recent admission for TIA vs ALOC, pneuonia, LEO discharged on 1/17/24. Over the past few days patient states he has not been able to tolerate minimal PO intake. Family denies any recent fever, chills, cough. This morning while getting ready to shower, he got out of bed and set a chair in the shower but experienced an unprovoked episode of syncope. Wife heard him fall, denies any head injury, bleeding, vomit, seizure activity, and states that the patient regained consciousness as EMS arrived and was immediately able to answer questions. Patient notes improvement since EMS started supplemental O2.     HYDRATION: Based on the patient's presentation of Dehydration and Sepsis the patient was given IV fluids. IV Hydration was used because oral hydration was not adequate alone. Upon recheck following hydration, the patient was improved.      ADDITIONAL PROBLEM LIST  Esophageal cancer  Thalamic hemorrhage  Hypertension  Acute kidney injury  Chronic renal insufficiency  Pneumonia  Anemia requiring multiple blood  transfusions    DISPOSITION AND DISCUSSIONS    The patient has a leukocytosis his white count is 27.1 he is more anemic than normal hemoglobin is down to 7.6 RDW is 55.3 platelet count is normal at 433.  He is 91% neutrophils and 2.0 immature granulocytes.  Patient's comprehensive metabolic panel is a low potassium of 3.3 anion gap is elevated 18 glucose 149 BUN is 87 creatinine 3.32 which is around the patient's baseline.  Liver function tests are normal.  COVID flu RSV is negative.  Troponin is slightly elevated at 29.  Lactic acid is elevated at 2.4.    Gave the patient IV fluid for the sepsis protocol as well as IV Unasyn for treatment of pneumonia.  I ordered a CT of the head because of his fall and syncope and it did not show any intracranial hemorrhage or mass effect.  CT of the abdomen showed large ascites as well as an oval-shaped fluid collection anterior to the left psoas musculature, uroma due to disruption of the left ureter as a possibility.  The patient also has moderate bilateral pleural effusions and his visualized thoracic esophagus has distention with fluid.  He has opacifications in both of his lung bases that could be pneumonia or edema.    I consented the patient for blood and transfused him a unit of packed red blood cells.    The patient's EKG showed sinus tachycardia with a left axis deviation and nonspecific ST changes and may be a subacute anterior myocardial infarction.  With his elevated troponin this gives him a heart score of 4.       Spoke with the intensivist who accepts the patient to admission to the [facility].  I also spoke with Dr. Dial who will write admission orders for the patient's admission to the [facility].      Blood consent  I have explained to the patient the risks and benefits of transfusion of blood products.  This includes, as appropriate, the risk of mild allergic reaction, hemolytic reaction, transfusion-associated lung injury, febrile reactions, circulatory or iron overload,  and infection.    We discussed possible alternatives and their risks, including directed donation, autologous transfusion, and no transfusion, including IV or oral iron supplementation, as appropriate.  I believe the patient understands the risks and benefits and was able to express understanding.       I have discussed management of the patient with the following physicians and PAIGE's:  intensivist Dr Stone  who will evaluate the pateint for the [facility]  Hospitalist Dr Dial who will admit the patient to the [facility]    Discussion of management with other [facility] or appropriate source(s): Pharmacy regarding antibitic choice      Escalation of care considered, and ultimately not performed: CTA of the chest was considered to rule out pulmonary embolus but his kidney function is too poor to perform this test    Barriers to care at this time, including but not limited to: none.     Decision tools and prescription drugs considered including, but not limited to: Antibiotics unasyn, NIH Stroke Scale 0, and HEART Score 4 .  CRITICAL CARE  The very real possibilty of a deterioration of this patient's condition required the highest level of my preparedness for sudden, emergent intervention.  I provided critical care services, which included medication orders, frequent reevaluations of the patient's condition and response to treatment, ordering and reviewing test results, and discussing the case with various consultants.  The critical care time associated with the care of the patient was 45 minutes. Review chart for interventions. This time is exclusive of any other billable procedures.      FINAL DIAGNOSIS  1. Syncope, unspecified syncope type    2. Leukocytosis, unspecified type    3. SIRS (systemic inflammatory response syndrome) (HCC)    4. Iron deficiency anemia, unspecified iron deficiency anemia type    5. Malignant neoplasm of esophagus, unspecified location (HCC)    6. Pneumonia of both lungs due to infectious organism,  unspecified part of lung    7. Pleural effusion    8. Other ascites           Electronically signed by: Shante Murphy M.D., 2/19/2024 1:30 PM

## 2024-02-19 NOTE — ED NOTES
Medication history reviewed with patient at bedside and patients home pharmacy ([facility] [phone]).   Med rec is complete  Allergies reviewed.   Patient has not had any outpatient antibiotics in the last 30 days.   Anticoagulants: No    Adarsh Brice

## 2024-02-19 NOTE — ED TRIAGE NOTES
"Chief Complaint   Patient presents with    Syncope     Pt found on the floor of his bathroom by EMS. Pt states that he was about to shower then felt dizzy and woke up on the floor. No obvious head trauma noted. -thinners. +LOC. FSBS per . Pt hx of esophageal CA    Shortness of Breath     Pt found to be 85% on RA by EMS. Pt arrived on 5L NC. Pt normally not on O2. Pt denies CP       Pt BIB EMS for above. Pt AOx4, GCS15  /72   Pulse (!) 109   Temp 36.6 °C (97.9 °F) (Temporal)   Resp (!) 22   Ht 1.93 m (6' 4\")   Wt 99.8 kg (220 lb)   SpO2 95%   BMI 26.78 kg/m²     "

## 2024-02-20 NOTE — CONSULTS
Date of Service  February 20, 2024     Reason for Consult: Syncope, SOB, hx of stage III esophageal cancer s/p radiation therapy    Requested by: Dr Dial / Dr Edwards    Location: [facility]    Chief Complaint  Syncope (Pt found on the floor of his bathroom by EMS. Pt states that he was about to shower then felt dizzy and woke up on the floor. No obvious head trauma noted. -thinners. +LOC. FSBS per . Pt hx of esophageal CA) and Shortness of Breath (Pt found to be 85% on RA by EMS. Pt arrived on 5L NC. Pt normally not on O2. Pt denies CP)      HPI: Tommy Mckeon is a 58 y.o. male who presented to ED on 2/19/2024 with shortness of breath and syncope, tachycardia, leukocytosis at 27.1, lactic acid initially at 2.4, increasing to 4.0, Creatinine at 3.3.  Per family found on floor and EMS was called.    This is a pleasant gentleman with a history of Stage III (cT3, cN0, cM0, G3).esophageal cancer of the lower third status post cytoreductive preoperative chemoradiation therapy completed in October 2023, GERD, hypertension, dyslipidemia, and recent thalamic hemorrhage.  In recent month he was also hospitalized with pneumonia, SOB, altered mental status - further complicated by  poor kidney function creatinine around 3. Unable to have MRI brain at the time due to kidney function.    The patient has been followed by Surgical Oncology clinic since previously in 2023. Noted to have malignant neoplasm of lower third of esophagus, with pathology noted adenocarcinoma at the GE junction, without signs of metastatic disease based on CT scans at the time. Patient underwent chemoradiation with 25 fractions managed by Dr Woods, completed around October 2023.    Completed paracentesis yesterday with fluid removed from abdomen. This afternoon the patient is resting supine, wakes up on verbal cue, but appears fatigued. Seems oriented when awake. He denies pain.    Past Medical History  Past Medical History:   Diagnosis Date     Bowel habit changes 08/31/2023    constipation    Cancer (HCC)     esophageal    Diabetes (HCC) 08/31/2023    prediabetic per problem list, pt denies    GERD (gastroesophageal reflux disease)     Heart burn     Hiatus hernia syndrome     Hypertension 08/31/2023    no medication    Iron deficiency anemia     per problem list    Obesity (BMI 30-39.9) 04/19/2016    Pneumonia 1/13/2024       Past Surgical History  Past Surgical History:   Procedure Laterality Date    CATH PLACEMENT Right 9/1/2023    Procedure: RIGHT CEPHALIC PORT PLACEMENT;  Surgeon: Corby Cagle M.D.;  Location: SURGERY [facility];  Service: General    AK UPPER GI ENDOSCOPY,DIAGNOSIS N/A 08/18/2023    Procedure: GASTROSCOPY;  Surgeon: Kale Patel M.D.;  Location: SURGERY SAME DAY [facility];  Service: Gastroenterology    AK UPPER GI ENDOSCOPY,BIOPSY N/A 08/18/2023    Procedure: GASTROSCOPY, WITH BIOPSY;  Surgeon: Kale Patel M.D.;  Location: SURGERY SAME DAY [facility];  Service: Gastroenterology    EYE SURGERY  01/01/2015    OTHER  2015     Allergies:   No Known Allergies    Problem List  Principal Problem:    Severe sepsis (HCC) (POA: Yes)  Active Problems:    Hypertension (Chronic) (POA: Yes)    Dyslipidemia (Chronic) (POA: Yes)    GERD (gastroesophageal reflux disease) (Chronic) (POA: Yes)    Iron deficiency Anemia secondary to acute GI bleeding (POA: Yes)    Malignant neoplasm of lower third of esophagus (HCC) (Chronic) (POA: Yes)      Overview: fb XRT            Is a pleasant 57-year-old male who originally presented with 30 pound       weight loss syncopal episode due to severe anemia and went to the ER from       the urgent care and underwent CT abdomen pelvis August 16, 2023 which       showed moderate hiatal hernia.  Patient had EGD by Dr. Patel 8/18/23       which showed mass lesion in distal esophagus beginning at 34 cm from       incisors going into the stomach cardia 42 cm biopsy showing moderate to       poorly  differentiated invasive adenocarcinoma.  Patient had CT chest       August 18, 2023 which showed hiatal hernia with wall thickening in the       distal thoracic esophagus and portion of herniating stomach which would be       in keeping with the provided clinical history.  Patient had CT pancreas       August 19, 2023 which showed distal esophageal proximal gastric mass       consistent with malignancy.  Patient had PET CT scan August 30, 2023 which       showed heterogeneous hypermetabolic mass in distal esophagus/cardia       consistent with known malignancy patient underwent EUS August 31, 2023 by       Dr. Fenton which showed clinical uT3N0 Siewert 2.  Overall patient doing       well and eating mostly solid foods with little difficulty but does have 30       pound weight loss over the last 3 months.            1. Malignant neoplasm of lower third of esophagus (HCC)  C15.5              Malignant neoplasm of lower third of esophagus (HCC)      Staging form: Esophagus - Adenocarcinoma, AJCC 8th Edition      - Clinical stage from 8/31/2023: Stage III (cT3, cN0, cM0, G3) - Signed by       Michael Woods M.D. on 8/31/2023      Total positive nodes: 0      Histologic grading system: 3 grade system             RECOMMENDATIONS:       We will plan to treat the patient with preoperative chemo-radiotherapy.        The patient has a Stage T3N0M0 esophagus cancer.  We talked about the       general prognosis and treatment rationale.  We discussed our goal to       control the tumor in the thorax, prevent regional recurrence, and hopeful       help to prevent or delay metastatic progression.   We discussed the goal       of facilitating complete surgical resection. We will plan 50.4 Gy in 28       fractions with concurrent chemo per Dr. Triana.              We discussed that for  adenocarcinoma pathologic complete response rate is       29%.             We discussed the treatment planning process and treatments.  We discussed        the risks of treatment at length including lung injury, shortness of       breath, fibrosis, pneumonitis, acute and chronic esophageal injury, heart       injury, spinal cord injury which is rare, skin toxicity, chest wall       injury, pain, dehydration, supplemental oxygen use or dependence, and the       risk of treatment related death.  They would like to proceed forward with       treatment.  We will set up CT simulation for treatment planning imaging       9/6/23 3PM.  That will consist of supine immobilization, possibly IV       contrast depending on kidney function and targeting requirements,       appropriate skin surface marking for radiation treatment.                  Acute respiratory failure with hypoxia (HCC) (POA: Yes)    Thalamic hemorrhage (HCC) (POA: Yes)    CAP (community acquired pneumonia) (POA: Yes)    Severe protein-calorie malnutrition (HCC) (POA: Yes)    Full code status (POA: Yes)    Acute kidney injury superimposed on CKD (HCC) (POA: Yes)    Benign prostatic hyperplasia with incomplete bladder emptying (POA: Yes)    Malignant ascites (POA: Unknown)    Bilateral pleural effusion (POA: Yes)    Elevated troponin (POA: Yes)  Resolved Problems:    * No resolved hospital problems. *       Subjective  Review of Systems   Constitutional:  Positive for malaise/fatigue and weight loss. Negative for chills and fever.   HENT:  Positive for sore throat.    Respiratory:  Positive for shortness of breath. Negative for cough.    Cardiovascular:  Negative for chest pain.   Gastrointestinal:  Positive for constipation, nausea and vomiting. Negative for abdominal pain.        Dysphagia         Objective  Temp:  [36.1 °C (97 °F)-36.9 °C (98.4 °F)] 36.1 °C (97 °F)  Pulse:  [] 89  Resp:  [18-33] 18  BP: (101-137)/(60-88) 113/65  SpO2:  [88 %-100 %] 99 %      Physical Exam  Constitutional:       General: He is not in acute distress.     Appearance: He is cachectic. He is ill-appearing.   HENT:       Head: Normocephalic.   Cardiovascular:      Rate and Rhythm: Normal rate.   Pulmonary:      Effort: Pulmonary effort is normal.      Comments: O2 NC 4L  Skin:     General: Skin is warm and dry.      Coloration: Skin is pale.   Neurological:      Mental Status: He is oriented to person, place, and time.      Comments: Tired appearing        Fluids    Intake/Output Summary (Last 24 hours) at 2/20/2024 1529  Last data filed at 2/20/2024 1000  Gross per 24 hour   Intake 2483.34 ml   Output 701 ml   Net 1782.34 ml         Labs  Lab Results   Component Value Date/Time    SODIUM 138 02/20/2024 02:40 AM    POTASSIUM 3.8 02/20/2024 02:40 AM    CHLORIDE 98 02/20/2024 02:40 AM    CO2 22 02/20/2024 02:40 AM    GLUCOSE 133 (H) 02/20/2024 02:40 AM    BUN 88 (H) 02/20/2024 02:40 AM    CREATININE 3.26 (H) 02/20/2024 02:40 AM         Lab Results   Component Value Date/Time    PROTHROMBTM 15.8 (H) 02/19/2024 01:01 PM    INR 1.24 (H) 02/19/2024 01:01 PM         Lab Results   Component Value Date/Time    WBC 26.0 (H) 02/20/2024 02:40 AM    RBC 2.88 (L) 02/20/2024 02:40 AM    HEMOGLOBIN 7.9 (L) 02/20/2024 02:40 AM    HEMATOCRIT 24.9 (L) 02/20/2024 02:40 AM    MCV 86.5 02/20/2024 02:40 AM    MCH 27.4 02/20/2024 02:40 AM    MCHC 31.7 (L) 02/20/2024 02:40 AM    MPV 9.7 02/20/2024 02:40 AM    NEUTSPOLYS 87.00 (H) 02/20/2024 02:40 AM    LYMPHOCYTES 4.00 (L) 02/20/2024 02:40 AM    MONOCYTES 5.70 02/20/2024 02:40 AM    EOSINOPHILS 0.00 02/20/2024 02:40 AM    BASOPHILS 0.10 02/20/2024 02:40 AM    HYPOCHROMIA 1+ 10/12/2023 09:23 AM    ANISOCYTOSIS 1+ 10/12/2023 09:23 AM         Recent Labs     02/19/24  1301 02/20/24  0240   ASTSGOT 10* 15   ALTSGPT 10 7   TBILIRUBIN 0.2 0.3   GLOBULIN 3.4 3.2   INR 1.24*  --         Imaging  CT ABDOMEN W/O (2/19/24)  IMPRESSION:     1.  Large amount of ascites is identified in the abdomen and pelvis.     2.  There is new evidence of moderate hydronephrosis bilaterally.     3.  In addition, an oval-shaped fluid  collection is now identified anterior to the left psoas musculature. Urinoma due to disruption of left ureter or renal collecting system is a possibility.     4.  Moderate pleural effusions bilaterally.     5.  Visualized thoracic esophagus is distended with fluid.     6.  Opacifications in each lung base could be due to edema or inflammation.    CT HEAD (2/19/24)  IMPRESSION:     NO ACUTE ABNORMALITIES ARE NOTED ON CT SCAN OF THE HEAD.       Assessment and Plan  Patient is a 58M admitted shortness of breath and syncope, tachycardia, leukocytosis at 27.1, lactic acid initially at 2.4, increasing to 4.0, Creatinine at 3.3.  He has a known history of stage III esophageal adenocarcinoma of the lower third status post cytoreductive preoperative chemoradiation therapy completed in October 2023. In recent month also hospitalized with pneumonia, SOB, altered mental status. Concern progression of initial malignancy, but incomplete information at this point due to limited imaging due to kidney function, and also complicated by delay in follow up following recent chemoradiation therapy.    Plan  Dr Cagle with Surgical Oncology to further assess patient on 2/21/24    Discussed patient status with Dr Edwards

## 2024-02-20 NOTE — ASSESSMENT & PLAN NOTE
Likely obstructive etiology  2/24 Creatinine:2.83 <2.45 <2.73 <3.24 <3.26 <3.32  Jules catheter placed 2/21pm  Patient previously had urinary retention.  CT showed hydronephrosis and urinoma.   Appreciate Urology input. Withongoing hydronephrosis on repeat 2/24 CT, urology will place stents 2/25  Flomax  IV fluids  Monitor I/O's, labs, and vitals.

## 2024-02-20 NOTE — PROGRESS NOTES
Hospital Medicine Daily Progress Note    Date of Service  2/20/2024    Chief Complaint  Syncope and short of breath    Hospital Course  Tommy Mckeon is a 58 y.o. male with known esophageal cancer stage IIII s/p radiation tx 11/2023, HTN, DLD, recent thalamic hemorrhage. He was admitted 2/19/2024 with syncope.  He was found to be in acute renal failure with a Cr:3.3 and has anasarca.  During his hospitalization he had a paracentesis with 10.3L removed. He was noted to be more anemic than prior.    Interval Problem Update  2/20:  Had 10.3 L removed w/ paracentesis. Was on 12 L overnight, now down to 5L NC.  Has old blood in his mouth that he keeps coughing up.  Denies melena.  I have consulted Dr Willis, GI, and surgical oncologist.  Wife at patient's bedside.    I have discussed this patient's plan of care and discharge plan at IDT rounds today with Case Management, Nursing, Nursing leadership, and other members of the IDT team.    Consultants/Specialty  GI and Surgical Oncology    Code Status  DNAR/DNI    Disposition  The patient is not medically cleared for discharge to home or a post-acute facility.      I have placed the appropriate orders for post-discharge needs.    Review of Systems  Review of Systems   Constitutional:  Positive for malaise/fatigue. Negative for fever.   HENT:  Positive for sore throat.    Respiratory:  Positive for cough, hemoptysis and shortness of breath.    Cardiovascular:  Positive for leg swelling. Negative for chest pain.   Gastrointestinal:  Negative for abdominal pain, blood in stool, melena and nausea.   Genitourinary:  Negative for dysuria.   Musculoskeletal:  Negative for back pain.   Neurological:  Positive for headaches.   Psychiatric/Behavioral:  The patient is not nervous/anxious.    All other systems reviewed and are negative.       Physical Exam  Temp:  [36.1 °C (97 °F)-36.6 °C (97.8 °F)] 36.1 °C (97 °F)  Pulse:  [] 86  Resp:  [18-29] 18  BP: (102-124)/(62-84)  115/68  SpO2:  [88 %-100 %] 98 %    Physical Exam  Vitals reviewed.   Constitutional:       Appearance: He is ill-appearing.   HENT:      Head: Normocephalic and atraumatic.      Nose: Nose normal.      Mouth/Throat:      Pharynx: Oropharyngeal exudate (dark old digested blood in mouth) present.   Eyes:      General:         Right eye: No discharge.         Left eye: No discharge.      Extraocular Movements: Extraocular movements intact.   Cardiovascular:      Rate and Rhythm: Normal rate and regular rhythm.      Heart sounds: No murmur heard.  Pulmonary:      Effort: No respiratory distress.      Breath sounds: Decreased breath sounds present.   Abdominal:      General: Bowel sounds are normal. There is distension.      Palpations: Abdomen is soft.      Tenderness: There is no abdominal tenderness.   Musculoskeletal:      Cervical back: Tenderness present.      Right lower leg: Edema present.      Left lower leg: Edema present.   Lymphadenopathy:      Cervical: Cervical adenopathy present.   Skin:     General: Skin is dry.   Neurological:      General: No focal deficit present.      Mental Status: He is alert and oriented to person, place, and time.   Psychiatric:         Mood and Affect: Mood normal.         Speech: Speech normal.         Behavior: Behavior is slowed.         Fluids    Intake/Output Summary (Last 24 hours) at 2/20/2024 2009  Last data filed at 2/20/2024 1700  Gross per 24 hour   Intake 679.39 ml   Output 851 ml   Net -171.61 ml       Laboratory  Recent Labs     02/19/24  1301 02/20/24  0240   WBC 27.1* 26.0*   RBC 2.73* 2.88*   HEMOGLOBIN 7.6* 7.9*   HEMATOCRIT 23.4* 24.9*   MCV 85.7 86.5   MCH 27.8 27.4   MCHC 32.5 31.7*   RDW 55.3* 54.2*   PLATELETCT 433 371   MPV 10.0 9.7     Recent Labs     02/19/24  1301 02/20/24  0240   SODIUM 138 138   POTASSIUM 3.3* 3.8   CHLORIDE 98 98   CO2 22 22   GLUCOSE 149* 133*   BUN 87* 88*   CREATININE 3.32* 3.26*   CALCIUM 8.8 8.5     Recent Labs      02/19/24  1301 02/20/24  0240   APTT  --  28.8   INR 1.24*  --                Imaging  US-PARACENTESIS, ABD WITH IMAGING   Final Result      1. Ultrasound-guided therapeutic paracentesis of the right lower quadrant of the abdominal wall.      2. 10.3 mL of fluid withdrawn.      DX-CHEST-LIMITED (1 VIEW)   Final Result      Stable appearance of the chest.      CT-ABDOMEN-PELVIS W/O   Final Result      1.  Large amount of ascites is identified in the abdomen and pelvis.      2.  There is new evidence of moderate hydronephrosis bilaterally.      3.  In addition, an oval-shaped fluid collection is now identified anterior to the left psoas musculature. Urinoma due to disruption of left ureter or renal collecting system is a possibility.      4.  Moderate pleural effusions bilaterally.      5.  Visualized thoracic esophagus is distended with fluid.      6.  Opacifications in each lung base could be due to edema or inflammation.      CT-HEAD W/O   Final Result         NO ACUTE ABNORMALITIES ARE NOTED ON CT SCAN OF THE HEAD.               DX-CHEST-PORTABLE (1 VIEW)   Final Result         1.  Increase in right basilar opacifications are noted which could be due to discoid atelectasis aspiration or developing pneumonia.      2.  There is interval decrease in opacifications and volume loss in the left lung base.      US-THORACENTESIS PUNCTURE RIGHT    (Results Pending)        Assessment/Plan  * Severe sepsis (HCC)- (present on admission)  Assessment & Plan  Aspiration vs active infection  Follow up on ascites fluid evaluation  Mildly elevated procalcitonin  Continue Unasyn for now.  Wean IV fluids  Monitor I/O's, labs, and vitals.    Elevated troponin- (present on admission)  Assessment & Plan  Initial troponin mildly elevated at 29.  Concerning for demand ischemia in setting of mild ST depression in V2 and V3.  No active chest pain.  Continue to trend  Continuous cardiac monitoring to monitor for arrhythmias    Bilateral  pleural effusion- (present on admission)  Assessment & Plan  As per CT scan.  Suspect hypoalbuminemia contribute.  Cannot rule out malignant pleural effusion.    I have ordered right-sided thoracentesis.  Consider early diuresis following stabilization from sepsis.    Malignant ascites  Assessment & Plan  Presumed malignant due to history of stage III esophageal cancer.  Status post chemoradiation therapy preoperatively for cytoreduction.  No significant of liver disease or alcohol.  2/20 abdominal paracentesis with 10.3L removed and albumin given  Await ascites cytology review     Benign prostatic hyperplasia with incomplete bladder emptying- (present on admission)  Assessment & Plan  Refused flomax per RN  Monitor for urinary retention with prn bladder scan  Cath if needed    Acute kidney injury superimposed on CKD (HCC)- (present on admission)  Assessment & Plan  2/20 Creatinine: 3.26 <3.32  Patient previously had urinary retention.  Suspect prerenal due to dehydration.  IV fluids  Monitor I/O's, labs, and vitals.    Full code status  Assessment & Plan  I discussed the CODE STATUS with the patient.  He wishes to be full code.    Severe protein-calorie malnutrition (HCC)- (present on admission)  Assessment & Plan  Body mass index is 26.75 kg/m².    Patient notably had BMI of 33 during his last hospitalization.  He has significant subcutaneous tissue and muscle wasting, bitemporal wasting and periorbital wasting.  Due to poor oral intake, inpart due to esophageal cancer  Discussed possible future surgical G-tube if unable to get enough fluids/calories orally    Nutrition consult  Start mirtazapine 15 mg nightly      CAP (community acquired pneumonia)- (present on admission)  Assessment & Plan  As per chest x-ray.  Concerning for right greater than left lower lobe infiltrate.    Unasyn for gram-negative coverage and azithromycin atypical coverage      Thalamic hemorrhage (HCC)- (present on admission)  Assessment &  Plan  As per history during recent hospitalization.  Patient was supposed to arrange outpatient MRI to evaluate for metastatic brain tumor once his renal function improved.  Consider MRI of the brain with contrast once medically stabilized  Stopping heparin subcutaneous  SCDs    Acute respiratory failure with hypoxia (HCC)- (present on admission)  Assessment & Plan  Secondary to bilateral pleural effusions, question if aspiration vs infection  Continue oxygen as per respiratory protocol  Continuous pulse oximetry.  Respiratory therapy consult.  Duo nebs every 4 hours needed.  Incentive spirometry.  PEP therapy  Early diuresis following stabilization  Paracentesis 2/20 with 10.3 liters removed.      Malignant neoplasm of lower third of esophagus (HCC)- (present on admission)  Assessment & Plan  As per history.  Stage III (cT3, cN0, cM0, G3).  Status post cytoreductive preoperative chemo radiation therapy.  Completed in October 2023.  2/20 I have consulted Dr. Rangel in terms of timing of surgery and he has requested a repeat staging due to new ascites/anasarca and concern of spread.  Followed by Dr. Triana of [facility].  May need future G-tube for additional calories.  Wife and patient to discuss Hospice if surgery not an option.    Iron deficiency Anemia secondary to acute GI bleeding- (present on admission)  Assessment & Plan  As per history.  Ferritin of 16 in December 2023.  2/19 Patient given transfusion 1 unit PRBC in the emergency room.  iron panel studies  Concern of esophageal bleed.    2/20 I have consulted gi and they do not feel need to perform EGD but agree with my order for PPI IV BID  Monitor labs      GERD (gastroesophageal reflux disease)- (present on admission)  Assessment & Plan  As per history.  IV PPI  GI cocktail prn     Dyslipidemia- (present on admission)  Assessment & Plan  As per history.    Hypertension- (present on admission)  Assessment & Plan  Monitor vitals.          VTE prophylaxis:   SCDs/TEDs

## 2024-02-20 NOTE — CARE PLAN
Problem: Urinary - Renal Perfusion  Goal: Ability to achieve and maintain adequate renal perfusion and functioning will improve  Outcome: Progressing     Problem: Respiratory  Goal: Patient will achieve/maintain optimum respiratory ventilation and gas exchange  Outcome: Progressing     Problem: Physical Regulation  Goal: Diagnostic test results will improve  Outcome: Progressing     Problem: Pain - Standard  Goal: Alleviation of pain or a reduction in pain to the patient’s comfort goal  Outcome: Progressing   The patient is Watcher - Medium risk of patient condition declining or worsening    Shift Goals  Clinical Goals: Monitor Vitals, precent falls  Patient Goals: rest  Family Goals: liban    Progress made toward(s) clinical / shift goals:  Stable BP, HR, no syncope    Patient is not progressing towards the following goals:Increased O2 demands

## 2024-02-20 NOTE — ASSESSMENT & PLAN NOTE
As per chest x-ray.  Concerning for right greater than left lower lobe effusion.  Unasyn for gram-negative coverage and azithromycin atypical coverage  pleural effusion culture no growth to date  Light's criteria med for exudative effusion (malignancy has not been ruled out)

## 2024-02-20 NOTE — DIETARY
"Nutrition services: Day 1 of admit.  Tommy Mckeon is a 58 y.o. male with admitting DX of severe sepsis.   Consult received for failure to thrive.     RD able to visit pt and spouse at bed side. Pt reports eating <50% of normal for x2 weeks now. Pt reports wt has been trending down since starting chemo & radiation treatment in Aug/Sept. Pt states UBW of 250 lbs. Pt wt hx with a 3 year gap. Using pt report, pt is with a -12.4% wt loss in ~4 months. Wt loss is severe. Per NFPE pt is with severe muscle and fat wasting in the temporal, orbital, masseter, clavicle and interosseous regions. RD offered clear supplements to encourage nutrient needs being met, pt accepted. Pt and spouse report no breakfast tray being delivered, wife just received jello from nursing station. RD stated the missed delivery may have been related to the patients diet changing. RD encouraged pt to reach out to care team if that happens again.    Assessment:  Height: 193 cm (6' 4\")  Weight: 99.7 kg (219 lb 12.8 oz)  Body mass index is 26.75 kg/m²., BMI classification: Overweight  Diet/Intake: Clear liquid    Wt Readings from Last 2 Encounters:   02/20/24 99.7 kg (219 lb 12.8 oz)   01/24/24 102 kg (224 lb 12.8 oz)     Evaluation:   Hx of Stage III (cT3, cN0, cM0, G3).esophageal cancer of the lower third status post cytoreductive preoperative chemoradiation therapy completed in October 2023, GERD, hypertension, dyslipidemia, and recent thalamic hemorrhage.   Labs: Glu 133, Bun 88, Creatinine 3.26, GFR 21  Meds: remeron, bowel regimen, lasix  Skin: 1+ RLE and LLE.   +BM 2/17    Malnutrition Risk: Per ASPEN guidelines, pt meets criteria for severe malnutrition in the context of chronic illness related to cancer treatment as evidence by pt eating <50% for x2 weeks with severe muscle and fat wasting in multiple regions and a reported -12.4% wt loss in <6 months.      Recommendations/Plan:  Ensure Clear TID to provide an additional 720 kcal and 24 g " protein per day.   Encourage intake of >50%.   Document intake of all PO as % taken in ADL's to provide interdisciplinary communication across all shifts.   Monitor weight.  Nutrition rep will continue to see patient for ongoing meal and snack preferences.     RD following.

## 2024-02-20 NOTE — H&P
Hospital Medicine History & Physical Note    Date of Service  2/19/2024    Primary Care Physician  Reynaldo Robins M.D.    Consultants  none    Specialist Names: None    Code Status  DNAR/DNI    Chief Complaint  Chief Complaint   Patient presents with    Syncope     Pt found on the floor of his bathroom by EMS. Pt states that he was about to shower then felt dizzy and woke up on the floor. No obvious head trauma noted. -thinners. +LOC. FSBS per . Pt hx of esophageal CA    Shortness of Breath     Pt found to be 85% on RA by EMS. Pt arrived on 5L NC. Pt normally not on O2. Pt denies CP       History of Presenting Illness  Tommy Mckeon is a 58 y.o. male who presented 2/19/2024 with shortness of breath and syncope.  This is a pleasant gentleman with a history of Stage III (cT3, cN0, cM0, G3).esophageal cancer of the lower third status post cytoreductive preoperative chemoradiation therapy completed in October 2023, GERD, hypertension, dyslipidemia, and recent thalamic hemorrhage.    Patient reports that approximately 4 to 5 weeks ago, he was pulled over by the [place] police when he was found to be incoherent and was brought to the emergency room.  He was diagnosed with a pneumonia at that time and head CT showed a blood hemorrhage.  He was subsequently discharged.  Subsequently feeling increasing fatigue states that he wanted to maybe go to the emergency room to look for oxygen due to his shortness of breath.  In preparation, he went into the shower and found himself laying on the floor.  He had passed out and his wife called EMS.  Patient reports that he has noticed constipation as well as distention of his abdomen for the past few days.  Denies any significant alcohol history.  Does not smoke.  No known history of hepatitis.  His appetite has been poor.    In the emergency room, patient was tachycardic heart rate of 100s, tachypneic respiratory rate of 22.  White count was elevated 27.1 with a left shift.   Lactic acid initially 2.4 increasing to 4.0.  Started on sepsis bolus protocol.  Creatinine elevated at 3.32  Previously 2.89.    I reviewed the patient's discharge summary from is hospitalization from 1/13/2024 to 1/17/2024:  Patient presented with altered mental status with some mild dysphagia and confusion.  He had pneumonia started on IV antibiotics.  MRI showing small thalamic hemorrhage.  Neurology consulted and recommended hypertension control as well as MRI of the brain to rule out underlying mass given history of esophageal cancer.  Discharged with blood pressure medications.  Noted to have acute kidney injury due to urinary retention improved with Jules catheter.  Creatinine baseline 3 on time of discharge.  Started on tamsulosin.  He was supposed to follow-up with his primary care provider to coordinate MRI of the brain with contrast if kidney function improved.    I discussed the plan of care with patient, family, bedside RN, and  .    Review of Systems  Review of Systems   Constitutional:  Positive for malaise/fatigue. Negative for chills and fever.   HENT:  Positive for sore throat. Negative for ear pain.    Eyes:  Negative for blurred vision and double vision.   Respiratory:  Positive for cough and shortness of breath.    Cardiovascular:  Negative for chest pain and palpitations.   Gastrointestinal:  Positive for constipation, nausea and vomiting. Negative for abdominal pain and diarrhea.   Genitourinary:  Negative for dysuria and frequency.   Musculoskeletal:  Negative for joint pain and myalgias.   Skin:  Negative for itching and rash.   Neurological:  Positive for weakness. Negative for dizziness and headaches.   Endo/Heme/Allergies:  Does not bruise/bleed easily.   Psychiatric/Behavioral:  Negative for memory loss. The patient is not nervous/anxious.        Past Medical History   has a past medical history of Bowel habit changes (08/31/2023), Cancer (HCC), Diabetes (HCC)  (08/31/2023), GERD (gastroesophageal reflux disease), Heart burn, Hiatus hernia syndrome, Hypertension (08/31/2023), Iron deficiency anemia, Obesity (BMI 30-39.9) (04/19/2016), and Pneumonia (1/13/2024).    Surgical History   has a past surgical history that includes eye surgery (01/01/2015); pr upper gi endoscopy,diagnosis (N/A, 08/18/2023); pr upper gi endoscopy,biopsy (N/A, 08/18/2023); other (2015); and cath placement (Right, 9/1/2023).     Family History  family history includes Diabetes in his mother; Stroke in his mother.   Family history reviewed with patient. There is no family history that is pertinent to the chief complaint.     Social History   reports that he has never smoked. He has been exposed to tobacco smoke. He has never used smokeless tobacco. He reports that he does not currently use alcohol. He reports that he does not use drugs.    Allergies  No Known Allergies    Medications  Prior to Admission Medications   Prescriptions Last Dose Informant Patient Reported? Taking?   DOCUSATE SODIUM PO 2/18/2024 at pm Patient Yes Yes   Sig: Take 100 mg by mouth 1 time a day as needed for Constipation.   acetaminophen (TYLENOL) 500 MG Tab 2/18/2024 at pm Patient Yes Yes   Sig: Take 1,500 mg by mouth 1 time a day as needed for Mild Pain. 3 tablets=1500mg   lidocaine (ASPERFLEX) 4 % Patch 2/17/2024 at am Patient Yes Yes   Sig: Place 1 Patch on the skin every 24 hours.   omeprazole (PRILOSEC) 20 MG delayed-release capsule unk at [facility] Patient, Patient's Home Pharmacy No No   Sig: Take 1 Capsule by mouth 2 times a day.      Facility-Administered Medications: None       Physical Exam  Temp:  [36.6 °C (97.9 °F)-36.9 °C (98.4 °F)] 36.8 °C (98.2 °F)  Pulse:  [] 101  Resp:  [19-22] 19  BP: (101-137)/(60-88) 123/76  SpO2:  [90 %-95 %] 92 %  Blood Pressure: 123/77   Temperature: 36.8 °C (98.2 °F)   Pulse: 93   Respiration: 19   Pulse Oximetry: 92 %       Physical Exam  Vitals and nursing note reviewed. Exam  conducted with a chaperone present.   Constitutional:       General: He is not in acute distress.     Appearance: He is cachectic. He is ill-appearing. He is not toxic-appearing or diaphoretic.   HENT:      Head: Normocephalic and atraumatic.      Right Ear: External ear normal.      Left Ear: External ear normal.      Nose: Nose normal.      Mouth/Throat:      Mouth: Mucous membranes are moist.      Pharynx: No oropharyngeal exudate or posterior oropharyngeal erythema.   Eyes:      General:         Right eye: No discharge.         Left eye: No discharge.      Conjunctiva/sclera: Conjunctivae normal.      Pupils: Pupils are equal, round, and reactive to light.   Cardiovascular:      Rate and Rhythm: Normal rate and regular rhythm.      Pulses: Normal pulses.      Heart sounds: Normal heart sounds. No murmur heard.     No gallop.   Pulmonary:      Effort: Pulmonary effort is normal.      Breath sounds: Rales present. No wheezing or rhonchi.      Comments: Decreased breath sounds bilaterally  Abdominal:      General: Abdomen is flat. Bowel sounds are normal. There is distension.      Palpations: Abdomen is soft.      Tenderness: There is no abdominal tenderness.   Musculoskeletal:         General: No tenderness.      Cervical back: Neck supple. No tenderness.      Right lower leg: No edema.      Left lower leg: No edema.   Lymphadenopathy:      Cervical: No cervical adenopathy.   Skin:     General: Skin is warm and dry.      Coloration: Skin is pale.      Findings: No erythema.   Neurological:      Mental Status: He is oriented to person, place, and time. He is lethargic.      Sensory: No sensory deficit.      Motor: No weakness.   Psychiatric:         Behavior: Behavior normal.         Thought Content: Thought content normal.         Judgment: Judgment normal.         Laboratory:  Recent Labs     02/19/24  1301   WBC 27.1*   RBC 2.73*   HEMOGLOBIN 7.6*   HEMATOCRIT 23.4*   MCV 85.7   MCH 27.8   MCHC 32.5   RDW 55.3*    PLATELETCT 433   MPV 10.0     Recent Labs     02/19/24  1301   SODIUM 138   POTASSIUM 3.3*   CHLORIDE 98   CO2 22   GLUCOSE 149*   BUN 87*   CREATININE 3.32*   CALCIUM 8.8     Recent Labs     02/19/24  1301   ALTSGPT 10   ASTSGOT 10*   ALKPHOSPHAT 59   TBILIRUBIN 0.2   GLUCOSE 149*     Recent Labs     02/19/24  1301   INR 1.24*     Recent Labs     02/19/24  1301   NTPROBNP 523*         Recent Labs     02/19/24  1301   TROPONINT 29*       Imaging:  CT-ABDOMEN-PELVIS W/O   Final Result      1.  Large amount of ascites is identified in the abdomen and pelvis.      2.  There is new evidence of moderate hydronephrosis bilaterally.      3.  In addition, an oval-shaped fluid collection is now identified anterior to the left psoas musculature. Urinoma due to disruption of left ureter or renal collecting system is a possibility.      4.  Moderate pleural effusions bilaterally.      5.  Visualized thoracic esophagus is distended with fluid.      6.  Opacifications in each lung base could be due to edema or inflammation.      CT-HEAD W/O   Final Result         NO ACUTE ABNORMALITIES ARE NOTED ON CT SCAN OF THE HEAD.               DX-CHEST-PORTABLE (1 VIEW)   Final Result         1.  Increase in right basilar opacifications are noted which could be due to discoid atelectasis aspiration or developing pneumonia.      2.  There is interval decrease in opacifications and volume loss in the left lung base.      US-PARACENTESIS, ABD WITH IMAGING    (Results Pending)   US-THORACENTESIS PUNCTURE RIGHT    (Results Pending)         CT scan of the abdomen per my review shows significant amount of abdominal ascites as well as bilateral pleural effusions right greater than left.  Concerning for fluid overload.        Chest x-ray per my review shows right lower lobe infiltrate concerning for pneumonia.      EKG per my review shows normal sinus rhythm with heart rate of 106, QTc prolonged at 517, mild ST depression in V2 and  V3.    Assessment/Plan:  Justification for Admission Status  I anticipate this patient will require at least two midnights for appropriate medical management, necessitating inpatient admission because severe sepsis requiring aggressive fluid resuscitation with antibiotics.      Patient will need a Intermediate Care (Adult and Pediatrics) bed on MEDICAL service .  The need is secondary to severe sepsis. Clinically unstable may need IV pressors and escalation to intensive care unit.    * Severe sepsis (HCC)- (present on admission)  Assessment & Plan  This is Sepsis Present on admission  SIRS criteria identified on my evaluation include: Tachycardia, with heart rate greater than 90 BPM, Tachypnea, with respirations greater than 20 per minute, and Leukocytosis, with WBC greater than 12,000  Clinical indicators of end organ dysfunction include Lactic Acid greater than 2  Source is pneumonia  Sepsis protocol initiated  Crystalloid Fluid Administration: Fluid resuscitation ordered per standard protocol - 30 mL/kg per current or ideal body weight  IV antibiotics as appropriate for source of sepsis: Unasyn and azithromycin  Reassessment: I have reassessed the patient's hemodynamic status    Patient will be admitted to the intermediate care unit.  Lactic acid noted to be 4.0 during IV fluid administration.    Low threshold for IV pressors as well as consideration for transfer to the intensive care unit.    Bilateral pleural effusion- (present on admission)  Assessment & Plan  As per CT scan.  Suspect hypoalbuminemia contribute.  Cannot rule out malignant pleural effusion.    I have ordered right-sided thoracentesis.  Consider early diuresis following stabilization from sepsis.    Malignant ascites  Assessment & Plan  Presumed malignant due to history of stage III esophageal cancer.  Status post chemoradiation therapy preoperatively for cytoreduction.  No significant of liver disease or alcohol.    I have ordered abdominal  paracentesis.  Patient will need cytology review as well.    Acute kidney injury superimposed on CKD (HCC)- (present on admission)  Assessment & Plan  Creatinine elevated at 3.32  Previously 2.89.  Patient previously had urinary retention.  Bladder does not appear to be distended per my review of the abdominal and pelvic CT.    Continue IV fluids    CAP (community acquired pneumonia)- (present on admission)  Assessment & Plan  As per chest x-ray.  Concerning for right greater than left lower lobe infiltrate.    Unasyn for gram-negative coverage and azithromycin atypical coverage      Acute respiratory failure with hypoxia (HCC)- (present on admission)  Assessment & Plan  Secondary to bilateral pleural effusions and commune acquired pneumonia.    Continue oxygen as per respiratory protocol.  Continuous pulse oximetry.  Respiratory therapy consult.  Duo nebs every 4 hours needed.  Incentive spirometry.  PEP therapy  Early diuresis following stabilization      Malignant neoplasm of lower third of esophagus (HCC)- (present on admission)  Assessment & Plan  As per history.  Stage III (cT3, cN0, cM0, G3).  Status post cytoreductive preoperative chemo radiation therapy.  Completed in October 2023.  Apparently has not had good follow-up with Dr. Rangel in terms of timing of surgery.  Followed by Dr. [facility]ana [facility].    Consider discussion with Dr. Rangel and palliative care consultation    Elevated troponin- (present on admission)  Assessment & Plan  Initial troponin mildly elevated at 29.  Concerning for demand ischemia in setting of mild ST depression in V2 and V3.  No active chest pain.  Continue to trend  Continuous cardiac monitoring to monitor for arrhythmias    Thalamic hemorrhage (HCC)- (present on admission)  Assessment & Plan  As per history during recent hospitalization.    Patient was supposed to arrange outpatient MRI to evaluate for metastatic brain tumor once his renal function  improved.  Consider MRI of the brain with contrast once medically stabilized    Iron deficiency Anemia secondary to acute GI bleeding- (present on admission)  Assessment & Plan  As per history.  Ferritin of 16 in December 2023.    Patient is undergoing transfusion 1 unit PRBC in the emergency room.  I have reordered iron panel studies  Work-up with ferritin, iron panel, reticulocyte panel, B12, folate, LDH, TSH, stool occult blood x3.      GERD (gastroesophageal reflux disease)- (present on admission)  Assessment & Plan  As per history.    Dyslipidemia- (present on admission)  Assessment & Plan  As per history.    Hypertension- (present on admission)  Assessment & Plan  As per history.  Patient's blood pressure is currently stable.  No longer requiring blood pressure medications likely due to severe protein calorie malnutrition.    Benign prostatic hyperplasia with incomplete bladder emptying- (present on admission)  Assessment & Plan  As per history.    Continue home tamsulosin    Full code status- (present on admission)  Assessment & Plan  I discussed the CODE STATUS with the patient.  He wishes to be full code.    Severe protein-calorie malnutrition (HCC)- (present on admission)  Assessment & Plan  Body mass index is 26.78 kg/m².    Patient notably had BMI of 33 during his last hospitalization.  He has significant subcutaneous tissue and muscle wasting, bitemporal wasting and periorbital wasting.  Due to poor oral intake.    Nutrition consult  Start mirtazapine 15 mg nightly          VTE prophylaxis: SCDs/TEDs and enoxaparin ppx

## 2024-02-20 NOTE — ASSESSMENT & PLAN NOTE
As per history.  Ferritin of 16 in December 2023.  2/19 Patient given transfusion 1 unit PRBC in the emergency room.  2/21 Ordered for 1 unit pRBC's for hgb:7.1.  iron panel studies  Concern of esophageal bleed.    2/20 I have consulted gi and they do not feel need to perform EGD but agree with my order for PPI IV BID  Monitor labs  2/24 Hgb:8.9<9.3

## 2024-02-20 NOTE — CONSULTS
Date of Consultation:  2/20/2024    Patient: : Tommy Mckeon  MRN: [id number]    Referring Physician:  Dr. Vasiliy Edwards. DO     GI:Micheline Willis M.D.     Reason for Consultation: Esophageal cancer, upper GI bleeding.     History of Present Illness: The patient is a 58 years old gentleman with medical history of esophageal cancer diagnosed in late 2023 by this GI team, completed CCRT before possible surgery, currently waiting for surgical consultation for his esophageal cancer.  Patient presented to the inpatient GI service for upper GI bleeding and also ascites formation.    GI team saw the patient at bedside, the patient was tired, intermittently fell into sleep while we talk to him.  Patient's wife was at bedside to provide information.    No previous surgery chemo and radiation therapy were completed about 4 months ago.  The patient still waiting for surgical consult.    GI bleeding seems slowed down when we visited patient.  Also we reviewed the CAT scan finding to share that the distal esophagus have some narrowing from the tumor, the patient should be aware the risk of aspiration.        Past Medical History:   Diagnosis Date    Pneumonia 1/13/2024    Hypertension 08/31/2023    no medication    Bowel habit changes 08/31/2023    constipation    Diabetes (HCC) 08/31/2023    prediabetic per problem list, pt denies    Obesity (BMI 30-39.9) 04/19/2016    Cancer (HCC)     esophageal    GERD (gastroesophageal reflux disease)     Heart burn     Hiatus hernia syndrome     Iron deficiency anemia     per problem list         Past Surgical History:   Procedure Laterality Date    CATH PLACEMENT Right 9/1/2023    Procedure: RIGHT CEPHALIC PORT PLACEMENT;  Surgeon: Corby Cagle M.D.;  Location: SURGERY [facility];  Service: General    CA UPPER GI ENDOSCOPY,DIAGNOSIS N/A 08/18/2023    Procedure: GASTROSCOPY;  Surgeon: Kale Patel M.D.;  [facility];  Service: Gastroenterology    CA  UPPER GI ENDOSCOPY,BIOPSY N/A 08/18/2023    Procedure: GASTROSCOPY, WITH BIOPSY;  Surgeon: Kale Patel M.D.;  Location: SURGERY [facility];  Service: Gastroenterology    EYE SURGERY  01/01/2015    OTHER  2015       Family History   Problem Relation Age of Onset    Diabetes Mother     Stroke Mother        Social History     Socioeconomic History    Marital status:    Tobacco Use    Smoking status: Never     Passive exposure: Past    Smokeless tobacco: Never   Vaping Use    Vaping Use: Never used   Substance and Sexual Activity    Alcohol use: Not Currently    Drug use: No    Sexual activity: Yes     Partners: Female   Social History Narrative    Works in security            Physical Exam:  Vitals:    02/20/24 0630 02/20/24 0700 02/20/24 0800 02/20/24 0900   BP: 109/78 102/72 123/82 122/81   Pulse: 99 (!) 103 98 (!) 105   Resp: (!) 28 (!) 26 (!) 25 20   Temp:   36.2 °C (97.2 °F)    TempSrc:       SpO2: 94% 90% 91% 91%   Weight:       Height:             Constitutional: No fevers.  Eyes: No symptoms reported.   Ears/Nose/Throat/Mouth: No choking reported.  Cardiovascular: No chest pain reported.   Respiratory: SOB +  Gastrointestinal/Hepatic: Per H/P.   Skin/Breast: No new rash reported.   Psychiatric: No complaints      HEENT: grossly normal.  Cardiovascular: Please refer to primary team's daily assessment.   Lungs: Please refer to primary team's daily assessment.   Abdomen: Soft, No tenderness.  Skin: No erythema, No rash.  Lower limbs: fluid overload.   Neurologic: Alert & oriented x 1, Normal motor function, No focal deficits noted.  PSY: stable mood.       Labs:  Recent Labs     02/19/24  1301 02/20/24  0240   WBC 27.1* 26.0*   RBC 2.73* 2.88*   HEMOGLOBIN 7.6* 7.9*   HEMATOCRIT 23.4* 24.9*   MCV 85.7 86.5   MCH 27.8 27.4   MCHC 32.5 31.7*   RDW 55.3* 54.2*   PLATELETCT 433 371   MPV 10.0 9.7     Recent Labs     02/19/24  1301 02/20/24  0240   SODIUM 138 138   POTASSIUM 3.3* 3.8   CHLORIDE 98 98    CO2 22 22   GLUCOSE 149* 133*   BUN 87* 88*     Recent Labs     02/19/24  1301 02/20/24  0240   APTT  --  28.8   INR 1.24*  --        Recent Labs     02/19/24  1301 02/20/24  0240   ASTSGOT 10* 15   ALTSGPT 10 7   TBILIRUBIN 0.2 0.3   ALKPHOSPHAT 59 54   GLOBULIN 3.4 3.2   INR 1.24*  --          Imaging:  DX-CHEST-LIMITED (1 VIEW)  Narrative: 2/20/2024 2:42 AM    HISTORY/REASON FOR EXAM:  Shortness of Breath.    TECHNIQUE/EXAM DESCRIPTION AND NUMBER OF VIEWS:  Single AP view of the chest.    COMPARISON: 2/19/2024    FINDINGS:  Cardiomediastinal silhouette is unchanged. Right-sided Port-A-Cath is stably positioned. There are small bilateral pleural effusions with associated basilar atelectasis and or consolidation, similar to the previous exam.  Impression: Stable appearance of the chest.            Impressions:  The patient is a 58 years old gentleman with medical history of esophageal cancer diagnosed in late 2023 by this GI team, completed CCRT before possible surgery, currently waiting for surgical consultation for his esophageal cancer.  Patient presented to the inpatient GI service for upper GI bleeding and also ascites formation.    His upper GI bleeding is most likely from the known esophageal cancer, or localized injury from dilated esophagus due to partial obstruction.  Patient is okay to resume clear liquid diet, high risk of aspiration due to distal narrowing.  The patient and wife are hoping the surgery team can stop by and provide information regarding if the patient still candidate and if he is, what/when is the plan for the surgery.    No plan to offer upper GI scope due to the nature of upper GI bleeding.  GI team sign off right now, please call us back if any help we can provide.  This note was generated using voice recognition software which has a small chance of producing errors of grammar and possibly content. I have made every reasonable attempt to find and correct any obvious errors, but  expect that some may not be found prior to finalization of this note.

## 2024-02-20 NOTE — ED NOTES
Blood transfusion started per MD order. Blood product verified with this RN and NADEEN Watkins. Provided teaching to pt regarding possible transfusion reactions and signs and symptoms of transfusion reaction. Pt instructed to alert staff immediately if any new symptoms develop. Pt verbalized understanding of teaching. Blood transfusion consent signed and in pt's chart. Continuous cardiac, oxygenation and blood pressure monitors in place.

## 2024-02-20 NOTE — ASSESSMENT & PLAN NOTE
Initial troponin mildly elevated at 29.  Concerning for demand ischemia in setting of mild ST depression in V2 and V3.  No active chest pain.  Continue to trend  Continuous cardiac monitoring to monitor for arrhythmias

## 2024-02-20 NOTE — ASSESSMENT & PLAN NOTE
Secondary to bilateral pleural effusions, due to malignant effusion  Continue oxygen as per respiratory protocol  Continuous pulse oximetry.  Respiratory therapy consult.  Duo nebs every 4 hours needed.  Incentive spirometry.  PEP therapy  Early diuresis following stabilization  2/21 and 2/22 had thoracentesis.  Echocardiogram ordered 2/21.  Paracentesis 2/20 with 10.3 liters removed.

## 2024-02-20 NOTE — ASSESSMENT & PLAN NOTE
Aspiration vs active infection?  Ascites and pleural cultures no growth to date  Mildly elevated procalcitonin but has renal failure so indeterminate  S/p Unasyn   Wean IV fluids  Monitor I/O's, labs, and vitals.  2/23 wbc:18.9 <21.3<24.6<23.1  Pleural effusion with elevated nucleated cells:2282  Light's criteria of protein met for EXUDATIVE effusion

## 2024-02-20 NOTE — ED NOTES
Pt transferred out of ED at this time with RNx2. Pt is A&Ox4, with stable vital signs and no apparent distress upon transfer.  All paperwork and personal belongings sent with pt.   Report given to transporter.  Blood transfusion continued to floor    Pt transferred on tele monitor. Pt Transferred on 2L O2 with adequate O2 volume in bed tank.

## 2024-02-20 NOTE — PROGRESS NOTES
Patient with increased O2 requirements. Now at 8L oxymask. Patient denies increased SOB. Dr. Torres notified.

## 2024-02-20 NOTE — ASSESSMENT & PLAN NOTE
As per history during recent hospitalization.  Patient was supposed to arrange outpatient MRI to evaluate for metastatic brain tumor once his renal function improved.  Consider MRI of the brain with contrast once medically stabilized  2/19 CT Head unremarkable  Stopped heparin subcutaneous 2/20/24  SCDs

## 2024-02-20 NOTE — ASSESSMENT & PLAN NOTE
Presumed malignant due to history of stage III esophageal cancer.  Status post chemoradiation therapy preoperatively for cytoreduction.  Mildly elevated BNP on admit and I have ordered for an Echocardiogram 2/21  No significant of liver disease or alcohol.  Okay albumin level.  2/20 abdominal paracentesis with 10.3L removed and albumin given  Fluid analysis.  Follow up on cultures  Unasyn  No diuresis yet due to renal function.

## 2024-02-20 NOTE — PROGRESS NOTES
Patient being admitted with syncope and SOB, possible sepsis.  High risk of decompensation, so appropriate for IMCU based on chart review and my discussion with Dr. Murphy.  Notify intensivist team if a full consult is needed.

## 2024-02-20 NOTE — CARE PLAN
The patient is Watcher - Medium risk of patient condition declining or worsening         Problem: Knowledge Deficit - Standard  Goal: Patient and family/care givers will demonstrate understanding of plan of care, disease process/condition, diagnostic tests and medications  Outcome: Progressing     Problem: Hemodynamics  Goal: Patient's hemodynamics, fluid balance and neurologic status will be stable or improve  Outcome: Progressing     Problem: Fluid Volume  Goal: Fluid volume balance will be maintained  Outcome: Progressing     Problem: Urinary - Renal Perfusion  Goal: Ability to achieve and maintain adequate renal perfusion and functioning will improve  Outcome: Progressing     Problem: Respiratory  Goal: Patient will achieve/maintain optimum respiratory ventilation and gas exchange  Outcome: Progressing     Problem: Pain - Standard  Goal: Alleviation of pain or a reduction in pain to the patient’s comfort goal  Outcome: Progressing

## 2024-02-20 NOTE — ED NOTES
Lab called with critical result of Lactic at 4. Critical lab result read back to .   Dr. Dial notified of critical lab result at 1636.  Critical lab result read back by Dr. Dial.

## 2024-02-20 NOTE — ASSESSMENT & PLAN NOTE
Body mass index is 22.97 kg/m².  Higher BMI on admit due to ascites and pleural effusions  Patient notably had BMI of 33 during his last hospitalization.  He has significant subcutaneous tissue and muscle wasting, bitemporal wasting and periorbital wasting.  Due to poor oral intake, inpart due to esophageal cancer  Discussed possible future surgical G-tube if unable to get enough fluids/calories orally  Nutrition consult  mirtazapine 15 mg nightly

## 2024-02-20 NOTE — ASSESSMENT & PLAN NOTE
As per CT scan.  Suspect hypoalbuminemia contribute. Have not rule out malignant pleural effusion. Await cytology  2/21 right thoracentesis  2/22 left thoracentesis  Exudative effusion  Cytology showing malignant effusion with adenocarcinoma

## 2024-02-21 PROBLEM — K22.89 ESOPHAGEAL BLEEDING: Status: ACTIVE | Noted: 2024-01-01

## 2024-02-21 PROBLEM — E87.6 HYPOKALEMIA: Status: ACTIVE | Noted: 2024-01-01

## 2024-02-21 NOTE — PROGRESS NOTES
Hospital Medicine Daily Progress Note    Date of Service  2/21/2024    Chief Complaint  Syncope and short of breath    Hospital Course  Tommy Mckeon is a 58 y.o. male with known esophageal cancer stage IIII s/p radiation tx 11/2023, HTN, DLD, recent thalamic hemorrhage. He was admitted 2/19/2024 with syncope.  He was found to be in acute renal failure with a Cr:3.3 and has anasarca.  During his hospitalization he had a paracentesis with 10.3L removed. He was noted to be more anemic than prior.    Interval Problem Update  2/21: Reviewed records with wife and father in law.  Patient alert but lethargic    2/20:  Had 10.3 L removed w/ paracentesis. Was on 12 L overnight, now down to 5L NC.  Has old blood in his mouth that he keeps coughing up.  Denies melena.  I have consulted Dr Willis, GI, and surgical oncologist.  Wife at patient's bedside.    I have discussed this patient's plan of care and discharge plan at IDT rounds today with Case Management, Nursing, Nursing leadership, and other members of the IDT team.    Consultants/Specialty  GI and Surgical Oncology    Code Status  DNAR/DNI    Disposition  The patient is not medically cleared for discharge to home or a post-acute facility.      I have placed the appropriate orders for post-discharge needs.    Review of Systems  Review of Systems   Constitutional:  Positive for malaise/fatigue. Negative for fever.   HENT:  Positive for sore throat.    Respiratory:  Positive for cough and shortness of breath. Negative for hemoptysis.    Cardiovascular:  Negative for chest pain and leg swelling.   Gastrointestinal:  Negative for abdominal pain and nausea.   Genitourinary:  Negative for dysuria.   Musculoskeletal:  Negative for back pain.   Neurological:  Negative for headaches.   Psychiatric/Behavioral:  The patient is not nervous/anxious.    All other systems reviewed and are negative.       Physical Exam  Temp:  [36.2 °C (97.1 °F)-36.7 °C (98.1 °F)] 36.4 °C (97.6  °F)  Pulse:  [] 96  Resp:  [9-33] 31  BP: ()/(61-82) 116/72  SpO2:  [92 %-100 %] 97 %    Physical Exam  Vitals reviewed.   Constitutional:       Appearance: He is ill-appearing.   HENT:      Head: Normocephalic and atraumatic.      Nose: Nose normal.      Mouth/Throat:      Pharynx: Oropharyngeal exudate (dark old digested blood in mouth) present.   Eyes:      General:         Right eye: No discharge.         Left eye: No discharge.      Extraocular Movements: Extraocular movements intact.   Cardiovascular:      Rate and Rhythm: Normal rate and regular rhythm.      Heart sounds: No murmur heard.  Pulmonary:      Effort: No respiratory distress.      Breath sounds: Decreased breath sounds present.   Abdominal:      General: Bowel sounds are normal. There is no distension.      Palpations: Abdomen is soft.      Tenderness: There is no abdominal tenderness.   Musculoskeletal:      Cervical back: Neck supple. Tenderness present.      Right lower leg: No edema.      Left lower leg: No edema.   Lymphadenopathy:      Cervical: Cervical adenopathy present.   Skin:     General: Skin is dry.   Neurological:      General: No focal deficit present.      Mental Status: He is alert and oriented to person, place, and time.   Psychiatric:         Mood and Affect: Mood normal.         Speech: Speech normal.         Behavior: Behavior is slowed.         Cognition and Memory: Cognition normal.         Fluids    Intake/Output Summary (Last 24 hours) at 2/21/2024 1455  Last data filed at 2/21/2024 1312  Gross per 24 hour   Intake 1928 ml   Output 2140 ml   Net -212 ml       Laboratory  Recent Labs     02/19/24  1301 02/20/24  0240 02/20/24  2318   WBC 27.1* 26.0* 23.1*   RBC 2.73* 2.88* 2.57*   HEMOGLOBIN 7.6* 7.9* 7.1*   HEMATOCRIT 23.4* 24.9* 22.7*   MCV 85.7 86.5 88.3   MCH 27.8 27.4 27.6   MCHC 32.5 31.7* 31.3*   RDW 55.3* 54.2* 55.8*   PLATELETCT 433 371 301   MPV 10.0 9.7 9.3     Recent Labs     02/19/24  1301  02/20/24  0240 02/21/24  0310   SODIUM 138 138 141   POTASSIUM 3.3* 3.8 3.4*   CHLORIDE 98 98 105   CO2 22 22 25   GLUCOSE 149* 133* 112*   BUN 87* 88* 87*   CREATININE 3.32* 3.26* 3.24*   CALCIUM 8.8 8.5 7.9*     Recent Labs     02/19/24  1301 02/20/24  0240   APTT  --  28.8   INR 1.24*  --                Imaging  US-PARACENTESIS, ABD WITH IMAGING   Final Result      1. Ultrasound-guided therapeutic paracentesis of the right lower quadrant of the abdominal wall.      2. 10.3 mL of fluid withdrawn.      DX-CHEST-LIMITED (1 VIEW)   Final Result      Stable appearance of the chest.      CT-ABDOMEN-PELVIS W/O   Final Result      1.  Large amount of ascites is identified in the abdomen and pelvis.      2.  There is new evidence of moderate hydronephrosis bilaterally.      3.  In addition, an oval-shaped fluid collection is now identified anterior to the left psoas musculature. Urinoma due to disruption of left ureter or renal collecting system is a possibility.      4.  Moderate pleural effusions bilaterally.      5.  Visualized thoracic esophagus is distended with fluid.      6.  Opacifications in each lung base could be due to edema or inflammation.      CT-HEAD W/O   Final Result         NO ACUTE ABNORMALITIES ARE NOTED ON CT SCAN OF THE HEAD.               DX-CHEST-PORTABLE (1 VIEW)   Final Result         1.  Increase in right basilar opacifications are noted which could be due to discoid atelectasis aspiration or developing pneumonia.      2.  There is interval decrease in opacifications and volume loss in the left lung base.      US-THORACENTESIS PUNCTURE RIGHT    (Results Pending)   EC-ECHOCARDIOGRAM COMPLETE W/O CONT    (Results Pending)   US-RUQ    (Results Pending)        Assessment/Plan  * Severe sepsis (HCC)- (present on admission)  Assessment & Plan  Aspiration vs active infection?  Follow up on ascites fluid evaluation (diagnostic tap per report and surgery ordered labs on ascites fluid that was left in  the room)  Mildly elevated procalcitonin but has renal failure so indeterminate  Continue Unasyn for now.  Wean IV fluids  Monitor I/O's, labs, and vitals.    Hypokalemia  Assessment & Plan  2/21 K:3.4  I have ordered replacement  Monitor am BMP    Elevated troponin- (present on admission)  Assessment & Plan  Initial troponin mildly elevated at 29.  Concerning for demand ischemia in setting of mild ST depression in V2 and V3.  No active chest pain.  Continue to trend  Continuous cardiac monitoring to monitor for arrhythmias    Bilateral pleural effusion- (present on admission)  Assessment & Plan  As per CT scan.  Suspect hypoalbuminemia contribute.  Cannot rule out malignant pleural effusion.  I have ordered right-sided thoracentesis.  Consider early diuresis following stabilization from sepsis.    Malignant ascites  Assessment & Plan  Presumed malignant due to history of stage III esophageal cancer.  Status post chemoradiation therapy preoperatively for cytoreduction.  Mildly elevated BNP on admit and I have ordered for an Echocardiogram 2/21  No significant of liver disease or alcohol.Okay albumin level.  2/20 abdominal paracentesis with 10.3L removed and albumin given  Fluid analysis.    Benign prostatic hyperplasia with incomplete bladder emptying- (present on admission)  Assessment & Plan  Flomax  Monitor for urinary retention with prn bladder scan  Cath if needed    Acute kidney injury superimposed on CKD (HCC)- (present on admission)  Assessment & Plan  2/21 Creatinine: 3.24 <3.26 <3.32  Patient previously had urinary retention.  CT showed hydronephrosis and urinoma.  I have consulted Dr Flores, Urology, for further input.  IV fluids  Monitor I/O's, labs, and vitals.    Full code status  Assessment & Plan  I discussed the CODE STATUS with the patient.  He wishes to be full code.    Severe protein-calorie malnutrition (HCC)- (present on admission)  Assessment & Plan  Body mass index is 26.75 kg/m².    Patient  notably had BMI of 33 during his last hospitalization.  He has significant subcutaneous tissue and muscle wasting, bitemporal wasting and periorbital wasting.  Due to poor oral intake, inpart due to esophageal cancer  Discussed possible future surgical G-tube if unable to get enough fluids/calories orally    Nutrition consult  Start mirtazapine 15 mg nightly      CAP (community acquired pneumonia)- (present on admission)  Assessment & Plan  As per chest x-ray.  Concerning for right greater than left lower lobe effusion.  Unasyn for gram-negative coverage and azithromycin atypical coverage      Thalamic hemorrhage (HCC)- (present on admission)  Assessment & Plan  As per history during recent hospitalization.  Patient was supposed to arrange outpatient MRI to evaluate for metastatic brain tumor once his renal function improved.  Consider MRI of the brain with contrast once medically stabilized  2/19 CT Head unremarkable  Stopped heparin subcutaneous 2/20/24  SCDs    Acute respiratory failure with hypoxia (HCC)- (present on admission)  Assessment & Plan  Secondary to bilateral pleural effusions, question if aspiration vs infection  Continue oxygen as per respiratory protocol  Continuous pulse oximetry.  Respiratory therapy consult.  Duo nebs every 4 hours needed.  Incentive spirometry.  PEP therapy  Early diuresis following stabilization  May need thoracentesis  Echocardiogram ordered 2/21.  Paracentesis 2/20 with 10.3 liters removed.      Malignant neoplasm of lower third of esophagus (HCC)- (present on admission)  Assessment & Plan  As per history.  Stage III (cT3, cN0, cM0, G3).  Status post cytoreductive preoperative chemo radiation therapy.  Completed in October 2023.  2/20 I have consulted Dr. Rangel in terms of timing of surgery and he has requested a repeat staging due to new ascites/anasarca and concern of spread.  Followed by Dr. Triana of [facility].  May need future G-tube for additional  calories.  Wife and patient to discuss Hospice if surgery not an option.    Iron deficiency Anemia secondary to acute GI bleeding- (present on admission)  Assessment & Plan  As per history.  Ferritin of 16 in December 2023.  2/19 Patient given transfusion 1 unit PRBC in the emergency room.  2/21 Ordered for 1 unit pRBC's for hgb:7.1.  iron panel studies  Concern of esophageal bleed.    2/20 I have consulted gi and they do not feel need to perform EGD but agree with my order for PPI IV BID  Monitor labs      GERD (gastroesophageal reflux disease)- (present on admission)  Assessment & Plan  As per history.  IV PPI  GI cocktail prn     Dyslipidemia- (present on admission)  Assessment & Plan  As per history.    Hypertension- (present on admission)  Assessment & Plan  Monitor vitals.         VTE prophylaxis: VTE Selection

## 2024-02-21 NOTE — CARE PLAN
The patient is Watcher - Medium risk of patient condition declining or worsening    Shift Goals  Clinical Goals: Stable vitals, wean O2  Patient Goals: Sleep  Family Goals: ANUPAM    Progress made toward(s) clinical / shift goals:    Problem: Knowledge Deficit - Standard  Goal: Patient and family/care givers will demonstrate understanding of plan of care, disease process/condition, diagnostic tests and medications  Outcome: Progressing     Problem: Urinary - Renal Perfusion  Goal: Ability to achieve and maintain adequate renal perfusion and functioning will improve  Outcome: Progressing     Problem: Pain - Standard  Goal: Alleviation of pain or a reduction in pain to the patient’s comfort goal  Outcome: Progressing     Problem: Fall Risk  Goal: Patient will remain free from falls  Outcome: Progressing

## 2024-02-21 NOTE — CARE PLAN
Problem: Hyperinflation  Goal: Prevent or improve atelectasis  Description: Target End Date:  3 to 4 days    1. Instruct incentive spirometry usage  2.  Perform hyperinflation therapy as indicated  Outcome: Progressing         PEP QID  IS-[id number]

## 2024-02-21 NOTE — PROGRESS NOTES
Port not drawing back blood at time of intial assessment and multiple attempts. Dr. JOHN Robins notified around 2210, orders received cathflo. However, cathflo not administered because port started to have blood return around 2325. Port used to draw blood and immediately connected to TKO per policy.    Patient also vomited 175cc of brown liquid after eating dinner- Dr JOHN Robins notified around and orders received for prn q4h ativan for nausea. Patient vomited another 600cc of brown fluid after attempting to drink ensure drink around 2250- Dr JOHN Robins notified, orders received for 1L NS bolus and CBC.

## 2024-02-21 NOTE — PROGRESS NOTES
Date of Service  February 21, 2024     Chief Complaint  Syncope (Pt found on the floor of his bathroom by EMS. Pt states that he was about to shower then felt dizzy and woke up on the floor. No obvious head trauma noted. -thinners. +LOC. FSBS per . Pt hx of esophageal CA) and Shortness of Breath (Pt found to be 85% on RA by EMS. Pt arrived on 5L NC. Pt normally not on O2. Pt denies CP)      Hospital Course  HD #3     Interval Problem Update  No acute events  Patient continues to be fatigued and sleepy, discussed with family  Reduced leukocytosis, WBC 23 from 27, previously on Unasyn  HGB 7.1 from 7.9, has received lots of fluid lately  Creatinine remains elevated at 3.24 from 3.26  Approx 10L of ascites fluid removed on 2/20/24 - sent for Cytology today  Dr Cagle reviewed updates with family - no current plans for surgery    Problem List  Principal Problem:    Severe sepsis (HCC) (POA: Yes)  Active Problems:    Hypertension (Chronic) (POA: Yes)    Dyslipidemia (Chronic) (POA: Yes)    GERD (gastroesophageal reflux disease) (Chronic) (POA: Yes)    Iron deficiency Anemia secondary to acute GI bleeding (POA: Yes)    Malignant neoplasm of lower third of esophagus (HCC) (Chronic) (POA: Yes)      Overview: fb XRT            Is a pleasant 57-year-old male who originally presented with 30 pound       weight loss syncopal episode due to severe anemia and went to the ER from       the urgent care and underwent CT abdomen pelvis August 16, 2023 which       showed moderate hiatal hernia.  Patient had EGD by Dr. Patel 8/18/23       which showed mass lesion in distal esophagus beginning at 34 cm from       incisors going into the stomach cardia 42 cm biopsy showing moderate to       poorly differentiated invasive adenocarcinoma.  Patient had CT chest       August 18, 2023 which showed hiatal hernia with wall thickening in the       distal thoracic esophagus and portion of herniating stomach which would be        in keeping with the provided clinical history.  Patient had CT pancreas       August 19, 2023 which showed distal esophageal proximal gastric mass       consistent with malignancy.  Patient had PET CT scan August 30, 2023 which       showed heterogeneous hypermetabolic mass in distal esophagus/cardia       consistent with known malignancy patient underwent EUS August 31, 2023 by       Dr. Fenton which showed clinical uT3N0 Siewert 2.  Overall patient doing       well and eating mostly solid foods with little difficulty but does have 30       pound weight loss over the last 3 months.            1. Malignant neoplasm of lower third of esophagus (HCC)  C15.5              Malignant neoplasm of lower third of esophagus (HCC)      Staging form: Esophagus - Adenocarcinoma, AJCC 8th Edition      - Clinical stage from 8/31/2023: Stage III (cT3, cN0, cM0, G3) - Signed by       Michael Woods M.D. on 8/31/2023      Total positive nodes: 0      Histologic grading system: 3 grade system             RECOMMENDATIONS:       We will plan to treat the patient with preoperative chemo-radiotherapy.        The patient has a Stage T3N0M0 esophagus cancer.  We talked about the       general prognosis and treatment rationale.  We discussed our goal to       control the tumor in the thorax, prevent regional recurrence, and hopeful       help to prevent or delay metastatic progression.   We discussed the goal       of facilitating complete surgical resection. We will plan 50.4 Gy in 28       fractions with concurrent chemo per Dr. Triana.              We discussed that for  adenocarcinoma pathologic complete response rate is       29%.             We discussed the treatment planning process and treatments.  We discussed       the risks of treatment at length including lung injury, shortness of       breath, fibrosis, pneumonitis, acute and chronic esophageal injury, heart       injury, spinal cord injury which is rare, skin toxicity, chest  wall       injury, pain, dehydration, supplemental oxygen use or dependence, and the       risk of treatment related death.  They would like to proceed forward with       treatment.  We will set up CT simulation for treatment planning imaging       9/6/23 3PM.  That will consist of supine immobilization, possibly IV       contrast depending on kidney function and targeting requirements,       appropriate skin surface marking for radiation treatment.                  Acute respiratory failure with hypoxia (HCC) (POA: Yes)    Thalamic hemorrhage (HCC) (POA: Yes)    CAP (community acquired pneumonia) (POA: Yes)    Severe protein-calorie malnutrition (HCC) (POA: Yes)    Acute kidney injury superimposed on CKD (HCC) (POA: Yes)    Benign prostatic hyperplasia with incomplete bladder emptying (POA: Yes)    Malignant ascites (POA: Unknown)    Bilateral pleural effusion (POA: Yes)    Elevated troponin (POA: Yes)    Esophageal bleeding (POA: Yes)  Resolved Problems:    * No resolved hospital problems. *     Subjective  Review of Systems   Constitutional:  Positive for malaise/fatigue. Negative for chills and fever.   Respiratory:  Positive for shortness of breath. Negative for cough.    Cardiovascular:  Negative for chest pain.   Gastrointestinal:  Positive for abdominal pain, nausea and vomiting.      Majority of history and update provided by family members in room     Objective  Temp:  [36.1 °C (97 °F)-36.7 °C (98.1 °F)] 36.7 °C (98.1 °F)  Pulse:  [] 102  Resp:  [9-33] 9  BP: ()/(62-74) 112/71  SpO2:  [92 %-100 %] 95 %      Physical Exam  Constitutional:       General: He is not in acute distress.     Appearance: He is cachectic. He is ill-appearing.   HENT:      Head: Normocephalic and atraumatic.      Nose: Nose normal.   Eyes:      Conjunctiva/sclera: Conjunctivae normal.   Cardiovascular:      Rate and Rhythm: Tachycardia present.   Pulmonary:      Effort: Pulmonary effort is normal.   Abdominal:       General: There is no distension.      Tenderness: There is no abdominal tenderness.   Skin:     General: Skin is warm and dry.   Neurological:      Comments: Tired appearing, sleeping much of time        Fluids    Intake/Output Summary (Last 24 hours) at 2/21/2024 1005  Last data filed at 2/21/2024 0500  Gross per 24 hour   Intake 1480 ml   Output 1710 ml   Net -230 ml         Labs  Lab Results   Component Value Date/Time    SODIUM 141 02/21/2024 03:10 AM    POTASSIUM 3.4 (L) 02/21/2024 03:10 AM    CHLORIDE 105 02/21/2024 03:10 AM    CO2 25 02/21/2024 03:10 AM    GLUCOSE 112 (H) 02/21/2024 03:10 AM    BUN 87 (H) 02/21/2024 03:10 AM    CREATININE 3.24 (H) 02/21/2024 03:10 AM         Lab Results   Component Value Date/Time    PROTHROMBTM 15.8 (H) 02/19/2024 01:01 PM    INR 1.24 (H) 02/19/2024 01:01 PM         Lab Results   Component Value Date/Time    WBC 23.1 (H) 02/20/2024 11:18 PM    RBC 2.57 (L) 02/20/2024 11:18 PM    HEMOGLOBIN 7.1 (L) 02/20/2024 11:18 PM    HEMATOCRIT 22.7 (L) 02/20/2024 11:18 PM    MCV 88.3 02/20/2024 11:18 PM    MCH 27.6 02/20/2024 11:18 PM    MCHC 31.3 (L) 02/20/2024 11:18 PM    MPV 9.3 02/20/2024 11:18 PM    NEUTSPOLYS 87.00 (H) 02/20/2024 02:40 AM    LYMPHOCYTES 4.00 (L) 02/20/2024 02:40 AM    MONOCYTES 5.70 02/20/2024 02:40 AM    EOSINOPHILS 0.00 02/20/2024 02:40 AM    BASOPHILS 0.10 02/20/2024 02:40 AM    HYPOCHROMIA 1+ 10/12/2023 09:23 AM    ANISOCYTOSIS 1+ 10/12/2023 09:23 AM         Recent Labs     02/19/24  1301 02/20/24  0240   ASTSGOT 10* 15   ALTSGPT 10 7   TBILIRUBIN 0.2 0.3   GLOBULIN 3.4 3.2   INR 1.24*  --       Imaging  CT ABDOMEN W/O (2/19/24)  IMPRESSION:     1.  Large amount of ascites is identified in the abdomen and pelvis.     2.  There is new evidence of moderate hydronephrosis bilaterally.     3.  In addition, an oval-shaped fluid collection is now identified anterior to the left psoas musculature. Urinoma due to disruption of left ureter or renal collecting system is  a possibility.     4.  Moderate pleural effusions bilaterally.     5.  Visualized thoracic esophagus is distended with fluid.     6.  Opacifications in each lung base could be due to edema or inflammation.     CT HEAD (2/19/24)  IMPRESSION:     NO ACUTE ABNORMALITIES ARE NOTED ON CT SCAN OF THE HEAD.        Assessment/Plan  Patient is a 58M admitted shortness of breath and syncope, tachycardia, leukocytosis at 27.1, lactic acid initially at 2.4, increasing to 4.0, Creatinine at 3.3.  He has a known history of stage III esophageal adenocarcinoma of the lower third status post cytoreductive preoperative chemoradiation therapy completed in October 2023. In recent month also hospitalized with pneumonia, SOB, altered mental status. Concern progression of initial malignancy, but incomplete information at this point due to limited imaging due to kidney function, and also complicated by delay in follow up from chemoradiation therapy, and from new onset ascites     Plan  Esophageal adenocarcinoma, s/p radiation therapy Oct 2023  - No plan for surgery at this point  - Need updated imaging to assess treatment status, limited by current kidney performance  - New onset ascites would complicate/limit surgical options    2. New onset abdominal ascites, concern possible metastatic disease  - US DUPLEX to assess PV patency  - Paracentesis fluid sent for cytology evaluation    Dr Cagle discussed current assessment and treatment considerations with family    Discussed with RN, Hospitalist updated

## 2024-02-22 PROBLEM — R18.8 OTHER ASCITES: Status: ACTIVE | Noted: 2024-01-01

## 2024-02-22 NOTE — CONSULTS
Surgery Urology Consultation    Date of Service  2/21/2024    Referring Physician  Vasiliy Edwards D.O.    Consulting Physician  Basilia Flores M.D.    Reason for Consultation  Bilateral hydroureteronephrosis, left urinoma    History of Presenting Illness  58 y.o. male who presented 2/19/2024 with elevated Cr and after an episode with syncope.  The patient has esophageal cancer. He does have a prior history of retention, currently no andrade in place but is on flomax. Patient somewhat somnolent and minimally communicative during our interview.    Review of Systems  Review of Systems   Unable to perform ROS: Other   Patient minimally communicative    Past Medical History   has a past medical history of Bowel habit changes (08/31/2023), Cancer (HCC), Diabetes (HCC) (08/31/2023), GERD (gastroesophageal reflux disease), Heart burn, Hiatus hernia syndrome, Hypertension (08/31/2023), Iron deficiency anemia, Obesity (BMI 30-39.9) (04/19/2016), and Pneumonia (1/13/2024).    He has no past medical history of Acute nasopharyngitis, Anesthesia, Anginal syndrome (HCC), Arrhythmia, Arthritis, Asthma, Blood clotting disorder (HCC), Breath shortness, Bronchitis, Carcinoma in situ of respiratory system, Cataract, Congestive heart failure (HCC), Continuous ambulatory peritoneal dialysis status (HCC), Coughing blood, Dental disorder, Dialysis patient (HCC), Disorder of thyroid, Emphysema of lung (HCC), Glaucoma, Gynecological disorder, Heart murmur, Heart valve disease, Hemorrhagic disorder (HCC), Hepatitis A, Hepatitis B, Hepatitis C, High cholesterol, Infectious disease, Jaundice, Myocardial infarct (HCC), Pacemaker, Pain, Pregnant, Psychiatric problem, Renal disorder, Rheumatic fever, Seizure (HCC), Sleep apnea, Snoring, Stroke (HCC), Tuberculosis, Urinary bladder disorder, or Urinary incontinence.    Surgical History   has a past surgical history that includes eye surgery (01/01/2015); pr upper gi endoscopy,diagnosis (N/A,  08/18/2023); pr upper gi endoscopy,biopsy (N/A, 08/18/2023); other (2015); and cath placement (Right, 9/1/2023).    Family History  family history includes Diabetes in his mother; Stroke in his mother.    Social History   reports that he has never smoked. He has been exposed to tobacco smoke. He has never used smokeless tobacco. He reports that he does not currently use alcohol. He reports that he does not use drugs.    Medications      Allergies  No Known Allergies    Physical Exam  Temp:  [36.2 °C (97.1 °F)-36.7 °C (98.1 °F)] 36.4 °C (97.5 °F)  Pulse:  [] 96  Resp:  [9-37] 37  BP: ()/(61-82) 114/68  SpO2:  [92 %-98 %] 93 %    Physical Exam  Constitutional:       Appearance: He is ill-appearing.   HENT:      Head: Normocephalic.      Mouth/Throat:      Mouth: Mucous membranes are dry.   Cardiovascular:      Rate and Rhythm: Normal rate.   Pulmonary:      Effort: Pulmonary effort is normal.         Fluids  Date 02/21/24 0700 - 02/22/24 0659   Shift 5133-7471 7348-1892 8165-8272 24 Hour Total   INTAKE   Blood 448   448   Shift Total 448   448   OUTPUT   Urine 380 200  580   Emesis 50   50   Shift Total 430 200  630   Weight (kg) 99.7 99.7 99.7 99.7       Laboratory  Recent Labs     02/19/24  1301 02/20/24  0240 02/20/24  2318   WBC 27.1* 26.0* 23.1*   RBC 2.73* 2.88* 2.57*   HEMOGLOBIN 7.6* 7.9* 7.1*   HEMATOCRIT 23.4* 24.9* 22.7*   MCV 85.7 86.5 88.3   MCH 27.8 27.4 27.6   MCHC 32.5 31.7* 31.3*   RDW 55.3* 54.2* 55.8*   PLATELETCT 433 371 301   MPV 10.0 9.7 9.3     Recent Labs     02/19/24  1301 02/20/24  0240 02/21/24  0310   SODIUM 138 138 141   POTASSIUM 3.3* 3.8 3.4*   CHLORIDE 98 98 105   CO2 22 22 25   GLUCOSE 149* 133* 112*   BUN 87* 88* 87*   CREATININE 3.32* 3.26* 3.24*   CALCIUM 8.8 8.5 7.9*     Recent Labs     02/19/24  1301 02/20/24  0240   APTT  --  28.8   INR 1.24*  --                  Imaging  CT AP 2/19: bilateral hydroureteronephrosis, 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 Large volume  ascites. No nephrolithiasis or obvious cause for hydro    Assessment/Plan    Place Jules   Trend Cr  Will repeat imaging in 72 hours, if Cr not improving or hydro persistent will consider bilateral stent placement  If no fevers or chills no need to consider draining urinoma

## 2024-02-22 NOTE — CARE PLAN
The patient is Watcher - Medium risk of patient condition declining or worsening    Shift Goals  Clinical Goals: wean off O2  Patient Goals: rest  Family Goals: ANUPAM    Progress made toward(s) clinical / shift goals:    Problem: Hemodynamics  Goal: Patient's hemodynamics, fluid balance and neurologic status will be stable or improve  Outcome: Progressing     Problem: Urinary - Renal Perfusion  Goal: Ability to achieve and maintain adequate renal perfusion and functioning will improve  Outcome: Progressing       Patient is not progressing towards the following goals:      Problem: Respiratory  Goal: Patient will achieve/maintain optimum respiratory ventilation and gas exchange  Outcome: Not Progressing  Note: O2 demand increased, High flow NC was needed, Pt refused, put back on oxy mask     Problem: Skin Integrity  Goal: Skin integrity is maintained or improved  Outcome: Not Progressing  Note: Sacrum red, blanching

## 2024-02-22 NOTE — CARE PLAN
The patient is Watcher - Medium risk of patient condition declining or worsening    Shift Goals  Clinical Goals: wean off O2  Patient Goals: rest  Family Goals: ANUPAM    Progress made toward(s) clinical / shift goals:  Patient has been able to titrate O2 demands    Problem: Pain - Standard  Goal: Alleviation of pain or a reduction in pain to the patient’s comfort goal  Outcome: Met     Problem: Fall Risk  Goal: Patient will remain free from falls  Outcome: Met

## 2024-02-22 NOTE — CONSULTS
Palliative   EMR reviewed after consult received.  Presented to patient's room.  Per nurse, patient's wife has just left.  Will defer consult until tomorrow wife in attendance.  Thank you,      Niurka Joe, MSN, APRN, [id number]

## 2024-02-22 NOTE — PROGRESS NOTES
Hospital Medicine Daily Progress Note    Date of Service  2/22/2024    Chief Complaint  Syncope and short of breath    Hospital Course  Tommy Mckeon is a 58 y.o. male with known esophageal cancer s/p radiation tx 11/2023, HTN, DLD, recent thalamic hemorrhage. He was admitted 2/19/2024 with syncope.  He was found to be in acute renal failure with a Cr:3.3 and has anasarca.  During his hospitalization he had a paracentesis with 10.3L removed. He was noted to be more anemic than prior.    Interval Problem Update  2/22:  Had right thoracentesis yesterday with 850cc removed. Cr:2.73. A andrade catheter has been placed since I last saw him yesterday.  He is lethargic but answers appropriately.  Per RN the U/S tech stated there was not enough left sided pleural effusion to tap.  Echo tech had difficulty time evaluating heart today due to possible pleural effusion.  He did temporarily go from 4L to 12L O2 need but CXR did not show any significant new finding. Patient weak and frail.    I have discussed this patient's plan of care and discharge plan at IDT rounds today with Case Management, Nursing, Nursing leadership, and other members of the IDT team.    Consultants/Specialty  GI and Surgical Oncology    Code Status  DNAR/DNI    Disposition  The patient is not medically cleared for discharge to home or a post-acute facility.      I have placed the appropriate orders for post-discharge needs.    Review of Systems  Review of Systems   Constitutional:  Positive for malaise/fatigue. Negative for fever.   HENT:  Positive for sore throat.    Respiratory:  Positive for cough and shortness of breath. Negative for hemoptysis.    Cardiovascular:  Negative for chest pain and leg swelling.   Gastrointestinal:  Negative for abdominal pain and nausea.   Genitourinary:  Negative for hematuria.   Musculoskeletal:  Negative for back pain.   Neurological:  Negative for headaches.   Psychiatric/Behavioral:  The patient is not  nervous/anxious.    All other systems reviewed and are negative.       Physical Exam  Temp:  [36.7 °C (98 °F)] 36.7 °C (98 °F)  Pulse:  [] 101  Resp:  [19-36] 28  BP: ()/(65-81) 109/78  SpO2:  [83 %-99 %] 96 %    Physical Exam  Vitals reviewed.   Constitutional:       Appearance: He is ill-appearing.   HENT:      Head: Normocephalic and atraumatic.      Nose: Nose normal.      Mouth/Throat:      Pharynx: Oropharyngeal exudate (dark old digested blood in mouth) present.   Eyes:      General:         Right eye: No discharge.         Left eye: No discharge.      Extraocular Movements: Extraocular movements intact.   Cardiovascular:      Rate and Rhythm: Normal rate and regular rhythm.      Heart sounds: No murmur heard.  Pulmonary:      Effort: No respiratory distress.      Breath sounds: Decreased breath sounds present.   Abdominal:      General: Bowel sounds are normal. There is no distension.      Palpations: Abdomen is soft.      Tenderness: There is no abdominal tenderness.   Musculoskeletal:      Cervical back: Neck supple. Tenderness present.      Right lower leg: No edema.      Left lower leg: No edema.   Lymphadenopathy:      Cervical: Cervical adenopathy present.   Skin:     General: Skin is dry.   Neurological:      General: No focal deficit present.      Mental Status: He is alert and oriented to person, place, and time.   Psychiatric:         Mood and Affect: Mood normal.         Speech: Speech normal.         Behavior: Behavior is slowed.         Cognition and Memory: Cognition normal.         Fluids    Intake/Output Summary (Last 24 hours) at 2/22/2024 1637  Last data filed at 2/22/2024 0600  Gross per 24 hour   Intake 240 ml   Output 750 ml   Net -510 ml       Laboratory  Recent Labs     02/20/24  2318 02/21/24  2130 02/22/24  0502   WBC 23.1* 24.6* 21.3*   RBC 2.57* 3.32* 3.27*   HEMOGLOBIN 7.1* 9.4* 9.3*   HEMATOCRIT 22.7* 29.2* 29.0*   MCV 88.3 88.0 88.7   MCH 27.6 28.3 28.4   MCHC 31.3*  32.2* 32.1*   RDW 55.8* 52.2* 54.2*   PLATELETCT 301 316 322   MPV 9.3 9.5 9.5     Recent Labs     02/20/24  0240 02/21/24  0310 02/22/24  0502   SODIUM 138 141 141   POTASSIUM 3.8 3.4* 3.7   CHLORIDE 98 105 106   CO2 22 25 21   GLUCOSE 133* 112* 129*   BUN 88* 87* 73*   CREATININE 3.26* 3.24* 2.73*   CALCIUM 8.5 7.9* 8.6     Recent Labs     02/20/24  0240   APTT 28.8               Imaging  DX-CHEST-PORTABLE (1 VIEW)   Final Result      Stable appearance of the chest. No evidence of pneumothorax following thoracentesis.      US-THORACENTESIS PUNCTURE LEFT   Final Result      1. Ultrasound guided LEFT sided diagnostic / therapeutic  thoracentesis.      2. 625 mL of fluid withdrawn.      EC-ECHOCARDIOGRAM LTD W/O CONT   Final Result      DX-CHEST-LIMITED (1 VIEW)   Final Result      Unchanged LEFT basilar atelectasis with suspected superimposed pneumonia or other airspace disease      DX-CHEST-LIMITED (1 VIEW)   Final Result      1.  Bibasilar atelectasis without significant interval change, pneumonitis cannot be excluded and radiographic appearance alone.      DX-CHEST-PORTABLE (1 VIEW)   Final Result      1.  Hypoinflation with minimal bibasilar atelectasis, improved from prior.   2.  Minimal LEFT pleural fluid again seen.   3.  No pneumothorax.      US-THORACENTESIS PUNCTURE RIGHT   Final Result      1. Ultrasound guided right sided diagnostic and therapeutic thoracentesis.      2. 850 mL of fluid withdrawn.      US-RUQ   Final Result      1.  Moderate volume of abdominal ascites.   2.  Mild hepatomegaly.   3.  Hepatic steatosis and/or diffuse hepatocellular disease.   4.  Moderate right hydronephrosis.      US-PARACENTESIS, ABD WITH IMAGING   Final Result      1. Ultrasound-guided therapeutic paracentesis of the right lower quadrant of the abdominal wall.      2. 10.3 mL of fluid withdrawn.      DX-CHEST-LIMITED (1 VIEW)   Final Result      Stable appearance of the chest.      CT-ABDOMEN-PELVIS W/O   Final Result       1.  Large amount of ascites is identified in the abdomen and pelvis.      2.  There is new evidence of moderate hydronephrosis bilaterally.      3.  In addition, an oval-shaped fluid collection is now identified anterior to the left psoas musculature. Urinoma due to disruption of left ureter or renal collecting system is a possibility.      4.  Moderate pleural effusions bilaterally.      5.  Visualized thoracic esophagus is distended with fluid.      6.  Opacifications in each lung base could be due to edema or inflammation.      CT-HEAD W/O   Final Result         NO ACUTE ABNORMALITIES ARE NOTED ON CT SCAN OF THE HEAD.               DX-CHEST-PORTABLE (1 VIEW)   Final Result         1.  Increase in right basilar opacifications are noted which could be due to discoid atelectasis aspiration or developing pneumonia.      2.  There is interval decrease in opacifications and volume loss in the left lung base.           Assessment/Plan  * Severe sepsis (HCC)- (present on admission)  Assessment & Plan  Aspiration vs active infection?  Follow up on ascites fluid evaluation (diagnostic tap per report and surgery ordered labs on ascites fluid that was left in the room)  Mildly elevated procalcitonin but has renal failure so indeterminate  S/p Unasyn   Wean IV fluids  Monitor I/O's, labs, and vitals.  2/22 wbc:21.3 <24.6<23.1  Pleural effusion with elevated nucleated cells:2282  Light's criteria of protein met for EXUDATIVE effusion    Hypokalemia  Assessment & Plan  2/22 K:3.7  2/21 K:3.4  I have ordered replacement  Monitor am BMP    Esophageal bleeding- (present on admission)  Assessment & Plan  Hgb:9.3  On PPI  GI consulted and signed off stating medical management for now.    Elevated troponin- (present on admission)  Assessment & Plan  Initial troponin mildly elevated at 29.  Concerning for demand ischemia in setting of mild ST depression in V2 and V3.  No active chest pain.  Continue to trend  Continuous cardiac  monitoring to monitor for arrhythmias    Bilateral pleural effusion- (present on admission)  Assessment & Plan  As per CT scan.  Suspect hypoalbuminemia contribute.  Cannot rule out malignant pleural effusion.  2/21 right thoracentesis  2/22 left thoracentesis  Exudative effusion  F/u on cultures and cytology    Other ascites  Assessment & Plan  Presumed malignant due to history of stage III esophageal cancer.  Status post chemoradiation therapy preoperatively for cytoreduction.  Mildly elevated BNP on admit and I have ordered for an Echocardiogram 2/21  No significant of liver disease or alcohol.Okay albumin level.  2/20 abdominal paracentesis with 10.3L removed and albumin given  Fluid analysis.  Follow up on cultures  Unasyn    Benign prostatic hyperplasia with incomplete bladder emptying- (present on admission)  Assessment & Plan  Flomax  Monitor for urinary retention with prn bladder scan  Catheter placed 2/21    Acute kidney injury superimposed on CKD (HCC)- (present on admission)  Assessment & Plan  2/22 Creatinine:2.73 <3.24 <3.26 <3.32  Jules catheter placed 2/21pm  Patient previously had urinary retention.  CT showed hydronephrosis and urinoma.   Appreciate Urology input  IV fluids  Monitor I/O's, labs, and vitals.    Full code status  Assessment & Plan  I discussed the CODE STATUS with the patient.  He wishes to be full code.    Severe protein-calorie malnutrition (HCC)- (present on admission)  Assessment & Plan  Body mass index is 26.75 kg/m².    Patient notably had BMI of 33 during his last hospitalization.  He has significant subcutaneous tissue and muscle wasting, bitemporal wasting and periorbital wasting.  Due to poor oral intake, inpart due to esophageal cancer  Discussed possible future surgical G-tube if unable to get enough fluids/calories orally    Nutrition consult  Start mirtazapine 15 mg nightly      CAP (community acquired pneumonia)- (present on admission)  Assessment & Plan  As per chest  x-ray.  Concerning for right greater than left lower lobe effusion.  Unasyn for gram-negative coverage and azithromycin atypical coverage  Follow up on pleural effusion culture  Light's criteria med for exudative effusion (malignancy has not been ruled out)    Thalamic hemorrhage (HCC)- (present on admission)  Assessment & Plan  As per history during recent hospitalization.  Patient was supposed to arrange outpatient MRI to evaluate for metastatic brain tumor once his renal function improved.  Consider MRI of the brain with contrast once medically stabilized  2/19 CT Head unremarkable  Stopped heparin subcutaneous 2/20/24  SCDs    Acute respiratory failure with hypoxia (HCC)- (present on admission)  Assessment & Plan  Secondary to bilateral pleural effusions, question if aspiration vs infection  Continue oxygen as per respiratory protocol  Continuous pulse oximetry.  Respiratory therapy consult.  Duo nebs every 4 hours needed.  Incentive spirometry.  PEP therapy  Early diuresis following stabilization  2/21 and 2/22 had thoracentesis.  Echocardiogram ordered 2/21.  Paracentesis 2/20 with 10.3 liters removed.      Malignant neoplasm of lower third of esophagus (HCC)- (present on admission)  Assessment & Plan  As per history.  Stage III (cT3, cN0, cM0, G3).  Status post cytoreductive preoperative chemo radiation therapy.  Completed in October 2023.  2/20 I have consulted Dr. Rangel in terms of timing of surgery and he has requested a repeat staging due to new ascites/anasarca and concern of spread.  Followed by Dr. Triana of [facility].  May need future G-tube for additional calories.  Wife and patient to discuss Hospice if surgery not an option.    Iron deficiency Anemia secondary to acute GI bleeding- (present on admission)  Assessment & Plan  As per history.  Ferritin of 16 in December 2023.  2/19 Patient given transfusion 1 unit PRBC in the emergency room.  2/21 Ordered for 1 unit pRBC's for  hgb:7.1.  iron panel studies  Concern of esophageal bleed.    2/20 I have consulted gi and they do not feel need to perform EGD but agree with my order for PPI IV BID  Monitor labs      GERD (gastroesophageal reflux disease)- (present on admission)  Assessment & Plan  As per history.  IV PPI  GI cocktail prn     Dyslipidemia- (present on admission)  Assessment & Plan  As per history.    Hypertension- (present on admission)  Assessment & Plan  Monitor vitals.         VTE prophylaxis:   SCDs/TEDs

## 2024-02-22 NOTE — CARE PLAN
The patient is Watcher - Medium risk of patient condition declining or worsening    Shift Goals  Clinical Goals: wean 02  Patient Goals: rest/go home  Family Goals: ANUPAM    Progress made toward(s) clinical / shift goals:    Problem: Respiratory  Goal: Patient will achieve/maintain optimum respiratory ventilation and gas exchange  Outcome: Progressing     Problem: Skin Integrity  Goal: Skin integrity is maintained or improved  Outcome: Progressing     Problem: Hemodynamics  Goal: Patient's hemodynamics, fluid balance and neurologic status will be stable or improve  Outcome: Progressing     Problem: Knowledge Deficit - Standard  Goal: Patient and family/care givers will demonstrate understanding of plan of care, disease process/condition, diagnostic tests and medications  Outcome: Progressing       Patient is not progressing towards the following goals:

## 2024-02-22 NOTE — DIETARY
Nutrition Services: Update   Day 3 of admit. Tommy Mckeon is a 58 y.o. male with admitting DX of Severe sepsis (HCC) [A41.9, R65.20]  Esophageal bleeding [K22.89]    Pt CLD x3 days. This is day 3 of inadequate nutrition while admitted (pt eating <50% on clears), pt with poor po x2 weeks prior to admission. Already meets severe protein calorie malnutrition criteria. If it is not medically feasible to advance to PO feedings within 48-72 hours, consider nutrition support to meet needs.     Malnutrition Risk: Per ASPEN guidelines, pt meets criteria for severe malnutrition in the context of chronic illness related to cancer treatment as evidence by pt eating <50% for x2 weeks with severe muscle and fat wasting in multiple regions and a reported -12.4% wt loss in <6 months.        Recommendations/Plan:  Voalted MD with concerns.   Diet advancements as medically feasible per MD.   Consideration of nutrition support if PO diet not feasible.  Monitor weight.    RD Following.

## 2024-02-22 NOTE — PROGRESS NOTES
4 Eyes Skin Assessment Completed by NADEEN De La Garza and NADEEN Evans.    Head WDL  Ears WDL  Nose WDL  Mouth Redness    Neck WDL  Breast/Chest WDL; R sided port  Shoulder Blades WDL  Spine WDL  (R) Arm/Elbow/Hand Redness and Blanching  (L) Arm/Elbow/Hand Redness and Blanching  Abdomen Redness/Excoriation, Bruising    Groin WDL  Scrotum/Coccyx/Buttocks Redness and Blanching, Discoloration    (R) Leg WDL  (L) Leg WDL  (R) Heel/Foot/Toe Redness and Blanching  (L) Heel/Foot/Toe Redness and Blanching      Devices In Places ECG, Blood Pressure Cuff, Pulse Ox, Jules, SCD's, and Nasal Cannula      Interventions In Place Gray Ear Foams, Sacral Mepilex, Waffle Overlay, Pillows, Q2 Turns, Low Air Loss Mattress, and Heels Loaded W/Pillows    Possible Skin Injury No    Pictures Uploaded Into Epic Yes  Wound Consult Placed N/A  RN Wound Prevention Protocol Ordered No

## 2024-02-23 NOTE — PROGRESS NOTES
SUMMARY Ongoing goals of care discussion given pending pathology results. PC will continue to follow along, patient could not commit to any decisions today .      MRN: [id number]  Date of palliative consult: 2/21/24  Reason for consult: ACP/GOC  Referring provider: Melissa  Location of consult: [facility] [place] [facility]  Additional consulting services: Hospital medicine, critical care, gastroenterology, surgical oncology, urological surgery    HPI:   Tommy Mckeon is a 58 y.o. male with past medical history of stage III esophageal cancer of the lower third post cytoreductive preoperative chemoradiation therapy completed in October 2023, GERD, hypertension, dyslipidemia, and recent thalamic hemorrhage who presented on 2/19/2024 with shortness of breath and syncope.  Reportedly, patient was found on the floor of his bathroom by EMS, he stated that he was about to shower then felt dizzy and woke up on the floor.  No obvious head trauma was noted.  Patient was found to have 85% saturation on room air per EMS and arrived to the hospital on 5 L, patient is not normally on oxygen at home.    Recent hospitalization from 1/13 through 1/17/2024 after patient presented with altered mental status and some mild dysphagia and confusion.  He was diagnosed with pneumonia at this time and started on IV antibiotics.  MRI revealed small thalamic hemorrhage-neurology was consulted and recommended hypertension control as well as MRI of the brain to rule out underlying mass given history of esophageal cancer.  He was discharged on blood pressure medications Jules catheter due to urinary retention.  Baseline creatinine was 3 at the time of discharge.  Patient was started on tamsulosin and was instructed to follow-up with his primary care provider to coordinate MRI of the brain with contrast if kidney function improved.    Patient being followed by surgical oncology since 2023.  No signs of metastatic disease based on previous CT scans.  Status post  chemoradiation with 25 fractions managed by Dr. Woods completed in October 2023. Also has been seen by Dr. Triana with [facility].     Significant lab and diagnostics indicate tachycardia, respiratory rate of 22, white blood cell count of 27.1 with left shift, creatinine 3.32, baseline 2.89, lactic acid 2.4 increased at 4.0.  Patient was started on sepsis protocol.    Significant/pertinent past medical history: Bowel habit changes, diabetes, GERD, heartburn, hiatal hernia, hypertension, iron deficiency anemia, obesity, pneumonia.  Never smoker no alcohol or drug use currently.    Pain History:denies  Onset:   Location:   Duration:   Characteristics:   Aggravating factors:   Alleviating factors:   Radiation:   Treatments:   Severity:     Additional symptoms: Moderate to severe anorexia, patient has been eating jello and apple juice only per wife. Ongoing intermittent nausea. Increased fatigue, unable to work since last admit in January. Intermittent sleep.       Interval History: Patient noted to be having intermittent GI bleeding and increased oxygen requirements.  Gastroenterology not recommending invasive intervention.  Patient noted to have ascites, likely malignant status post paracentesis with 10.3 L removal.  Surgical oncology has evaluated and does not recommend surgery at this time, recommend updated imaging to assess treatment status limited by current kidney performance, US duplex to assess PD patency, paracentesis fluid has been sent for cytology evaluation.  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  The recommendation is to trend creatinine, repeat imaging in 72 hours if no improvement in creatinine or hydronephrosis persistent will consider bilateral stent placement.  If no fevers or chills no need to consider draining urinoma.    Medication Allergy/Sensitivities:  No Known  Allergies    ROS:    Review of Systems   Unable to perform ROS: Acuity of condition       PE:   Recent vital signs  BMI: Body mass index is 26.43 kg/m².    Temp (24hrs), Av.5 °C (97.7 °F), Min:36.5 °C (97.7 °F), Max:36.5 °C (97.7 °F)  Temperature: 36.5 °C (97.7 °F), Monitored Temp: 37.9 °C (100.2 °F)  Pulse  Av.1  Min: 86  Max: 114   Blood Pressure: 105/74       Physical Exam  Vitals and nursing note reviewed.   Constitutional:       General: He is not in acute distress.     Appearance: He is cachectic. He is ill-appearing.      Interventions: Nasal cannula in place.   HENT:      Head: Normocephalic.      Comments: Sunken eyes and cheeks.   Neck:      Comments: SCM and trapezius wasting.   Cardiovascular:      Rate and Rhythm: Tachycardia present.   Pulmonary:      Effort: Pulmonary effort is normal.   Musculoskeletal:      Comments: Diffuse wasting evident.    Skin:     General: Skin is warm and dry.      Capillary Refill: Capillary refill takes 2 to 3 seconds.      Coloration: Skin is ashen and pale.   Neurological:      General: No focal deficit present.      Mental Status: He is alert and oriented to person, place, and time.   Psychiatric:         Attention and Perception: Attention normal.         Mood and Affect: Affect is flat.         Speech: Speech is delayed.         Behavior: Behavior is withdrawn. Behavior is cooperative.         Thought Content: Thought content normal.         Cognition and Memory: Cognition normal.         Judgment: Judgment normal.       Recent Labs     24  0310 24  0502 24  0530   SODIUM 141 141 141   POTASSIUM 3.4* 3.7 3.4*   CHLORIDE 105 106 106   CO2 25    GLUCOSE 112* 129* 134*   BUN 87* 73* 67*   CREATININE 3.24* 2.73* 2.45*   CALCIUM 7.9* 8.6 7.8*     Recent Labs     24  2130 24  0502 24  0530   WBC 24.6* 21.3* 18.9*   RBC 3.32* 3.27* 3.21*   HEMOGLOBIN 9.4* 9.3* 9.3*   HEMATOCRIT 29.2* 29.0* 28.1*   MCV 88.0 88.7 87.5   MCH  "28.3 28.4 29.0   MCHC 32.2* 32.1* 33.1   RDW 52.2* 54.2* 54.3*   PLATELETCT 316 322 320   MPV 9.5 9.5 9.5       ASSESSMENT/PLAN WITH SHARED DECISION MAKING:   Review  Pertinent imaging reviewed.    PHYSICAL ASPECTS OF CARE  Palliative Performance Scale: 30%    # Severe sepsis  # Bilateral pleural effusion  # Malignant ascites  # Esophageal cancer    # Acute kidney injury superimposed on CKD  # Protein calorie malnutrition  #Community-acquired pneumonia  #Thalamic hemorrhage  #Acute respiratory failure with hypoxia  #Iron deficiency anemia secondary to acute GI bleeding    SOCIAL ASPECTS OF CARE  MER resides with his wife, Grace, and their 2 dogs and 2 cats. 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 He hasn't been able to work, however, since DC from hospital in January 2024. Grace and MER have been  for 13 years. They have no children together, but MER has 3 kids, 2 of whom he has a positive relationship with. Grace shares that her family, her Dad and Mom and sister, are a great source of support for MER and her. She also shares that their family recently lost 2 close family members in the past month or so.     Prior to initial hospitalization, patient was independent in ADL's and IADL's. He states he enjoys leather working and \"getting stuff done around the house\".     SPIRITUAL ASPECTS OF CARE   None    GOALS OF CARE/SERIOUS ILLNESS CONVERSATION  Met with patient at bedside with wife Grace in attendance. Introduced myself and role of palliative care and they are agreeable to visit. Explored couples understanding of current health status. They have a fair understanding of  his current medical condition relative to cancer diagnosis. They are not able to state what they were told regarding cancer prognosis but do express frustration as they were told his cancer was \"completely curable\". Grace is upset because she understood esophageal surgery to be much less invasive than as " per explanation during this hospital stay. Overall, they express sadness. JT states that it's important to him that Grace be taken care of. He worries she won't be. Grace shares she has a history of multiple CVA's in a short time frame. She has ST memory difficulties. Discussed potential prognosis with permission with both patient and wife. Given ascites and bilateral pleural effusions, possibility of metastatic disease is high. Also, given his frail state, treatment options will be limited. He has difficulty stating what he is hopeful for. Grace requests provider to return when her Dad is in the building. I provided my contact information and will return today if able. Otherwise, will f/u on Monday.     1430: re convened with patient, wife and wife's dad, Mic. Overview of conversation earlier with Grace and JT. Discussed options dependent on patient's hopes/wishes and pathology results which are still pending. Mic is familiar with hospice philosophy and treatment, discussed as an option for JT. Will follow up on Monday, 2/26/24.     Code Status: DNR/DNI, need to complete POLST    ACP Documents: none    18 minutes spent discussing advance care planning, this time excludes any other billed services.    I spent a total of 50 minutes reviewing medical records, direct face-to-face time with the patient and/or family, documentation and coordination of care. This is separate from the time spent on advance care planning, which is documented above.    Niurka Joe, MSN, APRN, ACNPC-AG.  Palliative Care Nurse Practitioner  [phone]

## 2024-02-23 NOTE — ASSESSMENT & PLAN NOTE
Due to esophageal cancer  If bleeding worsens may need embolization with IR  2/24 Hgb:8.9 <9.3  On PPI  GI consulted and signed off stating medical management for now.

## 2024-02-23 NOTE — PROGRESS NOTES
Hospital Medicine Daily Progress Note    Date of Service  2/23/2024    Chief Complaint  Syncope and short of breath    Hospital Course  Tommy Mckeon is a 58 y.o. male with known esophageal cancer s/p radiation tx 11/2023, HTN, DLD, recent thalamic hemorrhage. He was admitted 2/19/2024 with syncope.  He was found to be in acute renal failure with a Cr:3.3 and has anasarca.  During his hospitalization he had a paracentesis with 10.3L removed. He was noted to be more anemic than prior.    Interval Problem Update  2/23: More alert.  Off and on required higher oxygen levels through the night per patient.  His speech sounds better today. Renal function slightly better.  No blood in mouth.      I have discussed this patient's plan of care and discharge plan at IDT rounds today with Case Management, Nursing, Nursing leadership, and other members of the IDT team.    Consultants/Specialty  GI and Surgical Oncology    Code Status  DNAR/DNI    Disposition  The patient is not medically cleared for discharge to home or a post-acute facility.      I have placed the appropriate orders for post-discharge needs.    Review of Systems  Review of Systems   Constitutional:  Positive for malaise/fatigue. Negative for fever.   Respiratory:  Positive for cough and shortness of breath. Negative for hemoptysis.    Cardiovascular:  Negative for chest pain and leg swelling.   Gastrointestinal:  Negative for abdominal pain and nausea.   Neurological:  Negative for headaches.   Psychiatric/Behavioral:  The patient is not nervous/anxious.    All other systems reviewed and are negative.       Physical Exam  Temp:  [36.5 °C (97.7 °F)] 36.5 °C (97.7 °F)  Pulse:  [101-114] 114  Resp:  [22-36] 25  BP: ()/(67-81) 105/74  SpO2:  [83 %-96 %] 90 %    Physical Exam  Vitals reviewed.   Constitutional:       Appearance: He is not ill-appearing.   HENT:      Head: Normocephalic and atraumatic.      Nose: Nose normal.      Mouth/Throat:      Mouth:  Mucous membranes are moist.   Eyes:      General:         Right eye: No discharge.         Left eye: No discharge.      Extraocular Movements: Extraocular movements intact.   Cardiovascular:      Rate and Rhythm: Normal rate and regular rhythm.      Heart sounds: No murmur heard.  Pulmonary:      Effort: No respiratory distress.      Breath sounds: Decreased breath sounds and rhonchi present.   Abdominal:      General: Bowel sounds are normal. There is no distension.      Palpations: Abdomen is soft.      Tenderness: There is no abdominal tenderness.   Musculoskeletal:      Cervical back: Neck supple. No tenderness.      Right lower leg: No edema.      Left lower leg: No edema.   Lymphadenopathy:      Cervical: Cervical adenopathy present.   Skin:     General: Skin is dry.   Neurological:      General: No focal deficit present.      Mental Status: He is alert and oriented to person, place, and time.   Psychiatric:         Mood and Affect: Mood normal.         Speech: Speech normal.         Behavior: Behavior is slowed.         Cognition and Memory: Cognition normal.         Fluids    Intake/Output Summary (Last 24 hours) at 2/23/2024 1215  Last data filed at 2/22/2024 1813  Gross per 24 hour   Intake 500 ml   Output 700 ml   Net -200 ml       Laboratory  Recent Labs     02/21/24  2130 02/22/24  0502 02/23/24  0530   WBC 24.6* 21.3* 18.9*   RBC 3.32* 3.27* 3.21*   HEMOGLOBIN 9.4* 9.3* 9.3*   HEMATOCRIT 29.2* 29.0* 28.1*   MCV 88.0 88.7 87.5   MCH 28.3 28.4 29.0   MCHC 32.2* 32.1* 33.1   RDW 52.2* 54.2* 54.3*   PLATELETCT 316 322 320   MPV 9.5 9.5 9.5     Recent Labs     02/21/24  0310 02/22/24  0502 02/23/24  0530   SODIUM 141 141 141   POTASSIUM 3.4* 3.7 3.4*   CHLORIDE 105 106 106   CO2 25 21 21   GLUCOSE 112* 129* 134*   BUN 87* 73* 67*   CREATININE 3.24* 2.73* 2.45*   CALCIUM 7.9* 8.6 7.8*                     Imaging  DX-CHEST-PORTABLE (1 VIEW)   Final Result      Stable appearance of the chest. No evidence of  pneumothorax following thoracentesis.      US-THORACENTESIS PUNCTURE LEFT   Final Result      1. Ultrasound guided LEFT sided diagnostic / therapeutic  thoracentesis.      2. 625 mL of fluid withdrawn.      EC-ECHOCARDIOGRAM LTD W/O CONT   Final Result      DX-CHEST-LIMITED (1 VIEW)   Final Result      Unchanged LEFT basilar atelectasis with suspected superimposed pneumonia or other airspace disease      DX-CHEST-LIMITED (1 VIEW)   Final Result      1.  Bibasilar atelectasis without significant interval change, pneumonitis cannot be excluded and radiographic appearance alone.      DX-CHEST-PORTABLE (1 VIEW)   Final Result      1.  Hypoinflation with minimal bibasilar atelectasis, improved from prior.   2.  Minimal LEFT pleural fluid again seen.   3.  No pneumothorax.      US-THORACENTESIS PUNCTURE RIGHT   Final Result      1. Ultrasound guided right sided diagnostic and therapeutic thoracentesis.      2. 850 mL of fluid withdrawn.      US-RUQ   Final Result      1.  Moderate volume of abdominal ascites.   2.  Mild hepatomegaly.   3.  Hepatic steatosis and/or diffuse hepatocellular disease.   4.  Moderate right hydronephrosis.      US-PARACENTESIS, ABD WITH IMAGING   Final Result      1. Ultrasound-guided therapeutic paracentesis of the right lower quadrant of the abdominal wall.      2. 10.3 mL of fluid withdrawn.      DX-CHEST-LIMITED (1 VIEW)   Final Result      Stable appearance of the chest.      CT-ABDOMEN-PELVIS W/O   Final Result      1.  Large amount of ascites is identified in the abdomen and pelvis.      2.  There is new evidence of moderate hydronephrosis bilaterally.      3.  In addition, an oval-shaped fluid collection is now identified anterior to the left psoas musculature. Urinoma due to disruption of left ureter or renal collecting system is a possibility.      4.  Moderate pleural effusions bilaterally.      5.  Visualized thoracic esophagus is distended with fluid.      6.  Opacifications in each  lung base could be due to edema or inflammation.      CT-HEAD W/O   Final Result         NO ACUTE ABNORMALITIES ARE NOTED ON CT SCAN OF THE HEAD.               DX-CHEST-PORTABLE (1 VIEW)   Final Result         1.  Increase in right basilar opacifications are noted which could be due to discoid atelectasis aspiration or developing pneumonia.      2.  There is interval decrease in opacifications and volume loss in the left lung base.      EC-ECHOCARDIOGRAM COMPLETE W/O CONT    (Results Pending)        Assessment/Plan  * Severe sepsis (HCC)- (present on admission)  Assessment & Plan  Aspiration vs active infection?  Ascites and pleural cultures no growth to date  Mildly elevated procalcitonin but has renal failure so indeterminate  S/p Unasyn   Wean IV fluids  Monitor I/O's, labs, and vitals.  2/23 wbc:18.9 <21.3<24.6<23.1  Pleural effusion with elevated nucleated cells:2282  Light's criteria of protein met for EXUDATIVE effusion    Hypokalemia  Assessment & Plan  2/23 K:3.4  2/22 K:3.7  2/21 K:3.4  I have ordered replacement  Monitor am BMP    Esophageal bleeding- (present on admission)  Assessment & Plan  Hgb:9.3  On PPI  GI consulted and signed off stating medical management for now.    Elevated troponin- (present on admission)  Assessment & Plan  Initial troponin mildly elevated at 29.  Concerning for demand ischemia in setting of mild ST depression in V2 and V3.  No active chest pain.  Continue to trend  Continuous cardiac monitoring to monitor for arrhythmias    Bilateral pleural effusion- (present on admission)  Assessment & Plan  As per CT scan.  Suspect hypoalbuminemia contribute. Have not rule out malignant pleural effusion. Await cytology  2/21 right thoracentesis  2/22 left thoracentesis  Exudative effusion  F/u on cultures and cytology    Other ascites  Assessment & Plan  Presumed malignant due to history of stage III esophageal cancer.  Status post chemoradiation therapy preoperatively for  cytoreduction.  Mildly elevated BNP on admit and I have ordered for an Echocardiogram 2/21  No significant of liver disease or alcohol.Okay albumin level.  2/20 abdominal paracentesis with 10.3L removed and albumin given  Fluid analysis.  Follow up on cultures  Unasyn  No diuresis yet due to renal function.    Benign prostatic hyperplasia with incomplete bladder emptying- (present on admission)  Assessment & Plan  Flomax  Monitor for urinary retention with prn bladder scan  Catheter placed 2/21    Acute kidney injury superimposed on CKD (HCC)- (present on admission)  Assessment & Plan  Likely obstructive etiology  2/22 Creatinine:2.45 <2.73 <3.24 <3.26 <3.32  Jules catheter placed 2/21pm  Patient previously had urinary retention.  CT showed hydronephrosis and urinoma.   Appreciate Urology input  Flomax  IV fluids  Monitor I/O's, labs, and vitals.    Full code status  Assessment & Plan  I discussed the CODE STATUS with the patient.  He wishes to be full code.    Severe protein-calorie malnutrition (HCC)- (present on admission)  Assessment & Plan  Body mass index is 26.75 kg/m².    Patient notably had BMI of 33 during his last hospitalization.  He has significant subcutaneous tissue and muscle wasting, bitemporal wasting and periorbital wasting.  Due to poor oral intake, inpart due to esophageal cancer  Discussed possible future surgical G-tube if unable to get enough fluids/calories orally    Nutrition consult  mirtazapine 15 mg nightly    CAP (community acquired pneumonia)- (present on admission)  Assessment & Plan  As per chest x-ray.  Concerning for right greater than left lower lobe effusion.  Unasyn for gram-negative coverage and azithromycin atypical coverage  pleural effusion culture no growth to date  Light's criteria med for exudative effusion (malignancy has not been ruled out)    Thalamic hemorrhage (HCC)- (present on admission)  Assessment & Plan  As per history during recent hospitalization.  Patient was  supposed to arrange outpatient MRI to evaluate for metastatic brain tumor once his renal function improved.  Consider MRI of the brain with contrast once medically stabilized  2/19 CT Head unremarkable  Stopped heparin subcutaneous 2/20/24  SCDs    Acute respiratory failure with hypoxia (HCC)- (present on admission)  Assessment & Plan  Secondary to bilateral pleural effusions, question if aspiration vs infection  Continue oxygen as per respiratory protocol  Continuous pulse oximetry.  Respiratory therapy consult.  Duo nebs every 4 hours needed.  Incentive spirometry.  PEP therapy  Early diuresis following stabilization  2/21 and 2/22 had thoracentesis.  Echocardiogram ordered 2/21.  Paracentesis 2/20 with 10.3 liters removed.      Malignant neoplasm of lower third of esophagus (HCC)- (present on admission)  Assessment & Plan  As per history.  Stage III (cT3, cN0, cM0, G3).  Status post cytoreductive preoperative chemo radiation therapy.  Completed in October 2023.  2/20 I have consulted Dr. Rangel in terms of timing of surgery and he has requested a repeat staging due to new ascites/anasarca and concern of spread.  Followed by Dr. Triana of [facility].  May need future G-tube for additional calories.  Wife and patient to discuss Hospice if surgery not an option.    Iron deficiency Anemia secondary to acute GI bleeding- (present on admission)  Assessment & Plan  As per history.  Ferritin of 16 in December 2023.  2/19 Patient given transfusion 1 unit PRBC in the emergency room.  2/21 Ordered for 1 unit pRBC's for hgb:7.1.  iron panel studies  Concern of esophageal bleed.    2/20 I have consulted gi and they do not feel need to perform EGD but agree with my order for PPI IV BID  Monitor labs      GERD (gastroesophageal reflux disease)- (present on admission)  Assessment & Plan  As per history.  IV PPI  GI cocktail prn     Dyslipidemia- (present on admission)  Assessment & Plan  As per  history.    Hypertension- (present on admission)  Assessment & Plan  Monitor vitals.         VTE prophylaxis:   SCDs/TEDs

## 2024-02-23 NOTE — CARE PLAN
The patient is Watcher - Medium risk of patient condition declining or worsening    Shift Goals  Clinical Goals: stable hemodynamics, wean o2  Patient Goals: sleep  Family Goals: ANUPAM    Progress made toward(s) clinical / shift goals:    Problem: Hemodynamics  Goal: Patient's hemodynamics, fluid balance and neurologic status will be stable or improve  Outcome: Progressing     Problem: Fluid Volume  Goal: Fluid volume balance will be maintained  Outcome: Progressing     Problem: Respiratory  Goal: Patient will achieve/maintain optimum respiratory ventilation and gas exchange  Outcome: Progressing     Problem: Skin Integrity  Goal: Skin integrity is maintained or improved  Outcome: Progressing       Patient is not progressing towards the following goals:      Problem: Knowledge Deficit - Standard  Goal: Patient and family/care givers will demonstrate understanding of plan of care, disease process/condition, diagnostic tests and medications  Outcome: Not Progressing  Note: Pt educated regarding plan of care and medications. All questions answered.

## 2024-02-23 NOTE — PROGRESS NOTES
4 Eyes Skin Assessment Completed by NADEEN Dowd and NADEEN Maier.    Head WDL  Ears Redness and Blanching  Nose WDL  Mouth Bleeding, old blood   Neck WDL  Breast/Chest WDL  Shoulder Blades WDL  Spine WDL, old BL thoracetesis sites covered with gauze/tegaderm  (R) Arm/Elbow/Hand WDL  (L) Arm/Elbow/Hand WDL  Abdomen Incision old paracentesis site  Groin WDL  Scrotum/Coccyx/Buttocks WDL  (R) Leg WDL  (L) Leg WDL  (R) Heel/Foot/Toe WDL  (L) Heel/Foot/Toe WDL          Devices In Places ECG, Tele Box, Blood Pressure Cuff, Pulse Ox, Jules, SCD's, and Nasal Cannula      Interventions In Place NC W/Ear Foams, Pillows, and Low Air Loss Mattress    Possible Skin Injury No    Pictures Uploaded Into Epic N/A  Wound Consult Placed N/A  RN Wound Prevention Protocol Ordered Yes

## 2024-02-24 PROBLEM — J91.0 MALIGNANT PLEURAL EFFUSION: Status: ACTIVE | Noted: 2024-01-01

## 2024-02-24 NOTE — ASSESSMENT & PLAN NOTE
Noted on cytology with adenocarcinoma.  Oncology consulted for further direction of care.  2/24 Discussed with Dr Kevin Hare.

## 2024-02-24 NOTE — CONSULTS
Surgery Urology Consultation    Date of Service  2/24/2024    Referring Physician  Vasiliy Edwards D.O.    Consulting Physician  Marissa Hobbs M.D.    Reason for Consultation  Bilateral hydroureteronephrosis, left urinoma    History of Presenting Illness  58 y.o. male who presented 2/19/2024 with elevated Cr and after an episode with syncope.  The patient has esophageal cancer. He does have a prior history of retention, currently no andrade in place but is on flomax. Patient somewhat somnolent and minimally communicative during our interview.    2/24/2024: Patient is somnolent and minimally communicative today. The Andrade catheter has been in place for 3 days with minimal improvement of his kidney function.     Review of Systems  Review of Systems   Unable to perform ROS: Other   Patient minimally communicative    Past Medical History   has a past medical history of Bowel habit changes (08/31/2023), Cancer (HCC), Diabetes (HCC) (08/31/2023), GERD (gastroesophageal reflux disease), Heart burn, Hiatus hernia syndrome, Hypertension (08/31/2023), Iron deficiency anemia, Obesity (BMI 30-39.9) (04/19/2016), and Pneumonia (1/13/2024).    He has no past medical history of Acute nasopharyngitis, Anesthesia, Anginal syndrome (HCC), Arrhythmia, Arthritis, Asthma, Blood clotting disorder (HCC), Breath shortness, Bronchitis, Carcinoma in situ of respiratory system, Cataract, Congestive heart failure (HCC), Continuous ambulatory peritoneal dialysis status (HCC), Coughing blood, Dental disorder, Dialysis patient (HCC), Disorder of thyroid, Emphysema of lung (HCC), Glaucoma, Gynecological disorder, Heart murmur, Heart valve disease, Hemorrhagic disorder (HCC), Hepatitis A, Hepatitis B, Hepatitis C, High cholesterol, Infectious disease, Jaundice, Myocardial infarct (HCC), Pacemaker, Pain, Pregnant, Psychiatric problem, Renal disorder, Rheumatic fever, Seizure (HCC), Sleep apnea, Snoring, Stroke (HCC), Tuberculosis, Urinary bladder  disorder, or Urinary incontinence.    Surgical History   has a past surgical history that includes eye surgery (01/01/2015); pr upper gi endoscopy,diagnosis (N/A, 08/18/2023); pr upper gi endoscopy,biopsy (N/A, 08/18/2023); other (2015); and cath placement (Right, 9/1/2023).    Family History  family history includes Diabetes in his mother; Stroke in his mother.    Social History   reports that he has never smoked. He has been exposed to tobacco smoke. He has never used smokeless tobacco. He reports that he does not currently use alcohol. He reports that he does not use drugs.    Medications      Allergies  No Known Allergies    Physical Exam  Temp:  [36.7 °C (98 °F)-37 °C (98.6 °F)] 36.9 °C (98.4 °F)  Pulse:  [] 100  Resp:  [16-26] 20  BP: ()/(63-87) 122/87  SpO2:  [90 %-98 %] 93 %    Physical Exam  Constitutional:       Appearance: He is ill-appearing.   HENT:      Head: Normocephalic.      Mouth/Throat:      Mouth: Mucous membranes are dry.   Cardiovascular:      Rate and Rhythm: Normal rate.   Pulmonary:      Effort: Pulmonary effort is normal.   Abdominal:      General: There is distension.      Tenderness: There is no abdominal tenderness.   Skin:     General: Skin is warm.         Fluids  Date 02/21/24 0700 - 02/22/24 0659   Shift 2690-4911 1995-1000 8948-8165 24 Hour Total   INTAKE   Blood 448   448   Shift Total 448   448   OUTPUT   Urine 380 200  580   Emesis 50   50   Shift Total 430 200  630   Weight (kg) 99.7 99.7 99.7 99.7       Laboratory  Recent Labs     02/22/24  0502 02/23/24  0530 02/24/24  0348   WBC 21.3* 18.9* 27.8*   RBC 3.27* 3.21* 3.17*   HEMOGLOBIN 9.3* 9.3* 8.9*   HEMATOCRIT 29.0* 28.1* 28.0*   MCV 88.7 87.5 88.3   MCH 28.4 29.0 28.1   MCHC 32.1* 33.1 31.8*   RDW 54.2* 54.3* 56.2*   PLATELETCT 322 320 347   MPV 9.5 9.5 9.8     Recent Labs     02/22/24  0502 02/23/24  0530 02/24/24  0348   SODIUM 141 141 139   POTASSIUM 3.7 3.4* 3.6   CHLORIDE 106 106 101   CO2 21 21 22    GLUCOSE 129* 134* 174*   BUN 73* 67* 68*   CREATININE 2.73* 2.45* 2.83*   CALCIUM 8.6 7.8* 8.5                       Imaging  CT AP 2/19: bilateral hydroureteronephrosis, 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 Large volume ascites. No nephrolithiasis or obvious cause for hydro    CT AP 2/24: persistent bilateral hydronephrosis with slight increase in size of left urinoma. Large volume ascites.    Assessment/Plan    Continue Jules   Trend Cr  NPO at MN  Cystoscopy and bilateral ureteral stent placement tomorrow  If no fevers or chills no need to consider draining urinoma

## 2024-02-24 NOTE — PROGRESS NOTES
4 Eyes Skin Assessment Completed by NADEEN Perla and NADEEN Tong.      Head WDL  Ears Redness and Blanching  Nose WDL  Mouth Bleeding, old blood   Neck WDL  Breast/Chest WDL  Shoulder Blades WDL  Spine WDL, old BL thoracetesis sites covered with gauze/tegaderm  (R) Arm/Elbow/Hand WDL  (L) Arm/Elbow/Hand WDL  Abdomen Incision old paracentesis site  Groin WDL  Scrotum/Coccyx/Buttocks WDL  (R) Leg WDL  (L) Leg WDL  (R) Heel/Foot/Toe WDL  (L) Heel/Foot/Toe WDL        Devices In Places ECG, Tele Box, Blood Pressure Cuff, Pulse Ox, Jules, SCD's, and Nasal Cannula        Interventions In Place NC W/Ear Foams, Pillows, and Low Air Loss Mattress     Possible Skin Injury No     Pictures Uploaded Into Epic N/A  Wound Consult Placed N/A  RN Wound Prevention Protocol Ordered Yes

## 2024-02-24 NOTE — CARE PLAN
The patient is Stable - Low risk of patient condition declining or worsening    Shift Goals  Clinical Goals: Hemodymanic stability  Patient Goals: Comfort  Family Goals: ANUPAM    Progress made toward(s) clinical / shift goals:    Problem: Knowledge Deficit - Standard  Goal: Patient and family/care givers will demonstrate understanding of plan of care, disease process/condition, diagnostic tests and medications  Outcome: Progressing     Problem: Hemodynamics  Goal: Patient's hemodynamics, fluid balance and neurologic status will be stable or improve  Outcome: Progressing     Problem: Pain - Standard  Goal: Alleviation of pain or a reduction in pain to the patient’s comfort goal  Outcome: Progressing    Problem: Fall Risk  Goal: Patient will remain free from falls  Outcome: Progressing     Problem: Skin Integrity  Goal: Skin integrity is maintained or improved  Outcome: Progressing

## 2024-02-24 NOTE — PROGRESS NOTES
Hospital Medicine Daily Progress Note    Date of Service  2/24/2024    Chief Complaint  Syncope and short of breath    Hospital Course  Tommy Mckeon is a 58 y.o. male with known esophageal cancer s/p radiation tx 11/2023, HTN, DLD, recent thalamic hemorrhage. He was admitted 2/19/2024 with syncope.  He was found to be in acute renal failure with a Cr:3.3 and has anasarca.  During his hospitalization he had a paracentesis with 10.3L removed. He was noted to be more anemic than prior.    Interval Problem Update  2/24: wbc:27.8, Cr:2.83. Repeat CT abd with ongoing hydroneprosis bilaterally L>R. There is a new psoas fluid collection and ongoing ascites (although I question if this is more so from his ureter). I have reviewed records with the  and wife.  They were made aware of the cytology showing malignant effusion.    I have discussed this patient's plan of care and discharge plan at IDT rounds today with Case Management, Nursing, Nursing leadership, and other members of the IDT team.    Consultants/Specialty  GI and Surgical Oncology    Code Status  DNAR/DNI    Disposition  The patient is not medically cleared for discharge to home or a post-acute facility.      I have placed the appropriate orders for post-discharge needs.    Review of Systems  Review of Systems   Constitutional:  Positive for malaise/fatigue. Negative for fever.   HENT:  Negative for sore throat.    Respiratory:  Positive for cough and shortness of breath. Negative for hemoptysis.    Cardiovascular:  Negative for palpitations and leg swelling.   Gastrointestinal:  Negative for abdominal pain and nausea.   Neurological:  Negative for headaches.   Psychiatric/Behavioral:  The patient is not nervous/anxious.    All other systems reviewed and are negative.       Physical Exam  Temp:  [36.7 °C (98 °F)-37 °C (98.6 °F)] 36.9 °C (98.4 °F)  Pulse:  [] 100  Resp:  [16-26] 20  BP: ()/(63-87) 122/87  SpO2:  [90 %-98 %] 93 %    Physical  Exam  Vitals reviewed.   Constitutional:       Appearance: He is not ill-appearing.   HENT:      Head: Normocephalic and atraumatic.      Nose: Nose normal.      Mouth/Throat:      Mouth: Mucous membranes are moist.   Eyes:      General:         Right eye: No discharge.         Left eye: No discharge.      Extraocular Movements: Extraocular movements intact.   Cardiovascular:      Rate and Rhythm: Normal rate and regular rhythm.      Heart sounds: No murmur heard.  Pulmonary:      Effort: No respiratory distress.      Breath sounds: Decreased breath sounds and rhonchi present.   Abdominal:      General: Bowel sounds are normal. There is no distension.      Palpations: Abdomen is soft.      Tenderness: There is no abdominal tenderness.   Musculoskeletal:      Cervical back: Normal range of motion.      Right lower leg: No edema.      Left lower leg: No edema.   Skin:     General: Skin is dry.   Neurological:      General: No focal deficit present.      Mental Status: He is alert and oriented to person, place, and time.   Psychiatric:         Mood and Affect: Mood normal.         Speech: Speech normal.         Behavior: Behavior is slowed.         Cognition and Memory: Cognition normal.         Fluids    Intake/Output Summary (Last 24 hours) at 2/24/2024 1319  Last data filed at 2/24/2024 0616  Gross per 24 hour   Intake 150 ml   Output 1863 ml   Net -1713 ml       Laboratory  Recent Labs     02/22/24  0502 02/23/24  0530 02/24/24  0348   WBC 21.3* 18.9* 27.8*   RBC 3.27* 3.21* 3.17*   HEMOGLOBIN 9.3* 9.3* 8.9*   HEMATOCRIT 29.0* 28.1* 28.0*   MCV 88.7 87.5 88.3   MCH 28.4 29.0 28.1   MCHC 32.1* 33.1 31.8*   RDW 54.2* 54.3* 56.2*   PLATELETCT 322 320 347   MPV 9.5 9.5 9.8     Recent Labs     02/22/24  0502 02/23/24  0530 02/24/24  0348   SODIUM 141 141 139   POTASSIUM 3.7 3.4* 3.6   CHLORIDE 106 106 101   CO2 21 21 22   GLUCOSE 129* 134* 174*   BUN 73* 67* 68*   CREATININE 2.73* 2.45* 2.83*   CALCIUM 8.6 7.8* 8.5                      Imaging  CT-ABDOMEN-PELVIS W/O   Final Result      1.  Again seen large amount of ascites.      2.  Again seen bilateral ureteropelvic junction dilatation being mild to moderate on the right and moderate to severe on the left.      3.  Interval increase in size of a fluid collection involving the left anterior psoas muscle.      4.  Improved bibasilar consolidation and pleural effusions.      5.  Cirrhotic appearance of the liver.      6.  Again seen dilated thoracic esophagus filled with fluid.      EC-ECHOCARDIOGRAM COMPLETE W/ CONT   Final Result      DX-CHEST-PORTABLE (1 VIEW)   Final Result      Stable appearance of the chest. No evidence of pneumothorax following thoracentesis.      US-THORACENTESIS PUNCTURE LEFT   Final Result      1. Ultrasound guided LEFT sided diagnostic / therapeutic  thoracentesis.      2. 625 mL of fluid withdrawn.      EC-ECHOCARDIOGRAM LTD W/O CONT   Final Result      DX-CHEST-LIMITED (1 VIEW)   Final Result      Unchanged LEFT basilar atelectasis with suspected superimposed pneumonia or other airspace disease      DX-CHEST-LIMITED (1 VIEW)   Final Result      1.  Bibasilar atelectasis without significant interval change, pneumonitis cannot be excluded and radiographic appearance alone.      DX-CHEST-PORTABLE (1 VIEW)   Final Result      1.  Hypoinflation with minimal bibasilar atelectasis, improved from prior.   2.  Minimal LEFT pleural fluid again seen.   3.  No pneumothorax.      US-THORACENTESIS PUNCTURE RIGHT   Final Result      1. Ultrasound guided right sided diagnostic and therapeutic thoracentesis.      2. 850 mL of fluid withdrawn.      US-RUQ   Final Result      1.  Moderate volume of abdominal ascites.   2.  Mild hepatomegaly.   3.  Hepatic steatosis and/or diffuse hepatocellular disease.   4.  Moderate right hydronephrosis.      US-PARACENTESIS, ABD WITH IMAGING   Final Result      1. Ultrasound-guided therapeutic paracentesis of the right lower quadrant of  the abdominal wall.      2. 10.3 mL of fluid withdrawn.      DX-CHEST-LIMITED (1 VIEW)   Final Result      Stable appearance of the chest.      CT-ABDOMEN-PELVIS W/O   Final Result      1.  Large amount of ascites is identified in the abdomen and pelvis.      2.  There is new evidence of moderate hydronephrosis bilaterally.      3.  In addition, an oval-shaped fluid collection is now identified anterior to the left psoas musculature. Urinoma due to disruption of left ureter or renal collecting system is a possibility.      4.  Moderate pleural effusions bilaterally.      5.  Visualized thoracic esophagus is distended with fluid.      6.  Opacifications in each lung base could be due to edema or inflammation.      CT-HEAD W/O   Final Result         NO ACUTE ABNORMALITIES ARE NOTED ON CT SCAN OF THE HEAD.               DX-CHEST-PORTABLE (1 VIEW)   Final Result         1.  Increase in right basilar opacifications are noted which could be due to discoid atelectasis aspiration or developing pneumonia.      2.  There is interval decrease in opacifications and volume loss in the left lung base.           Assessment/Plan  * Severe sepsis (HCC)- (present on admission)  Assessment & Plan  Aspiration vs active infection?  Ascites and pleural cultures no growth to date  Mildly elevated procalcitonin but has renal failure so indeterminate  S/p Unasyn   Wean IV fluids  Monitor I/O's, labs, and vitals.  2/23 wbc:18.9 <21.3<24.6<23.1  Pleural effusion with elevated nucleated cells:2282  Light's criteria of protein met for EXUDATIVE effusion    Malignant pleural effusion- (present on admission)  Assessment & Plan  Noted on cytology with adenocarcinoma.  Oncology consulted for further direction of care.  2/24 Discussed with Dr Kevin Hare.    Hypokalemia  Assessment & Plan  2/24 K:3.6  I have ordered replacement  Monitor am BMP    Esophageal bleeding- (present on admission)  Assessment & Plan  Due to esophageal cancer  If bleeding  worsens may need embolization with IR  2/24 Hgb:8.9 <9.3  On PPI  GI consulted and signed off stating medical management for now.    Elevated troponin- (present on admission)  Assessment & Plan  Initial troponin mildly elevated at 29.  Concerning for demand ischemia in setting of mild ST depression in V2 and V3.  No active chest pain.  Continue to trend  Continuous cardiac monitoring to monitor for arrhythmias    Bilateral pleural effusion- (present on admission)  Assessment & Plan  As per CT scan.  Suspect hypoalbuminemia contribute. Have not rule out malignant pleural effusion. Await cytology  2/21 right thoracentesis  2/22 left thoracentesis  Exudative effusion  Cytology showing malignant effusion with adenocarcinoma    Other ascites  Assessment & Plan  Presumed malignant due to history of stage III esophageal cancer.  Status post chemoradiation therapy preoperatively for cytoreduction.  Mildly elevated BNP on admit and I have ordered for an Echocardiogram 2/21  No significant of liver disease or alcohol.  Okay albumin level.  2/20 abdominal paracentesis with 10.3L removed and albumin given  Fluid analysis.  Follow up on cultures  Unasyn  No diuresis yet due to renal function.    Benign prostatic hyperplasia with incomplete bladder emptying- (present on admission)  Assessment & Plan  Flomax  Monitor for urinary retention with prn bladder scan  Catheter placed 2/21    Acute kidney injury superimposed on CKD (HCC)- (present on admission)  Assessment & Plan  Likely obstructive etiology  2/24 Creatinine:2.83 <2.45 <2.73 <3.24 <3.26 <3.32  Jules catheter placed 2/21pm  Patient previously had urinary retention.  CT showed hydronephrosis and urinoma.   Appreciate Urology input. Withongoing hydronephrosis on repeat 2/24 CT, urology will place stents 2/25  Flomax  IV fluids  Monitor I/O's, labs, and vitals.    Full code status  Assessment & Plan  I discussed the CODE STATUS with the patient.  He wishes to be full  code.    Severe protein-calorie malnutrition (HCC)- (present on admission)  Assessment & Plan  Body mass index is 22.97 kg/m².  Higher BMI on admit due to ascites and pleural effusions  Patient notably had BMI of 33 during his last hospitalization.  He has significant subcutaneous tissue and muscle wasting, bitemporal wasting and periorbital wasting.  Due to poor oral intake, inpart due to esophageal cancer  Discussed possible future surgical G-tube if unable to get enough fluids/calories orally  Nutrition consult  mirtazapine 15 mg nightly    CAP (community acquired pneumonia)- (present on admission)  Assessment & Plan  As per chest x-ray.  Concerning for right greater than left lower lobe effusion.  Unasyn for gram-negative coverage and azithromycin atypical coverage  pleural effusion culture no growth to date  Light's criteria med for exudative effusion (malignancy has not been ruled out)    Thalamic hemorrhage (HCC)- (present on admission)  Assessment & Plan  As per history during recent hospitalization.  Patient was supposed to arrange outpatient MRI to evaluate for metastatic brain tumor once his renal function improved.  Consider MRI of the brain with contrast once medically stabilized  2/19 CT Head unremarkable  Stopped heparin subcutaneous 2/20/24  SCDs    Acute respiratory failure with hypoxia (HCC)- (present on admission)  Assessment & Plan  Secondary to bilateral pleural effusions, due to malignant effusion  Continue oxygen as per respiratory protocol  Continuous pulse oximetry.  Respiratory therapy consult.  Duo nebs every 4 hours needed.  Incentive spirometry.  PEP therapy  Early diuresis following stabilization  2/21 and 2/22 had thoracentesis.  Echocardiogram ordered 2/21.  Paracentesis 2/20 with 10.3 liters removed.      Malignant neoplasm of lower third of esophagus (HCC)- (present on admission)  Assessment & Plan  As per history.  Stage III (cT3, cN0, cM0, G3).  Status post cytoreductive  preoperative chemo radiation therapy.  Completed in October 2023.  2/20 I have consulted Dr. Rangel in terms of timing of surgery and he has requested a repeat staging due to new ascites/anasarca and concern of spread.  Followed by Dr. Triana of [facility]  2/24 I have consulted Dr Hare, oncologist for further options in his care vs hospice  May need future G-tube for additional calories.  Wife and patient to discuss Hospice if surgery not an option.    Iron deficiency Anemia secondary to acute GI bleeding- (present on admission)  Assessment & Plan  As per history.  Ferritin of 16 in December 2023.  2/19 Patient given transfusion 1 unit PRBC in the emergency room.  2/21 Ordered for 1 unit pRBC's for hgb:7.1.  iron panel studies  Concern of esophageal bleed.    2/20 I have consulted gi and they do not feel need to perform EGD but agree with my order for PPI IV BID  Monitor labs  2/24 Hgb:8.9<9.3    GERD (gastroesophageal reflux disease)- (present on admission)  Assessment & Plan  As per history.  IV PPI  GI cocktail prn     Dyslipidemia- (present on admission)  Assessment & Plan  As per history.    Hypertension- (present on admission)  Assessment & Plan  Monitor vitals.         VTE prophylaxis: VTE Selection

## 2024-02-24 NOTE — CARE PLAN
The patient is Watcher - Medium risk of patient condition declining or worsening    Shift Goals  Clinical Goals: Hemodynamic stability, Safety  Patient Goals: Comfort, rest  Family Goals: ANUPAM    Progress made toward(s) clinical / shift goals:      Problem: Knowledge Deficit - Standard  Goal: Patient and family/care givers will demonstrate understanding of plan of care, disease process/condition, diagnostic tests and medications  Outcome: Progressing     Problem: Hemodynamics  Goal: Patient's hemodynamics, fluid balance and neurologic status will be stable or improve  Outcome: Progressing     Problem: Pain - Standard  Goal: Alleviation of pain or a reduction in pain to the patient’s comfort goal  Outcome: Progressing     Problem: Skin Integrity  Goal: Skin integrity is maintained or improved  Outcome: Progressing       Patient is not progressing towards the following goals:

## 2024-02-24 NOTE — PROGRESS NOTES
4 Eyes Skin Assessment Completed by NADEEN Duff and NADEEN Todd.    Head WDL  Ears WDL  Nose WDL  Mouth WDL  Neck WDL  Breast/Chest WDL  Shoulder Blades WDL  Spine WDL, bilateral thoracetesis sites covered with gauze /tegaderm. No drainage present. Dressings clean, dry, and intact    (R) Arm/Elbow/Hand WDL  (L) Arm/Elbow/Hand WDL  Abdomen WDL  Groin WDL  Scrotum/Coccyx/Buttocks Redness and Blanching  (R) Leg WDL  (L) Leg WDL  (R) Heel/Foot/Toe WDL  (L) Heel/Foot/Toe WDL          Devices In Places ECG, Blood Pressure Cuff, Pulse Ox, Jules, SCD's, and Nasal Cannula      Interventions In Place NC w/ Ears Foams, ECG, Blood Pressure Cuff, Pulse Ox, Jules, SCD's, and Nasal Cannula    Possible Skin Injury No    Pictures Uploaded Into Epic N/A  Wound Consult Placed N/A  RN Wound Prevention Protocol Ordered Yes

## 2024-02-25 NOTE — PROGRESS NOTES
Hospital Medicine Daily Progress Note    Date of Service  2/25/2024    Chief Complaint  Syncope and short of breath    Hospital Course  Tommy Mckeon is a 58 y.o. male with known esophageal cancer s/p radiation tx 11/2023, HTN, DLD, recent thalamic hemorrhage. He was admitted 2/19/2024 with syncope.  He was found to be in acute renal failure with a Cr:3.3 and has anasarca.  During his hospitalization he had a paracentesis with 10.3L removed. He was noted to be more anemic than prior.    Interval Problem Update  2/24: wbc:27.8, Cr:2.83. Repeat CT abd with ongoing hydroneprosis bilaterally L>R. There is a new psoas fluid collection and ongoing ascites (although I question if this is more so from his ureter). I have reviewed records with the  and wife.  They were made aware of the cytology showing malignant effusion.    2/25 Long discussion with family today. They recognize he is in an end of life situation and I told them I was concerned he may not even be able to make it home with hospice. In his current condition she would not be able to care for him alone. He is going for ureteral stent placement today. Renal function stable, GFR in the 20's, WBX worse today. CXR actually looks good, no infiltrates or consolidation, no reaccumulation of pleural fluid, no pneumothorax.  He is on 5 to 6 L nasal cannula and as soon as he takes off his supplemental oxygen his oxygen saturation immediately dropped into the low 80s.  No dyspnea or respiratory distress.  He is weak, does not look to be in significant distress from pain.  Palliative will meet with patient and family again today.  I explained to them there are no oncology treatment options at this time and my suspicion that he will be able to get strong enough or improve his functional status in any meaningful way is very low.  He is unable to get up out of bed, having significant amount of dysphagia, suffering from acute respiratory failure with 0 physiological  reserve.  My recommendation is to pursue hospice at this time as well however I feel they are still processing this, we will touch base again in the afternoon following the procedure.    ADDENDUM: due to patient's tenuous respiratory status will hold ureteral stent placement today.     I have discussed this patient's plan of care and discharge plan at IDT rounds today with Case Management, Nursing, Nursing leadership, and other members of the IDT team.    Consultants/Specialty  GI and Surgical Oncology    Code Status  DNAR/DNI    Disposition  Medically Cleared  I have placed the appropriate orders for post-discharge needs.    Review of Systems  Review of Systems   Constitutional:  Positive for malaise/fatigue. Negative for fever.   HENT:  Negative for sore throat.    Respiratory:  Positive for cough and shortness of breath. Negative for hemoptysis.    Cardiovascular:  Negative for palpitations and leg swelling.   Gastrointestinal:  Negative for abdominal pain and nausea.   Neurological:  Negative for headaches.   Psychiatric/Behavioral:  The patient is not nervous/anxious.    All other systems reviewed and are negative.       Physical Exam  Temp:  [36.8 °C (98.3 °F)-36.9 °C (98.4 °F)] 36.8 °C (98.3 °F)  Pulse:  [] 106  Resp:  [17-23] 20  BP: (105-115)/(76-82) 115/81  SpO2:  [89 %-95 %] 91 %    Physical Exam  Vitals reviewed.   Constitutional:       Appearance: He is ill-appearing.   HENT:      Head: Normocephalic and atraumatic.      Nose: Nose normal.      Mouth/Throat:      Mouth: Mucous membranes are moist.   Eyes:      General:         Right eye: No discharge.         Left eye: No discharge.      Extraocular Movements: Extraocular movements intact.   Cardiovascular:      Rate and Rhythm: Normal rate and regular rhythm.      Heart sounds: No murmur heard.  Pulmonary:      Effort: No respiratory distress.      Breath sounds: Decreased breath sounds and rhonchi present. No wheezing.   Abdominal:      General:  Bowel sounds are normal. There is no distension.      Palpations: Abdomen is soft.      Tenderness: There is no abdominal tenderness.   Musculoskeletal:      Cervical back: Normal range of motion.      Right lower leg: No edema.      Left lower leg: No edema.   Skin:     General: Skin is dry.   Neurological:      General: No focal deficit present.      Mental Status: He is alert and oriented to person, place, and time.      Cranial Nerves: No cranial nerve deficit.   Psychiatric:         Mood and Affect: Mood normal.         Speech: Speech normal.         Behavior: Behavior is slowed.         Cognition and Memory: Cognition normal.         Fluids  No intake or output data in the 24 hours ending 02/25/24 0941      Laboratory  Recent Labs     02/23/24  0530 02/24/24  0348   WBC 18.9* 27.8*   RBC 3.21* 3.17*   HEMOGLOBIN 9.3* 8.9*   HEMATOCRIT 28.1* 28.0*   MCV 87.5 88.3   MCH 29.0 28.1   MCHC 33.1 31.8*   RDW 54.3* 56.2*   PLATELETCT 320 347   MPV 9.5 9.8     Recent Labs     02/23/24  0530 02/24/24  0348   SODIUM 141 139   POTASSIUM 3.4* 3.6   CHLORIDE 106 101   CO2 21 22   GLUCOSE 134* 174*   BUN 67* 68*   CREATININE 2.45* 2.83*   CALCIUM 7.8* 8.5                     Imaging  DX-CHEST-PORTABLE (1 VIEW)   Final Result         Findings on chest radiograph appear stable since the prior radiograph.  No new abnormalities are identified.      CT-ABDOMEN-PELVIS W/O   Final Result      1.  Again seen large amount of ascites.      2.  Again seen bilateral ureteropelvic junction dilatation being mild to moderate on the right and moderate to severe on the left.      3.  Interval increase in size of a fluid collection involving the left anterior psoas muscle.      4.  Improved bibasilar consolidation and pleural effusions.      5.  Cirrhotic appearance of the liver.      6.  Again seen dilated thoracic esophagus filled with fluid.      EC-ECHOCARDIOGRAM COMPLETE W/ CONT   Final Result      DX-CHEST-PORTABLE (1 VIEW)   Final  Result      Stable appearance of the chest. No evidence of pneumothorax following thoracentesis.      US-THORACENTESIS PUNCTURE LEFT   Final Result      1. Ultrasound guided LEFT sided diagnostic / therapeutic  thoracentesis.      2. 625 mL of fluid withdrawn.      EC-ECHOCARDIOGRAM LTD W/O CONT   Final Result      DX-CHEST-LIMITED (1 VIEW)   Final Result      Unchanged LEFT basilar atelectasis with suspected superimposed pneumonia or other airspace disease      DX-CHEST-LIMITED (1 VIEW)   Final Result      1.  Bibasilar atelectasis without significant interval change, pneumonitis cannot be excluded and radiographic appearance alone.      DX-CHEST-PORTABLE (1 VIEW)   Final Result      1.  Hypoinflation with minimal bibasilar atelectasis, improved from prior.   2.  Minimal LEFT pleural fluid again seen.   3.  No pneumothorax.      US-THORACENTESIS PUNCTURE RIGHT   Final Result      1. Ultrasound guided right sided diagnostic and therapeutic thoracentesis.      2. 850 mL of fluid withdrawn.      US-RUQ   Final Result      1.  Moderate volume of abdominal ascites.   2.  Mild hepatomegaly.   3.  Hepatic steatosis and/or diffuse hepatocellular disease.   4.  Moderate right hydronephrosis.      US-PARACENTESIS, ABD WITH IMAGING   Final Result      1. Ultrasound-guided therapeutic paracentesis of the right lower quadrant of the abdominal wall.      2. 10.3 mL of fluid withdrawn.      DX-CHEST-LIMITED (1 VIEW)   Final Result      Stable appearance of the chest.      CT-ABDOMEN-PELVIS W/O   Final Result      1.  Large amount of ascites is identified in the abdomen and pelvis.      2.  There is new evidence of moderate hydronephrosis bilaterally.      3.  In addition, an oval-shaped fluid collection is now identified anterior to the left psoas musculature. Urinoma due to disruption of left ureter or renal collecting system is a possibility.      4.  Moderate pleural effusions bilaterally.      5.  Visualized thoracic  esophagus is distended with fluid.      6.  Opacifications in each lung base could be due to edema or inflammation.      CT-HEAD W/O   Final Result         NO ACUTE ABNORMALITIES ARE NOTED ON CT SCAN OF THE HEAD.               DX-CHEST-PORTABLE (1 VIEW)   Final Result         1.  Increase in right basilar opacifications are noted which could be due to discoid atelectasis aspiration or developing pneumonia.      2.  There is interval decrease in opacifications and volume loss in the left lung base.      DX-PORTABLE FLUORO > 1 HOUR    (Results Pending)   AX-ORFOBLD-0 VIEW    (Results Pending)        Assessment/Plan  * Severe sepsis (HCC)- (present on admission)  Assessment & Plan  Aspiration vs active infection?  Ascites and pleural cultures no growth to date  Mildly elevated procalcitonin but has renal failure so indeterminate  S/p Unasyn   Wean IV fluids  Monitor I/O's, labs, and vitals.  2/23 wbc:18.9 <21.3<24.6<23.1  Pleural effusion with elevated nucleated cells:2282  Light's criteria of protein met for EXUDATIVE effusion    Malignant pleural effusion- (present on admission)  Assessment & Plan  Noted on cytology with adenocarcinoma.  Oncology consulted for further direction of care.  2/24 Discussed with Dr Kevin Hare.    Hypokalemia  Assessment & Plan  2/24 K:3.6  I have ordered replacement  Monitor am BMP    Esophageal bleeding- (present on admission)  Assessment & Plan  Due to esophageal cancer  If bleeding worsens may need embolization with IR  2/24 Hgb:8.9 <9.3  On PPI  GI consulted and signed off stating medical management for now.    Elevated troponin- (present on admission)  Assessment & Plan  Initial troponin mildly elevated at 29.  Concerning for demand ischemia in setting of mild ST depression in V2 and V3.  No active chest pain.  Continue to trend  Continuous cardiac monitoring to monitor for arrhythmias    Bilateral pleural effusion- (present on admission)  Assessment & Plan  As per CT scan.   Suspect hypoalbuminemia contribute. Have not rule out malignant pleural effusion. Await cytology  2/21 right thoracentesis  2/22 left thoracentesis  Exudative effusion  Cytology showing malignant effusion with adenocarcinoma    Other ascites  Assessment & Plan  Presumed malignant due to history of stage III esophageal cancer.  Status post chemoradiation therapy preoperatively for cytoreduction.  Mildly elevated BNP on admit and I have ordered for an Echocardiogram 2/21  No significant of liver disease or alcohol.  Okay albumin level.  2/20 abdominal paracentesis with 10.3L removed and albumin given  Fluid analysis.  Follow up on cultures  Unasyn  No diuresis yet due to renal function.    Benign prostatic hyperplasia with incomplete bladder emptying- (present on admission)  Assessment & Plan  Flomax  Monitor for urinary retention with prn bladder scan  Catheter placed 2/21    Acute kidney injury superimposed on CKD (HCC)- (present on admission)  Assessment & Plan  Likely obstructive etiology  2/24 Creatinine:2.83 <2.45 <2.73 <3.24 <3.26 <3.32  Jules catheter placed 2/21pm  Patient previously had urinary retention.  CT showed hydronephrosis and urinoma.   Appreciate Urology input. Withongoing hydronephrosis on repeat 2/24 CT, urology will place stents 2/25  Flomax  IV fluids  Monitor I/O's, labs, and vitals.    Full code status  Assessment & Plan  I discussed the CODE STATUS with the patient.  He wishes to be full code.    Severe protein-calorie malnutrition (HCC)- (present on admission)  Assessment & Plan  Body mass index is 22.97 kg/m².  Higher BMI on admit due to ascites and pleural effusions  Patient notably had BMI of 33 during his last hospitalization.  He has significant subcutaneous tissue and muscle wasting, bitemporal wasting and periorbital wasting.  Due to poor oral intake, inpart due to esophageal cancer  Discussed possible future surgical G-tube if unable to get enough fluids/calories orally  Nutrition  consult  mirtazapine 15 mg nightly    CAP (community acquired pneumonia)- (present on admission)  Assessment & Plan  As per chest x-ray.  Concerning for right greater than left lower lobe effusion.  Unasyn for gram-negative coverage and azithromycin atypical coverage  pleural effusion culture no growth to date  Light's criteria med for exudative effusion (malignancy has not been ruled out)    Thalamic hemorrhage (HCC)- (present on admission)  Assessment & Plan  As per history during recent hospitalization.  Patient was supposed to arrange outpatient MRI to evaluate for metastatic brain tumor once his renal function improved.  Consider MRI of the brain with contrast once medically stabilized  2/19 CT Head unremarkable  Stopped heparin subcutaneous 2/20/24  SCDs    Acute respiratory failure with hypoxia (HCC)- (present on admission)  Assessment & Plan  Secondary to bilateral pleural effusions, due to malignant effusion  Continue oxygen as per respiratory protocol  Continuous pulse oximetry.  Respiratory therapy consult.  Duo nebs every 4 hours needed.  Incentive spirometry.  PEP therapy  Early diuresis following stabilization  2/21 and 2/22 had thoracentesis.  Echocardiogram ordered 2/21.  Paracentesis 2/20 with 10.3 liters removed.      Malignant neoplasm of lower third of esophagus (HCC)- (present on admission)  Assessment & Plan  As per history.  Stage III (cT3, cN0, cM0, G3).  Status post cytoreductive preoperative chemo radiation therapy.  Completed in October 2023.  2/20 I have consulted Dr. Rangel in terms of timing of surgery and he has requested a repeat staging due to new ascites/anasarca and concern of spread.  Followed by Dr. Triana of [facility]  2/24 I have consulted Dr Hare, oncologist for further options in his care vs hospice  May need future G-tube for additional calories.  Wife and patient to discuss Hospice if surgery not an option.    Iron deficiency Anemia secondary to acute  GI bleeding- (present on admission)  Assessment & Plan  As per history.  Ferritin of 16 in December 2023.  2/19 Patient given transfusion 1 unit PRBC in the emergency room.  2/21 Ordered for 1 unit pRBC's for hgb:7.1.  iron panel studies  Concern of esophageal bleed.    2/20 I have consulted gi and they do not feel need to perform EGD but agree with my order for PPI IV BID  Monitor labs  2/24 Hgb:8.9<9.3    GERD (gastroesophageal reflux disease)- (present on admission)  Assessment & Plan  As per history.  IV PPI  GI cocktail prn     Dyslipidemia- (present on admission)  Assessment & Plan  As per history.    Hypertension- (present on admission)  Assessment & Plan  Monitor vitals.         VTE prophylaxis:   SCDs/TEDs    Total time spent 52 minutes. I spent greater than 50% of the time for patient care, counseling, and coordination on this date, including unit/floor time, and face-to-face time with the patient as per interval events, my own review of patient's imaging and lab analysis and developing my assessment and plan above.

## 2024-02-25 NOTE — PROGRESS NOTES
4 Eyes Skin Assessment Completed by NADEEN Perla and NADEEN Smith.    Head WDL  Ears WDL  Nose Redness      Mouth WDL  Neck WDL  Breast/Chest WDL  Shoulder Blades WDL  Spine WDL  (R) Arm/Elbow/Hand WDL  (L) Arm/Elbow/Hand WDL  Abdomen WDL  Groin WDL  Scrotum/Coccyx/Buttocks WDL  (R) Leg WDL  (L) Leg WDL  (R) Heel/Foot/Toe WDL  (L) Heel/Foot/Toe WDL          Devices In Places Blood Pressure Cuff, Pulse Ox, Jules, SCD's, and Nasal Cannula      Interventions In Place NC W/Ear Foams, Waffle Overlay, Pillows, and Low Air Loss Mattress    Possible Skin Injury Yes    Pictures Uploaded Into Epic Yes  Wound Consult Placed Yes  RN Wound Prevention Protocol Ordered Yes

## 2024-02-25 NOTE — CARE PLAN
The patient is Stable - Low risk of patient condition declining or worsening    Shift Goals  Clinical Goals: Reduce O2 demand  Patient Goals: Same  Family Goals: ANUPAM    Progress made toward(s) clinical / shift goals:       Patient is not progressing towards the following goals:      Problem: Knowledge Deficit - Standard  Goal: Patient and family/care givers will demonstrate understanding of plan of care, disease process/condition, diagnostic tests and medications  Outcome: Not Progressing     Note: non-compliant with medical instructions

## 2024-02-25 NOTE — PROGRESS NOTES
Report called to Landon RN on  CNU.   Patient transferring to [facility], [place].  VSS.   Patient updated on plan of care.

## 2024-02-25 NOTE — PROGRESS NOTES
Surgical Progress Note    Author: Basilia Flores M.D. Date & Time created: 2024   1:11 PM     Interval Events:  Plan was to place bilateral ureteral stents today, patient has a decompensated respiratory status and the case was cancelled per the recommendation of hospitalist. Hospice/palliative care has been discussed with the family, they state they can't think about that right now. Cr slightly better than on admission but hydronephrosis has been stable.  Review of Systems   All other systems reviewed and are negative.    Hemodynamics:  Temp (24hrs), Av.8 °C (98.2 °F), Min:36.5 °C (97.7 °F), Max:36.9 °C (98.4 °F)  Temperature: 36.5 °C (97.7 °F)  Pulse  Av.9  Min: 86  Max: 114   Blood Pressure: 120/85     Respiratory:    Respiration: (!) 26, Pulse Oximetry: 95 %     Given By:: Mouthpiece, Work Of Breathing / Effort: Mild;Shallow;Increased Work of Breathing  RUL Breath Sounds: Clear, RML Breath Sounds: Clear, RLL Breath Sounds: Diminished, TONY Breath Sounds: Clear, LLL Breath Sounds: Clear  Neuro:  GCS       Fluids:  No intake or output data in the 24 hours ending 24 1311     Current Diet Order   Procedures    Diet NPO Restrict to: Sips with Medications     Physical Exam  Constitutional:       Appearance: He is ill-appearing.   HENT:      Head: Normocephalic.   Skin:     General: Skin is dry.   Neurological:      General: No focal deficit present.   Psychiatric:         Behavior: Behavior normal.       Labs:  Recent Results (from the past 24 hour(s))   proBrain Natriuretic Peptide, NT    Collection Time: 24  3:48 AM   Result Value Ref Range    NT-proBNP 321 (H) 0 - 125 pg/mL   PROCALCITONIN    Collection Time: 24  3:48 AM   Result Value Ref Range    Procalcitonin 1.00 (H) <0.25 ng/mL   MAGNESIUM    Collection Time: 24  3:48 AM   Result Value Ref Range    Magnesium 2.5 1.5 - 2.5 mg/dL     Medical Decision Making, by Problem:  Active Hospital Problems    Diagnosis     Malignant  pleural effusion [J91.0]     Esophageal bleeding [K22.89]     Hypokalemia [E87.6]     Severe sepsis (HCC) [A41.9, R65.20]     CAP (community acquired pneumonia) [J18.9]     Severe protein-calorie malnutrition (HCC) [E43]     Acute kidney injury superimposed on CKD (HCC) [N17.9, N18.9]     Benign prostatic hyperplasia with incomplete bladder emptying [N40.1, R39.14]     Other ascites [R18.8]     Bilateral pleural effusion [J90]     Elevated troponin [R79.89]     Thalamic hemorrhage (HCC) [I61.0]     Acute respiratory failure with hypoxia (HCC) [J96.01]     Malignant neoplasm of lower third of esophagus (HCC) [C15.5]      fb XRT    Is a pleasant 57-year-old male who originally presented with 30 pound weight loss syncopal episode due to severe anemia and went to the ER from the urgent care and underwent CT abdomen pelvis August 16, 2023 which showed moderate hiatal hernia.  Patient had EGD by Dr. Patel 8/18/23 which showed mass lesion in distal esophagus beginning at 34 cm from incisors going into the stomach cardia 42 cm biopsy showing moderate to poorly differentiated invasive adenocarcinoma.  Patient had CT chest August 18, 2023 which showed hiatal hernia with wall thickening in the distal thoracic esophagus and portion of herniating stomach which would be in keeping with the provided clinical history.  Patient had CT pancreas August 19, 2023 which showed distal esophageal proximal gastric mass consistent with malignancy.  Patient had PET CT scan August 30, 2023 which showed heterogeneous hypermetabolic mass in distal esophagus/cardia consistent with known malignancy patient underwent EUS August 31, 2023 by Dr. Fenton which showed clinical uT3N0 Siewert 2.  Overall patient doing well and eating mostly solid foods with little difficulty but does have 30 pound weight loss over the last 3 months.    1. Malignant neoplasm of lower third of esophagus (HCC)  C15.5      Malignant neoplasm of lower third of esophagus  (HCC)  Staging form: Esophagus - Adenocarcinoma, AJCC 8th Edition  - Clinical stage from 8/31/2023: Stage III (cT3, cN0, cM0, G3) - Signed by Michael Woods M.D. on 8/31/2023  Total positive nodes: 0  Histologic grading system: 3 grade system     RECOMMENDATIONS:   We will plan to treat the patient with preoperative chemo-radiotherapy.  The patient has a Stage T3N0M0 esophagus cancer.  We talked about the general prognosis and treatment rationale.  We discussed our goal to control the tumor in the thorax, prevent regional recurrence, and hopeful help to prevent or delay metastatic progression.   We discussed the goal of facilitating complete surgical resection. We will plan 50.4 Gy in 28 fractions with concurrent chemo per Dr. Triana.      We discussed that for  adenocarcinoma pathologic complete response rate is 29%.     We discussed the treatment planning process and treatments.  We discussed the risks of treatment at length including lung injury, shortness of breath, fibrosis, pneumonitis, acute and chronic esophageal injury, heart injury, spinal cord injury which is rare, skin toxicity, chest wall injury, pain, dehydration, supplemental oxygen use or dependence, and the risk of treatment related death.  They would like to proceed forward with treatment.  We will set up CT simulation for treatment planning imaging 9/6/23 3PM.  That will consist of supine immobilization, possibly IV contrast depending on kidney function and targeting requirements, appropriate skin surface marking for radiation treatment.            Iron deficiency Anemia secondary to acute GI bleeding [K92.2]     GERD (gastroesophageal reflux disease) [K21.9]     Dyslipidemia [E78.5]     Hypertension [I10]      Plan:  Will reconsider stent placement when cleared for anesthesia   Continue to monitor Cr, electrolytes, urine output  Quality Measures:  Quality-Core Measures    Discussed patient condition with Family and Patient

## 2024-02-25 NOTE — CARE PLAN
The patient is Stable - Low risk of patient condition declining or worsening    Shift Goals  Clinical Goals: Hemodynamic stability  Patient Goals: Rest ,  Comfort  Family Goals: ANUPAM    Progress made toward(s) clinical / shift goals:    Problem: Knowledge Deficit - Standard  Goal: Patient and family/care givers will demonstrate understanding of plan of care, disease process/condition, diagnostic tests and medications  Outcome: Progressing     Problem: Hemodynamics  Goal: Patient's hemodynamics, fluid balance and neurologic status will be stable or improve  Outcome: Progressing     Problem: Pain - Standard  Goal: Alleviation of pain or a reduction in pain to the patient’s comfort goal  Outcome: Progressing     Problem: Fall Risk  Goal: Patient will remain free from falls  Outcome: Progressing     Problem: Skin Integrity  Goal: Skin integrity is maintained or improved  Outcome: Progressing

## 2024-02-25 NOTE — CONSULTS
Hematology/Oncology Consultation    Date of consultation: 2/24/2024 8:07 PM  Primary Hematologist/Oncologist: Kong    Reason for consultation: metastatic esophageal cancer    Hematology/Oncology History:  August 2023: Presented with 20-30 pounds of unintentional weight loss.  EGD demonstrated a mass in the distal esophagus 34 cm from the incisor down to 42 cm at the cardia of the stomach.  Biopsy demonstrated poorly differentiated invasive esophageal adenocarcinoma.  PET/CT demonstrated no evidence of distant disease.,  Clinical staging of T3 N0.    8/31/2023: Initiation of concurrent chemoradiation with carboplatin and paclitaxel.    10/23/2023: Final week of concurrent chemoradiation.    12/4/2023: Posttreatment evaluation with PET/CT demonstrated improvement in the primary esophageal mass with no evidence of progression.  Plan was for patient to follow-up with surgery for completion esophagectomy.    Despite multiple attempts by surgery, radiation oncology, and medical oncology, the patient was lost to follow-up.    2/19/2024: Patient was admitted to [facility] with syncope and was found to have acute renal failure with a creatinine 3.3, anasarca, new malignant ascites, and a new malignant pleural effusion.    PMH:    Past Medical History:   Diagnosis Date    Bowel habit changes 08/31/2023    constipation    Cancer (HCC)     esophageal    Diabetes (HCC) 08/31/2023    prediabetic per problem list, pt denies    GERD (gastroesophageal reflux disease)     Heart burn     Hiatus hernia syndrome     Hypertension 08/31/2023    no medication    Iron deficiency anemia     per problem list    Obesity (BMI 30-39.9) 04/19/2016    Pneumonia 1/13/2024       PSH:    Past Surgical History:   Procedure Laterality Date    CATH PLACEMENT Right 9/1/2023    Procedure: RIGHT CEPHALIC PORT PLACEMENT;  Surgeon: Corby Cagle M.D.;  Location: SURGERY [facility];  Service: General    DE UPPER GI ENDOSCOPY,DIAGNOSIS N/A 08/18/2023     Procedure: GASTROSCOPY;  Surgeon: Kale Patel M.D.;  Location: SURGERY SAME DAY [facility];  Service: Gastroenterology    MD UPPER GI ENDOSCOPY,BIOPSY N/A 08/18/2023    Procedure: GASTROSCOPY, WITH BIOPSY;  Surgeon: Kale Patel M.D.;  Location: SURGERY SAME DAY [facility];  Service: Gastroenterology    EYE SURGERY  01/01/2015    OTHER  2015       Allergies:    Patient has no known allergies.    Medications:    Current Facility-Administered Medications   Medication Dose Route Frequency Provider Last Rate Last Admin    potassium bicarbonate (Klyte) effervescent tablet 25 mEq  25 mEq Oral BID MAXIMO RushOLolly   25 mEq at 02/24/24 1740    ampicillin/sulbactam (Unasyn) 3 g in  mL IVPB  3 g Intravenous Q6HR Vasiliy Edwards D.O.   Stopped at 02/24/24 1811    senna-docusate (Pericolace Or Senokot S) 8.6-50 MG per tablet 2 Tablet  2 Tablet Oral Q EVENING MAXIMO RushOLolly   2 Tablet at 02/24/24 1740    And    polyethylene glycol/lytes (Miralax) Packet 1 Packet  1 Packet Oral QDAY PRN Vasiliy Edwards D.O.        pantoprazole (Protonix) injection 40 mg  40 mg Intravenous BID MAXIMO RushOLolly   40 mg at 02/24/24 1741    LORazepam (Ativan) injection 2 mg  2 mg Intravenous Q4HRS PRN Tian Robins M.D.   2 mg at 02/24/24 1147    lidocaine (Asperflex) 4 % patch 1 Patch  1 Patch Transdermal Q24HRS Malvin Dial M.D.   1 Patch at 02/24/24 0625    acetaminophen (Tylenol) tablet 650 mg  650 mg Oral Q6HRS PRN Malvin Dial M.D.        mirtazapine (Remeron) tablet 15 mg  15 mg Oral QHS Malvin Dial M.D.   15 mg at 02/23/24 2200    ipratropium-albuterol (DUONEB) nebulizer solution  3 mL Nebulization Q4H PRN (RT) Malvin Dial M.D.        Respiratory Therapy Consult   Nebulization Continuous RT Malvin Dial M.D.        tamsulosin (Flomax) capsule 0.4 mg  0.4 mg Oral AFTER BREAKFAST Malvin Dial M.D.   0.4 mg at 02/24/24 1003       Social History:     Social History     Socioeconomic History    Marital status:  "     Spouse name: Not on file    Number of children: Not on file    Years of education: Not on file    Highest education level: Not on file   Occupational History    Not on file   Tobacco Use    Smoking status: Never     Passive exposure: Past    Smokeless tobacco: Never   Vaping Use    Vaping Use: Never used   Substance and Sexual Activity    Alcohol use: Not Currently    Drug use: No    Sexual activity: Yes     Partners: Female   Other Topics Concern    Not on file   Social History Narrative    Works in security      Social Determinants of Health     Financial Resource Strain: Not on file   Food Insecurity: Not on file   Transportation Needs: Not on file   Physical Activity: Not on file   Stress: Not on file   Social Connections: Not on file   Intimate Partner Violence: Not on file   Housing Stability: Not on file       Family History:     Family History   Problem Relation Age of Onset    Diabetes Mother     Stroke Mother        Review of Systems:  Review of Systems   Unable to perform ROS: Mental status change        Vitals:     /76   Pulse (!) 105   Temp 36.9 °C (98.4 °F) (Temporal)   Resp 17   Ht 1.93 m (6' 4\")   Wt 85.6 kg (188 lb 11.4 oz) Comment: RN notified. bed was zeroed with SCD on bed and this was the value. Will obtain stand-up wgt. next ambulation.  SpO2 95%   BMI 22.97 kg/m²     Physical Exam:  Physical Exam  Constitutional:       Appearance: He is ill-appearing and diaphoretic.   Cardiovascular:      Rate and Rhythm: Tachycardia present.   Musculoskeletal:         General: Swelling present.   Skin:     Capillary Refill: Capillary refill takes more than 3 seconds.      Coloration: Skin is jaundiced and pale.          Assessment and Plan:    # Metastatic Esophageal Cancer  Tommy is a 58-year-old male who was diagnosed with locally advanced T3 N0 poorly differentiated invasive esophageal adenocarcinoma in August 2023.  He underwent neoadjuvant chemoradiation with carboplatin and " paclitaxel from August through October 2023.  He was last seen by Dr. Triana in December 2023, with plans for him to be seen by surgical oncology shortly thereafter for completion esophagectomy.    Unfortunately, the patient has been lost to follow-up despite numerous attempts by the medical oncology, surgical oncology, and radiation oncology members of his treatment team.  He presented in extremis on 2/19/2024 with new malignant ascites and a malignant pleural effusion.    Unfortunately, the progression of the patient's disease has rendered his cancer incurable.  Moreover, his ECOG 4 functional status with renal failure, elevated LFTs, and profound deconditioning precludes the safe use of any chemotherapy or immunotherapy options for his disease.    Given the gravity of his current illness and lack of ability for cancer directed therapy, Dr. Triana and I both agree that hospice or best supportive care are in the patient's best interests.    PLAN:  - Not a candidate for cancer directed therapies  - Strong consideration should be given for hospice  - Oncology to sign off      Thank you for allowing me to participate in his care. Please do not hesitate to reach out with any questions.    Please note that this dictation was created using voice recognition software. I have made every reasonable attempt to correct obvious errors, but I expect that there are errors of grammar and possibly content that I did not discover before finalizing the note.      Kevin Hare MD  Hematologist/Oncologist  [facility]   of [facility] - [facility]

## 2024-02-25 NOTE — PROGRESS NOTES
4 Eyes Skin Assessment Completed by Joseph RN and NADEEN Fair.    Head WDL  Ears WDL  Nose WDL  Mouth WDL  Neck WDL  Breast/Chest WDL  Shoulder Blades WDL  Spine WDL  (R) Arm/Elbow/Hand WDL  (L) Arm/Elbow/Hand WDL  Abdomen WDL  Groin WDL  Scrotum/Coccyx/Buttocks WDL  (R) Leg WDL  (L) Leg WDL  (R) Heel/Foot/Toe WDL  (L) Heel/Foot/Toe WDL          Devices In Places Jules and Oxy Mask      Interventions In Place Pillows and Optifoam    Possible Skin Injury No    Pictures Uploaded Into Epic N/A  Wound Consult Placed N/A  RN Wound Prevention Protocol Ordered No

## 2024-02-26 NOTE — PROGRESS NOTES
"Patient states\" remove this! (Pertaining to his bilateral soft wrist restraints) RN explained we will re evaluate it this morning. For now he needs to have it as he keeps on removing his oxygen. Patient says \" give me a knife\" RN said what did you said? A knife? Patient says \"Yes\". RN asked what you gonna do with the knife? Patient states\" I need it to get out of here\"    Patient is still confused, drowsy. AO x 1.   "

## 2024-02-26 NOTE — PROGRESS NOTES
Inpatient Palliative Care     Location: Cancer Nursing Unit, R308     HPI:   Tommy Mckeon is a 58 y.o. male with past medical history of stage III esophageal cancer of the lower third post cytoreductive preoperative chemoradiation therapy completed in October 2023, GERD, hypertension, dyslipidemia, and recent thalamic hemorrhage who presented on 2/19/2024 with shortness of breath and syncope.  Reportedly, patient was found on the floor of his bathroom by EMS, he stated that he was about to shower then felt dizzy and woke up on the floor.  No obvious head trauma was noted.  Patient was found to have 85% saturation on room air per EMS and arrived to the hospital on 5 L, patient is not normally on oxygen at home.     Recent hospitalization from 1/13 through 1/17/2024 after patient presented with altered mental status and some mild dysphagia and confusion.  He was diagnosed with pneumonia at this time and started on IV antibiotics.  MRI revealed small thalamic hemorrhage-neurology was consulted and recommended hypertension control as well as MRI of the brain to rule out underlying mass given history of esophageal cancer.  He was discharged on blood pressure medications Jules catheter due to urinary retention.  Baseline creatinine was 3 at the time of discharge.  Patient was started on tamsulosin and was instructed to follow-up with his primary care provider to coordinate MRI of the brain with contrast if kidney function improved.     Patient being followed by surgical oncology since 2023.  No signs of metastatic disease based on previous CT scans.  Status post chemoradiation with 25 fractions managed by Dr. Woods completed in October 2023. Also has been seen by Dr. Triana with [facility].      Significant lab and diagnostics indicate tachycardia, respiratory rate of 22, white blood cell count of 27.1 with left shift, creatinine 3.32, baseline 2.89, lactic acid 2.4 increased at 4.0.  Patient was started on sepsis protocol.    .  Interval:  Patient noted to be having intermittent GI bleeding and increased oxygen requirements.  Gastroenterology not recommending invasive intervention.  Patient noted to have ascites, likely malignant status post paracentesis with 10.3 L removal.  Surgical oncology has evaluated and does not recommend surgery at this time, recommend updated imaging to assess treatment status limited by current kidney performance, US duplex to assess PD patency, paracentesis fluid has been sent for cytology evaluation.  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  The recommendation is to trend creatinine, repeat imaging in 72 hours if no improvement in creatinine or hydronephrosis persistent will consider bilateral stent placement.  If no fevers or chills no need to consider draining urinoma.     2/26/24: Pathology reports returned Friday afternoon indicating malignant ascites and pleural effusions.  Patient was seen by oncology and no further treatment is available per review of note.  He was transferred to cancer nursing unit over the weekend.  Overnight increasing agitation with decreasing oxygen and saturations.  Patient was transition to comfort focused care.    Summary:  Patient is seen sitting up in bed, father-in-law is at bedside.  He awakens is fidgety and restless.  He denies pain, however has somewhat furrowed brow and is noticeably dyspneic.  Is off oxygen currently.  Family has been updated as patient is near end-of-life.  Discussed routine medications for management of pain and agitation, patient nods in agreement.     Active listening, reflection, reminiscing, validation & normalization, and empathic support utilized throughout this encounter.  All questions answered and contact information provided, encouraged to call with any questions or concerns.      Plan:     1)  schedule hydromorphone 1 mg every 4 hours IV for pain; continue to use as needed dosing per nurses discretion pain scale  2) schedule lorazepam 2 mg every 4 hours IV; continue to use as needed dosing per nurses discretion agitation/restlessness (likely terminal agitation)  3) PC to continue to follow for symptom management, support family as needed.     Thank you for allowing me the opportunity to participate in the care of  Tommy Mckeon.     I spent a total of 28 minutes reviewing medical records, direct face-to-face time with the patient and/or family, coordination of care, and documentation. This is separate from the time spent on advance care planning, which is documented above.     Niurka Joe, MSN, APRN, ACNPC-AG.  Palliative Care Nurse Practitioner  [phone]

## 2024-02-26 NOTE — PROGRESS NOTES
Rapid Response Summary     Rapid response called at 0441 for: Shortness of breath  and Increased oxygen demand     VS: Hypoxic  (See Vitals Flowsheet)  Additional info: Increased oxygen requirement  MD Paged: STEVEN Garland  Interventions:    Imaging/Tests: N/A   Labs: N/A   Medications:    Other: Started heated hiflo  Disposition: Did not improve  with rapid response team interventions. Primary RN updated on plan of care. Transfer not indicated at this time.  and Rapid team will continue to follow the patient.  APRN contacted family, family will be coming to visit patient and discuss plan of care.

## 2024-02-26 NOTE — CARE PLAN
The patient is Unstable - High likelihood or risk of patient condition declining or worsening    Shift Goals  Clinical Goals: monitor oxygen  Patient Goals: Same  Family Goals: ANUPAM    Progress made toward(s) clinical / shift goals:    Problem: Pain - Standard  Goal: Alleviation of pain or a reduction in pain to the patient’s comfort goal  Description: Target End Date:  Prior to discharge or change in level of care    Document on Vitals flowsheet    1.  Document pain using the appropriate pain scale per order or unit policy  2.  Educate and implement non-pharmacologic comfort measures (i.e. relaxation, distraction, massage, cold/heat therapy, etc.)  3.  Pain management medications as ordered  4.  Reassess pain after pain med administration per policy  5.  If opiods administered assess patient's response to pain medication is appropriate per POSS sedation scale  6.  Follow pain management plan developed in collaboration with patient and interdisciplinary team (including palliative care or pain specialists if applicable)  Outcome: Progressing  Note: Pt assessed for pain, pt denies pain.      Problem: Fall Risk  Goal: Patient will remain free from falls  Description: Target End Date:  Prior to discharge or change in level of care    Document interventions on the Richter Yao Fall Risk Assessment    1.  Assess for fall risk factors  2.  Implement fall precautions  Outcome: Progressing  Note: Pt impulsive at times; tele-sitter in use, frequent rounding.        Patient is not progressing towards the following goals:      Problem: Respiratory  Goal: Patient will achieve/maintain optimum respiratory ventilation and gas exchange  Description: Target End Date:  Prior to discharge or change in level of care    Document on Assessment flowsheet    1.  Assess and monitor rate, rhythm, depth and effort of respiration  2.  Breath sounds assessed qshift and/or as needed  3.  Assess O2 saturation, administer/titrate oxygen as  ordered  4.  Position patient for maximum ventilatory efficiency  5.  Turn, cough, and deep breath with splinting to improve effectiveness  6.  Collaborate with RT to administer medication/treatments per order  7.  Encourage use of incentive spirometer and encourage patient to cough after use and utilize splinting techniques if applicable  8.  Airway suctioning  9.  Monitor sputum production for changes in color, consistency and frequency  10. Perform frequent oral hygiene  11. Alternate physical activity with rest periods  Outcome: Not Progressing  Note: Pt with increased WOB this shift, increased oxygen needs.  Pt has been titrated back down to 6L oxy mask but pt removes mask frequently and at times is argumentative with staff about putting it back on.

## 2024-02-26 NOTE — PROGRESS NOTES
Called Rapid response as patient sats at 77-80 at 15 lpm of oxymask  Shallow breathing, diminished upper and lower lobes  V/s taken and recorded  Changed to 15 lpm of non rebreater and still sats at 88.  Changed pulse ox. Still at 87-88%  Rapid and hospitalist now at bedside.

## 2024-02-26 NOTE — DISCHARGE PLANNING
Case Management Discharge Planning    Admission Date: 2/19/2024  GMLOS: 9.9  ALOS: 7    6-Clicks ADL Score: 18  6-Clicks Mobility Score: 18      Anticipated Discharge Dispo: Discharge Disposition: Discharged to home/self care (01)  Discharge Address: Home ([address] [place] [id number])  Discharge Contact Phone Number: SpouseGrace ()    DME Needed: No    Action(s) Taken: Pt has transitioned to comfort care. Pt is receiving scheduled IV pain medication. Pt will remain in hospital.     Escalations Completed: None    Medically Clear: No    Next Steps: F/u with pt and IDT regarding dc barriers and needs as they arise    Barriers to Discharge: Medical clearance    Is the patient up for discharge tomorrow: No

## 2024-02-26 NOTE — CARE PLAN
The patient is Stable - Low risk of patient condition declining or worsening    Shift Goals  Clinical Goals: sats 88 above, cooperate with care, rest and comfort  Patient Goals: rest  Family Goals: not present    Progress made toward(s) clinical / shift goals:    Problem: Knowledge Deficit - Standard  Goal: Patient and family/care givers will demonstrate understanding of plan of care, disease process/condition, diagnostic tests and medications  Outcome: Progressing     Problem: Hemodynamics  Goal: Patient's hemodynamics, fluid balance and neurologic status will be stable or improve  Outcome: Progressing     Problem: Fluid Volume  Goal: Fluid volume balance will be maintained  Outcome: Progressing     Problem: Pain - Standard  Goal: Alleviation of pain or a reduction in pain to the patient’s comfort goal  Outcome: Progressing     Problem: Fall Risk  Goal: Patient will remain free from falls  Outcome: Progressing     Problem: Skin Integrity  Goal: Skin integrity is maintained or improved  Outcome: Progressing     Problem: Safety - Medical Restraint  Goal: Remains free of injury from restraints (Restraint for Interference with Medical Device)  Outcome: Progressing  Goal: Free from restraint(s) (Restraint for Interference with Medical Device)  Outcome: Progressing     1915 Received RN report, assumed care for this patient  Resting, drowsy  Identifies self only. Consumed with time, place and situation. Denies pain at this time  IV site patent ( Chest port), Tko. IV abx given  Can self reposition in bed  Reviewed plan of care, no evidence of learning  Jules in placed. Draining yellow urine  Restless, impulsive and removing his gown, oxygen. Refusing oral medications  Call light and personal items within reached.   Hourly rounding, kept safe and continue monitoring.    0110. Bilateral sift wrist restraints in placed.   For safety and to keep his oxygen on . [place]as to 78% to early 80's on RA    Patient is not progressing  towards the following goals:    The patient is Unstable - High likelihood or risk of patient condition declining or worsening    Shift Goals  Clinical Goals: monitor oxygen  Patient Goals: Same  Family Goals: ANUPAM    Progress made toward(s) clinical / shift goals:    Problem: Pain - Standard  Goal: Alleviation of pain or a reduction in pain to the patient’s comfort goal  Description: Target End Date:  Prior to discharge or change in level of care    Document on Vitals flowsheet    1.  Document pain using the appropriate pain scale per order or unit policy  2.  Educate and implement non-pharmacologic comfort measures (i.e. relaxation, distraction, massage, cold/heat therapy, etc.)  3.  Pain management medications as ordered  4.  Reassess pain after pain med administration per policy  5.  If opiods administered assess patient's response to pain medication is appropriate per POSS sedation scale  6.  Follow pain management plan developed in collaboration with patient and interdisciplinary team (including palliative care or pain specialists if applicable)  Outcome: Progressing  Note: Pt assessed for pain, pt denies pain.      Problem: Fall Risk  Goal: Patient will remain free from falls  Description: Target End Date:  Prior to discharge or change in level of care    Document interventions on the Richter Yao Fall Risk Assessment    1.  Assess for fall risk factors  2.  Implement fall precautions  Outcome: Progressing  Note: Pt impulsive at times; tele-sitter in use, frequent rounding.        Patient is not progressing towards the following goals:      Problem: Respiratory  Goal: Patient will achieve/maintain optimum respiratory ventilation and gas exchange  Description: Target End Date:  Prior to discharge or change in level of care    Document on Assessment flowsheet    1.  Assess and monitor rate, rhythm, depth and effort of respiration  2.  Breath sounds assessed qshift and/or as needed  3.  Assess O2 saturation,  administer/titrate oxygen as ordered  4.  Position patient for maximum ventilatory efficiency  5.  Turn, cough, and deep breath with splinting to improve effectiveness  6.  Collaborate with RT to administer medication/treatments per order  7.  Encourage use of incentive spirometer and encourage patient to cough after use and utilize splinting techniques if applicable  8.  Airway suctioning  9.  Monitor sputum production for changes in color, consistency and frequency  10. Perform frequent oral hygiene  11. Alternate physical activity with rest periods  Outcome: Not Progressing  Note: Pt with increased WOB this shift, increased oxygen needs.  Pt has been titrated back down to 6L oxy mask but pt removes mask frequently and at times is argumentative with staff about putting it back on.    The patient is Watcher - Medium risk of patient condition declining or worsening    Shift Goals  Clinical Goals: sats 88 above, cooperate with care, rest and comfort  Patient Goals: rest  Family Goals: not present    Progress made toward(s) clinical / shift goals:      Patient is not progressing towards the following goals:

## 2024-02-26 NOTE — PROGRESS NOTES
1915 Received RN report, assumed care for this patient  Resting, drowsy  Identifies self only. Consumed with time, place and situation. Denies pain at this time  Telesitter in the room  IV site patent ( Chest port), Tko. IV abx given  Can self reposition in bed  Reviewed plan of care, no evidence of learning  Jules in placed. Draining yellow urine  Restless, impulsive and removing his gown, oxygen. Refusing oral medications  Call light and personal items within reached.   Hourly rounding, kept safe and continue monitoring.     0110. Bilateral sift wrist restraints in placed.   For safety and to keep his oxygen on . Destas to 78% to early 80's on RA

## 2024-02-26 NOTE — PROGRESS NOTES
Patient been impulsive, keeps removing his oxygen and when RN or Cna trying to put it back as his oxygen is dropping to 78- late 80's. He still shouting and aiming to hit us with his arms and he is still strong and can sit up in the bed. Hospitalist made aware and ordered Haldol. Bilateral soft wrist restraints as well. Ordered carried out.

## 2024-02-26 NOTE — CODE DOCUMENTATION
STEVEN Garland contacted patient family. Patient family to come in to hospital to visit. Per APRN no chest x-ray or other protocol orders necessary at this time.

## 2024-02-26 NOTE — PROGRESS NOTES
Oral Lorazepam given. Will have the medication takes effect and will removed the restraints. Will call RT to remove the high flow oxygen.

## 2024-02-26 NOTE — PROGRESS NOTES
"  NOC APRN CROSS COVER NOTE      I was notified earlier this evening by primary RN for patient suffering from severe agitation. Orders placed for 5 mg IV haldol q 6 PRN and bilateral soft wrist restraints.     Responded to rapid response this morning for patient with increasing oxygen requirements. Patient with oxygen saturation at 86-88% on 15 L NRB.     To bedside to assess patient. Upon arrival, patient is lethargic but wakes and becomes agitated. He states his breathing is \"fine\". Patient placed on HHFNC by RT, 60L/100% to maintain O2 saturation >92%.     I have called and spoken with patient's wife Grace who is coming to the hospital to discuss plan of care.     Gabriela Garland ACN-AG, NOC Hospitalist APRN        Addendum 0534: Grace arrived to bedside. We discussed patients condition. She has elected to make him comfort care. Grace is requesting that we make JT as comfortable as possible to help ease his transition. I have discussed providing appropriate medications and removing the high flow and restraints. Grace is agreeable to this. I have spoken with the bedside RN and charge RN. Appropriate orders placed.   "

## 2024-02-26 NOTE — CARE PLAN
The patient is Stable - Low risk of patient condition declining or worsening    Shift Goals  Clinical Goals: sats 88 above, cooperate with care, rest and comfort  Patient Goals: rest  Family Goals: not present    Progress made toward(s) clinical / shift goals:    Problem: Knowledge Deficit - Standard  Goal: Patient and family/care givers will demonstrate understanding of plan of care, disease process/condition, diagnostic tests and medications  Outcome: Progressing     Problem: Hemodynamics  Goal: Patient's hemodynamics, fluid balance and neurologic status will be stable or improve  Outcome: Progressing     Problem: Fluid Volume  Goal: Fluid volume balance will be maintained  Outcome: Progressing     Problem: Pain - Standard  Goal: Alleviation of pain or a reduction in pain to the patient’s comfort goal  Outcome: Progressing     Problem: Fall Risk  Goal: Patient will remain free from falls  Outcome: Progressing     Problem: Skin Integrity  Goal: Skin integrity is maintained or improved  Outcome: Progressing     Problem: Safety - Medical Restraint  Goal: Remains free of injury from restraints (Restraint for Interference with Medical Device)  Outcome: Progressing  Goal: Free from restraint(s) (Restraint for Interference with Medical Device)  Outcome: Progressing     1915 Received RN report, assumed care for this patient  Resting, drowsy  Identifies self only. Consumed with time, place and situation. Denies pain at this time  IV site patent ( Chest port), Tko. IV abx given  Can self reposition in bed  Reviewed plan of care, no evidence of learning  Jules in placed. Draining yellow urine  Restless, impulsive and removing his gown, oxygen. Refusing oral medications  Call light and personal items within reached.   Hourly rounding, kept safe and continue monitoring.    0110. Bilateral sift wrist restraints in placed.   For safety and to keep his oxygen on . [place] to 78% to early 80's on RA    Patient is not progressing  towards the following goals:

## 2024-02-27 LAB
BACTERIA FLD AEROBE CULT: NORMAL
GRAM STN SPEC: NORMAL
SIGNIFICANT IND 70042: NORMAL
SITE SITE: NORMAL
SOURCE SOURCE: NORMAL

## 2024-02-28 NOTE — DISCHARGE SUMMARY
Death Summary    Cause of Death  Acute respiratory failure due to malignant pleural effusions due to metastatic esophageal cancer complicated by pneumonia and sepsis.    Comorbid Conditions at the Time of Death  Principal Problem:    Severe sepsis (HCC) (POA: Yes)  Active Problems:    Hypertension (Chronic) (POA: Yes)    Dyslipidemia (Chronic) (POA: Yes)    GERD (gastroesophageal reflux disease) (Chronic) (POA: Yes)    Iron deficiency Anemia secondary to acute GI bleeding (POA: Yes)    Malignant neoplasm of lower third of esophagus (HCC) (Chronic) (POA: Yes)      Overview: fb XRT            Is a pleasant 57-year-old male who originally presented with 30 pound       weight loss syncopal episode due to severe anemia and went to the ER from       the urgent care and underwent CT abdomen pelvis August 16, 2023 which       showed moderate hiatal hernia.  Patient had EGD by Dr. Patel 8/18/23       which showed mass lesion in distal esophagus beginning at 34 cm from       incisors going into the stomach cardia 42 cm biopsy showing moderate to       poorly differentiated invasive adenocarcinoma.  Patient had CT chest       August 18, 2023 which showed hiatal hernia with wall thickening in the       distal thoracic esophagus and portion of herniating stomach which would be       in keeping with the provided clinical history.  Patient had CT pancreas       August 19, 2023 which showed distal esophageal proximal gastric mass       consistent with malignancy.  Patient had PET CT scan August 30, 2023 which       showed heterogeneous hypermetabolic mass in distal esophagus/cardia       consistent with known malignancy patient underwent EUS August 31, 2023 by       Dr. Fenton which showed clinical uT3N0 Siewert 2.  Overall patient doing       well and eating mostly solid foods with little difficulty but does have 30       pound weight loss over the last 3 months.            1. Malignant neoplasm of lower third of esophagus (HCC)   C15.5              Malignant neoplasm of lower third of esophagus (HCC)      Staging form: Esophagus - Adenocarcinoma, AJCC 8th Edition      - Clinical stage from 8/31/2023: Stage III (cT3, cN0, cM0, G3) - Signed by       Michael Woods M.D. on 8/31/2023      Total positive nodes: 0      Histologic grading system: 3 grade system             RECOMMENDATIONS:       We will plan to treat the patient with preoperative chemo-radiotherapy.        The patient has a Stage T3N0M0 esophagus cancer.  We talked about the       general prognosis and treatment rationale.  We discussed our goal to       control the tumor in the thorax, prevent regional recurrence, and hopeful       help to prevent or delay metastatic progression.   We discussed the goal       of facilitating complete surgical resection. We will plan 50.4 Gy in 28       fractions with concurrent chemo per Dr. Triana.              We discussed that for  adenocarcinoma pathologic complete response rate is       29%.             We discussed the treatment planning process and treatments.  We discussed       the risks of treatment at length including lung injury, shortness of       breath, fibrosis, pneumonitis, acute and chronic esophageal injury, heart       injury, spinal cord injury which is rare, skin toxicity, chest wall       injury, pain, dehydration, supplemental oxygen use or dependence, and the       risk of treatment related death.  They would like to proceed forward with       treatment.  We will set up CT simulation for treatment planning imaging       9/6/23 3PM.  That will consist of supine immobilization, possibly IV       contrast depending on kidney function and targeting requirements,       appropriate skin surface marking for radiation treatment.                  Acute respiratory failure with hypoxia (HCC) (POA: Yes)    Thalamic hemorrhage (HCC) (POA: Yes)    CAP (community acquired pneumonia) (POA: Yes)    Severe protein-calorie malnutrition  (HCC) (POA: Yes)    Acute kidney injury superimposed on CKD (HCC) (POA: Yes)    Benign prostatic hyperplasia with incomplete bladder emptying (POA: Yes)    Other ascites (POA: Unknown)    Bilateral pleural effusion (POA: Yes)    Elevated troponin (POA: Yes)    Esophageal bleeding (POA: Yes)    Hypokalemia (POA: Unknown)    Malignant pleural effusion (POA: Yes)  Resolved Problems:    * No resolved hospital problems. *      History of Presenting Illness and Hospital Course  Tommy Mckeon is a 58 y.o. male with known esophageal cancer s/p radiation tx 11/2023, HTN, DLD, recent thalamic hemorrhage. He was admitted 2/19/2024 with syncope.  He was found to be in acute renal failure with a Cr:3.3 and has anasarca.  During his hospitalization he had a paracentesis with 10.3L removed. He was noted to be more anemic than prior.     Interval Problem Update  2/24: wbc:27.8, Cr:2.83. Repeat CT abd with ongoing hydroneprosis bilaterally L>R. There is a new psoas fluid collection and ongoing ascites (although I question if this is more so from his ureter). I have reviewed records with the  and wife.  They were made aware of the cytology showing malignant effusion.     2/25 Long discussion with family today. They recognize he is in an end of life situation and I told them I was concerned he may not even be able to make it home with hospice. In his current condition she would not be able to care for him alone. He is going for ureteral stent placement today. Renal function stable, GFR in the 20's, WBX worse today. CXR actually looks good, no infiltrates or consolidation, no reaccumulation of pleural fluid, no pneumothorax.  He is on 5 to 6 L nasal cannula and as soon as he takes off his supplemental oxygen his oxygen saturation immediately dropped into the low 80s.  No dyspnea or respiratory distress.  He is weak, does not look to be in significant distress from pain.  Palliative will meet with patient and family again  today.  I explained to them there are no oncology treatment options at this time and my suspicion that he will be able to get strong enough or improve his functional status in any meaningful way is very low.  He is unable to get up out of bed, having significant amount of dysphagia, suffering from acute respiratory failure with 0 physiological reserve.  My recommendation is to pursue hospice at this time as well however I feel they are still processing this, we will touch base again in the afternoon following the procedure.     His respiratory status rapidly declined and he and his family elected rfor comfort care. He  shortly after that surrounded by his family.    Death Date: 24   Death Time:          Pronounced By (RN1): Soledad Palmer RN  Pronounced By (RN2): Pavithra Lau RN

## 2024-03-02 NOTE — DOCUMENTATION QUERY
[facility]                                                                       Query Response Note      PATIENT:               MIRELLA PIPER  ACCT #:                  [id number]  MRN:                     [id number]  :                      1965  ADMIT DATE:       2024 12:31 PM  DISCH DATE:        2024 3:15 PM  RESPONDING  PROVIDER #:        [id number]           QUERY TEXT:    Lactic acid 4.0 is noted in the  Lab Results.  Can a diagnosis be specified to support this finding?    The patient's clinical indicators include:  H&P: Lactic acid initially 2.4 increasing to 4.0  Severe sepsis: indicator of end organ dysfunction include Lactic Acid > 2   Lactic acid 2.4, 4.0, 2.1, 1.3  Risk Factors: Sepsis  Treatment: trend lactic acid, IVF    Thank you,  Payal Kruger RN, BSN, CCDS  Clinical   Connect via Critique^It  Options provided:   -- Lactic acidosis is clinically significant and ruled in   -- Findings of no clinical significance   -- Other explanation, (please specify the other explanation)   -- Unable to determine      Query created by: Payal Kruger on 2024 12:23 PM    RESPONSE TEXT:    Lactic acidosis is clinically significant and ruled in          Electronically signed by:  ELLEN SELLERS MD 3/1/2024 7:42 PM

## 2024-03-19 LAB
FUNGUS SPEC CULT: NORMAL
FUNGUS SPEC CULT: NORMAL
FUNGUS SPEC FUNGUS STN: NORMAL
FUNGUS SPEC FUNGUS STN: NORMAL
SIGNIFICANT IND 70042: NORMAL
SIGNIFICANT IND 70042: NORMAL
SITE SITE: NORMAL
SITE SITE: NORMAL
SOURCE SOURCE: NORMAL
SOURCE SOURCE: NORMAL

## 2024-04-07 LAB
MYCOBACTERIUM SPEC CULT: NORMAL
MYCOBACTERIUM SPEC CULT: NORMAL
RHODAMINE-AURAMINE STN SPEC: NORMAL
RHODAMINE-AURAMINE STN SPEC: NORMAL
SIGNIFICANT IND 70042: NORMAL
SIGNIFICANT IND 70042: NORMAL
SITE SITE: NORMAL
SITE SITE: NORMAL
SOURCE SOURCE: NORMAL
SOURCE SOURCE: NORMAL

## 2024-04-10 NOTE — TELEPHONE ENCOUNTER
April 10, 2024  EMPLOYEE INFORMATION: EMPLOYER INFORMATION:   NAME: Linda WILLIAM   : 1985 [phone]    DATE OF INJURY/EVENT: 2024           Location: [facility]   Treating Provider: Andrew Doss MD  Time In:  10:59 AM Time Out:  1:19 PM      DIAGNOSIS:   1. Strain of muscle and tendon of back wall of thorax, initial encounter      STATUS: This injury is determined to be WORK RELATED.    RETURN TO WORK: Employee may return to work without restrictions.  Return Date: 4/10/2024            RESTRICTIONS:  No Restrictions    TREATMENT PLAN:   Medications for this injury/condition:   Ibuprofen 200 mg: Take 1 - 2 tablets by mouth every 4 hours as needed. Take with food.  Maximum dose is 1,200 mg per 24 hours.  Tylenol 500 mg, 1-2 tablets every 8 hours as needed. Maximum acetaminophen dose is 3,000 mg in 24 hours.  Use topical pain relievers per package instructions as needed (e.g. Biofreeze, Icy Hot, Aspercreme).   Referral/Consult: None  Diagnostic Testing: None       Instructions:   Apply ice or heat for 15-20 minutes, as needed for comfort.   Wear supportive, comfortable shoes.   Anticipate MMI on 24. She will contact us by that date if additional follow-up is required.     NEXT RETURN VISIT:  none  Thank you for the privilege of providing medical care for this injury/condition.  If there are any questions, please call the occupational health clinic at Dept: [phone].    Electronically signed on 4/10/2024 at 1:19 PM by:   Andrew Doss MD   [facility]   Nutrition Services: Telephone Encounter   Wt Readings from Last 7 Encounters:   10/25/23 100 kg (220 lb 10.9 oz)   10/23/23 97.5 kg (214 lb 15.2 oz)   10/18/23 98.3 kg (216 lb 11.4 oz)   10/16/23 96.7 kg (213 lb 3 oz)   10/11/23 100 kg (221 lb 1.9 oz)   10/09/23 98.8 kg (217 lb 13 oz)   10/04/23 98.3 kg (216 lb 11.4 oz)     Weight Change: wt trended up    RD called for nutrition check-in for final day of XRT, though pt did not answer. RD able to call and leave brief voicemessage encouraging pt to reach out to RD as needed. RD remains available PRN.    Please contact [phone]

## (undated) DEVICE — KIT CUSTOM PROCEDURE SINGLE FOR ENDO  (15/CA)

## (undated) DEVICE — FILM CASSETTE ENDO

## (undated) DEVICE — SUTURE GENERAL

## (undated) DEVICE — SUCTION INSTRUMENT YANKAUER BULBOUS TIP W/O VENT (50EA/CA)

## (undated) DEVICE — DRAPE LAPAROTOMY T SHEET - (12EA/CA)

## (undated) DEVICE — SODIUM CHL IRRIGATION 0.9% 1000ML (12EA/CA)

## (undated) DEVICE — DECANTER FLD BLS - (50/CA)

## (undated) DEVICE — SET EXTENSION WITH 2 PORTS (48EA/CA) ***PART #2C8610 IS A SUBSTITUTE*****

## (undated) DEVICE — TUBING CLEARLINK DUO-VENT - C-FLO (48EA/CA)

## (undated) DEVICE — SET LEADWIRE 5 LEAD BEDSIDE DISPOSABLE ECG (1SET OF 5/EA)

## (undated) DEVICE — CLOSURE SKIN STRIP 1/2 X 4 IN - (STERI STRIP) (50/BX 4BX/CA)

## (undated) DEVICE — DRAPE C-ARM LARGE 41IN X 74 IN - (10/BX 2BX/CA)

## (undated) DEVICE — DRESSING TRANSPARENT FILM TEGADERM 4 X 4.75" (50EA/BX)"

## (undated) DEVICE — BAG SPONGE COUNT 10.25 X 32 - BLUE (250/CA)

## (undated) DEVICE — SUTURE 3-0 VICRYL PLUS SH - 8X 18 INCH (12/BX)

## (undated) DEVICE — SUTURE 4-0 MONOCRYL PLUS PS-2 - 27 INCH (36/BX)

## (undated) DEVICE — MASK PANORAMIC OXYGEN PRO2 (30EA/CA)

## (undated) DEVICE — DRAPE IOBAN II INCISE 23X17 - (10EA/BX 4BX/CA)

## (undated) DEVICE — SLEEVE VASO CALF MED - (10PR/CA)

## (undated) DEVICE — TOWEL STOP TIMEOUT SAFETY FLAG (40EA/CA)

## (undated) DEVICE — CANISTER SUCTION RIGID RED 1500CC (40EA/CA)

## (undated) DEVICE — LACTATED RINGERS INJ 1000 ML - (14EA/CA 60CA/PF)

## (undated) DEVICE — CONTAINER, SPECIMEN, STERILE

## (undated) DEVICE — SYRINGE 10 ML CONTROL LL (25EA/BX 4BX/CA)

## (undated) DEVICE — CANNULA O2 COMFORT SOFT EAR ADULT 7 FT TUBING (50/CA)

## (undated) DEVICE — NEPTUNE 4 PORT MANIFOLD - (20/PK)

## (undated) DEVICE — BUTTON ENDOSCOPY DISPOSABLE

## (undated) DEVICE — TUBE CONNECTING SUCTION - CLEAR PLASTIC STERILE 72 IN (50EA/CA)

## (undated) DEVICE — ADHESIVE MASTISOL - (48/BX)

## (undated) DEVICE — SENSOR OXIMETER ADULT SPO2 RD SET (20EA/BX)

## (undated) DEVICE — SODIUM CHL. INJ. 0.9% 500ML (24EA/CA 50CA/PF)

## (undated) DEVICE — PORT AUXILLARY WATER (50EA/BX)

## (undated) DEVICE — WATER IRRIGATION STERILE 1000ML (12EA/CA)

## (undated) DEVICE — CANISTER SUCTION 3000ML MECHANICAL FILTER AUTO SHUTOFF MEDI-VAC NONSTERILE LF DISP  (40EA/CA)

## (undated) DEVICE — ELECTRODE DUAL RETURN W/ CORD - (50/PK)

## (undated) DEVICE — SYRINGE 30 ML LL (56/BX)

## (undated) DEVICE — BITE BLOCK, DISP.

## (undated) DEVICE — GLOVE BIOGEL SZ 8 SURGICAL PF LTX - (50PR/BX 4BX/CA)

## (undated) DEVICE — SUTURE 2-0 PROLENE SH (36PK/BX)

## (undated) DEVICE — GOWN WARMING STANDARD FLEX - (30/CA)

## (undated) DEVICE — COVER LIGHT HANDLE ALC PLUS DISP (18EA/BX)

## (undated) DEVICE — PACK MINOR BASIN - (2EA/CA)

## (undated) DEVICE — FORCEP RADIAL JAW 4 STANDARD CAPACITY W/NEEDLE 240CM (40EA/BX)

## (undated) DEVICE — BLADE SURGICAL #11 - (50/BX)

## (undated) DEVICE — SPONGE GAUZESTER. 2X2 4-PL - (2/PK 50PK/BX 30BX/CS)

## (undated) DEVICE — CHLORAPREP 26 ML APPLICATOR - ORANGE TINT(25/CA)